# Patient Record
Sex: FEMALE | Race: BLACK OR AFRICAN AMERICAN | NOT HISPANIC OR LATINO | Employment: FULL TIME | URBAN - METROPOLITAN AREA
[De-identification: names, ages, dates, MRNs, and addresses within clinical notes are randomized per-mention and may not be internally consistent; named-entity substitution may affect disease eponyms.]

---

## 2017-02-01 RX ORDER — LEVOTHYROXINE SODIUM 75 UG/1
TABLET ORAL
Qty: 90 TABLET | Refills: 0 | OUTPATIENT
Start: 2017-02-01

## 2017-02-07 ENCOUNTER — OFFICE VISIT (OUTPATIENT)
Dept: FAMILY MEDICINE | Facility: CLINIC | Age: 32
End: 2017-02-07
Payer: COMMERCIAL

## 2017-02-07 VITALS
BODY MASS INDEX: 27.46 KG/M2 | HEART RATE: 80 BPM | HEIGHT: 69 IN | WEIGHT: 185.44 LBS | TEMPERATURE: 99 F | DIASTOLIC BLOOD PRESSURE: 70 MMHG | SYSTOLIC BLOOD PRESSURE: 118 MMHG

## 2017-02-07 DIAGNOSIS — E03.1 CONGENITAL HYPOTHYROIDISM WITHOUT GOITER: Primary | ICD-10-CM

## 2017-02-07 PROCEDURE — 99999 PR PBB SHADOW E&M-EST. PATIENT-LVL III: CPT | Mod: PBBFAC,,, | Performed by: NURSE PRACTITIONER

## 2017-02-07 PROCEDURE — 99213 OFFICE O/P EST LOW 20 MIN: CPT | Mod: S$GLB,,, | Performed by: NURSE PRACTITIONER

## 2017-02-07 RX ORDER — LEVOTHYROXINE SODIUM 75 UG/1
75 TABLET ORAL
Qty: 30 TABLET | Refills: 1 | Status: SHIPPED | OUTPATIENT
Start: 2017-02-07 | End: 2017-03-17 | Stop reason: SDUPTHER

## 2017-02-07 NOTE — PROGRESS NOTES
Subjective:       Patient ID: Emily Pelayo is a 31 y.o. female.    Chief Complaint: thyroid check up    HPI Comments: Here for thyroid check up, states no new sxs. Has been out of thyroid meds since Friday.    Vitals:    02/07/17 0919   BP: 118/70   Pulse: 80   Temp: 98.7 °F (37.1 °C)     Review of Systems   Constitutional: Negative for chills and fever.   Respiratory: Negative for shortness of breath.    Cardiovascular: Negative for chest pain and palpitations.   Gastrointestinal:        Hx of IBS   Genitourinary:        Has been on birth control to regulate her cycle, sees Dr. Sargent for GYN care   Musculoskeletal: Negative.    Skin: Negative.        Past Medical History   Diagnosis Date    Endometriosis     Neuromuscular disorder      nerve damage of right foot after surgery    PCOS (polycystic ovarian syndrome)     Thyroid disease      Objective:      Physical Exam   Constitutional: She is oriented to person, place, and time. She appears well-developed and well-nourished.   HENT:   Head: Normocephalic.   Eyes: Pupils are equal, round, and reactive to light.   Neck: No thyromegaly present.   Cardiovascular: Normal rate, regular rhythm and normal heart sounds.    No murmur heard.  Pulmonary/Chest: Effort normal and breath sounds normal.   Abdominal: Soft. Bowel sounds are normal.   Musculoskeletal: Normal range of motion.   Neurological: She is alert and oriented to person, place, and time.   Skin: Skin is warm and dry.   Nursing note and vitals reviewed.      Assessment:       1. Congenital hypothyroidism without goiter        Plan:       Congenital hypothyroidism without goiter  -     TSH; Future; Expected date: 2/7/17  -     levothyroxine (SYNTHROID) 75 MCG tablet; Take 1 tablet (75 mcg total) by mouth before breakfast.  Dispense: 30 tablet; Refill: 1    labs as ordered in 7-10 days since she has been out of her medication, refill as above, further refills pending lab results        Return in about 6  months (around 8/7/2017). Dictation #1  MRN:[id number]  [facility]:[id number]

## 2017-02-07 NOTE — MR AVS SNAPSHOT
[facility]  [address]  [facility] [address]  Phone: [phone]  Fax: [phone]                  Emily Pelayo   2017 9:20 AM   Office Visit    Description:  Female : 1985   Provider:  EDER Vaughn   Department:  [facility]           Reason for Visit     thyroid check up           Diagnoses this Visit        Comments    Congenital hypothyroidism without goiter    -  Primary            To Do List           Goals (5 Years of Data)     None       These Medications        Disp Refills Start End    levothyroxine (SYNTHROID) 75 MCG tablet 30 tablet 1 2017     Take 1 tablet (75 mcg total) by mouth before breakfast. - Oral    Pharmacy: [facility] [address] [place] [address] #: [phone]         [facility] On Call     [facility] On Call Nurse Care Line -  Assistance  Registered nurses in the [facility] On Call Center provide clinical advisement, health education, appointment booking, and other advisory services.  Call for this free service at [phone].             Medications           Message regarding Medications     Verify the changes and/or additions to your medication regime listed below are the same as discussed with your clinician today.  If any of these changes or additions are incorrect, please notify your healthcare provider.             Verify that the below list of medications is an accurate representation of the medications you are currently taking.  If none reported, the list may be blank. If incorrect, please contact your healthcare provider. Carry this list with you in case of emergency.           Current Medications     CITRANATAL HARMONY, IRON FUM, 27 mg iron-1 mg -50 mg-260 mg Cap TAKE 1 CAPSULE BY MOUTH EVERY DAY    fluocinonide (LIDEX) 0.05 % external solution APPLY THIN FILM TO SCALP EVERY NIGHT AT BEDTIME AS NEEDED FOR ITCHING OR SCALING    ibuprofen  "(ADVIL,MOTRIN) 800 MG tablet Take 800 mg by mouth every 6 (six) hours as needed.     ketoconazole (NIZORAL) 2 % shampoo WASH HAIR WITH MEDICATED SHOMPOO AT LEAST TWICE A WEEK LET SIT ON SCALP AT LEAST 5 MINUTES PRIOR TO RINSING    LEVORA-28 0.15-0.03 mg per tablet TK 1 T PO QD    levothyroxine (SYNTHROID) 75 MCG tablet Take 1 tablet (75 mcg total) by mouth before breakfast.    progesterone (PROMETRIUM) 100 MG capsule TAKE ONE CAPSULE BY MOUTH EVERY NIGHT AT BEDTIME. ONE BY MOUTH DAILY ON DAYS 10-25 EACH CYCLE           Clinical Reference Information           Your Vitals Were     BP Pulse Temp Height Weight BMI    118/70 80 98.7 °F (37.1 °C) (Oral) 5' 9" (1.753 m) 84.1 kg (185 lb 6.5 oz) 27.38 kg/m2      Blood Pressure          Most Recent Value    BP  118/70      Allergies as of 2/7/2017     No Known Allergies      Immunizations Administered on Date of Encounter - 2/7/2017     None      Orders Placed During Today's Visit     Future Labs/Procedures Expected by Expires    TSH  2/7/2017 4/8/2018      Language Assistance Services     ATTENTION: Language assistance services are available, free of charge. Please call [phone].      ATENCIÓN: Si kathyla syeda, tiene a hernandez disposición servicios gratuitos de asistencia lingüística. Llame al [phone].     CHUCK Ý: N?u b?n nói Ti?ng Vi?t, có các d?ch v? h? tr? ngôn ng? mi?n phí dành cho b?n. G?i s? [phone].         [facility] complies with applicable Federal civil rights laws and does not discriminate on the basis of race, color, national origin, age, disability, or sex.        "

## 2017-03-04 ENCOUNTER — LAB VISIT (OUTPATIENT)
Dept: LAB | Facility: HOSPITAL | Age: 32
End: 2017-03-04
Attending: NURSE PRACTITIONER
Payer: COMMERCIAL

## 2017-03-04 DIAGNOSIS — E03.1 CONGENITAL HYPOTHYROIDISM WITHOUT GOITER: ICD-10-CM

## 2017-03-04 LAB
T4 FREE SERPL-MCNC: 1.33 NG/DL
TSH SERPL DL<=0.005 MIU/L-ACNC: 0.39 UIU/ML

## 2017-03-04 PROCEDURE — 84439 ASSAY OF FREE THYROXINE: CPT

## 2017-03-04 PROCEDURE — 36415 COLL VENOUS BLD VENIPUNCTURE: CPT | Mod: PO

## 2017-03-04 PROCEDURE — 84443 ASSAY THYROID STIM HORMONE: CPT

## 2017-03-08 ENCOUNTER — PATIENT MESSAGE (OUTPATIENT)
Dept: FAMILY MEDICINE | Facility: CLINIC | Age: 32
End: 2017-03-08

## 2017-03-13 NOTE — TELEPHONE ENCOUNTER
----- Message from Carolyne Bill sent at 3/13/2017 12:30 PM CDT -----  Contact: Rnju-[phone]  Patient called asking to speak with the nurse regarding lab results. Patient want to know if she should continue to take medication for thyroid.Thanks!

## 2017-03-14 NOTE — TELEPHONE ENCOUNTER
Dr LOVE   This pt is calling to ask since she has low thyroid will she continue on the same med or will she need to increase.

## 2017-03-14 NOTE — TELEPHONE ENCOUNTER
----- Message from Dolores Jones sent at 3/13/2017  1:45 PM CDT -----  Patient is waiting for office to call her back. Patient wants to know how much thyroid medication she should be taking. Her results (she believes because nobody explained what her results meant) said she was low thyroid. Could somebody please call patient and let her know how much medication should she be taking at [phone].

## 2017-03-16 ENCOUNTER — DOCUMENTATION ONLY (OUTPATIENT)
Dept: FAMILY MEDICINE | Facility: CLINIC | Age: 32
End: 2017-03-16

## 2017-03-16 NOTE — TELEPHONE ENCOUNTER
----- Message from Ivy Stevenson sent at 3/16/2017  7:42 AM CDT -----  Patient stated she left several messages requesting her lab test results (thyroid)stated has not heard from anyone/please call patient back at [phone] to advise. (Patient is agitated)

## 2017-03-16 NOTE — PROGRESS NOTES
Pre-Visit Chart Review  For Appointment Scheduled on 3/17/17.    Health Maintenance Due   Topic Date Due    TETANUS VACCINE  06/28/2001    Influenza Vaccine  08/01/2016

## 2017-03-17 ENCOUNTER — LAB VISIT (OUTPATIENT)
Dept: LAB | Facility: HOSPITAL | Age: 32
End: 2017-03-17
Attending: FAMILY MEDICINE
Payer: COMMERCIAL

## 2017-03-17 ENCOUNTER — OFFICE VISIT (OUTPATIENT)
Dept: FAMILY MEDICINE | Facility: CLINIC | Age: 32
End: 2017-03-17
Payer: COMMERCIAL

## 2017-03-17 VITALS
BODY MASS INDEX: 27.46 KG/M2 | DIASTOLIC BLOOD PRESSURE: 80 MMHG | SYSTOLIC BLOOD PRESSURE: 110 MMHG | TEMPERATURE: 99 F | WEIGHT: 185.44 LBS | HEIGHT: 69 IN | HEART RATE: 80 BPM

## 2017-03-17 DIAGNOSIS — E03.9 HYPOTHYROIDISM, UNSPECIFIED TYPE: ICD-10-CM

## 2017-03-17 DIAGNOSIS — R53.83 FATIGUE, UNSPECIFIED TYPE: ICD-10-CM

## 2017-03-17 DIAGNOSIS — E03.9 HYPOTHYROIDISM, UNSPECIFIED TYPE: Primary | ICD-10-CM

## 2017-03-17 LAB
25(OH)D3+25(OH)D2 SERPL-MCNC: 42 NG/ML
BASOPHILS # BLD AUTO: 0.04 K/UL
BASOPHILS NFR BLD: 0.7 %
DIFFERENTIAL METHOD: ABNORMAL
EOSINOPHIL # BLD AUTO: 0 K/UL
EOSINOPHIL NFR BLD: 0.7 %
ERYTHROCYTE [DISTWIDTH] IN BLOOD BY AUTOMATED COUNT: 12.6 %
FERRITIN SERPL-MCNC: 17 NG/ML
FOLATE SERPL-MCNC: 13.4 NG/ML
HCT VFR BLD AUTO: 39.4 %
HGB BLD-MCNC: 12.1 G/DL
IRON SERPL-MCNC: 52 UG/DL
LYMPHOCYTES # BLD AUTO: 1.9 K/UL
LYMPHOCYTES NFR BLD: 31 %
MCH RBC QN AUTO: 29.3 PG
MCHC RBC AUTO-ENTMCNC: 30.7 %
MCV RBC AUTO: 95 FL
MONOCYTES # BLD AUTO: 0.3 K/UL
MONOCYTES NFR BLD: 4.3 %
NEUTROPHILS # BLD AUTO: 3.9 K/UL
NEUTROPHILS NFR BLD: 63.3 %
PLATELET # BLD AUTO: 316 K/UL
PMV BLD AUTO: 11.2 FL
RBC # BLD AUTO: 4.13 M/UL
SATURATED IRON: 11 %
TOTAL IRON BINDING CAPACITY: 460 UG/DL
TRANSFERRIN SERPL-MCNC: 311 MG/DL
TSH SERPL DL<=0.005 MIU/L-ACNC: 1.13 UIU/ML
VIT B12 SERPL-MCNC: 357 PG/ML
WBC # BLD AUTO: 6.09 K/UL

## 2017-03-17 PROCEDURE — 82728 ASSAY OF FERRITIN: CPT

## 2017-03-17 PROCEDURE — 1160F RVW MEDS BY RX/DR IN RCRD: CPT | Mod: S$GLB,,, | Performed by: FAMILY MEDICINE

## 2017-03-17 PROCEDURE — 82306 VITAMIN D 25 HYDROXY: CPT

## 2017-03-17 PROCEDURE — 82746 ASSAY OF FOLIC ACID SERUM: CPT

## 2017-03-17 PROCEDURE — 99999 PR PBB SHADOW E&M-EST. PATIENT-LVL III: CPT | Mod: PBBFAC,,, | Performed by: FAMILY MEDICINE

## 2017-03-17 PROCEDURE — 83540 ASSAY OF IRON: CPT

## 2017-03-17 PROCEDURE — 84443 ASSAY THYROID STIM HORMONE: CPT

## 2017-03-17 PROCEDURE — 36415 COLL VENOUS BLD VENIPUNCTURE: CPT | Mod: PO

## 2017-03-17 PROCEDURE — 82607 VITAMIN B-12: CPT

## 2017-03-17 PROCEDURE — 85025 COMPLETE CBC W/AUTO DIFF WBC: CPT

## 2017-03-17 PROCEDURE — 99214 OFFICE O/P EST MOD 30 MIN: CPT | Mod: S$GLB,,, | Performed by: FAMILY MEDICINE

## 2017-03-17 RX ORDER — LEVOTHYROXINE SODIUM 75 UG/1
75 TABLET ORAL
Qty: 90 TABLET | Refills: 3 | Status: SHIPPED | OUTPATIENT
Start: 2017-03-17 | End: 2017-05-12 | Stop reason: SDUPTHER

## 2017-03-17 RX ORDER — CHOLECALCIFEROL (VITAMIN D3) 50 MCG
2000 TABLET ORAL DAILY
COMMUNITY

## 2017-03-17 NOTE — MR AVS SNAPSHOT
[facility]  [address]  [place] [address]  Phone: [phone]  Fax: [phone]                  Emily Pelayo   3/17/2017 8:00 AM   Office Visit    Description:  Female : 1985   Provider:  Olga Urbina MD   Department:  [facility]           Reason for Visit     Establish Care           Diagnoses this Visit        Comments    Fatigue, unspecified type    -  Primary     Hypothyroidism, unspecified type                To Do List           Future Appointments        Provider Department Dept Phone    3/17/2017 11:00 AM [facility] - [facility] [phone]    2017 8:00 AM Olga Urbina MD [facility] [phone]      Goals (5 Years of Data)     None      Follow-Up and Disposition     Return in about 6 months (around 2017) for hypothyroidism.       These Medications        Disp Refills Start End    levothyroxine (SYNTHROID) 75 MCG tablet 90 tablet 3 3/17/2017     Take 1 tablet (75 mcg total) by mouth before breakfast. - Oral    Pharmacy: [facility] [address] [place] [address] AT [facility] Ph #: [phone]         [facility] On [facility]     [facility] On Call Nurse Care Line -  Assistance  Registered nurses in the [facility] On Call Center provide clinical advisement, health education, appointment booking, and other advisory services.  Call for this free service at [phone].             Medications           Message regarding Medications     Verify the changes and/or additions to your medication regime listed below are the same as discussed with your clinician today.  If any of these changes or additions are incorrect, please notify your healthcare provider.             Verify that the below list of medications is an accurate representation of the medications you are currently taking.  If none reported, the list may be blank. If incorrect, please contact your healthcare provider.  "Carry this list with you in case of emergency.           Current Medications     CITRANATAL HARMONY, IRON FUM, 27 mg iron-1 mg -50 mg-260 mg Cap TAKE 1 CAPSULE BY MOUTH EVERY DAY    fluocinonide (LIDEX) 0.05 % external solution APPLY THIN FILM TO SCALP EVERY NIGHT AT BEDTIME AS NEEDED FOR ITCHING OR SCALING    ibuprofen (ADVIL,MOTRIN) 800 MG tablet Take 800 mg by mouth every 6 (six) hours as needed.     ketoconazole (NIZORAL) 2 % shampoo WASH HAIR WITH MEDICATED SHOMPOO AT LEAST TWICE A WEEK LET SIT ON SCALP AT LEAST 5 MINUTES PRIOR TO RINSING    LEVORA-28 0.15-0.03 mg per tablet TK 1 T PO QD    levothyroxine (SYNTHROID) 75 MCG tablet Take 1 tablet (75 mcg total) by mouth before breakfast.    progesterone (PROMETRIUM) 100 MG capsule TAKE ONE CAPSULE BY MOUTH EVERY NIGHT AT BEDTIME. ONE BY MOUTH DAILY ON DAYS 10-25 EACH CYCLE           Clinical Reference Information           Your Vitals Were     BP Pulse Temp Height Weight Last Period    110/80 (BP Location: Left arm, Patient Position: Sitting, BP Method: Automatic) 80 98.6 °F (37 °C) (Oral) 5' 8.5" (1.74 m) 84.1 kg (185 lb 6.5 oz) 03/13/2017    BMI                27.78 kg/m2          Blood Pressure          Most Recent Value    BP  110/80      Allergies as of 3/17/2017     No Known Allergies      Immunizations Administered on Date of Encounter - 3/17/2017     None      Orders Placed During Today's Visit     Future Labs/Procedures Expected by Expires    CBC auto differential  3/17/2017 5/16/2018    Ferritin  3/17/2017 5/16/2018    Folate  3/17/2017 5/16/2018    Iron and TIBC  3/17/2017 5/16/2018    TSH  3/17/2017 3/17/2018    Vitamin B12  3/17/2017 5/16/2018    Vitamin D  3/17/2017 5/16/2018      Language Assistance Services     ATTENTION: Language assistance services are available, free of charge. Please call [phone].      ATENCIÓN: Si habla español, tiene a hernandez disposición servicios gratuitos de asistencia lingüística. Llame al [phone].     CHÚ Ý: N?u " b?n nói Ti?ng Vi?t, có các d?ch v? h? tr? ngôn ng? mi?n phí dành cho b?n. G?i s? [phone].         [facility] complies with applicable Federal civil rights laws and does not discriminate on the basis of race, color, national origin, age, disability, or sex.

## 2017-03-17 NOTE — PROGRESS NOTES
CHIEF COMPLAINT:  Establish care, hypothyroidism      HISTORY OF PRESENT ILLNESS:  Emily Pelayo is a 31 y.o. female who presents to clinic as a new patient to me for evaluation of hypothyroidism. She is on levothyroxine 75 mcg daily. She had a history of Grave's disease but was then started on thyroid supplementation with pregnancy. Recent TSH was slightly low at 0.394.  She describes excessive fatigue, she has a history of anemia and has heavy periods. She is currently taking a multivitamin and vitamin D 2000 units daily.       REVIEW OF SYSTEMS:  The patient denies any fever, chills, night sweats, headaches, vision changes, difficulty speaking or swallowing, decreased hearing, weight loss, weight gain, chest pain, palpitations, shortness of breath, cough, nausea, vomiting, abdominal pain, dysuria, diarrhea, constipation, hematuria, hematochezia, melena, changes in her hair, skin, nails, numbness or weakness in her extremities, erythema, pain or swelling over any of her joints, myalgia, swollen glands, easy bruising, edema, symptoms of anxiety or depression.       MEDICATIONS:   Reviewed and/or reconciled in EPIC    ALLERGIES:  Reviewed and/or reconciled in EPIC    PAST MEDICAL/SURGICAL HISTORY:   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    PCOS (polycystic ovarian syndrome)     Thyroid disease       Past Surgical History:   Procedure Laterality Date    bladder suspension      dx lap      hammer toe Right 11/19/2015    heavenly toe repeair with callus filing    TONSILLECTOMY      VAGINAL HYSTERECTOMY W/ ANTERIOR AND POSTERIOR VAGINAL REPAIR         FAMILY HISTORY:    Family History   Problem Relation Age of Onset    Ovarian cancer Neg Hx     Breast cancer Neg Hx     Eczema Neg Hx     Lupus Neg Hx     Psoriasis Neg Hx     Melanoma Neg Hx        SOCIAL HISTORY:    Social History     Social History    Marital status:  "Single     Spouse name: N/A    Number of children: N/A    Years of education: N/A     Occupational History    Not on file.     Social History Main Topics    Smoking status: Never Smoker    Smokeless tobacco: Never Used    Alcohol use No    Drug use: No    Sexual activity: Yes     Partners: Male     Birth control/ protection: Condom     Other Topics Concern    Not on file     Social History Narrative       PHYSICAL EXAM:  VITAL SIGNS:   Vitals:    03/17/17 0805   BP: 110/80   BP Location: Left arm   Patient Position: Sitting   BP Method: Automatic   Pulse: 80   Temp: 98.6 °F (37 °C)   TempSrc: Oral   Weight: 84.1 kg (185 lb 6.5 oz)   Height: 5' 8.5" (1.74 m)     GENERAL:  Patient appears well nourished, sitting on exam table, in no acute distress.  HEENT:  Atraumatic, normocephalic, PERRLA, EOMI, no conjunctival injection, sclerae are anicteric, normal external auditory canals,TMs clear b/l, gross hearing intact to whisper, MMM, no oropharygneal erythema or exudate.  NECK:  Supple, normal ROM, trachea is midline , no supraclavicular or cervical LAD or masses palpated.  Thyroid gland not palpable.  CARDIOVASCULAR:  RRR, normal S1 and S2, no m/r/g.  RESPIRATORY:  CTA b/l, no wheezes, rhonchi, rales.  No increased work of breathing, no  use of accessory muscles.  ABDOMEN:  Soft, nontender, nondistended, normoactive bowel sounds in all four quadrants, no rebound or guarding, no HSM or masses palpated.  Normal percussion.  EXTREMITIES:  2+ DP pulses b/l, no edema.  SKIN:  Warm, no lesions on exposed skin.  NEUROMUSCULAR:  Cranial nerves II-XII grossly intact. No clubbing or cyanosis of digits/nails.  Steady gait.  PSYCH:  Patient is alert and oriented to person, time, place. They are appropriately dressed and groomed. There is normal eye contact. Rate and tone of speech is normal. Normal insight, judgement. Normal thought content and process.         ASSESSMENT/PLAN: This is a 31 y.o. female who presents to " clinic for evaluation of the following concerns  1. Hypothyroidism: repeat TSH, if still decreased may need to decrease levothyroxine dose  2. Fatigue: CBC, TSH, iron and ferritin levels, B12, folate        Olga Urbina MD

## 2017-03-28 ENCOUNTER — TELEPHONE (OUTPATIENT)
Dept: FAMILY MEDICINE | Facility: CLINIC | Age: 32
End: 2017-03-28

## 2017-03-28 DIAGNOSIS — E61.1 LOW IRON: Primary | ICD-10-CM

## 2017-03-28 NOTE — TELEPHONE ENCOUNTER
TSH is in goal range, no need to adjust levothyroxine at this time.  there is low ferritin and saturated iron. vitamin D level is at goal. B12 and folate are normal. There is no anemia.      needs to take ferrous sulfate 325 mg 1-2 tablets a day with vitamin C and have repeat labs in 3 months to recheck iron levels. This may help with her fatigue

## 2017-03-28 NOTE — TELEPHONE ENCOUNTER
----- Message from Sharonda Gerardo sent at 3/28/2017  1:23 PM CDT -----  Contact: self  Patient wants to speak with a nurse results. Please call back at [phone]

## 2017-05-12 ENCOUNTER — PATIENT MESSAGE (OUTPATIENT)
Dept: OBSTETRICS AND GYNECOLOGY | Facility: CLINIC | Age: 32
End: 2017-05-12

## 2017-05-12 DIAGNOSIS — E03.9 HYPOTHYROIDISM, UNSPECIFIED TYPE: ICD-10-CM

## 2017-05-12 RX ORDER — LEVOTHYROXINE SODIUM 75 UG/1
75 TABLET ORAL
Qty: 90 TABLET | Refills: 3 | Status: SHIPPED | OUTPATIENT
Start: 2017-05-12 | End: 2018-06-04 | Stop reason: SDUPTHER

## 2017-05-12 NOTE — TELEPHONE ENCOUNTER
----- Message from Keren Vazquez sent at 5/12/2017  8:09 AM CDT -----  Contact: self  Patient needs a refill on tyroid meds, states she has 1 left.  Please call back at [phone] (home)   [facility]-[phone]

## 2017-05-12 NOTE — TELEPHONE ENCOUNTER
I offered the patient an appointment to get her annual exam. She is very upset. She is demanding a refill now. Please advise

## 2017-05-12 NOTE — TELEPHONE ENCOUNTER
Patient called at 2:40 pm reporting she over slept after being added for 1 pm today. Advised she will need to reschedule to next available appointment.

## 2017-05-12 NOTE — TELEPHONE ENCOUNTER
----- Message from Pallavi Owens RT sent at 5/12/2017  2:52 PM CDT -----  Contact: pt    pt , requesting to inform she over slept from work last night and missed her appt today and would like to be rescheduled can your schedule as soon as possible, thanks.

## 2017-05-15 RX ORDER — LEVONORGESTREL AND ETHINYL ESTRADIOL 0.15-0.03
1 KIT ORAL DAILY
Qty: 30 TABLET | Refills: 3 | Status: SHIPPED | OUTPATIENT
Start: 2017-05-15 | End: 2017-08-02 | Stop reason: SDUPTHER

## 2017-05-29 ENCOUNTER — OFFICE VISIT (OUTPATIENT)
Dept: OBSTETRICS AND GYNECOLOGY | Facility: CLINIC | Age: 32
End: 2017-05-29
Payer: COMMERCIAL

## 2017-05-29 VITALS
DIASTOLIC BLOOD PRESSURE: 68 MMHG | BODY MASS INDEX: 27.15 KG/M2 | WEIGHT: 181.19 LBS | SYSTOLIC BLOOD PRESSURE: 114 MMHG

## 2017-05-29 DIAGNOSIS — Z01.419 ENCOUNTER FOR WELL WOMAN EXAM WITH ROUTINE GYNECOLOGICAL EXAM: Primary | ICD-10-CM

## 2017-05-29 DIAGNOSIS — N81.4 UTERINE PROLAPSE: ICD-10-CM

## 2017-05-29 PROCEDURE — 88175 CYTOPATH C/V AUTO FLUID REDO: CPT

## 2017-05-29 PROCEDURE — 99395 PREV VISIT EST AGE 18-39: CPT | Mod: S$GLB,,, | Performed by: SPECIALIST

## 2017-05-29 PROCEDURE — 99999 PR PBB SHADOW E&M-EST. PATIENT-LVL III: CPT | Mod: PBBFAC,,, | Performed by: SPECIALIST

## 2017-05-29 RX ORDER — FLUCONAZOLE 100 MG/1
100 TABLET ORAL WEEKLY
Qty: 30 TABLET | Refills: 0 | Status: SHIPPED | OUTPATIENT
Start: 2017-05-29 | End: 2018-01-25 | Stop reason: SDUPTHER

## 2017-05-29 NOTE — PROGRESS NOTES
30 yo BF presents for annual gyn evaluation. No acute gyn complaints with exception of vaginal pressure. Pt with known hx of uterine prolapse.  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    PCOS (polycystic ovarian syndrome)     Thyroid disease        Past Surgical History:   Procedure Laterality Date    bladder suspension      dx lap      hammer toe Right 11/19/2015    heavenly toe repeair with callus filing    TONSILLECTOMY         Family History   Problem Relation Age of Onset    Thyroid disease Mother      hyperthyroidism    Hypertension Mother     Hypertension Sister     Heart disease Maternal Grandmother     Ovarian cancer Neg Hx     Breast cancer Neg Hx     Eczema Neg Hx     Lupus Neg Hx     Psoriasis Neg Hx     Melanoma Neg Hx     Cancer Neg Hx        Social History     Social History    Marital status: Single     Spouse name: N/A    Number of children: N/A    Years of education: N/A     Social History Main Topics    Smoking status: Never Smoker    Smokeless tobacco: Never Used    Alcohol use No    Drug use: No    Sexual activity: Yes     Partners: Male     Birth control/ protection: Condom     Other Topics Concern    None     Social History Narrative    None       Current Outpatient Prescriptions   Medication Sig Dispense Refill    cholecalciferol, vitamin D3, (VITAMIN D3) 2,000 unit Tab Take 2,000 Units by mouth once daily.      fluocinonide (LIDEX) 0.05 % external solution APPLY THIN FILM TO SCALP EVERY NIGHT AT BEDTIME AS NEEDED FOR ITCHING OR SCALING 60 mL 0    ibuprofen (ADVIL,MOTRIN) 800 MG tablet Take 800 mg by mouth every 6 (six) hours as needed.   0    ketoconazole (NIZORAL) 2 % shampoo WASH HAIR WITH MEDICATED SHOMPOO AT LEAST TWICE A WEEK LET SIT ON SCALP AT LEAST 5 MINUTES PRIOR TO RINSING 120 mL 0    LEVORA-28 0.15-0.03 mg per tablet Take 1 tablet by mouth once daily. 30 tablet 3     levothyroxine (SYNTHROID) 75 MCG tablet Take 1 tablet (75 mcg total) by mouth before breakfast. 90 tablet 3    PNV59-iron,carb,fum-FA-DSS-dha (CITRANATAL HARMONY, IRON FUM,) 27 mg iron-1 mg -50 mg-260 mg Cap Take 1 capsule by mouth once daily. 100 capsule 0    progesterone (PROMETRIUM) 100 MG capsule TAKE ONE CAPSULE BY MOUTH EVERY NIGHT AT BEDTIME. ONE BY MOUTH DAILY ON DAYS 10-25 EACH CYCLE 90 capsule 3     No current facility-administered medications for this visit.        Review of patient's allergies indicates:  No Known Allergies    Review of System:   General: no chills, fever, night sweats, weight gain or weight loss  Psychological: no depression or suicidal ideation  Breasts: no new or changing breast lumps, nipple discharge or masses.  Respiratory: no cough, shortness of breath, or wheezing  Cardiovascular: no chest pain or dyspnea on exertion  Gastrointestinal: no abdominal pain, change in bowel habits, or black or bloody stools  Genito-Urinary: no incontinence, urinary frequency/urgency or , pelvic pain or abnormal vaginal bleeding.RELATIVE VAGINAL PRESSURE  Musculoskeletal: no gait disturbance or muscular weakness    General Appearance:  Alert, cooperative, no distress, appears stated age   Head:  Normocephalic, without obvious abnormality, atraumatic   Eyes:  PERRL, conjunctiva/corneas clear, EOM's intact, fundi benign, both eyes   Ears:  Normal TM's and external ear canals, both ears   Nose: Nares normal, septum midline,mucosa normal, no drainage or sinus tenderness   Throat: Lips, mucosa, and tongue normal; teeth and gums normal   Neck: Supple, symmetrical, trachea midline, no adenopathy;  thyroid: not enlarged, symmetric, no tenderness/mass/nodules; no carotid bruit or JVD   Back:   Symmetric, no curvature, ROM normal, no CVA tenderness   Lungs:   Clear to auscultation bilaterally, respirations unlabored   Breasts:  No masses or tenderness   Heart:  Regular rate and rhythm, S1 and S2 normal,  no murmur, rub, or gallop   Abdomen:   Soft, non-tender, bowel sounds active all four quadrants,  no masses, no organomegaly    Genitourinary:   External rectal exam shows no thrombosed external hemorrhoids.   Pelvic exam was performed with patient supine.  No labial fusion.  There is no rash, lesion or injury on the right labia.  There is no rash, lesion or injury on the left labia.  No bleeding and no signs of injury around the vaginal introitus, urethra is without lesions and well supported. The cervix is visualized with no discharge, lesions or friability.  No vaginal discharge found.  No significant Cystocele, Enterocele or rectocele, and uterus displaying Grade 1 prolapse.  Bimanual exam:  The urethra is normal to palpation and there are no palpable vaginal wall masses.  Uterus is not deviated, not enlarged, not fixed, normal shape and not tender.  Cervix exhibits no motion tenderness.   Right adnexum displays no mass and no tenderness.  Left adnexum displays no mass and no tenderness.   Extremities: Extremities normal, atraumatic, no cyanosis or edema   Pulses: 2+ and symmetric   Skin: Skin color, texture, turgor normal, no rashes or lesions   Lymph nodes: Cervical, supraclavicular, and axillary nodes normal   Neurologic: Normal     PAP submitted    I discussed prolapse and supportive care measures   RTO 1 year/prn

## 2017-06-29 ENCOUNTER — LAB VISIT (OUTPATIENT)
Dept: LAB | Facility: HOSPITAL | Age: 32
End: 2017-06-29
Attending: FAMILY MEDICINE
Payer: COMMERCIAL

## 2017-06-29 ENCOUNTER — TELEPHONE (OUTPATIENT)
Dept: FAMILY MEDICINE | Facility: CLINIC | Age: 32
End: 2017-06-29

## 2017-06-29 DIAGNOSIS — E61.1 LOW IRON: ICD-10-CM

## 2017-06-29 LAB
BASOPHILS # BLD AUTO: 0.06 K/UL
BASOPHILS NFR BLD: 1 %
DIFFERENTIAL METHOD: NORMAL
EOSINOPHIL # BLD AUTO: 0.1 K/UL
EOSINOPHIL NFR BLD: 1.5 %
ERYTHROCYTE [DISTWIDTH] IN BLOOD BY AUTOMATED COUNT: 12.6 %
FERRITIN SERPL-MCNC: 20 NG/ML
HCT VFR BLD AUTO: 37.8 %
HGB BLD-MCNC: 12.1 G/DL
IRON SERPL-MCNC: 62 UG/DL
LYMPHOCYTES # BLD AUTO: 2.2 K/UL
LYMPHOCYTES NFR BLD: 36.7 %
MCH RBC QN AUTO: 30.1 PG
MCHC RBC AUTO-ENTMCNC: 32 %
MCV RBC AUTO: 94 FL
MONOCYTES # BLD AUTO: 0.5 K/UL
MONOCYTES NFR BLD: 8.2 %
NEUTROPHILS # BLD AUTO: 3.1 K/UL
NEUTROPHILS NFR BLD: 52.4 %
PLATELET # BLD AUTO: 305 K/UL
PMV BLD AUTO: 11 FL
RBC # BLD AUTO: 4.02 M/UL
SATURATED IRON: 14 %
TOTAL IRON BINDING CAPACITY: 448 UG/DL
TRANSFERRIN SERPL-MCNC: 303 MG/DL
WBC # BLD AUTO: 5.96 K/UL

## 2017-06-29 PROCEDURE — 36415 COLL VENOUS BLD VENIPUNCTURE: CPT | Mod: PO

## 2017-06-29 PROCEDURE — 85025 COMPLETE CBC W/AUTO DIFF WBC: CPT

## 2017-06-29 PROCEDURE — 82728 ASSAY OF FERRITIN: CPT

## 2017-06-29 PROCEDURE — 83540 ASSAY OF IRON: CPT

## 2017-06-29 NOTE — TELEPHONE ENCOUNTER
Patient came in for labs for repeat iron studies from march last TSH was normal in march does patient need to have thyroid labs.

## 2017-07-25 ENCOUNTER — PATIENT MESSAGE (OUTPATIENT)
Dept: FAMILY MEDICINE | Facility: CLINIC | Age: 32
End: 2017-07-25

## 2017-07-25 NOTE — TELEPHONE ENCOUNTER
Dr. Urbina---please review pts My Chart message and advise. Does pt need an additional Iron supplement with the prenatal vitamins? ThanksRadha

## 2017-07-25 NOTE — TELEPHONE ENCOUNTER
Does her prenatal vitamin have iron in it? It it does, that is enough.  It it doesn't, needs to add ferrous sulfate 325 mg daily with the vitamin C

## 2017-08-04 ENCOUNTER — PATIENT MESSAGE (OUTPATIENT)
Dept: OBSTETRICS AND GYNECOLOGY | Facility: CLINIC | Age: 32
End: 2017-08-04

## 2017-08-07 RX ORDER — LEVONORGESTREL AND ETHINYL ESTRADIOL 0.15-0.03
KIT ORAL
Qty: 28 TABLET | Refills: 0 | Status: SHIPPED | OUTPATIENT
Start: 2017-08-07 | End: 2018-01-25 | Stop reason: SDUPTHER

## 2017-09-11 ENCOUNTER — DOCUMENTATION ONLY (OUTPATIENT)
Dept: FAMILY MEDICINE | Facility: CLINIC | Age: 32
End: 2017-09-11

## 2017-09-11 NOTE — PROGRESS NOTES
Pre-Visit Chart Review  For Appointment Scheduled on 9/20/17.    Health Maintenance Due   Topic Date Due    Influenza Vaccine  08/01/2017

## 2017-09-20 ENCOUNTER — TELEPHONE (OUTPATIENT)
Dept: FAMILY MEDICINE | Facility: CLINIC | Age: 32
End: 2017-09-20

## 2017-09-20 NOTE — TELEPHONE ENCOUNTER
Pt was scheduled to see Dr. Urbina today at 8 am. Pt was not in our lobby by 820 am, so Dr. Urbina said that she was not going to be able to be seen if she showed up. At 830 am we received an instant message from the  that pt was on the other side of the clinic in [address], but that she did not sign the clipboard to get checked in. Asked Dr. Urbina if pt could be seen, and again Dr. Urbina stated that she could not see pt today, her schedule is full. Offered pt appt this Friday afternoon, pt declined, offered pt 3 different appts next week, pt declined those too. Pt states that she did not even know why she had to come to an appt today, and that if she could not see her PCP right now, she was going to find a new doctor. Apologized to pt and advised her that Dr. Urbina is just back from being out of the office for 10 days and her schedule is full until Friday. Pt again stated that she was going to find a new doctor and left the building. Advised Dr. Urbina. Thanks, Radha

## 2017-09-25 ENCOUNTER — OFFICE VISIT (OUTPATIENT)
Dept: FAMILY MEDICINE | Facility: CLINIC | Age: 32
End: 2017-09-25
Payer: COMMERCIAL

## 2017-09-25 ENCOUNTER — LAB VISIT (OUTPATIENT)
Dept: LAB | Facility: HOSPITAL | Age: 32
End: 2017-09-25
Attending: FAMILY MEDICINE
Payer: COMMERCIAL

## 2017-09-25 VITALS
HEIGHT: 69 IN | HEART RATE: 77 BPM | WEIGHT: 183 LBS | BODY MASS INDEX: 27.11 KG/M2 | TEMPERATURE: 99 F | DIASTOLIC BLOOD PRESSURE: 87 MMHG | SYSTOLIC BLOOD PRESSURE: 121 MMHG

## 2017-09-25 DIAGNOSIS — E03.9 HYPOTHYROIDISM, UNSPECIFIED TYPE: ICD-10-CM

## 2017-09-25 DIAGNOSIS — Z76.89 ESTABLISHING CARE WITH NEW DOCTOR, ENCOUNTER FOR: Primary | ICD-10-CM

## 2017-09-25 DIAGNOSIS — E28.2 PCOS (POLYCYSTIC OVARIAN SYNDROME): ICD-10-CM

## 2017-09-25 LAB
T4 SERPL-MCNC: 11.2 UG/DL
TSH SERPL DL<=0.005 MIU/L-ACNC: 0.7 UIU/ML

## 2017-09-25 PROCEDURE — 36415 COLL VENOUS BLD VENIPUNCTURE: CPT | Mod: PO

## 2017-09-25 PROCEDURE — 84436 ASSAY OF TOTAL THYROXINE: CPT

## 2017-09-25 PROCEDURE — 99999 PR PBB SHADOW E&M-EST. PATIENT-LVL III: CPT | Mod: PBBFAC,,, | Performed by: FAMILY MEDICINE

## 2017-09-25 PROCEDURE — 84443 ASSAY THYROID STIM HORMONE: CPT

## 2017-09-25 PROCEDURE — 99214 OFFICE O/P EST MOD 30 MIN: CPT | Mod: S$GLB,,, | Performed by: FAMILY MEDICINE

## 2017-09-25 PROCEDURE — 3008F BODY MASS INDEX DOCD: CPT | Mod: S$GLB,,, | Performed by: FAMILY MEDICINE

## 2017-09-25 NOTE — PROGRESS NOTES
Subjective:       Patient ID: Emily Pelayo is a 32 y.o. female.    Chief Complaint: Follow-up and Establish Care    HPI     Establish Care  Pt reports to the clinic to establish care.   The pt has a medical history which includes PCOS and hypothyroidism.   As far as smoking is concerned, the pt denies.   The pt attempts to maintain a healthy diet and engages in regular exercise.   Consistent seatbelt usage reported.   Pt has no symptoms of depression.   Health maintenance addressed and updated in chart.    Review of Systems   Constitutional: Positive for fatigue. Negative for chills and fever.   HENT: Negative for sneezing and sore throat.    Eyes: Negative for visual disturbance.   Respiratory: Negative for cough and shortness of breath.    Cardiovascular: Negative for chest pain and leg swelling.   Gastrointestinal: Negative for abdominal pain, blood in stool, constipation, diarrhea and vomiting.   Genitourinary: Negative for difficulty urinating, dysuria and hematuria.   Musculoskeletal: Negative for arthralgias.   Neurological: Negative for dizziness and weakness.       Objective:      Physical Exam   Constitutional: She appears well-developed and well-nourished. No distress.   HENT:   Head: Normocephalic and atraumatic.   Mouth/Throat: Oropharynx is clear and moist. No oropharyngeal exudate.   Eyes: EOM are normal. Pupils are equal, round, and reactive to light.   Neck: Normal range of motion. Neck supple. No thyromegaly present.   Cardiovascular: Normal rate, regular rhythm, normal heart sounds and intact distal pulses.    Pulmonary/Chest: Effort normal and breath sounds normal. No respiratory distress. She has no wheezes.   Abdominal: Soft. Bowel sounds are normal. She exhibits no distension and no mass. There is no tenderness.   Musculoskeletal: She exhibits no edema.   Lymphadenopathy:     She has no cervical adenopathy.   Neurological: She is alert.   Skin: Skin is warm. No rash noted. No erythema.    Psychiatric: She has a normal mood and affect. Her behavior is normal.   Vitals reviewed.      Assessment:       1. Establishing care with new doctor, encounter for    2. Hypothyroidism, unspecified type    3. PCOS (polycystic ovarian syndrome)        Plan:       1. Establishing care with new doctor, encounter for  Patient has been advised to continue to maintain a healthy lifestyle, including regular exercise and consuming a well balanced diet.     2. Hypothyroidism, unspecified type  - TSH; Future  - T4; Future    3. PCOS (polycystic ovarian syndrome)  Followed by Gyn for this condition.     Portions of this note were created using Dragon voice recognition software. There may be voice recognition errors found in the text, and attempts were made to correct these errors prior to signature    Anshul Hernandes MD    Family Medicine  9/25/2017

## 2017-10-31 DIAGNOSIS — L21.9 SEBORRHEIC DERMATITIS OF SCALP: ICD-10-CM

## 2017-10-31 RX ORDER — FLUOCINONIDE TOPICAL SOLUTION USP, 0.05% 0.5 MG/ML
SOLUTION TOPICAL
Qty: 60 ML | Refills: 0 | Status: SHIPPED | OUTPATIENT
Start: 2017-10-31 | End: 2018-07-16 | Stop reason: SDUPTHER

## 2017-10-31 RX ORDER — KETOCONAZOLE 20 MG/ML
SHAMPOO, SUSPENSION TOPICAL
Qty: 120 ML | Refills: 0 | Status: SHIPPED | OUTPATIENT
Start: 2017-10-31 | End: 2018-07-16 | Stop reason: SDUPTHER

## 2017-12-18 ENCOUNTER — TELEPHONE (OUTPATIENT)
Dept: OBSTETRICS AND GYNECOLOGY | Facility: CLINIC | Age: 32
End: 2017-12-18

## 2017-12-18 NOTE — TELEPHONE ENCOUNTER
----- Message from Valentin ORTEGA Frisard sent at 12/18/2017  7:52 AM CST -----  Contact: same  FYI--Patient called in and is running about 10 minutes late for her 8am appt today Monday 12/18.

## 2017-12-19 ENCOUNTER — OFFICE VISIT (OUTPATIENT)
Dept: OBSTETRICS AND GYNECOLOGY | Facility: CLINIC | Age: 32
End: 2017-12-19
Payer: COMMERCIAL

## 2017-12-19 ENCOUNTER — LAB VISIT (OUTPATIENT)
Dept: LAB | Facility: HOSPITAL | Age: 32
End: 2017-12-19
Attending: OBSTETRICS & GYNECOLOGY
Payer: COMMERCIAL

## 2017-12-19 VITALS
HEIGHT: 69 IN | SYSTOLIC BLOOD PRESSURE: 131 MMHG | BODY MASS INDEX: 27.39 KG/M2 | HEART RATE: 82 BPM | WEIGHT: 184.94 LBS | DIASTOLIC BLOOD PRESSURE: 81 MMHG

## 2017-12-19 DIAGNOSIS — Z11.3 SCREEN FOR STD (SEXUALLY TRANSMITTED DISEASE): ICD-10-CM

## 2017-12-19 DIAGNOSIS — Z71.1 CONCERN ABOUT STD IN FEMALE WITHOUT DIAGNOSIS: ICD-10-CM

## 2017-12-19 DIAGNOSIS — N93.9 ABNORMAL UTERINE BLEEDING: ICD-10-CM

## 2017-12-19 DIAGNOSIS — N92.6 IRREGULAR PERIODS: Primary | ICD-10-CM

## 2017-12-19 LAB
B-HCG UR QL: NEGATIVE
CTP QC/QA: YES
RPR SER QL: NORMAL

## 2017-12-19 PROCEDURE — 87591 N.GONORRHOEAE DNA AMP PROB: CPT

## 2017-12-19 PROCEDURE — 81025 URINE PREGNANCY TEST: CPT | Mod: S$GLB,,, | Performed by: OBSTETRICS & GYNECOLOGY

## 2017-12-19 PROCEDURE — 86703 HIV-1/HIV-2 1 RESULT ANTBDY: CPT

## 2017-12-19 PROCEDURE — 86592 SYPHILIS TEST NON-TREP QUAL: CPT

## 2017-12-19 PROCEDURE — 99999 PR PBB SHADOW E&M-EST. PATIENT-LVL III: CPT | Mod: PBBFAC,,, | Performed by: OBSTETRICS & GYNECOLOGY

## 2017-12-19 PROCEDURE — 87480 CANDIDA DNA DIR PROBE: CPT

## 2017-12-19 PROCEDURE — 36415 COLL VENOUS BLD VENIPUNCTURE: CPT

## 2017-12-19 PROCEDURE — 99214 OFFICE O/P EST MOD 30 MIN: CPT | Mod: S$GLB,,, | Performed by: OBSTETRICS & GYNECOLOGY

## 2017-12-19 PROCEDURE — 87660 TRICHOMONAS VAGIN DIR PROBE: CPT

## 2017-12-19 NOTE — PROGRESS NOTES
Subjective:       Patient ID: Emily Pelayo is a 32 y.o. female.    Chief Complaint:  Menometrorrhagia and STD CHECK      History of Present Illness  HPI  Dysfunctional Uterine Bleeding  Patient complains of irregular menses. She had been bleeding irregularly. Patient admits to non compliance with taking the pill. Patient currently in 3rd week of OCP cycle pack. History of infertility: no. History of abnormal Pap smear: no.Patient desires STD testing today.       GYN & OB History  Patient's last menstrual period was 2017 (approximate).   Date of Last Pap: 2017    OB History    Para Term  AB Living   2 1 1 0 1 1   SAB TAB Ectopic Multiple Live Births   1 0 0 0 1      # Outcome Date GA Lbr Juan/2nd Weight Sex Delivery Anes PTL Lv   2 SAB            1 Term      Vag-Spont   MONET      Birth Comments: System Generated. Please review and update pregnancy details.          Review of Systems  Review of Systems   Constitutional: Negative for activity change, appetite change, chills, diaphoresis, fatigue, fever and unexpected weight change.   HENT: Negative for mouth sores and tinnitus.    Eyes: Negative for discharge and visual disturbance.   Respiratory: Negative for cough, shortness of breath and wheezing.    Cardiovascular: Negative for chest pain, palpitations and leg swelling.   Gastrointestinal: Negative for abdominal pain, bloating, blood in stool, constipation, diarrhea, nausea and vomiting.   Endocrine: Negative for diabetes, hair loss, hot flashes, hyperthyroidism and hypothyroidism.   Genitourinary: Positive for menstrual problem and postcoital bleeding. Negative for decreased libido, dyspareunia, dysuria, flank pain, frequency, genital sores, hematuria, menorrhagia, pelvic pain, urgency, vaginal bleeding, vaginal discharge, vaginal pain, dysmenorrhea, urinary incontinence, postmenopausal bleeding and vaginal odor.   Musculoskeletal: Negative for back pain, joint swelling and myalgias.    Skin:  Negative for rash, no acne and hair changes.   Neurological: Negative for seizures, syncope, numbness and headaches.   Hematological: Negative for adenopathy. Does not bruise/bleed easily.   Psychiatric/Behavioral: Negative for depression and sleep disturbance. The patient is not nervous/anxious.    Breast: Negative for breast mass, breast pain, nipple discharge and skin changes          Objective:    Physical Exam:   Constitutional: She is oriented to person, place, and time. She appears well-developed and well-nourished. No distress.    HENT:   Head: Normocephalic.    Eyes: Pupils are equal, round, and reactive to light.    Neck: Normal range of motion.    Cardiovascular: Normal rate.     Pulmonary/Chest: Effort normal.        Abdominal: Soft.     Genitourinary: Vagina normal and uterus normal.   Genitourinary Comments: No active bleeding from cervix. Cultures done today. Uterus is retroverted,non tender,normal size and shape. No obvious adnexal masses.           Musculoskeletal: Normal range of motion.       Neurological: She is alert and oriented to person, place, and time.    Skin: Skin is warm and dry.    Psychiatric: She has a normal mood and affect. Her behavior is normal. Judgment and thought content normal.          Assessment:        1. Irregular periods    2. Screen for STD (sexually transmitted disease)    3. Abnormal uterine bleeding    4. Concern about STD in female without diagnosis                Plan:      Patient counseled to be consistent with taking the OCP's   Patient to have STD labs

## 2017-12-20 LAB
C TRACH DNA SPEC QL NAA+PROBE: NOT DETECTED
CANDIDA RRNA VAG QL PROBE: NEGATIVE
G VAGINALIS RRNA GENITAL QL PROBE: NEGATIVE
HIV 1+2 AB+HIV1 P24 AG SERPL QL IA: NEGATIVE
N GONORRHOEA DNA SPEC QL NAA+PROBE: NOT DETECTED
T VAGINALIS RRNA GENITAL QL PROBE: NEGATIVE

## 2018-01-25 RX ORDER — LEVONORGESTREL AND ETHINYL ESTRADIOL 0.15-0.03
KIT ORAL
Qty: 28 TABLET | Refills: 5 | Status: SHIPPED | OUTPATIENT
Start: 2018-01-25 | End: 2018-07-16 | Stop reason: SDUPTHER

## 2018-01-25 RX ORDER — FLUCONAZOLE 100 MG/1
100 TABLET ORAL WEEKLY
Qty: 1 TABLET | Refills: 0 | Status: SHIPPED | OUTPATIENT
Start: 2018-01-25 | End: 2018-01-26

## 2018-03-28 ENCOUNTER — DOCUMENTATION ONLY (OUTPATIENT)
Dept: FAMILY MEDICINE | Facility: CLINIC | Age: 33
End: 2018-03-28

## 2018-03-28 NOTE — PROGRESS NOTES
Pre-Visit Chart Review  For Appointment Scheduled on 04/02/18    Health Maintenance Due   Topic Date Due    Influenza Vaccine  08/01/2017

## 2018-04-02 ENCOUNTER — OFFICE VISIT (OUTPATIENT)
Dept: FAMILY MEDICINE | Facility: CLINIC | Age: 33
End: 2018-04-02
Payer: COMMERCIAL

## 2018-04-02 VITALS
DIASTOLIC BLOOD PRESSURE: 73 MMHG | HEIGHT: 69 IN | BODY MASS INDEX: 26.71 KG/M2 | WEIGHT: 180.31 LBS | HEART RATE: 77 BPM | SYSTOLIC BLOOD PRESSURE: 112 MMHG | TEMPERATURE: 99 F

## 2018-04-02 DIAGNOSIS — E03.9 HYPOTHYROIDISM, UNSPECIFIED TYPE: Primary | ICD-10-CM

## 2018-04-02 DIAGNOSIS — Z13.220 SCREENING CHOLESTEROL LEVEL: ICD-10-CM

## 2018-04-02 DIAGNOSIS — E61.1 LOW IRON: ICD-10-CM

## 2018-04-02 PROCEDURE — 99999 PR PBB SHADOW E&M-EST. PATIENT-LVL III: CPT | Mod: PBBFAC,,, | Performed by: PHYSICIAN ASSISTANT

## 2018-04-02 PROCEDURE — 99213 OFFICE O/P EST LOW 20 MIN: CPT | Mod: S$GLB,,, | Performed by: PHYSICIAN ASSISTANT

## 2018-04-02 NOTE — PROGRESS NOTES
Subjective:       Patient ID: Emily Pelayo is a 32 y.o. female.    Chief Complaint: Follow-up (hypothyroidism)    HPI   Patient is a 32 year old AA female presenting to the clinic for follow-up hypothyroidism, low iron.   1) Hypothyroidism- TSH 9/2017 within normal limits; due for repeat TSH level; taking levothyroxine 75mcg daily; reports being fatigued & always cold; she reports losing weight abnormally  2) Low iron- taking iron supplementation; reports history of heavy periods, but not always  Review of Systems   Constitutional: Positive for unexpected weight change. Negative for activity change.   HENT: Negative for hearing loss, rhinorrhea and trouble swallowing.    Eyes: Negative for discharge and visual disturbance.   Respiratory: Negative for chest tightness and wheezing.    Cardiovascular: Negative for chest pain and palpitations.   Gastrointestinal: Negative for blood in stool, constipation, diarrhea and vomiting.   Endocrine: Negative for polydipsia and polyuria.   Genitourinary: Negative for difficulty urinating, dysuria, hematuria and menstrual problem.   Musculoskeletal: Negative for arthralgias, joint swelling and neck pain.   Neurological: Negative for weakness and headaches.   Psychiatric/Behavioral: Negative for confusion and dysphoric mood.       Objective:      Physical Exam   Constitutional: Vital signs are normal. She appears well-developed and well-nourished. No distress.   Cardiovascular: Normal rate, regular rhythm, S1 normal, S2 normal and normal heart sounds.  Exam reveals no gallop.    No murmur heard.  Pulses:       Radial pulses are 2+ on the right side, and 2+ on the left side.   <2sec cap refill fingers bilat     Pulmonary/Chest: Effort normal and breath sounds normal. No respiratory distress. She has no wheezes. She has no rhonchi.   Skin: Skin is warm and dry. She is not diaphoretic.   Appropriate skin turgor   Psychiatric: She has a normal mood and affect. Her speech is normal  and behavior is normal. Judgment and thought content normal. Cognition and memory are normal.       Assessment:       1. Low iron    2. Hypothyroidism, unspecified type    3. Screening cholesterol level        Plan:       Emily was seen today for follow-up.    Diagnoses and all orders for this visit:    Hypothyroidism, unspecified type  -     Comprehensive metabolic panel; Future  -     TSH; Future    Low iron  -     CBC auto differential; Future  -     Iron and TIBC; Future  -     Ferritin; Future    Screening cholesterol level  -     Lipid panel; Future

## 2018-04-03 ENCOUNTER — TELEPHONE (OUTPATIENT)
Dept: FAMILY MEDICINE | Facility: CLINIC | Age: 33
End: 2018-04-03

## 2018-04-04 ENCOUNTER — LAB VISIT (OUTPATIENT)
Dept: LAB | Facility: HOSPITAL | Age: 33
End: 2018-04-04
Attending: PHYSICIAN ASSISTANT
Payer: COMMERCIAL

## 2018-04-04 DIAGNOSIS — Z13.220 SCREENING CHOLESTEROL LEVEL: ICD-10-CM

## 2018-04-04 DIAGNOSIS — E61.1 LOW IRON: ICD-10-CM

## 2018-04-04 DIAGNOSIS — E03.9 HYPOTHYROIDISM, UNSPECIFIED TYPE: ICD-10-CM

## 2018-04-04 LAB
ALBUMIN SERPL BCP-MCNC: 3.4 G/DL
ALP SERPL-CCNC: 51 U/L
ALT SERPL W/O P-5'-P-CCNC: 14 U/L
ANION GAP SERPL CALC-SCNC: 8 MMOL/L
AST SERPL-CCNC: 14 U/L
BASOPHILS # BLD AUTO: 0.03 K/UL
BASOPHILS NFR BLD: 0.8 %
BILIRUB SERPL-MCNC: 0.8 MG/DL
BUN SERPL-MCNC: 13 MG/DL
CALCIUM SERPL-MCNC: 8.8 MG/DL
CHLORIDE SERPL-SCNC: 108 MMOL/L
CHOLEST SERPL-MCNC: 154 MG/DL
CHOLEST/HDLC SERPL: 2.7 {RATIO}
CO2 SERPL-SCNC: 25 MMOL/L
CREAT SERPL-MCNC: 0.8 MG/DL
DIFFERENTIAL METHOD: ABNORMAL
EOSINOPHIL # BLD AUTO: 0.1 K/UL
EOSINOPHIL NFR BLD: 1.5 %
ERYTHROCYTE [DISTWIDTH] IN BLOOD BY AUTOMATED COUNT: 12.1 %
EST. GFR  (AFRICAN AMERICAN): >60 ML/MIN/1.73 M^2
EST. GFR  (NON AFRICAN AMERICAN): >60 ML/MIN/1.73 M^2
FERRITIN SERPL-MCNC: 42 NG/ML
GLUCOSE SERPL-MCNC: 83 MG/DL
HCT VFR BLD AUTO: 56.6 %
HDLC SERPL-MCNC: 58 MG/DL
HDLC SERPL: 37.7 %
HGB BLD-MCNC: 17.7 G/DL
IMM GRANULOCYTES # BLD AUTO: 0 K/UL
IMM GRANULOCYTES NFR BLD AUTO: 0 %
IRON SERPL-MCNC: 100 UG/DL
LDLC SERPL CALC-MCNC: 85 MG/DL
LYMPHOCYTES # BLD AUTO: 1.8 K/UL
LYMPHOCYTES NFR BLD: 44.6 %
MCH RBC QN AUTO: 29.6 PG
MCHC RBC AUTO-ENTMCNC: 31.3 G/DL
MCV RBC AUTO: 95 FL
MONOCYTES # BLD AUTO: 0.2 K/UL
MONOCYTES NFR BLD: 5.3 %
NEUTROPHILS # BLD AUTO: 1.9 K/UL
NEUTROPHILS NFR BLD: 47.8 %
NONHDLC SERPL-MCNC: 96 MG/DL
NRBC BLD-RTO: 0 /100 WBC
PLATELET # BLD AUTO: 124 K/UL
PMV BLD AUTO: 11.2 FL
POTASSIUM SERPL-SCNC: 4.1 MMOL/L
PROT SERPL-MCNC: 6.8 G/DL
RBC # BLD AUTO: 5.97 M/UL
SATURATED IRON: 26 %
SODIUM SERPL-SCNC: 141 MMOL/L
TOTAL IRON BINDING CAPACITY: 392 UG/DL
TRANSFERRIN SERPL-MCNC: 265 MG/DL
TRIGL SERPL-MCNC: 55 MG/DL
TSH SERPL DL<=0.005 MIU/L-ACNC: 0.97 UIU/ML
WBC # BLD AUTO: 3.95 K/UL

## 2018-04-04 PROCEDURE — 83540 ASSAY OF IRON: CPT

## 2018-04-04 PROCEDURE — 36415 COLL VENOUS BLD VENIPUNCTURE: CPT | Mod: PO

## 2018-04-04 PROCEDURE — 80053 COMPREHEN METABOLIC PANEL: CPT

## 2018-04-04 PROCEDURE — 84443 ASSAY THYROID STIM HORMONE: CPT

## 2018-04-04 PROCEDURE — 82728 ASSAY OF FERRITIN: CPT

## 2018-04-04 PROCEDURE — 85025 COMPLETE CBC W/AUTO DIFF WBC: CPT

## 2018-04-04 PROCEDURE — 80061 LIPID PANEL: CPT

## 2018-04-09 DIAGNOSIS — R71.8 ABNORMAL RED BLOOD CELLS: Primary | ICD-10-CM

## 2018-05-09 ENCOUNTER — LAB VISIT (OUTPATIENT)
Dept: LAB | Facility: HOSPITAL | Age: 33
End: 2018-05-09
Attending: PHYSICIAN ASSISTANT
Payer: COMMERCIAL

## 2018-05-09 DIAGNOSIS — R71.8 ABNORMAL RED BLOOD CELLS: ICD-10-CM

## 2018-05-09 LAB
BASOPHILS # BLD AUTO: 0.08 K/UL
BASOPHILS NFR BLD: 1.5 %
DIFFERENTIAL METHOD: ABNORMAL
EOSINOPHIL # BLD AUTO: 0 K/UL
EOSINOPHIL NFR BLD: 0.6 %
ERYTHROCYTE [DISTWIDTH] IN BLOOD BY AUTOMATED COUNT: 12 %
HCT VFR BLD AUTO: 41.3 %
HGB BLD-MCNC: 12.6 G/DL
IMM GRANULOCYTES # BLD AUTO: 0.02 K/UL
IMM GRANULOCYTES NFR BLD AUTO: 0.4 %
LYMPHOCYTES # BLD AUTO: 1.8 K/UL
LYMPHOCYTES NFR BLD: 33.7 %
MCH RBC QN AUTO: 29.4 PG
MCHC RBC AUTO-ENTMCNC: 30.5 G/DL
MCV RBC AUTO: 96 FL
MONOCYTES # BLD AUTO: 0.3 K/UL
MONOCYTES NFR BLD: 6.1 %
NEUTROPHILS # BLD AUTO: 3 K/UL
NEUTROPHILS NFR BLD: 57.7 %
NRBC BLD-RTO: 0 /100 WBC
PLATELET # BLD AUTO: 294 K/UL
PMV BLD AUTO: 11.4 FL
RBC # BLD AUTO: 4.29 M/UL
WBC # BLD AUTO: 5.22 K/UL

## 2018-05-09 PROCEDURE — 85025 COMPLETE CBC W/AUTO DIFF WBC: CPT

## 2018-05-09 PROCEDURE — 36415 COLL VENOUS BLD VENIPUNCTURE: CPT | Mod: PO

## 2018-06-04 DIAGNOSIS — E03.9 HYPOTHYROIDISM, UNSPECIFIED TYPE: ICD-10-CM

## 2018-06-07 RX ORDER — LEVOTHYROXINE SODIUM 75 UG/1
TABLET ORAL
Qty: 90 TABLET | Refills: 3 | Status: SHIPPED | OUTPATIENT
Start: 2018-06-07 | End: 2019-07-23 | Stop reason: SDUPTHER

## 2018-07-13 RX ORDER — LEVONORGESTREL AND ETHINYL ESTRADIOL 0.15-0.03
KIT ORAL
Qty: 28 TABLET | Refills: 5 | OUTPATIENT
Start: 2018-07-13

## 2018-07-16 ENCOUNTER — PATIENT MESSAGE (OUTPATIENT)
Dept: OBSTETRICS AND GYNECOLOGY | Facility: CLINIC | Age: 33
End: 2018-07-16

## 2018-07-16 DIAGNOSIS — L21.9 SEBORRHEIC DERMATITIS OF SCALP: ICD-10-CM

## 2018-07-16 RX ORDER — KETOCONAZOLE 20 MG/ML
SHAMPOO, SUSPENSION TOPICAL
Qty: 120 ML | Refills: 0 | Status: SHIPPED | OUTPATIENT
Start: 2018-07-16 | End: 2019-03-09 | Stop reason: SDUPTHER

## 2018-07-16 RX ORDER — FLUOCINONIDE TOPICAL SOLUTION USP, 0.05% 0.5 MG/ML
SOLUTION TOPICAL
Qty: 60 ML | Refills: 0 | Status: SHIPPED | OUTPATIENT
Start: 2018-07-16 | End: 2019-10-21 | Stop reason: SDUPTHER

## 2018-07-16 RX ORDER — LEVONORGESTREL AND ETHINYL ESTRADIOL 0.15-0.03
1 KIT ORAL DAILY
Qty: 28 TABLET | Refills: 0 | Status: SHIPPED | OUTPATIENT
Start: 2018-07-16 | End: 2018-07-20 | Stop reason: SDUPTHER

## 2018-07-16 NOTE — TELEPHONE ENCOUNTER
Pt last seen 2/19/2016  Requesting refill for keto shampoo and lidex solution  Please advise...      Seborrheic dermatitis of scalp  Orders:  -     ketoconazole (NIZORAL) 2 % shampoo; Wash hair with medicated shampoo at least 2x/week - let sit on scalp at least 5 minutes prior to rinsing  Dispense: 120 mL; Refill: 5  -     fluocinonide (LIDEX) 0.05 % external solution; Thin film to scalp nightly PRN itch/scaling  Dispense: 60 mL; Refill: 1

## 2018-07-20 ENCOUNTER — OFFICE VISIT (OUTPATIENT)
Dept: OBSTETRICS AND GYNECOLOGY | Facility: CLINIC | Age: 33
End: 2018-07-20
Payer: COMMERCIAL

## 2018-07-20 VITALS
BODY MASS INDEX: 27.04 KG/M2 | HEIGHT: 69 IN | DIASTOLIC BLOOD PRESSURE: 60 MMHG | SYSTOLIC BLOOD PRESSURE: 100 MMHG | WEIGHT: 182.56 LBS

## 2018-07-20 DIAGNOSIS — Z01.411 ENCOUNTER FOR GYNECOLOGICAL EXAMINATION WITH ABNORMAL FINDING: ICD-10-CM

## 2018-07-20 DIAGNOSIS — B37.31 YEAST VAGINITIS: ICD-10-CM

## 2018-07-20 DIAGNOSIS — Z12.4 ENCOUNTER FOR PAP SMEAR OF CERVIX WITH HPV DNA COTESTING: Primary | ICD-10-CM

## 2018-07-20 PROCEDURE — 99999 PR PBB SHADOW E&M-EST. PATIENT-LVL III: CPT | Mod: PBBFAC,,, | Performed by: SPECIALIST

## 2018-07-20 PROCEDURE — 3008F BODY MASS INDEX DOCD: CPT | Mod: CPTII,S$GLB,, | Performed by: SPECIALIST

## 2018-07-20 PROCEDURE — 87624 HPV HI-RISK TYP POOLED RSLT: CPT

## 2018-07-20 PROCEDURE — 88175 CYTOPATH C/V AUTO FLUID REDO: CPT

## 2018-07-20 PROCEDURE — 99395 PREV VISIT EST AGE 18-39: CPT | Mod: S$GLB,,, | Performed by: SPECIALIST

## 2018-07-20 RX ORDER — LEVONORGESTREL AND ETHINYL ESTRADIOL 0.15-0.03
1 KIT ORAL DAILY
Qty: 28 TABLET | Refills: 11 | Status: SHIPPED | OUTPATIENT
Start: 2018-07-20 | End: 2019-06-27 | Stop reason: SDUPTHER

## 2018-07-20 RX ORDER — FLUCONAZOLE 200 MG/1
200 TABLET ORAL ONCE
Qty: 1 TABLET | Refills: 0 | Status: SHIPPED | OUTPATIENT
Start: 2018-07-20 | End: 2018-07-20

## 2018-07-20 NOTE — PROGRESS NOTES
32 yo BF presents for annual gyn evaluation. 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    PCOS (polycystic ovarian syndrome)     Thyroid disease        Past Surgical History:   Procedure Laterality Date    bladder suspension      dx lap      hammer toe Right 11/19/2015    heavenly toe repeair with callus filing    TONSILLECTOMY         Family History   Problem Relation Age of Onset    Thyroid disease Mother         hyperthyroidism    Hypertension Mother     Hypertension Sister     Heart disease Maternal Grandmother     Ovarian cancer Neg Hx     Breast cancer Neg Hx     Eczema Neg Hx     Lupus Neg Hx     Psoriasis Neg Hx     Melanoma Neg Hx     Cancer Neg Hx        Social History     Social History    Marital status: Single     Spouse name: N/A    Number of children: N/A    Years of education: N/A     Social History Main Topics    Smoking status: Never Smoker    Smokeless tobacco: Never Used    Alcohol use No    Drug use: No    Sexual activity: Yes     Partners: Male     Birth control/ protection: OCP      Comment: OCP helps regulate cycle     Other Topics Concern    None     Social History Narrative    None       Current Outpatient Prescriptions   Medication Sig Dispense Refill    ASCORBATE CALCIUM (VITAMIN C ORAL) Take by mouth.      cholecalciferol, vitamin D3, (VITAMIN D3) 2,000 unit Tab Take 2,000 Units by mouth once daily.      CITRANATAL HARMONY, IRON FUM, 27 mg iron-1 mg -50 mg-260 mg Cap TAKE 1 CAPSULE BY MOUTH ONCE DAILY. 100 capsule 0    fluocinonide (LIDEX) 0.05 % external solution APPLY THIN FILM TO SCALP EVERY NIGHT AT BEDTIME AS NEEDED FOR ITCHING OR SCALING 60 mL 0    ketoconazole (NIZORAL) 2 % shampoo Lather scalp, let sit 5 min, rinse well. Use 2x weekly 120 mL 0    levonorgestrel-ethinyl estradiol (LEVORA-28) 0.15-0.03 mg per tablet Take 1 tablet by mouth once  daily. 28 tablet 0    levothyroxine (SYNTHROID) 75 MCG tablet TAKE 1 TABLET (75 MCG TOTAL) BY MOUTH BEFORE BREAKFAST DAILY 90 tablet 3     No current facility-administered medications for this visit.        Review of patient's allergies indicates:  No Known Allergies    Review of System:   General: no chills, fever, night sweats, weight gain or weight loss  Psychological: no depression or suicidal ideation  Breasts: no new or changing breast lumps, nipple discharge or masses.  Respiratory: no cough, shortness of breath, or wheezing  Cardiovascular: no chest pain or dyspnea on exertion  Gastrointestinal: no abdominal pain, change in bowel habits, or black or bloody stools  Genito-Urinary: no incontinence, urinary frequency/urgency or , pelvic pain or abnormal vaginal bleeding.  Musculoskeletal: no gait disturbance or muscular weakness    General Appearance:  Alert, cooperative, no distress, appears stated age   Head:  Normocephalic, without obvious abnormality, atraumatic   Eyes:  PERRL, conjunctiva/corneas clear, EOM's intact, fundi benign, both eyes   Ears:  Normal TM's and external ear canals, both ears   Nose: Nares normal, septum midline,mucosa normal, no drainage or sinus tenderness   Throat: Lips, mucosa, and tongue normal; teeth and gums normal   Neck: Supple, symmetrical, trachea midline, no adenopathy;  thyroid: not enlarged, symmetric, no tenderness/mass/nodules; no carotid bruit or JVD   Back:   Symmetric, no curvature, ROM normal, no CVA tenderness   Lungs:   Clear to auscultation bilaterally, respirations unlabored   Breasts:  No masses or tenderness   Heart:  Regular rate and rhythm, S1 and S2 normal, no murmur, rub, or gallop   Abdomen:   Soft, non-tender, bowel sounds active all four quadrants,  no masses, no organomegaly    Genitourinary:   External rectal exam shows no thrombosed external hemorrhoids.   Pelvic exam was performed with patient supine.  No labial fusion.  There is no rash, lesion or  injury on the right labia.  There is no rash, lesion or injury on the left labia.  No bleeding and no signs of injury around the vaginal introitus, urethra is without lesions and well supported. The cervix is visualized with no discharge, lesions or friability.  Vagina  Positive yeast  No significant Cystocele, Enterocele or rectocele, and uterus well supported.  Bimanual exam:  The urethra is normal to palpation and there are no palpable vaginal wall masses.  Uterus is not deviated, not enlarged, not fixed, normal shape and not tender.  Cervix exhibits no motion tenderness.   Right adnexum displays no mass and no tenderness.  Left adnexum displays no mass and no tenderness.   Extremities: Extremities normal, atraumatic, no cyanosis or edema   Pulses: 2+ and symmetric   Skin: Skin color, texture, turgor normal, no rashes or lesions   Lymph nodes: Cervical, supraclavicular, and axillary nodes normal   Neurologic: Normal       PAP submitted    Will treat yeast and refill OCPs  RTO 1 year/prn

## 2018-07-27 LAB
HPV HR 12 DNA CVX QL NAA+PROBE: NEGATIVE
HPV16 AG SPEC QL: NEGATIVE
HPV18 DNA SPEC QL NAA+PROBE: NEGATIVE

## 2018-09-25 ENCOUNTER — PATIENT MESSAGE (OUTPATIENT)
Dept: OBSTETRICS AND GYNECOLOGY | Facility: CLINIC | Age: 33
End: 2018-09-25

## 2018-09-25 RX ORDER — FLUCONAZOLE 200 MG/1
200 TABLET ORAL ONCE
Qty: 1 TABLET | Refills: 3 | Status: SHIPPED | OUTPATIENT
Start: 2018-09-25 | End: 2018-09-25

## 2019-03-09 DIAGNOSIS — L21.9 SEBORRHEIC DERMATITIS OF SCALP: ICD-10-CM

## 2019-03-11 RX ORDER — KETOCONAZOLE 20 MG/ML
SHAMPOO, SUSPENSION TOPICAL
Qty: 120 ML | Refills: 2 | Status: SHIPPED | OUTPATIENT
Start: 2019-03-11

## 2019-06-17 ENCOUNTER — OFFICE VISIT (OUTPATIENT)
Dept: OBSTETRICS AND GYNECOLOGY | Facility: CLINIC | Age: 34
End: 2019-06-17
Payer: COMMERCIAL

## 2019-06-17 DIAGNOSIS — Z01.411 ENCOUNTER FOR GYNECOLOGICAL EXAMINATION WITH ABNORMAL FINDING: Primary | ICD-10-CM

## 2019-06-17 PROCEDURE — 99499 NO LOS: ICD-10-PCS | Mod: S$GLB,,, | Performed by: SPECIALIST

## 2019-06-17 PROCEDURE — 99499 UNLISTED E&M SERVICE: CPT | Mod: S$GLB,,, | Performed by: SPECIALIST

## 2019-06-19 ENCOUNTER — PATIENT MESSAGE (OUTPATIENT)
Dept: OBSTETRICS AND GYNECOLOGY | Facility: CLINIC | Age: 34
End: 2019-06-19

## 2019-06-19 RX ORDER — FLUCONAZOLE 200 MG/1
200 TABLET ORAL ONCE
Qty: 1 TABLET | Refills: 0 | Status: SHIPPED | OUTPATIENT
Start: 2019-06-19 | End: 2019-06-19

## 2019-06-19 NOTE — TELEPHONE ENCOUNTER
Please send Rx for Diflucan as discussed. Was too soon for well women appt so had requested rx for Diflucan

## 2019-06-27 RX ORDER — LEVONORGESTREL AND ETHINYL ESTRADIOL 0.15-0.03
KIT ORAL
Qty: 84 TABLET | Refills: 3 | Status: SHIPPED | OUTPATIENT
Start: 2019-06-27 | End: 2019-07-08 | Stop reason: SDUPTHER

## 2019-07-05 ENCOUNTER — PATIENT MESSAGE (OUTPATIENT)
Dept: OBSTETRICS AND GYNECOLOGY | Facility: CLINIC | Age: 34
End: 2019-07-05

## 2019-07-08 RX ORDER — LEVONORGESTREL AND ETHINYL ESTRADIOL 0.15-0.03
1 KIT ORAL DAILY
Qty: 84 TABLET | Refills: 0 | Status: SHIPPED | OUTPATIENT
Start: 2019-07-08 | End: 2019-07-23 | Stop reason: SDUPTHER

## 2019-07-23 ENCOUNTER — OFFICE VISIT (OUTPATIENT)
Dept: OBSTETRICS AND GYNECOLOGY | Facility: CLINIC | Age: 34
End: 2019-07-23
Payer: COMMERCIAL

## 2019-07-23 ENCOUNTER — OFFICE VISIT (OUTPATIENT)
Dept: FAMILY MEDICINE | Facility: CLINIC | Age: 34
End: 2019-07-23
Payer: COMMERCIAL

## 2019-07-23 VITALS
HEART RATE: 81 BPM | DIASTOLIC BLOOD PRESSURE: 88 MMHG | OXYGEN SATURATION: 97 % | SYSTOLIC BLOOD PRESSURE: 118 MMHG | WEIGHT: 188.06 LBS | BODY MASS INDEX: 27.77 KG/M2

## 2019-07-23 VITALS
BODY MASS INDEX: 27.82 KG/M2 | HEIGHT: 69 IN | WEIGHT: 187.81 LBS | SYSTOLIC BLOOD PRESSURE: 118 MMHG | DIASTOLIC BLOOD PRESSURE: 76 MMHG

## 2019-07-23 DIAGNOSIS — Z00.00 ANNUAL PHYSICAL EXAM: Primary | ICD-10-CM

## 2019-07-23 DIAGNOSIS — E28.2 PCOS (POLYCYSTIC OVARIAN SYNDROME): ICD-10-CM

## 2019-07-23 DIAGNOSIS — E03.9 HYPOTHYROIDISM, UNSPECIFIED TYPE: ICD-10-CM

## 2019-07-23 DIAGNOSIS — Z01.419 WELL WOMAN EXAM WITH ROUTINE GYNECOLOGICAL EXAM: Primary | ICD-10-CM

## 2019-07-23 PROCEDURE — 99999 PR PBB SHADOW E&M-EST. PATIENT-LVL III: CPT | Mod: PBBFAC,,, | Performed by: INTERNAL MEDICINE

## 2019-07-23 PROCEDURE — 99395 PR PREVENTIVE VISIT,EST,18-39: ICD-10-PCS | Mod: S$GLB,,, | Performed by: SPECIALIST

## 2019-07-23 PROCEDURE — 99999 PR PBB SHADOW E&M-EST. PATIENT-LVL III: ICD-10-PCS | Mod: PBBFAC,,, | Performed by: SPECIALIST

## 2019-07-23 PROCEDURE — 87624 HPV HI-RISK TYP POOLED RSLT: CPT

## 2019-07-23 PROCEDURE — 99395 PR PREVENTIVE VISIT,EST,18-39: ICD-10-PCS | Mod: S$GLB,,, | Performed by: INTERNAL MEDICINE

## 2019-07-23 PROCEDURE — 3008F BODY MASS INDEX DOCD: CPT | Mod: CPTII,S$GLB,, | Performed by: SPECIALIST

## 2019-07-23 PROCEDURE — 99999 PR PBB SHADOW E&M-EST. PATIENT-LVL III: ICD-10-PCS | Mod: PBBFAC,,, | Performed by: INTERNAL MEDICINE

## 2019-07-23 PROCEDURE — 99395 PREV VISIT EST AGE 18-39: CPT | Mod: S$GLB,,, | Performed by: INTERNAL MEDICINE

## 2019-07-23 PROCEDURE — 99395 PREV VISIT EST AGE 18-39: CPT | Mod: S$GLB,,, | Performed by: SPECIALIST

## 2019-07-23 PROCEDURE — 3008F PR BODY MASS INDEX (BMI) DOCUMENTED: ICD-10-PCS | Mod: CPTII,S$GLB,, | Performed by: SPECIALIST

## 2019-07-23 PROCEDURE — 99999 PR PBB SHADOW E&M-EST. PATIENT-LVL III: CPT | Mod: PBBFAC,,, | Performed by: SPECIALIST

## 2019-07-23 PROCEDURE — 88175 CYTOPATH C/V AUTO FLUID REDO: CPT

## 2019-07-23 RX ORDER — LEVOTHYROXINE SODIUM 75 UG/1
TABLET ORAL
Qty: 90 TABLET | Refills: 3 | Status: SHIPPED | OUTPATIENT
Start: 2019-07-23 | End: 2020-07-14

## 2019-07-23 RX ORDER — FLUCONAZOLE 100 MG/1
100 TABLET ORAL DAILY
Qty: 3 TABLET | Refills: 3 | Status: SHIPPED | OUTPATIENT
Start: 2019-07-23 | End: 2019-07-26

## 2019-07-23 RX ORDER — LEVONORGESTREL AND ETHINYL ESTRADIOL 0.15-0.03
1 KIT ORAL DAILY
Qty: 84 TABLET | Refills: 3 | Status: SHIPPED | OUTPATIENT
Start: 2019-07-23 | End: 2020-09-24 | Stop reason: SDUPTHER

## 2019-07-23 NOTE — PROGRESS NOTES
Subjective:       Patient ID: Emily Pelayo is a 34 y.o. female.    Chief Complaint: Establish Care; Medication Refill; thyroid check (pt is out of medication due to provider is no longer seeing patients); and Cough    Pt here to get established. She has hypothyroidism and takes 75 mcg of synthroid. She has been out of her medication for a week. She complains of weight gain. She has no other medical conditions aside from PCOS. She takes ocp for this. She is not a smoker. She walks stairs daily.  She is not a smoker.     Review of Systems   Constitutional: Negative for activity change and unexpected weight change.   HENT: Negative for hearing loss, rhinorrhea and trouble swallowing.    Eyes: Negative for discharge and visual disturbance.   Respiratory: Negative for chest tightness and wheezing.    Cardiovascular: Negative for chest pain and palpitations.   Gastrointestinal: Negative for blood in stool, constipation, diarrhea and vomiting.   Endocrine: Negative for polydipsia and polyuria.   Genitourinary: Negative for difficulty urinating, dysuria, hematuria and menstrual problem.   Musculoskeletal: Negative for arthralgias, joint swelling and neck pain.   Neurological: Negative for weakness and headaches.   Psychiatric/Behavioral: Negative for confusion and dysphoric mood.       Objective:      Physical Exam   Constitutional: She is oriented to person, place, and time. She appears well-developed and well-nourished.   HENT:   Head: Normocephalic and atraumatic.   Mouth/Throat: Oropharynx is clear and moist.   Eyes: Pupils are equal, round, and reactive to light. Conjunctivae and EOM are normal.   Neck: Normal range of motion. Neck supple. No thyromegaly present.   Cardiovascular: Normal rate, regular rhythm, normal heart sounds and intact distal pulses. Exam reveals no gallop and no friction rub.   No murmur heard.  Pulmonary/Chest: Effort normal and breath sounds normal. No respiratory distress. She has no  wheezes. She has no rales.   Abdominal: Soft. Bowel sounds are normal. She exhibits no distension. There is no tenderness.   Musculoskeletal: Normal range of motion. She exhibits no edema.   Lymphadenopathy:     She has no cervical adenopathy.   Neurological: She is alert and oriented to person, place, and time. No cranial nerve deficit.   Skin: Skin is warm and dry.   Psychiatric: She has a normal mood and affect. Her behavior is normal.       Assessment:       1. Annual physical exam    2. PCOS (polycystic ovarian syndrome)    3. Hypothyroidism, unspecified type        Plan:       Hypothyroidism- check labs in 4-6 weeks once back on med. Will refill levothyroxine for now until get results back  Annual- utd gyn exam. Check labs

## 2019-09-09 ENCOUNTER — LAB VISIT (OUTPATIENT)
Dept: LAB | Facility: HOSPITAL | Age: 34
End: 2019-09-09
Attending: INTERNAL MEDICINE
Payer: COMMERCIAL

## 2019-09-09 DIAGNOSIS — Z00.00 ANNUAL PHYSICAL EXAM: ICD-10-CM

## 2019-09-09 LAB
ALBUMIN SERPL BCP-MCNC: 3.6 G/DL (ref 3.5–5.2)
ALP SERPL-CCNC: 42 U/L (ref 55–135)
ALT SERPL W/O P-5'-P-CCNC: 14 U/L (ref 10–44)
ANION GAP SERPL CALC-SCNC: 12 MMOL/L (ref 8–16)
AST SERPL-CCNC: 14 U/L (ref 10–40)
BASOPHILS # BLD AUTO: 0.07 K/UL (ref 0–0.2)
BASOPHILS NFR BLD: 1.1 % (ref 0–1.9)
BILIRUB SERPL-MCNC: 0.4 MG/DL (ref 0.1–1)
BUN SERPL-MCNC: 14 MG/DL (ref 6–20)
CALCIUM SERPL-MCNC: 9.5 MG/DL (ref 8.7–10.5)
CHLORIDE SERPL-SCNC: 105 MMOL/L (ref 95–110)
CHOLEST SERPL-MCNC: 166 MG/DL (ref 120–199)
CHOLEST/HDLC SERPL: 2.7 {RATIO} (ref 2–5)
CO2 SERPL-SCNC: 22 MMOL/L (ref 23–29)
CREAT SERPL-MCNC: 0.9 MG/DL (ref 0.5–1.4)
DIFFERENTIAL METHOD: ABNORMAL
EOSINOPHIL # BLD AUTO: 0 K/UL (ref 0–0.5)
EOSINOPHIL NFR BLD: 0.6 % (ref 0–8)
ERYTHROCYTE [DISTWIDTH] IN BLOOD BY AUTOMATED COUNT: 12.3 % (ref 11.5–14.5)
EST. GFR  (AFRICAN AMERICAN): >60 ML/MIN/1.73 M^2
EST. GFR  (NON AFRICAN AMERICAN): >60 ML/MIN/1.73 M^2
GLUCOSE SERPL-MCNC: 76 MG/DL (ref 70–110)
HCT VFR BLD AUTO: 42.1 % (ref 37–48.5)
HDLC SERPL-MCNC: 61 MG/DL (ref 40–75)
HDLC SERPL: 36.7 % (ref 20–50)
HGB BLD-MCNC: 12.7 G/DL (ref 12–16)
IMM GRANULOCYTES # BLD AUTO: 0.03 K/UL (ref 0–0.04)
IMM GRANULOCYTES NFR BLD AUTO: 0.5 % (ref 0–0.5)
LDLC SERPL CALC-MCNC: 96.8 MG/DL (ref 63–159)
LYMPHOCYTES # BLD AUTO: 2 K/UL (ref 1–4.8)
LYMPHOCYTES NFR BLD: 31.2 % (ref 18–48)
MCH RBC QN AUTO: 29.3 PG (ref 27–31)
MCHC RBC AUTO-ENTMCNC: 30.2 G/DL (ref 32–36)
MCV RBC AUTO: 97 FL (ref 82–98)
MONOCYTES # BLD AUTO: 0.4 K/UL (ref 0.3–1)
MONOCYTES NFR BLD: 6 % (ref 4–15)
NEUTROPHILS # BLD AUTO: 3.9 K/UL (ref 1.8–7.7)
NEUTROPHILS NFR BLD: 60.6 % (ref 38–73)
NONHDLC SERPL-MCNC: 105 MG/DL
NRBC BLD-RTO: 0 /100 WBC
PLATELET # BLD AUTO: 319 K/UL (ref 150–350)
PMV BLD AUTO: 10.8 FL (ref 9.2–12.9)
POTASSIUM SERPL-SCNC: 4.4 MMOL/L (ref 3.5–5.1)
PROT SERPL-MCNC: 7.3 G/DL (ref 6–8.4)
RBC # BLD AUTO: 4.34 M/UL (ref 4–5.4)
SODIUM SERPL-SCNC: 139 MMOL/L (ref 136–145)
TRIGL SERPL-MCNC: 41 MG/DL (ref 30–150)
TSH SERPL DL<=0.005 MIU/L-ACNC: 2.14 UIU/ML (ref 0.4–4)
WBC # BLD AUTO: 6.45 K/UL (ref 3.9–12.7)

## 2019-09-09 PROCEDURE — 80053 COMPREHEN METABOLIC PANEL: CPT

## 2019-09-09 PROCEDURE — 84443 ASSAY THYROID STIM HORMONE: CPT

## 2019-09-09 PROCEDURE — 80061 LIPID PANEL: CPT

## 2019-09-09 PROCEDURE — 36415 COLL VENOUS BLD VENIPUNCTURE: CPT | Mod: PO

## 2019-09-09 PROCEDURE — 85025 COMPLETE CBC W/AUTO DIFF WBC: CPT

## 2019-10-21 DIAGNOSIS — L21.9 SEBORRHEIC DERMATITIS OF SCALP: ICD-10-CM

## 2019-10-21 RX ORDER — FLUOCINONIDE TOPICAL SOLUTION USP, 0.05% 0.5 MG/ML
SOLUTION TOPICAL
Qty: 60 ML | Refills: 0 | Status: SHIPPED | OUTPATIENT
Start: 2019-10-21

## 2020-01-08 ENCOUNTER — OFFICE VISIT (OUTPATIENT)
Dept: GASTROENTEROLOGY | Facility: CLINIC | Age: 35
End: 2020-01-08
Payer: COMMERCIAL

## 2020-01-08 VITALS — BODY MASS INDEX: 28.44 KG/M2 | RESPIRATION RATE: 18 BRPM | HEIGHT: 69 IN | WEIGHT: 192 LBS

## 2020-01-08 DIAGNOSIS — K92.1 BLACK STOOL: ICD-10-CM

## 2020-01-08 DIAGNOSIS — R10.84 GENERALIZED ABDOMINAL PAIN: Primary | ICD-10-CM

## 2020-01-08 DIAGNOSIS — R11.2 NON-INTRACTABLE VOMITING WITH NAUSEA: ICD-10-CM

## 2020-01-08 DIAGNOSIS — R14.0 BLOATING SYMPTOM: ICD-10-CM

## 2020-01-08 DIAGNOSIS — R19.8 IRREGULAR BOWEL HABITS: ICD-10-CM

## 2020-01-08 DIAGNOSIS — Z86.010 HISTORY OF COLON POLYPS: ICD-10-CM

## 2020-01-08 PROCEDURE — 3008F PR BODY MASS INDEX (BMI) DOCUMENTED: ICD-10-PCS | Mod: CPTII,S$GLB,, | Performed by: NURSE PRACTITIONER

## 2020-01-08 PROCEDURE — 99204 OFFICE O/P NEW MOD 45 MIN: CPT | Mod: S$GLB,,, | Performed by: NURSE PRACTITIONER

## 2020-01-08 PROCEDURE — 99999 PR PBB SHADOW E&M-EST. PATIENT-LVL IV: CPT | Mod: PBBFAC,,, | Performed by: NURSE PRACTITIONER

## 2020-01-08 PROCEDURE — 99999 PR PBB SHADOW E&M-EST. PATIENT-LVL IV: ICD-10-PCS | Mod: PBBFAC,,, | Performed by: NURSE PRACTITIONER

## 2020-01-08 PROCEDURE — 99204 PR OFFICE/OUTPT VISIT, NEW, LEVL IV, 45-59 MIN: ICD-10-PCS | Mod: S$GLB,,, | Performed by: NURSE PRACTITIONER

## 2020-01-08 PROCEDURE — 3008F BODY MASS INDEX DOCD: CPT | Mod: CPTII,S$GLB,, | Performed by: NURSE PRACTITIONER

## 2020-01-08 RX ORDER — PREDNISOLONE ACETATE 10 MG/ML
SUSPENSION/ DROPS OPHTHALMIC
COMMUNITY
Start: 2020-01-05 | End: 2022-11-14 | Stop reason: ALTCHOICE

## 2020-01-08 RX ORDER — CHOLECALCIFEROL (VITAMIN D3) 125 MCG
CAPSULE ORAL DAILY PRN
COMMUNITY
End: 2021-05-28

## 2020-01-08 NOTE — PATIENT INSTRUCTIONS
Abdominal Pain    Abdominal pain is pain in the stomach or belly area. Everyone has this pain from time to time. In many cases it goes away on its own. But abdominal pain can sometimes be due to a serious problem, such as appendicitis. So its important to know when to seek help.  Causes of abdominal pain  There are many possible causes of abdominal pain. Common causes in adults include:  · Constipation, diarrhea, or gas  · Stomach acid flowing back up into the esophagus (acid reflux or heartburn)  · Severe acid reflux, called GERD (gastroesophageal reflux disease)  · A sore in the lining of the stomach or small intestine (peptic ulcer)  · Inflammation of the gallbladder, liver, or pancreas  · Gallstones or kidney stones  · Appendicitis   · Intestinal blockage   · An internal organ pushing through a muscle or other tissue (hernia)  · Urinary tract infections  · In women, menstrual cramps, fibroids, or endometriosis  · Inflammation or infection of the intestines  Diagnosing the cause of abdominal pain  Your healthcare provider will do a physical exam help find the cause of your pain. If needed, tests will be ordered. Belly pain has many possible causes. So it can be hard to find the reason for your pain. Giving details about your pain can help. Tell your provider where and when you feel the pain, and what makes it better or worse. Also let your provider know if you have other symptoms such as:  · Fever  · Tiredness  · Upset stomach (nausea)  · Vomiting  · Changes in bathroom habits  Treating abdominal pain  Some causes of pain need emergency medical treatment right away. These include appendicitis or a bowel blockage. Other problems can be treated with rest, fluids, or medicines. Your healthcare provider can give you specific instructions for treatment or self-care based on what is causing your pain.  If you have vomiting or diarrhea, sip water or other clear fluids. When you are ready to eat solid foods again,  start with small amounts of easy-to-digest, low-fat foods. These include apple sauce, toast, or crackers.   When to seek medical care  Call 911 or go to the hospital right away if you:  · Cant pass stool and are vomiting  · Are vomiting blood or have bloody diarrhea or black, tarry diarrhea  · Have chest, neck, or shoulder pain  · Feel like you might pass out  · Have pain in your shoulder blades with nausea  · Have sudden, severe belly pain  · Have new, severe pain unlike any you have felt before  · Have a belly that is rigid, hard, and tender to touch  Call your healthcare provider if you have:  · Pain for more than 5 days  · Bloating for more than 2 days  · Diarrhea for more than 5 days  · A fever of 100.4°F (38.0°C) or higher, or as directed by your provider  · Pain that gets worse  · Weight loss for no reason  · Continued lack of appetite  · Blood in your stool  How to prevent abdominal pain  Here are some tips to help prevent abdominal pain:  · Eat smaller amounts of food at one time.  · Avoid greasy, fried, or other high-fat foods.  · Avoid foods that give you gas.  · Exercise regularly.  · Drink plenty of fluids.  To help prevent GERD symptoms:  · Quit smoking.  · Reduce alcohol and certain foods that increase stomach acid.  · Avoid aspirin and over-the-counter pain and fever medicines (NSAIDS or nonsteroidal anti-inflammatory drugs), if possible  · Lose extra weight.  · Finish eating at least 2 hours before you go to bed or lie down.  · Raise the head of your bed.  Date Last Reviewed: 7/1/2016  © 9576-9752 [employer]. [address] [place] [address], [place] [address]. All rights reserved. This information is not intended as a substitute for professional medical care. Always follow your healthcare professional's instructions.            Excess Gas  Certain foods produce gas when digested. In some people, these foods make an excessive amount of gas. This may cause bloating, burping, or increased gas  "passing through the rectum (flatulence).     Foods that cause gas  The following foods are more likely to cause this problem. Limit them, or remove them from your diet:  · Broccoli  · Cauliflower  · [place] sprouts  · Cabbage  · Cooked dried beans  · Fizzy (carbonated) drinks, such as sparkling water, soda, beer, and champagne  Other causes  Other causes of excess gas include:  · Eating too fast or talking while you chew. This may cause you to swallow air. This increases the amount of gas in your stomach. And it may make your symptoms worse. Chew each mouthful completely before you swallow. Take your time.  · Chewing on gum or sucking on hard candy. These cause you to swallow more often. And some of what you are swallowing is air. This leads to more gas in your stomach. Avoid chewing gum and hard candy.  · Overeating. This may increase the feeling of being bloated and cause more gas. When you are full, stop eating.   · Being constipated. This can increase the amount of normal intestinal gas. Avoid constipation by getting more fiber in your diet. Good sources of fiber include whole-grain cereal, fresh vegetables (except those in the above list), and fresh fruits. High-fiber foods absorb water and carry it out of the body. When adding more fiber to your diet, you also need to drink more water. You should drink at least 8, 8-ounce glasses of water (2 quarts) per day.  Date Last Reviewed: 8/1/2016  © 1306-4642 [employer]. [address] [place] [address], [place] [address]. All rights reserved. This information is not intended as a substitute for professional medical care. Always follow your healthcare professional's instructions.          Vomiting (Adult)  Vomiting is a common symptom that may be due to different causes. These include gastroenteritis ("stomach flu"), food poisoning and gastritis. There are other more serious causes of vomiting which may be hard to diagnose early in the illness. Therefore, it is " important to watch for the warning signs listed below.  The main danger from repeated vomiting is dehydration. This is due to excess loss of water and minerals from the body. When this occurs, body fluids must be replaced.  Home care  · If symptoms are severe, rest at home for the next 24 hours.  · Because your symptoms may be from an infection, wash your hands frequently and well, and use alcohol-based  to avoid spreading the infection to others.  · Wash your hands for at least 20 seconds. Hum the happy birthday song twice for the correct length of time.  · Wash your hands after using the toilet, before and after preparing food, before eating food, after changing a diaper, cleaning a wound, caring for a sick person, and blowing your nose, coughing, or sneezing. You should also wash your hands after caring for someone who is sick, touching pet food, or treats, and touching an animal, or animal waste.  · You may use acetaminophen or NSAID medicines like ibuprofen or naproxen to control fever, unless another medicine was prescribed. If you have chronic liver or kidney disease or ever had a stomach ulcer or GI bleeding, talk with your doctor before using these medicines. Aspirin should never be used in anyone under 18 years of age who is ill with a fever. It may cause severe liver damage. Don't use NSAID medicines if you are already taking one for another condition (like arthritis) or are on aspirin (such as for heart disease, or after a stroke)  · Avoid tobacco and alcohol use, which may worsen your symptoms.  · If medicines for vomiting were prescribed, take as directed.  · Once vomiting stops, then follow these guidelines:  During the first 12 to 24 hours follow the diet below:  · Fruit juices. Apple, grape juice, clear fruit drinks, and electrolyte replacement drinks.  · Beverages. Soft drinks without caffeine; mineral water (plain or flavored), decaffeinated tea and coffee.  · Soups. Clear broth, consommé  and bouillon  · Desserts. Plain gelatin, popsicles and fruit juice bars. As you feel better, you may add 6-8 ounces of yogurt per day.  During the next 24 hours you may add the following to the above:  · Hot cereal, plain toast, bread, rolls, crackers  · Plain noodles, rice, mashed potatoes, chicken noodle or rice soup  · Unsweetened canned fruit (avoid pineapple), bananas  · Limit caffeine and chocolate. No spices or seasonings except salt.  During the next 24 hours:  Gradually resume a normal diet, as you feel better and your symptoms lessen.  Follow-up care  Follow up with your healthcare provider, or as advised.  When to seek medical advice  Call your healthcare provider right away if any of these occur:  · Constant right-sided lower abdominal pain or increasing general abdominal pain  · Continued vomiting (unable to keep liquids down) for 24 hours  · Frequent diarrhea (more than 5 times a day); blood (red or black color) or mucus in diarrhea  · Reduced urine output or extreme thirst  · Weakness, dizziness or fainting  · Unusually drowsy or confused  · Fever of 100.4°F (38°C) oral or higher, or as directed  · Yellow color of the eyes or skin  Date Last Reviewed: 11/16/2015  © 9056-9670 [employer]. [address] [place] [address], [place] [address]. All rights reserved. This information is not intended as a substitute for professional medical care. Always follow your healthcare professional's instructions.    Eating a High-Fiber Diet  Fiber is what gives strength and structure to plants. Most grains, beans, vegetables, and fruits contain fiber. Foods rich in fiber are often low in calories and fat, and they fill you up more. They may also reduce your risks for certain health problems. To find out the amount of fiber in canned, packaged, or frozen foods, read the Nutrition Facts label. It tells you how much fiber is in a serving.    Types of fiber and their benefits  There are two types of fiber: insoluble  and soluble. They both aid digestion and help you maintain a healthy weight.  · Insoluble fiber. This is found in whole grains, cereals, certain fruits and vegetables such as apple skin, corn, and carrots. Insoluble fiber may prevent constipation and reduce the risk for certain types of cancer.  · Soluble fiber. This type of fiber is in oats, beans, and certain fruits and vegetables such as strawberries and peas. Soluble fiber can reduce cholesterol, which may help lower the risk for heart disease. It also helps control blood sugar levels.  Look for high-fiber foods  Try these foods to add fiber to your diet:  · Whole-grain breads and cereals. Try to eat 6 to 8 ounces a day. Include wheat and oat bran cereals, whole-wheat muffins or toast, and corn tortillas in your meals.  · Fruits. Try to eat 2 cups a day. Apples, oranges, strawberries, pears, and bananas are good sources. (Note: Fruit juice is low in fiber.)  · Vegetables. Try to eat at least 2.5 cups a day. Add asparagus, carrots, broccoli, peas, and corn to your meals.  · Beans. One cup of cooked lentils gives you over 15 grams of fiber. Try navy beans, lentils, and chickpeas.  · Seeds. A small handful of seeds gives you about 3 grams of fiber. Try sunflower seeds.  Keep track of your fiber  Keep track of how much fiber you eat. Start by reading food labels. Then eat a variety of foods high in fiber. As you begin to eat more fiber, ask your healthcare provider how much water you should be drinking to keep your digestive system working smoothly.  You should aim for a certain amount of fiber in your diet each day. If you are a woman, that amount is between 25 and 28 grams per day. Men should aim for 30 to 33 grams per day. After age 50, your daily fiber needs drop to 22 grams for women and 28 grams for men.  Before you reach for the fiber supplements, think about this. Fiber is found naturally in healthy whole foods. It gives you that feeling of fullness after  you eat. Taking fiber supplements or eating fiber-enriched foods will not give you this full feeling.  Your fiber intake is a good measure for the quality of your overall diet. If you are missing out on your daily amount of fiber, you may be lacking other important nutrients as well.  Date Last Reviewed: 5/11/2015  © 1888-0226 [employer]. [address] [place] [address], [place] [address]. All rights reserved. This information is not intended as a substitute for professional medical care. Always follow your healthcare professional's instructions.

## 2020-01-08 NOTE — H&P (VIEW-ONLY)
Subjective:       Patient ID: Emily Pelayo is a 34 y.o. female Body mass index is 28.36 kg/m².    Chief Complaint: Abdominal Pain (flatulence, bloating, irregular bowel habits, vomiting)    This patient is new to me.     GI Problem   The primary symptoms include abdominal pain, nausea, vomiting (occurs with episodes of diarrhea), diarrhea and melena (black tarry; aleve prn- menstrual cycle cramps). Primary symptoms do not include fever, weight loss, fatigue, hematemesis, hematochezia or dysuria. The illness began more than 7 days ago (started with GI symptoms ~10 years ago, saw Dr. Neri for it; had colonoscopy for it). The problem has not changed since onset.  The abdominal pain began more than 2 days ago (started at least 10 years ago). The abdominal pain has been unchanged since its onset. Pain location: lower abdominal cramping. The severity of the abdominal pain is 4/10. Relieved by: sometimes improves with bowel movements & passing gas.   The emesis contains stomach contents and bilious material.   The illness is also significant for constipation (intermittent) and tenesmus. The illness does not include chills, dysphagia or odynophagia. Associated symptoms comments: Reports episodes of IBS described as once a month with bloating, cramping with urgency, and bowel movements of sticky, wet, dark stool with foul odor (can have 6-8 times a day), can last hours; occasional she vomits; occasional mucus of purulent drainage at times; borborygmus; otherwise bowel movements once every 3-4 days; lactose intolerant- reports she eats cheese, uses lactaid milk  TREATMENT: probiotic no relief, made gas worse. Significant associated medical issues include GERD (occasional; reflux occurs with vomiting) and irritable bowel syndrome. Associated medical issues do not include inflammatory bowel disease.     Review of Systems   Constitutional: Negative for appetite change, chills, fatigue, fever and weight loss.   HENT:  Negative for sore throat and trouble swallowing.    Respiratory: Negative for cough, choking and shortness of breath.    Cardiovascular: Negative for chest pain.   Gastrointestinal: Positive for abdominal pain, constipation (intermittent), diarrhea, melena (black tarry; aleve prn- menstrual cycle cramps), nausea and vomiting (occurs with episodes of diarrhea). Negative for anal bleeding, blood in stool, dysphagia, hematemesis, hematochezia and rectal pain.   Genitourinary: Negative for difficulty urinating, dysuria and flank pain.   Neurological: Negative for weakness.       Patient's last menstrual period was 12/14/2019 (approximate).  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 Neri, colon polyps per patient report    dx lap      hammer toe Right 11/19/2015    heavenly toe repeair with callus filing    LAPAROSCOPY  ~2012    done for infertility concern    TONSILLECTOMY       Family History   Problem Relation Age of Onset    Thyroid disease Mother         hyperthyroidism    Hypertension Mother     Hypertension Sister     Heart disease Maternal Grandmother     Ovarian cancer Neg Hx     Breast cancer Neg Hx     Eczema Neg Hx     Lupus Neg Hx     Psoriasis Neg Hx     Melanoma Neg Hx     Cancer Neg Hx     Colon cancer Neg Hx     Crohn's disease Neg Hx     Ulcerative colitis Neg Hx     Stomach cancer Neg Hx     Esophageal cancer Neg Hx      Wt Readings from Last 10 Encounters:   01/08/20 87.1 kg (192 lb 0.3 oz)   07/23/19 85.2 kg (187 lb 13.3 oz)   07/23/19 85.3 kg (188 lb 0.8 oz)   07/20/18 82.8 kg (182 lb 8.7 oz)   04/02/18 81.8 kg (180 lb 5.4 oz)   12/19/17 83.9 kg (184 lb 15.5 oz)   09/25/17 83 kg (182 lb 15.7 oz)   05/29/17 82.2 kg  (181 lb 3.5 oz)   03/17/17 84.1 kg (185 lb 6.5 oz)   02/07/17 84.1 kg (185 lb 6.5 oz)     Lab Results   Component Value Date    WBC 6.45 09/09/2019    HGB 12.7 09/09/2019    HCT 42.1 09/09/2019    MCV 97 09/09/2019     09/09/2019     CMP  Sodium   Date Value Ref Range Status   09/09/2019 139 136 - 145 mmol/L Final     Potassium   Date Value Ref Range Status   09/09/2019 4.4 3.5 - 5.1 mmol/L Final     Chloride   Date Value Ref Range Status   09/09/2019 105 95 - 110 mmol/L Final     CO2   Date Value Ref Range Status   09/09/2019 22 (L) 23 - 29 mmol/L Final     Glucose   Date Value Ref Range Status   09/09/2019 76 70 - 110 mg/dL Final     BUN, Bld   Date Value Ref Range Status   09/09/2019 14 6 - 20 mg/dL Final     Creatinine   Date Value Ref Range Status   09/09/2019 0.9 0.5 - 1.4 mg/dL Final     Calcium   Date Value Ref Range Status   09/09/2019 9.5 8.7 - 10.5 mg/dL Final     Total Protein   Date Value Ref Range Status   09/09/2019 7.3 6.0 - 8.4 g/dL Final     Albumin   Date Value Ref Range Status   09/09/2019 3.6 3.5 - 5.2 g/dL Final     Total Bilirubin   Date Value Ref Range Status   09/09/2019 0.4 0.1 - 1.0 mg/dL Final     Comment:     For infants and newborns, interpretation of results should be based  on gestational age, weight and in agreement with clinical  observations.  Premature Infant recommended reference ranges:  Up to 24 hours.............<8.0 mg/dL  Up to 48 hours............<12.0 mg/dL  3-5 days..................<15.0 mg/dL  6-29 days.................<15.0 mg/dL       Alkaline Phosphatase   Date Value Ref Range Status   09/09/2019 42 (L) 55 - 135 U/L Final     AST   Date Value Ref Range Status   09/09/2019 14 10 - 40 U/L Final     ALT   Date Value Ref Range Status   09/09/2019 14 10 - 44 U/L Final     Anion Gap   Date Value Ref Range Status   09/09/2019 12 8 - 16 mmol/L Final     eGFR if    Date Value Ref Range Status   09/09/2019 >60.0 >60 mL/min/1.73 m^2 Final     eGFR if non     Date Value Ref Range Status   09/09/2019 >60.0 >60 mL/min/1.73 m^2 Final     Comment:     Calculation used to obtain the estimated glomerular filtration  rate (eGFR) is the CKD-EPI equation.        Lab Results   Component Value Date    TSH 2.143 09/09/2019     Reviewed prior medical records including radiology report of 3/8/16 pelvic ultrasound.    Objective:      Physical Exam   Constitutional: She is oriented to person, place, and time. She appears well-developed and well-nourished. No distress.   HENT:   Mouth/Throat: Oropharynx is clear and moist and mucous membranes are normal. No oral lesions. No oropharyngeal exudate.   Eyes: Pupils are equal, round, and reactive to light. Conjunctivae are normal. No scleral icterus.   Pulmonary/Chest: Effort normal and breath sounds normal. No respiratory distress. She has no wheezes.   Abdominal: Soft. Normal appearance and bowel sounds are normal. She exhibits no distension, no abdominal bruit and no mass. There is tenderness (mild) in the periumbilical area and suprapubic area. There is no rigidity, no rebound, no guarding, no tenderness at McBurney's point and negative Gomez's sign.   Well-healed surgical scars noted.   Neurological: She is alert and oriented to person, place, and time.   Skin: Skin is warm and dry. No rash noted. She is not diaphoretic. No erythema. No pallor.   Non-jaundiced   Psychiatric: She has a normal mood and affect. Her behavior is normal. Judgment and thought content normal.   Nursing note and vitals reviewed.      Assessment:       1. Generalized abdominal pain    2. Bloating symptom    3. Non-intractable vomiting with nausea    4. Irregular bowel habits    5. Black stool    6. History of colon polyps        Plan:       Generalized abdominal pain  -     CT Abdomen Pelvis With Contrast; Future; Expected date: 01/08/2020  -     Pregnancy, urine rapid; Future; Expected date: 01/08/2020  -     Case request GI: EGD  (ESOPHAGOGASTRODUODENOSCOPY), - schedule EGD, discussed procedure with patient, including risks and benefits, patient verbalized understanding    Bloating symptom  -     CT Abdomen Pelvis With Contrast; Future; Expected date: 01/08/2020  -     Case request GI: EGD (ESOPHAGOGASTRODUODENOSCOPY), - schedule EGD, discussed procedure with patient, including risks and benefits, patient verbalized understanding  - recommended OTC simethicone as directed, such as Phazyme or Gas-x  -discussed with patient about low gas diet: Reduce or eliminate these foods from your diet: Broccoli, Cauliflower, [place] sprouts, Cabbage, Cooked dried beans, Carbonated beverages (sparkling water, soda, beer, champagne)  Other Causes Of Excess Gas Include:   1) EATING TOO FAST or TALKING WHILE YOU CHEW may cause you to swallow air. This increases the amount of gas in the stomach and may worsen your symptoms.  --> Chew each mouthful completely before swallowing. Take your time.  2) OVEREATING may increase the feeling of being bloated and cause more gas.  --> When you are full, stop eating.  3) CONSTIPATION can increase the amount of normal intestinal gas.  --> Avoid constipation by increasing the amount of fiber in your diet by including whole cereal grains, fresh vegetables (except those in the above list) and fresh fruits. High-fiber foods absorb water and carry it out of the body. When increasing the amount of fiber in your diet, you also need to increase the amount of water that you drink. You should drink at least eight 8-ounce glasses of water (two quarts) per day.    Non-intractable vomiting with nausea  -     CT Abdomen Pelvis With Contrast; Future; Expected date: 01/08/2020  -     Case request GI: EGD (ESOPHAGOGASTRODUODENOSCOPY), - schedule EGD, discussed procedure with patient, including risks and benefits, patient verbalized understanding    Irregular bowel habits  -     CT Abdomen Pelvis With Contrast; Future; Expected date:  01/08/2020  -     Stool Exam-Ova,Cysts,Parasites; Future; Expected date: 01/08/2020  -     Fecal fat, qualitative; Future; Expected date: 01/08/2020  -     Giardia / Cryptosporidum, EIA; Future; Expected date: 01/08/2020  -     Occult blood x 1, stool; Future; Expected date: 01/08/2020  -     pH, stool; Future; Expected date: 01/08/2020  -     Rotavirus antigen, stool; Future; Expected date: 01/08/2020  -     WBC, Stool; Future; Expected date: 01/08/2020  -     Stool culture; Future; Expected date: 01/08/2020  -     Clostridium difficile EIA; Future; Expected date: 01/08/2020  -     Adenovirus Molecular Detection, PCR, Non-Blood Stool; Future; Expected date: 01/08/2020  -     Pancreatic elastase, fecal; Future; Expected date: 01/08/2020  - avoid lactose  - schedule Colonoscopy, discussed procedure with the patient, including risks and benefits, patient verbalized understanding    Black stool  -     Case request GI: EGD (ESOPHAGOGASTRODUODENOSCOPY), - schedule EGD, discussed procedure with patient, including risks and benefits, patient verbalized understanding  - schedule Colonoscopy, discussed procedure with the patient, including risks and benefits, patient verbalized understanding  - avoid/minimize use of NSAIDs- since they can cause GI upset, bleeding and/or ulcers. If NSAID must be taken, recommend take with food.    History of colon polyps  - schedule Colonoscopy, discussed procedure with the patient, including risks and benefits, patient verbalized understanding    Follow up in about 1 month (around 2/8/2020), or if symptoms worsen or fail to improve.    If no improvement in symptoms or symptoms worsen, call/follow-up at clinic or go to ER.

## 2020-01-08 NOTE — PROGRESS NOTES
Subjective:       Patient ID: Emily Pelayo is a 34 y.o. female Body mass index is 28.36 kg/m².    Chief Complaint: Abdominal Pain (flatulence, bloating, irregular bowel habits, vomiting)    This patient is new to me.     GI Problem   The primary symptoms include abdominal pain, nausea, vomiting (occurs with episodes of diarrhea), diarrhea and melena (black tarry; aleve prn- menstrual cycle cramps). Primary symptoms do not include fever, weight loss, fatigue, hematemesis, hematochezia or dysuria. The illness began more than 7 days ago (started with GI symptoms ~10 years ago, saw Dr. Neri for it; had colonoscopy for it). The problem has not changed since onset.  The abdominal pain began more than 2 days ago (started at least 10 years ago). The abdominal pain has been unchanged since its onset. Pain location: lower abdominal cramping. The severity of the abdominal pain is 4/10. Relieved by: sometimes improves with bowel movements & passing gas.   The emesis contains stomach contents and bilious material.   The illness is also significant for constipation (intermittent) and tenesmus. The illness does not include chills, dysphagia or odynophagia. Associated symptoms comments: Reports episodes of IBS described as once a month with bloating, cramping with urgency, and bowel movements of sticky, wet, dark stool with foul odor (can have 6-8 times a day), can last hours; occasional she vomits; occasional mucus of purulent drainage at times; borborygmus; otherwise bowel movements once every 3-4 days; lactose intolerant- reports she eats cheese, uses lactaid milk  TREATMENT: probiotic no relief, made gas worse. Significant associated medical issues include GERD (occasional; reflux occurs with vomiting) and irritable bowel syndrome. Associated medical issues do not include inflammatory bowel disease.     Review of Systems   Constitutional: Negative for appetite change, chills, fatigue, fever and weight loss.   HENT:  Negative for sore throat and trouble swallowing.    Respiratory: Negative for cough, choking and shortness of breath.    Cardiovascular: Negative for chest pain.   Gastrointestinal: Positive for abdominal pain, constipation (intermittent), diarrhea, melena (black tarry; aleve prn- menstrual cycle cramps), nausea and vomiting (occurs with episodes of diarrhea). Negative for anal bleeding, blood in stool, dysphagia, hematemesis, hematochezia and rectal pain.   Genitourinary: Negative for difficulty urinating, dysuria and flank pain.   Neurological: Negative for weakness.       Patient's last menstrual period was 12/14/2019 (approximate).  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 Neri, colon polyps per patient report    dx lap      hammer toe Right 11/19/2015    heavenly toe repeair with callus filing    LAPAROSCOPY  ~2012    done for infertility concern    TONSILLECTOMY       Family History   Problem Relation Age of Onset    Thyroid disease Mother         hyperthyroidism    Hypertension Mother     Hypertension Sister     Heart disease Maternal Grandmother     Ovarian cancer Neg Hx     Breast cancer Neg Hx     Eczema Neg Hx     Lupus Neg Hx     Psoriasis Neg Hx     Melanoma Neg Hx     Cancer Neg Hx     Colon cancer Neg Hx     Crohn's disease Neg Hx     Ulcerative colitis Neg Hx     Stomach cancer Neg Hx     Esophageal cancer Neg Hx      Wt Readings from Last 10 Encounters:   01/08/20 87.1 kg (192 lb 0.3 oz)   07/23/19 85.2 kg (187 lb 13.3 oz)   07/23/19 85.3 kg (188 lb 0.8 oz)   07/20/18 82.8 kg (182 lb 8.7 oz)   04/02/18 81.8 kg (180 lb 5.4 oz)   12/19/17 83.9 kg (184 lb 15.5 oz)   09/25/17 83 kg (182 lb 15.7 oz)   05/29/17 82.2 kg  (181 lb 3.5 oz)   03/17/17 84.1 kg (185 lb 6.5 oz)   02/07/17 84.1 kg (185 lb 6.5 oz)     Lab Results   Component Value Date    WBC 6.45 09/09/2019    HGB 12.7 09/09/2019    HCT 42.1 09/09/2019    MCV 97 09/09/2019     09/09/2019     CMP  Sodium   Date Value Ref Range Status   09/09/2019 139 136 - 145 mmol/L Final     Potassium   Date Value Ref Range Status   09/09/2019 4.4 3.5 - 5.1 mmol/L Final     Chloride   Date Value Ref Range Status   09/09/2019 105 95 - 110 mmol/L Final     CO2   Date Value Ref Range Status   09/09/2019 22 (L) 23 - 29 mmol/L Final     Glucose   Date Value Ref Range Status   09/09/2019 76 70 - 110 mg/dL Final     BUN, Bld   Date Value Ref Range Status   09/09/2019 14 6 - 20 mg/dL Final     Creatinine   Date Value Ref Range Status   09/09/2019 0.9 0.5 - 1.4 mg/dL Final     Calcium   Date Value Ref Range Status   09/09/2019 9.5 8.7 - 10.5 mg/dL Final     Total Protein   Date Value Ref Range Status   09/09/2019 7.3 6.0 - 8.4 g/dL Final     Albumin   Date Value Ref Range Status   09/09/2019 3.6 3.5 - 5.2 g/dL Final     Total Bilirubin   Date Value Ref Range Status   09/09/2019 0.4 0.1 - 1.0 mg/dL Final     Comment:     For infants and newborns, interpretation of results should be based  on gestational age, weight and in agreement with clinical  observations.  Premature Infant recommended reference ranges:  Up to 24 hours.............<8.0 mg/dL  Up to 48 hours............<12.0 mg/dL  3-5 days..................<15.0 mg/dL  6-29 days.................<15.0 mg/dL       Alkaline Phosphatase   Date Value Ref Range Status   09/09/2019 42 (L) 55 - 135 U/L Final     AST   Date Value Ref Range Status   09/09/2019 14 10 - 40 U/L Final     ALT   Date Value Ref Range Status   09/09/2019 14 10 - 44 U/L Final     Anion Gap   Date Value Ref Range Status   09/09/2019 12 8 - 16 mmol/L Final     eGFR if    Date Value Ref Range Status   09/09/2019 >60.0 >60 mL/min/1.73 m^2 Final     eGFR if non     Date Value Ref Range Status   09/09/2019 >60.0 >60 mL/min/1.73 m^2 Final     Comment:     Calculation used to obtain the estimated glomerular filtration  rate (eGFR) is the CKD-EPI equation.        Lab Results   Component Value Date    TSH 2.143 09/09/2019     Reviewed prior medical records including radiology report of 3/8/16 pelvic ultrasound.    Objective:      Physical Exam   Constitutional: She is oriented to person, place, and time. She appears well-developed and well-nourished. No distress.   HENT:   Mouth/Throat: Oropharynx is clear and moist and mucous membranes are normal. No oral lesions. No oropharyngeal exudate.   Eyes: Pupils are equal, round, and reactive to light. Conjunctivae are normal. No scleral icterus.   Pulmonary/Chest: Effort normal and breath sounds normal. No respiratory distress. She has no wheezes.   Abdominal: Soft. Normal appearance and bowel sounds are normal. She exhibits no distension, no abdominal bruit and no mass. There is tenderness (mild) in the periumbilical area and suprapubic area. There is no rigidity, no rebound, no guarding, no tenderness at McBurney's point and negative Gomez's sign.   Well-healed surgical scars noted.   Neurological: She is alert and oriented to person, place, and time.   Skin: Skin is warm and dry. No rash noted. She is not diaphoretic. No erythema. No pallor.   Non-jaundiced   Psychiatric: She has a normal mood and affect. Her behavior is normal. Judgment and thought content normal.   Nursing note and vitals reviewed.      Assessment:       1. Generalized abdominal pain    2. Bloating symptom    3. Non-intractable vomiting with nausea    4. Irregular bowel habits    5. Black stool    6. History of colon polyps        Plan:       Generalized abdominal pain  -     CT Abdomen Pelvis With Contrast; Future; Expected date: 01/08/2020  -     Pregnancy, urine rapid; Future; Expected date: 01/08/2020  -     Case request GI: EGD  (ESOPHAGOGASTRODUODENOSCOPY), - schedule EGD, discussed procedure with patient, including risks and benefits, patient verbalized understanding    Bloating symptom  -     CT Abdomen Pelvis With Contrast; Future; Expected date: 01/08/2020  -     Case request GI: EGD (ESOPHAGOGASTRODUODENOSCOPY), - schedule EGD, discussed procedure with patient, including risks and benefits, patient verbalized understanding  - recommended OTC simethicone as directed, such as Phazyme or Gas-x  -discussed with patient about low gas diet: Reduce or eliminate these foods from your diet: Broccoli, Cauliflower, Wykoff sprouts, Cabbage, Cooked dried beans, Carbonated beverages (sparkling water, soda, beer, champagne)  Other Causes Of Excess Gas Include:   1) EATING TOO FAST or TALKING WHILE YOU CHEW may cause you to swallow air. This increases the amount of gas in the stomach and may worsen your symptoms.  --> Chew each mouthful completely before swallowing. Take your time.  2) OVEREATING may increase the feeling of being bloated and cause more gas.  --> When you are full, stop eating.  3) CONSTIPATION can increase the amount of normal intestinal gas.  --> Avoid constipation by increasing the amount of fiber in your diet by including whole cereal grains, fresh vegetables (except those in the above list) and fresh fruits. High-fiber foods absorb water and carry it out of the body. When increasing the amount of fiber in your diet, you also need to increase the amount of water that you drink. You should drink at least eight 8-ounce glasses of water (two quarts) per day.    Non-intractable vomiting with nausea  -     CT Abdomen Pelvis With Contrast; Future; Expected date: 01/08/2020  -     Case request GI: EGD (ESOPHAGOGASTRODUODENOSCOPY), - schedule EGD, discussed procedure with patient, including risks and benefits, patient verbalized understanding    Irregular bowel habits  -     CT Abdomen Pelvis With Contrast; Future; Expected date:  01/08/2020  -     Stool Exam-Ova,Cysts,Parasites; Future; Expected date: 01/08/2020  -     Fecal fat, qualitative; Future; Expected date: 01/08/2020  -     Giardia / Cryptosporidum, EIA; Future; Expected date: 01/08/2020  -     Occult blood x 1, stool; Future; Expected date: 01/08/2020  -     pH, stool; Future; Expected date: 01/08/2020  -     Rotavirus antigen, stool; Future; Expected date: 01/08/2020  -     WBC, Stool; Future; Expected date: 01/08/2020  -     Stool culture; Future; Expected date: 01/08/2020  -     Clostridium difficile EIA; Future; Expected date: 01/08/2020  -     Adenovirus Molecular Detection, PCR, Non-Blood Stool; Future; Expected date: 01/08/2020  -     Pancreatic elastase, fecal; Future; Expected date: 01/08/2020  - avoid lactose  - schedule Colonoscopy, discussed procedure with the patient, including risks and benefits, patient verbalized understanding    Black stool  -     Case request GI: EGD (ESOPHAGOGASTRODUODENOSCOPY), - schedule EGD, discussed procedure with patient, including risks and benefits, patient verbalized understanding  - schedule Colonoscopy, discussed procedure with the patient, including risks and benefits, patient verbalized understanding  - avoid/minimize use of NSAIDs- since they can cause GI upset, bleeding and/or ulcers. If NSAID must be taken, recommend take with food.    History of colon polyps  - schedule Colonoscopy, discussed procedure with the patient, including risks and benefits, patient verbalized understanding    Follow up in about 1 month (around 2/8/2020), or if symptoms worsen or fail to improve.    If no improvement in symptoms or symptoms worsen, call/follow-up at clinic or go to ER.

## 2020-01-14 ENCOUNTER — HOSPITAL ENCOUNTER (OUTPATIENT)
Dept: RADIOLOGY | Facility: HOSPITAL | Age: 35
Discharge: HOME | End: 2020-01-14
Attending: NURSE PRACTITIONER
Payer: COMMERCIAL

## 2020-01-14 DIAGNOSIS — R14.0 BLOATING SYMPTOM: ICD-10-CM

## 2020-01-14 DIAGNOSIS — R11.2 NON-INTRACTABLE VOMITING WITH NAUSEA: ICD-10-CM

## 2020-01-14 DIAGNOSIS — R10.84 GENERALIZED ABDOMINAL PAIN: ICD-10-CM

## 2020-01-14 DIAGNOSIS — R19.8 IRREGULAR BOWEL HABITS: ICD-10-CM

## 2020-01-14 PROCEDURE — 74177 CT ABD & PELVIS W/CONTRAST: CPT | Mod: 26,,, | Performed by: RADIOLOGY

## 2020-01-14 PROCEDURE — 74177 CT ABDOMEN PELVIS WITH CONTRAST: ICD-10-PCS | Mod: 26,,, | Performed by: RADIOLOGY

## 2020-01-14 PROCEDURE — 25500020 PHARM REV CODE 255: Performed by: NURSE PRACTITIONER

## 2020-01-14 PROCEDURE — 74177 CT ABD & PELVIS W/CONTRAST: CPT | Mod: TC

## 2020-01-14 RX ADMIN — IOHEXOL 1000 ML: 9 SOLUTION ORAL at 09:01

## 2020-01-14 RX ADMIN — IOHEXOL 100 ML: 350 INJECTION, SOLUTION INTRAVENOUS at 09:01

## 2020-01-16 ENCOUNTER — TELEPHONE (OUTPATIENT)
Dept: GASTROENTEROLOGY | Facility: CLINIC | Age: 35
End: 2020-01-16

## 2020-01-16 DIAGNOSIS — R93.89 ABNORMAL FINDING ON CT SCAN: Primary | ICD-10-CM

## 2020-01-16 DIAGNOSIS — R93.89 ABNORMAL FINDING PRESENT ON DIAGNOSTIC IMAGING OF UTERUS: ICD-10-CM

## 2020-01-16 DIAGNOSIS — N83.209 CYST OF OVARY, UNSPECIFIED LATERALITY: ICD-10-CM

## 2020-01-16 NOTE — TELEPHONE ENCOUNTER
"Please call to inform & review the results with the patient- radiology report of the ct abdomen pelvis showed "prominent amount of stool throughout colon and rectum" which may be contributing to bloating symptom. Can try one time dose of OTC magnesium citrate (295ml- take as directed) to help clear the stool out of the colon. Recommend high fiber diet (20-30 grams of fiber daily)/OTC fiber supplements daily as directed. Otherwise, continue previous recommendations as discussed.    "Very mildly prominent right lower quadrant mesenteric lymph nodes which in the appropriate clinical setting can question mesenteric adenitis with correlation recommended clinically": mesenteric adenitis is typically a self-limiting condition; continue with colonoscopy as previously recommended (please schedule patient for colonoscopy).     "Heterogeneous enhancement of the uterus which may be related to the stage of the patient's menstrual cycle and the timing of the contrast but suspicious for abnormal enhancement most commonly uterine fibroids and small exophytic fibroid is suggested as well.  Suggest pelvic ultrasound for further evaluation" & ovarian cysts: I placed order for pelvic ultrasound & recommend follow-up with GYN for continued evaluation and management of this finding.    Additional findings showed "Small basilar pulmonary nodules largest measuring 5 mm", "diffuse bladder wall thickening versus incomplete distention" & a renal cyst: Recommend follow-up with Primary Care Provider for continued evaluation and management of these findings.    Otherwise unremarkable findings.  Continue with previous recommendations. If no improvement in symptoms or symptoms worsen, call/follow-up at clinic or go to ER.  Please release results to patient's mychart once you have discussed results and recommendations with patient.  Thanks,        "

## 2020-01-16 NOTE — PATIENT INSTRUCTIONS
Mesenteric Adenitis  The mesentery is a sheet of tissue that attaches the intestines to the abdominal wall. Lymph nodes are small glands throughout the body. They are part of the system that fights infection. Mesenteric adenitis is swelling of the lymph nodes in the mesentery. The problem is caused by an infection, or an inflammatory condition, often of the intestines.  Mesenteric adenitis can cause these symptoms:  · Severe pain in the abdomen, which can be all over  · Pain can be in the lower right side, sometimes mimicking appendicitis  · Nausea and vomiting  · Diarrhea  · Fever  · Loss of appetite  · Malaise  This condition can be difficult to diagnose because the pain is usually not just in one spot. You may need tests for this reason. Occasionally, the pain shifts to the lower right part of your abdomen. When this happens, it may seem like appendicitis. This is another reason for testing.  The problem most often goes away in a few days. If you have a bacterial infection, you may need to take antibiotics. Medicines may also be given to help relieve pain until the problem subsides.    Home care  · Your healthcare provider may prescribe medicines for pain, nausea, or infection. Follow the healthcare provider's instructions when using these medicines. If you are given medicine for infection, take all of it as directed until it is gone, even if you feel better.  · Rest until you feel better.  · To help relieve abdominal pain, soak a towel in warm water and place it on your belly.  · If you have had diarrhea or vomiting, follow the guidelines you are given for what to eat and drink and what to avoid.  · Drink plenty of fluids.  · Dont smoke or drink alcohol.  Follow-up care  Follow up with your healthcare provider, or as advised. It is often very hard to tell mesenteric adenitis apart from appendicitis. So close follow-up is needed.  If X-rays were done, a radiologist will look at them. You will be told if there  are changes.  Call 911  Call 911 if any of these occur:  · Trouble breathing  · Confusion  · Very drowsy or trouble awakening  · Fainting or loss of consciousness  · Rapid heart rate   · Chest pain  When to seek medical advice  Call your healthcare provider right away if any of these occur:  · Fever of 100.4°F (38°C) or higher  · Pain not relieved with medicine, or pain that goes away and returns  · Pain that is getting worse over time or changing in location  · Pain that localizes to the right lower abdomen, and not improving or is worsening  · Severe diarrhea or vomiting  · Severe headache  · Few or no stools or gas  · Little or no urine  · Leg or foot cramps  · Small dark red dots on the skin  · Swelling in the abdomen  · Bloody stools   Date Last Reviewed: 12/30/2015  © 3331-6483 [employer]. [address] [place] [address], [place] [address]. All rights reserved. This information is not intended as a substitute for professional medical care. Always follow your healthcare professional's instructions.

## 2020-01-20 ENCOUNTER — TELEPHONE (OUTPATIENT)
Dept: GASTROENTEROLOGY | Facility: CLINIC | Age: 35
End: 2020-01-20

## 2020-01-20 DIAGNOSIS — R11.0 NAUSEA: ICD-10-CM

## 2020-01-20 DIAGNOSIS — R12 HEARTBURN: Primary | ICD-10-CM

## 2020-01-20 DIAGNOSIS — R10.84 GENERALIZED ABDOMINAL PAIN: ICD-10-CM

## 2020-01-20 RX ORDER — ONDANSETRON 4 MG/1
4 TABLET, ORALLY DISINTEGRATING ORAL 3 TIMES DAILY PRN
Qty: 30 TABLET | Refills: 0 | Status: ON HOLD | OUTPATIENT
Start: 2020-01-20 | End: 2020-01-22 | Stop reason: SDUPTHER

## 2020-01-20 RX ORDER — OMEPRAZOLE 40 MG/1
40 CAPSULE, DELAYED RELEASE ORAL
Qty: 30 CAPSULE | Refills: 0 | Status: SHIPPED | OUTPATIENT
Start: 2020-01-20 | End: 2020-02-14

## 2020-01-20 NOTE — TELEPHONE ENCOUNTER
Spoke with pt and informed of results and recommendations per Nathalie Tsang NP. Pt verbalized understanding. Pt stated she is having abd pain and vomiting and that it was severe yesterday 1/19/20 after eating tacos. I advised pt of educational material attached by Nathalie Tsang and pt verbalized understanding.

## 2020-01-20 NOTE — TELEPHONE ENCOUNTER
Patient had ct scan on 1/14/20 which did not possible mesenteric adenitis. I recommended patient schedule a colonoscopy to further evaluate her symptoms (I do not see that she has scheduled the colonoscopy).  Patient needs to see GYN abnormal findings of uterus seen on ct scan (possible fibroids); pelvic ultrasound was ordered (has not been completed yet).  Patient is scheduled for EGD on 1/22/2020. I recommend patient avoid spicy foods, high-fat foods, chocolate, caffeine, citrus, alcohol, & tomato products.  Advise to avoid use of NSAID's, since they can cause GI upset, bleeding, and/or ulcers.  We can try an acid reducing medication (omeprazole) and an anti-emetic medication (zofran). These were sent to patient's pharmacy. Also, patient should follow-up if her symptoms has persisted/worsened. I have availability in clinic tomorrow and later this week as well.  Thanks,  TERESA

## 2020-01-20 NOTE — TELEPHONE ENCOUNTER
Spoke with pt and advised to go to ER if pain was severe and that a message was sent to Nathalie and would contact pt with message from Nathalie as soon as she responds. Pt verbalized understanding.

## 2020-01-20 NOTE — TELEPHONE ENCOUNTER
----- Message from Dorota Gonzalez sent at 1/20/2020  8:37 AM CST -----  Contact: patient  Type: Needs Medical Advice    Who Called:  patient  Symptoms (please be specific):  Abdominal pains,vomitting  How long has patient had these symptoms: since sunday   Best Call Back Number: [phone]  Additional Information: Patient states she would like the nurse to return her call she has questions about lab results on her portal and to report pains in stomach.  Please call to advise.  Thanks!

## 2020-01-20 NOTE — TELEPHONE ENCOUNTER
----- Message from Ivy Grossman sent at 1/20/2020  2:07 PM CST -----  Type: Needs Medical Advice    Who Called:  Patient   Symptoms (please be specific):  abdominal pain  How long has patient had these symptoms:  Since passing stool today about 2pm  Pharmacy name and phone #:    Best Call Back Number: [phone]  Additional Information:

## 2020-01-21 ENCOUNTER — TELEPHONE (OUTPATIENT)
Dept: OBSTETRICS AND GYNECOLOGY | Facility: CLINIC | Age: 35
End: 2020-01-21

## 2020-01-21 ENCOUNTER — OFFICE VISIT (OUTPATIENT)
Dept: OBSTETRICS AND GYNECOLOGY | Facility: CLINIC | Age: 35
End: 2020-01-21
Payer: COMMERCIAL

## 2020-01-21 VITALS
SYSTOLIC BLOOD PRESSURE: 110 MMHG | WEIGHT: 187.38 LBS | BODY MASS INDEX: 27.75 KG/M2 | HEIGHT: 69 IN | DIASTOLIC BLOOD PRESSURE: 78 MMHG

## 2020-01-21 DIAGNOSIS — R10.10 PAIN OF UPPER ABDOMEN: ICD-10-CM

## 2020-01-21 DIAGNOSIS — R10.2 PELVIC PAIN: Primary | ICD-10-CM

## 2020-01-21 PROCEDURE — 3008F BODY MASS INDEX DOCD: CPT | Mod: CPTII,S$GLB,, | Performed by: SPECIALIST

## 2020-01-21 PROCEDURE — 99213 OFFICE O/P EST LOW 20 MIN: CPT | Mod: S$GLB,,, | Performed by: SPECIALIST

## 2020-01-21 PROCEDURE — 99999 PR PBB SHADOW E&M-EST. PATIENT-LVL III: CPT | Mod: PBBFAC,,, | Performed by: SPECIALIST

## 2020-01-21 PROCEDURE — 99999 PR PBB SHADOW E&M-EST. PATIENT-LVL III: ICD-10-PCS | Mod: PBBFAC,,, | Performed by: SPECIALIST

## 2020-01-21 PROCEDURE — 3008F PR BODY MASS INDEX (BMI) DOCUMENTED: ICD-10-PCS | Mod: CPTII,S$GLB,, | Performed by: SPECIALIST

## 2020-01-21 PROCEDURE — 99213 PR OFFICE/OUTPT VISIT, EST, LEVL III, 20-29 MIN: ICD-10-PCS | Mod: S$GLB,,, | Performed by: SPECIALIST

## 2020-01-21 NOTE — TELEPHONE ENCOUNTER
----- Message from Isabel  sent at 1/21/2020  8:43 AM CST -----  Contact: pt  Type: Needs Medical Advice    Who Called:      Best Call Back Number:     Additional Information: Requesting a call back from Nurse, regarding pt needs a pelvic exam per NP and would like for a nurse to call her ,please call back with advice ASAP

## 2020-01-21 NOTE — TELEPHONE ENCOUNTER
abd pain, has not held food down since Sunday afternoon. Pt saw GI, CT showed possible fibroids, GI recommended pelvic exam. Appt scheduled

## 2020-01-22 ENCOUNTER — ANESTHESIA EVENT (OUTPATIENT)
Dept: ENDOSCOPY | Facility: HOSPITAL | Age: 35
End: 2020-01-22
Payer: COMMERCIAL

## 2020-01-22 ENCOUNTER — ANESTHESIA (OUTPATIENT)
Dept: ENDOSCOPY | Facility: HOSPITAL | Age: 35
End: 2020-01-22
Payer: COMMERCIAL

## 2020-01-22 ENCOUNTER — HOSPITAL ENCOUNTER (OUTPATIENT)
Facility: HOSPITAL | Age: 35
Discharge: HOME | End: 2020-01-22
Attending: INTERNAL MEDICINE | Admitting: INTERNAL MEDICINE
Payer: COMMERCIAL

## 2020-01-22 VITALS
DIASTOLIC BLOOD PRESSURE: 82 MMHG | HEIGHT: 69 IN | HEART RATE: 88 BPM | RESPIRATION RATE: 20 BRPM | OXYGEN SATURATION: 100 % | SYSTOLIC BLOOD PRESSURE: 110 MMHG | BODY MASS INDEX: 27.75 KG/M2 | WEIGHT: 187.38 LBS | TEMPERATURE: 99 F

## 2020-01-22 DIAGNOSIS — R11.0 NAUSEA: ICD-10-CM

## 2020-01-22 DIAGNOSIS — R10.9 ABDOMINAL PAIN: ICD-10-CM

## 2020-01-22 PROCEDURE — 63600175 PHARM REV CODE 636 W HCPCS: Performed by: NURSE ANESTHETIST, CERTIFIED REGISTERED

## 2020-01-22 PROCEDURE — 37000009 HC ANESTHESIA EA ADD 15 MINS: Performed by: INTERNAL MEDICINE

## 2020-01-22 PROCEDURE — 63600175 PHARM REV CODE 636 W HCPCS: Performed by: INTERNAL MEDICINE

## 2020-01-22 PROCEDURE — 43239 EGD BIOPSY SINGLE/MULTIPLE: CPT | Performed by: INTERNAL MEDICINE

## 2020-01-22 PROCEDURE — 43239 EGD BIOPSY SINGLE/MULTIPLE: CPT | Mod: ,,, | Performed by: INTERNAL MEDICINE

## 2020-01-22 PROCEDURE — 37000008 HC ANESTHESIA 1ST 15 MINUTES: Performed by: INTERNAL MEDICINE

## 2020-01-22 PROCEDURE — D9220A PRA ANESTHESIA: ICD-10-PCS | Mod: ANES,,, | Performed by: ANESTHESIOLOGY

## 2020-01-22 PROCEDURE — 43239 PR EGD, FLEX, W/BIOPSY, SGL/MULTI: ICD-10-PCS | Mod: ,,, | Performed by: INTERNAL MEDICINE

## 2020-01-22 PROCEDURE — 88305 TISSUE EXAM BY PATHOLOGIST: ICD-10-PCS | Mod: 26,,, | Performed by: PATHOLOGY

## 2020-01-22 PROCEDURE — D9220A PRA ANESTHESIA: ICD-10-PCS | Mod: CRNA,,, | Performed by: NURSE ANESTHETIST, CERTIFIED REGISTERED

## 2020-01-22 PROCEDURE — D9220A PRA ANESTHESIA: Mod: CRNA,,, | Performed by: NURSE ANESTHETIST, CERTIFIED REGISTERED

## 2020-01-22 PROCEDURE — 27201012 HC FORCEPS, HOT/COLD, DISP: Performed by: INTERNAL MEDICINE

## 2020-01-22 PROCEDURE — 88305 TISSUE EXAM BY PATHOLOGIST: CPT | Mod: 26,,, | Performed by: PATHOLOGY

## 2020-01-22 PROCEDURE — D9220A PRA ANESTHESIA: Mod: ANES,,, | Performed by: ANESTHESIOLOGY

## 2020-01-22 PROCEDURE — 25000003 PHARM REV CODE 250: Performed by: NURSE ANESTHETIST, CERTIFIED REGISTERED

## 2020-01-22 PROCEDURE — 88305 TISSUE EXAM BY PATHOLOGIST: CPT | Performed by: PATHOLOGY

## 2020-01-22 RX ORDER — LIDOCAINE HCL/PF 100 MG/5ML
SYRINGE (ML) INTRAVENOUS
Status: DISCONTINUED | OUTPATIENT
Start: 2020-01-22 | End: 2020-01-22

## 2020-01-22 RX ORDER — GLYCOPYRROLATE 0.2 MG/ML
INJECTION INTRAMUSCULAR; INTRAVENOUS
Status: DISCONTINUED | OUTPATIENT
Start: 2020-01-22 | End: 2020-01-22

## 2020-01-22 RX ORDER — SUCRALFATE 1 G/10ML
1 SUSPENSION ORAL
Qty: 300 ML | Refills: 1 | Status: SHIPPED | OUTPATIENT
Start: 2020-01-22 | End: 2020-02-01

## 2020-01-22 RX ORDER — PROPOFOL 10 MG/ML
VIAL (ML) INTRAVENOUS
Status: DISCONTINUED | OUTPATIENT
Start: 2020-01-22 | End: 2020-01-22

## 2020-01-22 RX ORDER — ONDANSETRON 4 MG/1
4 TABLET, ORALLY DISINTEGRATING ORAL EVERY 8 HOURS PRN
Qty: 30 TABLET | Refills: 1 | Status: SHIPPED | OUTPATIENT
Start: 2020-01-22 | End: 2022-01-03

## 2020-01-22 RX ORDER — SODIUM CHLORIDE 9 MG/ML
INJECTION, SOLUTION INTRAVENOUS CONTINUOUS
Status: DISCONTINUED | OUTPATIENT
Start: 2020-01-22 | End: 2020-01-22 | Stop reason: HOSPADM

## 2020-01-22 RX ADMIN — GLYCOPYRROLATE 0.2 MG: 0.2 INJECTION, SOLUTION INTRAMUSCULAR; INTRAVENOUS at 08:01

## 2020-01-22 RX ADMIN — PROPOFOL 50 MG: 10 INJECTION, EMULSION INTRAVENOUS at 08:01

## 2020-01-22 RX ADMIN — LIDOCAINE HYDROCHLORIDE 100 MG: 20 INJECTION, SOLUTION INTRAVENOUS at 08:01

## 2020-01-22 RX ADMIN — SODIUM CHLORIDE 1000 ML: 0.9 INJECTION, SOLUTION INTRAVENOUS at 07:01

## 2020-01-22 RX ADMIN — PROPOFOL 150 MG: 10 INJECTION, EMULSION INTRAVENOUS at 08:01

## 2020-01-22 NOTE — DISCHARGE INSTRUCTIONS
Duodenitis    The duodenum is the first part of the small intestine, just past the stomach. Duodenitis is inflammation of the lining of the duodenum. This sheet tells you more about the condition.  Causes of duodenitis  The most common cause of duodenitis is infection by Helicobacter pylori (H. pylori) bacteria. Another common cause is long-term use of NSAIDs (such as aspirin and ibuprofen). Celiac disease, an allergy to gluten, causes a particular type of inflammation in the duodenum along with other changes. Less commonly, duodenitis happens along with another health problem, such as Crohn's disease. Drinking alcohol, smoking, or taking certain medicines also may make duodenitis more likely to happen.   Symptoms of duodenitis  The condition may cause no symptoms. If symptoms do happen, they can include:  · Burning, cramping, or hunger-like pain in your stomach  · Gas or a bloated feeling  · Nausea and vomiting  · Feeling full soon after starting a meal  Diagnosing duodenitis  Here is what will be done to diagnose duodenitis:  · If duodenitis is suspected, an upper endoscopy with biopsy will be done to confirm it. During this test, a thin, flexible tube with a light and camera on the end (endoscope) is used. The scope is moved down your throat to the stomach and into the duodenum. The scope sends images of the duodenum to a video screen. Small samples (biopsy) of the lining of the duodenum may be taken. These samples may be sent to a lab for testing for H. pylori.  · To check for H. pylori or other pathogens, a blood, stool, gastric biopsy, or breath test may be done. Samples of blood or stool are taken and tested in a lab. For a breath test, you swallow a harmless compound. If H. pylori bacteria are present, extra carbon dioxide gas can then be detected in your breath.  · To check for celiac disease, blood tests may be done. If positive, an upper endoscopy with biopsy is usually done to confirm the  diagnosis.   · In rare cases, an upper gastrointestinal (GI) series is done. This gives more information about the digestive tract. This procedure takes X-rays of the upper GI tract from the mouth to the small intestine.  Treating duodenitis  Duodenitis is treated using one or more of the following:  · Antibiotic medicines to kill H. pylori  · Medicines to reduce the amount of acid the stomach makes  · Stopping NSAIDs such as aspirin and ibuprofen. However, if you take aspirin for a medical condition, such as heart disease or stroke, do not stop until you check with your prescribing healthcare provider. If you take NSAIDs for arthritis or pain, check with your healthcare provider about alternatives.  · Adopting a gluten-free diet if celiac disease is the cause.  · Avoiding alcohol  · Stopping smoking  Your healthcare provider can tell you more about what treatments are needed.  Recovery and follow-up  With treatment, most cases of duodenitis clear up completely. In rare cases, duodenitis can be an ongoing (chronic) problem or can develop into a duodenal ulcer. If your symptoms do not improve or if they go away and come back, let your healthcare provider know. In such cases, regular healthcare provider visits and treatments are needed to manage your condition.   When to call the healthcare provider  Call your healthcare provider right away if you have any of the following:  · Fever of 100.4°F (38.0°C) or higher, or as advised by your healthcare provider  · Nausea or vomiting (vomit may be bloody or look like coffee grounds)  · Dark, tarry, or bloody stools  · Sudden or severe stomach pain  · Pain that does not improve with treatment  · Rapid weight loss   Date Last Reviewed: 7/1/2016  © 7027-8435 [employer]. [address] [place] [address], [place] [address]. All rights reserved. This information is not intended as a substitute for professional medical care. Always follow your healthcare professional's  instructions.

## 2020-01-22 NOTE — [employer]OVATION PATIENT INSTRUCTIONS
Discharge Summary/Instructions after an Endoscopic Procedure  Patient Name: Emily Pelayo  Patient MRN: [id number]  Patient YOB: 1985 Wednesday, January 22, 2020  Yessi Pal MD  RESTRICTIONS:  During your procedure today, you received medications for sedation.  These   medications may affect your judgment, balance and coordination.  Therefore,   for 24 hours, you have the following restrictions:   - DO NOT drive a car, operate machinery, make legal/financial decisions,   sign important papers or drink alcohol.    ACTIVITY:  Today: no heavy lifting, straining or running due to procedural   sedation/anesthesia.  The following day: return to full activity including work.  DIET:  Eat and drink normally unless instructed otherwise.     TREATMENT FOR COMMON SIDE EFFECTS:  - Mild abdominal pain, nausea, belching, bloating or excessive gas:  rest,   eat lightly and use a heating pad.  - Sore Throat: treat with throat lozenges and/or gargle with warm salt   water.  - Because air was used during the procedure, expelling large amounts of air   from your rectum or belching is normal.  - If a bowel prep was taken, you may not have a bowel movement for 1-3 days.    This is normal.  SYMPTOMS TO WATCH FOR AND REPORT TO YOUR PHYSICIAN:  1. Abdominal pain or bloating, other than gas cramps.  2. Chest pain.  3. Back pain.  4. Signs of infection such as: chills or fever occurring within 24 hours   after the procedure.  5. Rectal bleeding, which would show as bright red, maroon, or black stools.   (A tablespoon of blood from the rectum is not serious, especially if   hemorrhoids are present.)  6. Vomiting.  7. Weakness or dizziness.  GO DIRECTLY TO THE NEAREST EMERGENCY ROOM IF YOU HAVE ANY OF THE FOLLOWING:      Difficulty breathing              Chills and/or fever over 101 F   Persistent vomiting and/or vomiting blood   Severe abdominal pain   Severe chest pain   Black, tarry stools   Bleeding- more than  one tablespoon   Any other symptom or condition that you feel may need urgent attention  Your doctor recommends these additional instructions:  If any biopsies were taken, your doctors clinic will contact you in 1 to 2   weeks with any results.  - Await pathology results.   - Discharge patient to home (with escort).   - Patient has a contact number available for emergencies.  The signs and   symptoms of potential delayed complications were discussed with the   patient.  Return to normal activities tomorrow.  Written discharge   instructions were provided to the patient.   - Resume previous diet.   - Begin daily PPI  -Trial of Carafate TID x 10 days  -Zofran PRN  -If biopsies unremarkable and symptoms continue to be severe may consider   HIDA scan with EF as next step + trial of IBgard vs Bentyl  For questions, problems or results please call your physician - Yessi Pal MD at Work:  [phone].  [facility], EMERGENCY ROOM PHONE NUMBER: [phone]  IF A COMPLICATION OR EMERGENCY SITUATION ARISES AND YOU ARE UNABLE TO REACH   YOUR PHYSICIAN - GO DIRECTLY TO THE EMERGENCY ROOM.  Yessi Pal MD  1/22/2020 9:02:17 AM  This report has been verified and signed electronically.  [employer]

## 2020-01-22 NOTE — ANESTHESIA POSTPROCEDURE EVALUATION
Anesthesia Post Evaluation    Patient: Emily Pelayo    Procedure(s) Performed: Procedure(s) (LRB):  EGD (ESOPHAGOGASTRODUODENOSCOPY) (N/A)    Final Anesthesia Type: general    Patient location during evaluation: PACU  Patient participation: Yes- Able to Participate  Level of consciousness: awake and alert  Post-procedure vital signs: reviewed and stable  Pain management: adequate  Airway patency: patent    PONV status at discharge: No PONV  Anesthetic complications: no      Cardiovascular status: blood pressure returned to baseline  Respiratory status: unassisted  Hydration status: euvolemic  Follow-up not needed.          Vitals Value Taken Time   /82 1/22/2020  9:30 AM   Temp 37 °C (98.6 °F) 1/22/2020  7:45 AM   Pulse 88 1/22/2020  9:30 AM   Resp 20 1/22/2020  9:30 AM   SpO2 100 % 1/22/2020  9:30 AM         Event Time     Out of Recovery 09:37:58          Pain/Godwin Score: Godwin Score: 10 (1/22/2020  9:30 AM)

## 2020-01-22 NOTE — ANESTHESIA PREPROCEDURE EVALUATION
01/22/2020  Emily Pelayo is a 34 y.o., female.    Anesthesia Evaluation    I have reviewed the Patient Summary Reports.    I have reviewed the Nursing Notes.   I have reviewed the Medications.     Review of Systems  Anesthesia Hx:  Denies Family Hx of Anesthesia complications.   Denies Personal Hx of Anesthesia complications.   Endocrine:   Hypothyroidism        Physical Exam  General:  Well nourished    Airway/Jaw/Neck:  Airway Findings: Mouth Opening: Normal Tongue: Normal  General Airway Assessment: Adult  Mallampati: II  Improves to I with phonation.  TM Distance: Normal, at least 6 cm         Dental:  DENTAL FINDINGS: Normal   Chest/Lungs:  Chest/Lungs Clear    Heart/Vascular:  Heart Findings: Normal            Anesthesia Plan  Type of Anesthesia, risks & benefits discussed:  Anesthesia Type:  general  Patient's Preference:   Intra-op Monitoring Plan: standard ASA monitors  Intra-op Monitoring Plan Comments:   Post Op Pain Control Plan:   Post Op Pain Control Plan Comments:   Induction:   IV  Beta Blocker:  Patient is not currently on a Beta-Blocker (No further documentation required).       Informed Consent: Patient understands risks and agrees with Anesthesia plan.  Questions answered. Anesthesia consent signed with patient.  ASA Score: 2     Day of Surgery Review of History & Physical:    H&P update referred to the provider.         Ready For Surgery From Anesthesia Perspective.

## 2020-01-22 NOTE — TRANSFER OF CARE
"Anesthesia Transfer of Care Note    Patient: Emily Pelayo    Procedure(s) Performed: Procedure(s) (LRB):  EGD (ESOPHAGOGASTRODUODENOSCOPY) (N/A)    Patient location: GI    Anesthesia Type: general    Transport from OR: Transported from OR on room air with adequate spontaneous ventilation    Post pain: adequate analgesia    Post assessment: no apparent anesthetic complications    Post vital signs: stable    Level of consciousness: awake    Nausea/Vomiting: no nausea/vomiting    Complications: none    Transfer of care protocol was followed      Last vitals:   Visit Vitals  /73 (BP Location: Left arm, Patient Position: Lying)   Pulse 86   Temp 37 °C (98.6 °F) (Skin)   Resp 18   Ht 5' 9" (1.753 m)   Wt 85 kg (187 lb 6.3 oz)   LMP 01/11/2020   SpO2 99%   Breastfeeding? No   BMI 27.67 kg/m²     "

## 2020-01-22 NOTE — PLAN OF CARE
Vss, denies pain, ambulates easily. IV removed, catheter intact. Discharge instructions provided and states understanding. States ready to go home.  Discharged from facility with family per wheelchair.

## 2020-01-23 ENCOUNTER — HOSPITAL ENCOUNTER (OUTPATIENT)
Dept: RADIOLOGY | Facility: HOSPITAL | Age: 35
Discharge: HOME | End: 2020-01-23
Attending: SPECIALIST
Payer: COMMERCIAL

## 2020-01-23 DIAGNOSIS — R10.2 PELVIC PAIN: ICD-10-CM

## 2020-01-23 PROCEDURE — 76856 US PELVIS COMP WITH TRANSVAG NON-OB (XPD): ICD-10-PCS | Mod: 26,,, | Performed by: RADIOLOGY

## 2020-01-23 PROCEDURE — 76830 TRANSVAGINAL US NON-OB: CPT | Mod: 26,,, | Performed by: RADIOLOGY

## 2020-01-23 PROCEDURE — 76856 US EXAM PELVIC COMPLETE: CPT | Mod: 26,,, | Performed by: RADIOLOGY

## 2020-01-23 PROCEDURE — 76830 US PELVIS COMP WITH TRANSVAG NON-OB (XPD): ICD-10-PCS | Mod: 26,,, | Performed by: RADIOLOGY

## 2020-01-23 PROCEDURE — 76830 TRANSVAGINAL US NON-OB: CPT | Mod: TC,PN

## 2020-01-23 NOTE — PROGRESS NOTES
35 yo BF presents with complaint epigastric pain and discomfort over several weeks. Denies overt pelvic pain, flank pain, dysuria, f/c, discharge, menorrhagia.  Pt had CT abd and pelvis completed revealing the following...  Narrative     EXAMINATION:  CT ABDOMEN PELVIS WITH CONTRAST    CLINICAL HISTORY:  Abdominal pain, unspecified; Generalized abdominal pain    TECHNIQUE:  Low dose axial images, sagittal and coronal reformations were obtained from the lung bases to the pubic symphysis following the IV administration of 100 mL of Omnipaque 350 and the oral administration of 1000 mL Omnipaque 9 p.o.    COMPARISON:  None.    FINDINGS:  Visualized lung bases show small nodules largest measuring 5 mm for example coronal series 601, image 86 corresponding to axial series 2, image 12.    Liver, gallbladder, bile ducts; unremarkable appearance of the liver.  No calcified stones the gallbladder or CT findings of acute cholecystitis.  No biliary duct dilatation    Spleen not enlarged    Adrenal glands unremarkable appearance    Pancreas unremarkable appearance    Abdominal aorta no aneurysm    Stomach, bowel, mesentery; normal appearing appendix is thought to be visualized and no abnormal appendix inflammatory change in the appendiceal region is seen.  There are very mildly prominent right lower quadrant mesenteric lymph nodes which can question mesenteric adenitis with correlation recommended clinically.  No free intraperitoneal air.  Trace free fluid the pelvis nonspecific but not more so than can be seen on a physiologic basis.  No abscess.  No appreciable bowel wall thickening or inflammatory change.  There is prominent amount of stool within rectum and colon.    Small fat containing umbilical hernia.    Kidneys, ureters, bladder; symmetrical renal enhancement.  10 mm circumscribed low-density lower pole left renal mass has appearance and CT numbers consistent with a simple cyst.  No hydronephrosis.  Urinary bladder  mildly distended at time of the exam wall appears mildly diffusely thickened but not more so than can be attributed to the incomplete degree of distention.  Recommend correlation clinically if there is clinical consideration for cystitis.    Reproductive organs; tampon within the vagina.  Heterogeneous enhancement of the uterus and suggest pelvic ultrasound for further evaluation of the uterus particularly to evaluate for possible uterine mass/fibroid.  Exophytic 5 mm hyperdense mass is suggested along with a more diffuse heterogenicity.  Adnexal hypodensity suggesting bilateral ovarian cysts largest being on the right and measuring 1.8 cm.    Osseous structures show degenerative changes      Impression       Heterogeneous enhancement of the uterus which may be related to the stage of the patient's menstrual cycle and the timing of the contrast but suspicious for abnormal enhancement most commonly uterine fibroids and small exophytic fibroid is suggested as well.  Suggest pelvic ultrasound for further evaluation    Very mildly prominent right lower quadrant mesenteric lymph nodes which in the appropriate clinical setting can question mesenteric adenitis with correlation recommended clinically    Small basilar pulmonary nodules largest measuring 5 mm.    Yellow Pulmonary Nodule 2017 Alert:    Yellow Actionable Finding (Radiology: Volume 284: Number 1-July 2017)    Fleischner Guidelines do not apply in patients younger than 35 years, immunocompromised patients or patients with cancer. Risk assignment based on ACCP with low risk being less than 5% and high risk combining intermediate and high risk categories.    UNEXPECTED FINDINGS:  Incidental Pulmonary Nodule Multiple Solid <6mm (1)    RECOMMENDATIONS:  If Low Risk No routine follow up, if High Risk optional CT Chest without contrast 12 months    Additional findings as detailed above including mildly prominent amount of stool throughout colon and rectum, mild diffuse  bladder wall thickening versus incomplete distention, renal and ovarian cysts as detailed above.      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 Neri, colon polyps per patient report    dx lap      hammer toe Right 11/19/2015    heavenly toe repeair with callus filing    LAPAROSCOPY  ~2012    done for infertility concern    TONSILLECTOMY         Family History   Problem Relation Age of Onset    Thyroid disease Mother         hyperthyroidism    Hypertension Mother     Hypertension Sister     Heart disease Maternal Grandmother     Ovarian cancer Neg Hx     Breast cancer Neg Hx     Eczema Neg Hx     Lupus Neg Hx     Psoriasis Neg Hx     Melanoma Neg Hx     Cancer Neg Hx     Colon cancer Neg Hx     Crohn's disease Neg Hx     Ulcerative colitis Neg Hx     Stomach cancer Neg Hx     Esophageal cancer Neg Hx        Social History     Socioeconomic History    Marital status: Single     Spouse name: Not on file    Number of children: Not on file    Years of education: Not on file    Highest education level: Not on file   Occupational History    Not on file   Social Needs    Financial resource strain: Somewhat hard    Food insecurity:     Worry: Never true     Inability: Never true    Transportation needs:     Medical: No     Non-medical: No   Tobacco Use    Smoking status: Never Smoker    Smokeless tobacco: Never Used   Substance and Sexual Activity    Alcohol use: No     Frequency: Monthly or less     Drinks per session: 1 or 2     Binge frequency: Never    Drug use: No    Sexual activity: Yes     Partners: Male     Birth control/protection: OCP      Comment: OCP helps regulate cycle   Lifestyle    Physical activity:     Days per week: 4 days     Minutes per session: 60 min    Stress: Only a little   Relationships    Social connections:     Talks on phone: Once a week     Gets together: Never     Attends Orthodoxy service: Not on file     Active member of club or organization: Yes     Attends meetings of clubs or organizations: More than 4 times per year     Relationship status: Never    Other Topics Concern    Are you pregnant or think you may be? Not Asked    Breast-feeding Not Asked   Social History Narrative    Not on file       Current Outpatient Medications   Medication Sig Dispense Refill    levonorgestrel-ethinyl estradiol (MARLISSA, 28,) 0.15-0.03 mg per tablet Take 1 tablet by mouth once daily. 84 tablet 3    levothyroxine (SYNTHROID) 75 MCG tablet TAKE 1 TABLET (75 MCG TOTAL) BY MOUTH BEFORE BREAKFAST DAILY 90 tablet 3    ASCORBATE CALCIUM (VITAMIN C ORAL) Take by mouth.      cholecalciferol, vitamin D3, (VITAMIN D3) 2,000 unit Tab Take 2,000 Units by mouth once daily.      CITRANATAL HARMONY, IRON FUM, 27 mg iron-1 mg -50 mg-260 mg Cap TAKE 1 CAPSULE BY MOUTH ONCE DAILY. (Patient not taking: Reported on 1/8/2020) 100 capsule 0    fluocinonide (LIDEX) 0.05 % external solution APPLY THIN FILM TO SCALP EVERY NIGHT AT BEDTIME AS NEEDED FOR ITCHING OR SCALING (Patient not taking: Reported on 1/21/2020) 60 mL 0    ketoconazole (NIZORAL) 2 % shampoo LATHER SCALP, LET SIT 5 MIN, RINSE WELL. USE 2X WEEKLY (Patient not taking: Reported on 1/21/2020) 120 mL 2    naproxen sodium (ALEVE) 220 mg Cap Take by mouth daily as needed (menses).      omeprazole (PRILOSEC) 40 MG capsule Take 1 capsule (40 mg total) by mouth before breakfast. (Patient not taking: Reported on 1/21/2020) 30 capsule 0    ondansetron (ZOFRAN-ODT) 4 MG TbDL Take 1 tablet (4 mg total) by mouth every 8 (eight) hours as needed (nausea). 30 tablet 1    prednisoLONE acetate  (PRED FORTE) 1 % DrpS PLACE  1 DROP INTO OD QID FOR 5 DAYS      sucralfate (CARAFATE) 100 mg/mL suspension Take 10 mLs (1 g total) by mouth 3 (three) times daily before meals. for 10 days 300 mL 1     No current facility-administered medications for this visit.        Review of patient's allergies indicates:  No Known Allergies    Review of System:   General: no chills, fever, night sweats, weight gain or weight loss  Psychological: no depression or suicidal ideation  Breasts: no new or changing breast lumps, nipple discharge or masses.  Respiratory: no cough, shortness of breath, or wheezing  Cardiovascular: no chest pain or dyspnea on exertion  Gastrointestinal: POSITIVE  abdominal pain, change in bowel habits, or black or bloody stools  Genito-Urinary: no incontinence, urinary frequency/urgency or vulvar/vaginal symptoms, pelvic pain or abnormal vaginal bleeding.  Musculoskeletal: no gait disturbance or muscular weakness                                              General Appearance    A and O x 4, Cooperative, no distress   Breasts    Abdomen   DEFERRED  Soft, non-tender, bowel sounds active all four quadrants,  no masses, no organomegaly    Genitourinary:   External rectal exam shows no thrombosed external hemorrhoids.   Pelvic exam was performed with patient supine.  No labial fusion.  There is no rash, lesion or injury on the right labia.  There is no rash, lesion or injury on the left labia.  No bleeding and no signs of injury around the vaginal introitus, urethra is without lesions and well supported. The cervix is visualized with no discharge, lesions or friability.  No vaginal discharge found.  No significant Cystocele, Enterocele or rectocele, and uterus well supported.  Bimanual exam:  The urethra is normal to palpation and there are no palpable vaginal wall masses.  Uterus is not deviated, not enlarged, not fixed, normal shape and not tender.  Cervix exhibits no motion tenderness.   Right adnexum  displays no mass and no tenderness.  Left adnexum displays no mass and no tenderness.   Extremities: Extremities normal, atraumatic, no cyanosis or edema                       I discussed that I do not feel that uterine fibroids nor any gyn etiology exists to cause the pts abdominal symptoms. I rec follow up with GI medicine  Will follow for additional gyn complaints.  At the end of patient visit, nurse and MD both asked pt if any improvements needed in visit experience and asked whether any further pt questions or concerns.

## 2020-01-30 ENCOUNTER — TELEPHONE (OUTPATIENT)
Dept: FAMILY MEDICINE | Facility: CLINIC | Age: 35
End: 2020-01-30

## 2020-01-30 DIAGNOSIS — R91.1 LUNG NODULE: ICD-10-CM

## 2020-01-30 LAB
FINAL PATHOLOGIC DIAGNOSIS: NORMAL
GROSS: NORMAL

## 2020-01-30 NOTE — TELEPHONE ENCOUNTER
Pt had ct abd/pelvis done recently. It showed a lung nodule and radiologist recommending we get chest CT to look at this further. I have ordered it. Can we call pt and schedule

## 2020-01-30 NOTE — TELEPHONE ENCOUNTER
----- Message from Waldo Acuna sent at 1/30/2020  2:14 PM CST -----  Contact: pt  Type:  Patient Returning Call    Who Called:  pt  Who Left Message for Patient:  Bam  Does the patient know what this is regarding?:  Not sure  Best Call Back Number:  [phone]  Additional Information:

## 2020-01-30 NOTE — TELEPHONE ENCOUNTER
Advised pt of message below and scheduled pt for a chest CT. Pt verbalized understanding.    Pt had ct abd/pelvis done recently. It showed a lung nodule and radiologist recommending we get chest CT to look at this further. I have ordered it. Can we call pt and schedule

## 2020-02-05 ENCOUNTER — HOSPITAL ENCOUNTER (OUTPATIENT)
Dept: RADIOLOGY | Facility: HOSPITAL | Age: 35
Discharge: HOME | End: 2020-02-05
Attending: INTERNAL MEDICINE
Payer: COMMERCIAL

## 2020-02-05 DIAGNOSIS — R91.1 LUNG NODULE: ICD-10-CM

## 2020-02-05 PROCEDURE — 71250 CT THORAX DX C-: CPT | Mod: TC,PO

## 2020-02-05 PROCEDURE — 71250 CT CHEST WITHOUT CONTRAST: ICD-10-PCS | Mod: 26,,, | Performed by: RADIOLOGY

## 2020-02-05 PROCEDURE — 71250 CT THORAX DX C-: CPT | Mod: 26,,, | Performed by: RADIOLOGY

## 2020-02-07 ENCOUNTER — PATIENT MESSAGE (OUTPATIENT)
Dept: GASTROENTEROLOGY | Facility: CLINIC | Age: 35
End: 2020-02-07

## 2020-02-07 ENCOUNTER — TELEPHONE (OUTPATIENT)
Dept: GASTROENTEROLOGY | Facility: CLINIC | Age: 35
End: 2020-02-07

## 2020-02-07 NOTE — TELEPHONE ENCOUNTER
----- Message from Yessi Pal MD sent at 2/7/2020  1:59 PM CST -----  Please let patient know her biopsies are benign and do not show H.pylori but do show inflammation. Please let us know if she has not improved on daily PPI.

## 2020-02-13 ENCOUNTER — TELEPHONE (OUTPATIENT)
Dept: GASTROENTEROLOGY | Facility: CLINIC | Age: 35
End: 2020-02-13

## 2020-02-13 ENCOUNTER — PATIENT MESSAGE (OUTPATIENT)
Dept: GASTROENTEROLOGY | Facility: CLINIC | Age: 35
End: 2020-02-13

## 2020-02-13 DIAGNOSIS — R12 HEARTBURN: ICD-10-CM

## 2020-02-13 DIAGNOSIS — R10.84 GENERALIZED ABDOMINAL PAIN: ICD-10-CM

## 2020-02-13 NOTE — TELEPHONE ENCOUNTER
----- Message from Jennifer Veloz sent at 2/13/2020  3:37 PM CST -----  Contact: Emily pt  Type: Needs Medical Advice    Who Called:  Emily  Symptoms (please be specific):  She is still nauseated  How long has patient had these symptoms:  ongoing  Best Call Back Number: [phone]  Additional Information: Pls call pt regarding her results. She does not want a msg on my chart she wants to speak to someone live.

## 2020-02-13 NOTE — TELEPHONE ENCOUNTER
Call placed to Ms. Pelayo in regards to message left. She stated that she does NOT like receiving messages through patient portal. She wants to speak with someone in the office things. She stated that she is still having nausea, and stomach pains and she does not know why. Asked if she was taking her Prilosec as prescribed. She stated yes but she has not had any relief. She stated that she is having constipation which is making her feel nauseas. Advised her she can try an OTC stool softer. I advised her that it looks like KNadir Tsang only ordered an EGD. She stated that no she ordered a colonoscopy also but she was unable to do it due to not having a ride. I advised her that I will send a message to Dr. Pal letting her know what is going on, and give her a call back. Advised her that Dr. Pal is in surgery on Friday's, and won't return to clinic until Tuesday, but if her pain got worse she should proceed to the ER> She verbalized understanding.

## 2020-02-14 RX ORDER — OMEPRAZOLE 40 MG/1
40 CAPSULE, DELAYED RELEASE ORAL
Qty: 30 CAPSULE | Refills: 0 | Status: SHIPPED | OUTPATIENT
Start: 2020-02-14 | End: 2020-02-26 | Stop reason: SDUPTHER

## 2020-02-24 ENCOUNTER — PATIENT OUTREACH (OUTPATIENT)
Dept: ADMINISTRATIVE | Facility: OTHER | Age: 35
End: 2020-02-24

## 2020-02-26 ENCOUNTER — OFFICE VISIT (OUTPATIENT)
Dept: GASTROENTEROLOGY | Facility: CLINIC | Age: 35
End: 2020-02-26
Payer: COMMERCIAL

## 2020-02-26 VITALS
HEART RATE: 89 BPM | WEIGHT: 190.25 LBS | HEIGHT: 69 IN | BODY MASS INDEX: 28.18 KG/M2 | SYSTOLIC BLOOD PRESSURE: 124 MMHG | DIASTOLIC BLOOD PRESSURE: 83 MMHG

## 2020-02-26 DIAGNOSIS — R10.84 GENERALIZED ABDOMINAL PAIN: ICD-10-CM

## 2020-02-26 DIAGNOSIS — R12 HEARTBURN: ICD-10-CM

## 2020-02-26 DIAGNOSIS — R19.4 CHANGE IN BOWEL HABIT: Primary | ICD-10-CM

## 2020-02-26 PROCEDURE — 99999 PR PBB SHADOW E&M-EST. PATIENT-LVL III: CPT | Mod: PBBFAC,,, | Performed by: INTERNAL MEDICINE

## 2020-02-26 PROCEDURE — 99999 PR PBB SHADOW E&M-EST. PATIENT-LVL III: ICD-10-PCS | Mod: PBBFAC,,, | Performed by: INTERNAL MEDICINE

## 2020-02-26 PROCEDURE — 3008F BODY MASS INDEX DOCD: CPT | Mod: CPTII,S$GLB,, | Performed by: INTERNAL MEDICINE

## 2020-02-26 PROCEDURE — 99214 OFFICE O/P EST MOD 30 MIN: CPT | Mod: S$GLB,,, | Performed by: INTERNAL MEDICINE

## 2020-02-26 PROCEDURE — 99214 PR OFFICE/OUTPT VISIT, EST, LEVL IV, 30-39 MIN: ICD-10-PCS | Mod: S$GLB,,, | Performed by: INTERNAL MEDICINE

## 2020-02-26 PROCEDURE — 3008F PR BODY MASS INDEX (BMI) DOCUMENTED: ICD-10-PCS | Mod: CPTII,S$GLB,, | Performed by: INTERNAL MEDICINE

## 2020-02-26 RX ORDER — DICYCLOMINE HYDROCHLORIDE 20 MG/1
20 TABLET ORAL 3 TIMES DAILY
Qty: 90 TABLET | Refills: 3 | Status: SHIPPED | OUTPATIENT
Start: 2020-02-26 | End: 2020-03-27

## 2020-02-26 RX ORDER — OMEPRAZOLE 40 MG/1
40 CAPSULE, DELAYED RELEASE ORAL
Qty: 90 CAPSULE | Refills: 3 | Status: SHIPPED | OUTPATIENT
Start: 2020-02-26 | End: 2021-01-05

## 2020-02-26 NOTE — PROGRESS NOTES
"[facility] Note    CC: Abdominal pain, alternating bowel habits    HPI 34 y.o. female presents for follow up of chronic, moderate, diffuse abdominal pain that is associated with nausea, vomiting and occasional diarrhea. She has episodes every few weeks after eating where she often has vomiting and diarrhea at the same time. In between episodes she has mild/moderate constipation. She underwent colonoscopy performed by Dr. Neri > 10 years ago for similar symptoms and was told he removed several polyps. She underwent EGD in January 2020 which was notable for duodenitis, biopsies negative for H.pylori. She began taking Omeprazole every day and feels improved with more regular bowel habits. She has not had weight loss and states she has gained weight in her abdomen .she denies frequent NSAID use and has no family history of IBD.     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    PCOS (polycystic ovarian syndrome)     Thyroid disease          Review of Systems  General ROS: negative for - chills, fever or weight loss  Cardiovascular ROS: no chest pain or dyspnea on exertion  Gastrointestinal ROS: + abdominal pain, change in bowel habits with diarrhea and constipation, no hematemesis    Physical Examination  /83   Pulse 89   Ht 5' 9" (1.753 m)   Wt 86.3 kg (190 lb 4.1 oz)   LMP 02/09/2020   BMI 28.10 kg/m²   General appearance: alert, cooperative, no distress  HENT: Normocephalic, atraumatic, neck symmetrical, no nasal discharge, sclera anicteric   Lungs: clear to auscultation bilaterally, symmetric chest wall expansion bilaterally  Heart: regular rate and rhythm without rub; no displacement of the PMI   Abdomen: soft, mild distention, BS active, no masses appreciated   Extremities: extremities symmetric; no clubbing, cyanosis, or edema    Labs:  Lab Results   Component Value Date    WBC 6.45 09/09/2019    " HGB 12.7 09/09/2019    HCT 42.1 09/09/2019    MCV 97 09/09/2019     09/09/2019       CMP  Sodium   Date Value Ref Range Status   09/09/2019 139 136 - 145 mmol/L Final     Potassium   Date Value Ref Range Status   09/09/2019 4.4 3.5 - 5.1 mmol/L Final     Chloride   Date Value Ref Range Status   09/09/2019 105 95 - 110 mmol/L Final     CO2   Date Value Ref Range Status   09/09/2019 22 (L) 23 - 29 mmol/L Final     Glucose   Date Value Ref Range Status   09/09/2019 76 70 - 110 mg/dL Final     BUN, Bld   Date Value Ref Range Status   09/09/2019 14 6 - 20 mg/dL Final     Creatinine   Date Value Ref Range Status   09/09/2019 0.9 0.5 - 1.4 mg/dL Final     Calcium   Date Value Ref Range Status   09/09/2019 9.5 8.7 - 10.5 mg/dL Final     Total Protein   Date Value Ref Range Status   09/09/2019 7.3 6.0 - 8.4 g/dL Final     Albumin   Date Value Ref Range Status   09/09/2019 3.6 3.5 - 5.2 g/dL Final     Total Bilirubin   Date Value Ref Range Status   09/09/2019 0.4 0.1 - 1.0 mg/dL Final     Comment:     For infants and newborns, interpretation of results should be based  on gestational age, weight and in agreement with clinical  observations.  Premature Infant recommended reference ranges:  Up to 24 hours.............<8.0 mg/dL  Up to 48 hours............<12.0 mg/dL  3-5 days..................<15.0 mg/dL  6-29 days.................<15.0 mg/dL       Alkaline Phosphatase   Date Value Ref Range Status   09/09/2019 42 (L) 55 - 135 U/L Final     AST   Date Value Ref Range Status   09/09/2019 14 10 - 40 U/L Final     ALT   Date Value Ref Range Status   09/09/2019 14 10 - 44 U/L Final     Anion Gap   Date Value Ref Range Status   09/09/2019 12 8 - 16 mmol/L Final     eGFR if    Date Value Ref Range Status   09/09/2019 >60.0 >60 mL/min/1.73 m^2 Final     eGFR if non    Date Value Ref Range Status   09/09/2019 >60.0 >60 mL/min/1.73 m^2 Final     Comment:     Calculation used to obtain the estimated  glomerular filtration  rate (eGFR) is the CKD-EPI equation.            Imaging:  CT abdomen report January 2020 was independently visualized and reviewed by me and showed mildly prominent RLQ mesenteric LN, small pulmonary nodules, abnormal uterus    Assessment:   34 y.o. female presents for follow up of chronic, intermittent nausea, vomiting and diarrhea with frequent constipation, ? IBS. She reports multiple polyps removed > 10 years ago.     Plan:  -Schedule colonoscopy with TI intubation and biopsies  -Continue daily Omeprazole  -FODMAP diet  -Trial of Bentyl     Yessi Pal MD  [facility]  [address], [address]  [place] [address]  Office: [phone]  Fax: [phone]

## 2020-03-24 ENCOUNTER — TELEPHONE (OUTPATIENT)
Dept: GASTROENTEROLOGY | Facility: CLINIC | Age: 35
End: 2020-03-24

## 2020-03-24 ENCOUNTER — PATIENT MESSAGE (OUTPATIENT)
Dept: ENDOSCOPY | Facility: HOSPITAL | Age: 35
End: 2020-03-24

## 2020-03-24 NOTE — TELEPHONE ENCOUNTER
Call placed to Ms. Pelayo. Advised her per Dr. Pal If her symptoms are still frequent I would, if they have improved then I would defer for now. She stated that since starting the Bentyl she has not had any problems. She stated that she can hold off on her procedure for right now. I advised her that I would call her in 30 days to reschedule. She verbalized understanding. No further issues noted.

## 2020-03-24 NOTE — TELEPHONE ENCOUNTER
----- Message from Carolyne Bill sent at 3/24/2020  8:12 AM CDT -----  Type: Needs Medical Advice    Who Called:  self  Symptoms (please be specific):  NA  How long has patient had these symptoms: NA  Pharmacy name and phone #:    Best Call Back Number: [phone]   Additional Information: Patient called asking if the schedule procedure for Thursday is still on.

## 2020-05-28 DIAGNOSIS — Z01.818 PREOP TESTING: ICD-10-CM

## 2020-05-28 DIAGNOSIS — R19.4 CHANGE IN BOWEL HABIT: Primary | ICD-10-CM

## 2020-06-12 ENCOUNTER — TELEPHONE (OUTPATIENT)
Dept: GASTROENTEROLOGY | Facility: CLINIC | Age: 35
End: 2020-06-12

## 2020-06-12 NOTE — TELEPHONE ENCOUNTER
Call placed to Ms. Mcwilliams, advised her I would send over instructions right away. She verbalized understanding. No further issues noted.

## 2020-06-12 NOTE — TELEPHONE ENCOUNTER
----- Message from Danni Condon MA sent at 6/12/2020  9:32 AM CDT -----  Contact: self/ [phone]  Pt is requesting a call back in regards to OTC medication for her PROCS. Please call back to discuss.     Procs: 6/18/2020

## 2020-06-15 ENCOUNTER — TELEPHONE (OUTPATIENT)
Dept: GASTROENTEROLOGY | Facility: CLINIC | Age: 35
End: 2020-06-15

## 2020-06-15 NOTE — TELEPHONE ENCOUNTER
----- Message from Marietta Benitez sent at 6/15/2020 10:52 AM CDT -----  Regarding: Patient @ [phone]  Good Morning  Patient would like to talk to office due to Colonoscopy. Please call     Thank you

## 2020-06-15 NOTE — TELEPHONE ENCOUNTER
Call placed to MsNadir Poppy in regards to message received. She stated due to not having a ride or anyone to keep her child she has to cancel her colonoscopy. I advised her when she is ready to reschedule to give us a call. She verbalized understanding. No further issues noted.

## 2020-07-10 ENCOUNTER — OFFICE VISIT (OUTPATIENT)
Dept: FAMILY MEDICINE | Facility: CLINIC | Age: 35
End: 2020-07-10
Payer: COMMERCIAL

## 2020-07-10 VITALS
TEMPERATURE: 98 F | DIASTOLIC BLOOD PRESSURE: 90 MMHG | HEIGHT: 69 IN | BODY MASS INDEX: 28.77 KG/M2 | SYSTOLIC BLOOD PRESSURE: 118 MMHG | HEART RATE: 84 BPM | WEIGHT: 194.25 LBS

## 2020-07-10 DIAGNOSIS — G47.00 INSOMNIA, UNSPECIFIED TYPE: ICD-10-CM

## 2020-07-10 DIAGNOSIS — F43.23 SITUATIONAL MIXED ANXIETY AND DEPRESSIVE DISORDER: Primary | ICD-10-CM

## 2020-07-10 PROCEDURE — 99999 PR PBB SHADOW E&M-EST. PATIENT-LVL IV: CPT | Mod: PBBFAC,,, | Performed by: NURSE PRACTITIONER

## 2020-07-10 PROCEDURE — 3008F PR BODY MASS INDEX (BMI) DOCUMENTED: ICD-10-PCS | Mod: CPTII,S$GLB,, | Performed by: NURSE PRACTITIONER

## 2020-07-10 PROCEDURE — 99999 PR PBB SHADOW E&M-EST. PATIENT-LVL IV: ICD-10-PCS | Mod: PBBFAC,,, | Performed by: NURSE PRACTITIONER

## 2020-07-10 PROCEDURE — 99214 PR OFFICE/OUTPT VISIT, EST, LEVL IV, 30-39 MIN: ICD-10-PCS | Mod: S$GLB,,, | Performed by: NURSE PRACTITIONER

## 2020-07-10 PROCEDURE — 99214 OFFICE O/P EST MOD 30 MIN: CPT | Mod: S$GLB,,, | Performed by: NURSE PRACTITIONER

## 2020-07-10 PROCEDURE — 3008F BODY MASS INDEX DOCD: CPT | Mod: CPTII,S$GLB,, | Performed by: NURSE PRACTITIONER

## 2020-07-10 RX ORDER — QUETIAPINE FUMARATE 25 MG/1
25 TABLET, FILM COATED ORAL NIGHTLY
Qty: 30 TABLET | Refills: 11 | Status: SHIPPED | OUTPATIENT
Start: 2020-07-10 | End: 2020-07-28
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
## 2020-07-14 ENCOUNTER — OFFICE VISIT (OUTPATIENT)
Dept: FAMILY MEDICINE | Facility: CLINIC | Age: 35
End: 2020-07-14
Payer: COMMERCIAL

## 2020-07-14 VITALS
DIASTOLIC BLOOD PRESSURE: 86 MMHG | HEART RATE: 78 BPM | WEIGHT: 197.06 LBS | HEIGHT: 69 IN | BODY MASS INDEX: 29.19 KG/M2 | TEMPERATURE: 98 F | SYSTOLIC BLOOD PRESSURE: 130 MMHG

## 2020-07-14 DIAGNOSIS — T76.12XA PARENTAL CONCERN ABOUT POSSIBLE NON-ACCIDENTAL TRAUMATIC INJURY IN CHILD: ICD-10-CM

## 2020-07-14 DIAGNOSIS — F43.23 SITUATIONAL MIXED ANXIETY AND DEPRESSIVE DISORDER: Primary | ICD-10-CM

## 2020-07-14 DIAGNOSIS — T14.90XA TRAUMA IN CHILDHOOD: ICD-10-CM

## 2020-07-14 DIAGNOSIS — G47.00 INSOMNIA, UNSPECIFIED TYPE: ICD-10-CM

## 2020-07-14 PROCEDURE — 99999 PR PBB SHADOW E&M-EST. PATIENT-LVL III: ICD-10-PCS | Mod: PBBFAC,,, | Performed by: NURSE PRACTITIONER

## 2020-07-14 PROCEDURE — 3008F BODY MASS INDEX DOCD: CPT | Mod: CPTII,S$GLB,, | Performed by: NURSE PRACTITIONER

## 2020-07-14 PROCEDURE — 99215 OFFICE O/P EST HI 40 MIN: CPT | Mod: S$GLB,,, | Performed by: NURSE PRACTITIONER

## 2020-07-14 PROCEDURE — 99215 PR OFFICE/OUTPT VISIT, EST, LEVL V, 40-54 MIN: ICD-10-PCS | Mod: S$GLB,,, | Performed by: NURSE PRACTITIONER

## 2020-07-14 PROCEDURE — 99999 PR PBB SHADOW E&M-EST. PATIENT-LVL III: CPT | Mod: PBBFAC,,, | Performed by: NURSE PRACTITIONER

## 2020-07-14 PROCEDURE — 3008F PR BODY MASS INDEX (BMI) DOCUMENTED: ICD-10-PCS | Mod: CPTII,S$GLB,, | Performed by: NURSE PRACTITIONER
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
## 2020-07-23 ENCOUNTER — TELEPHONE (OUTPATIENT)
Dept: FAMILY MEDICINE | Facility: CLINIC | Age: 35
End: 2020-07-23

## 2020-07-23 NOTE — TELEPHONE ENCOUNTER
Completed Functional mental status exam form has been faxed to Lauri gómez. The original has been placed at the  for the patient to . Patient is aware.   Copy sent to scanning.

## 2020-07-23 NOTE — TELEPHONE ENCOUNTER
----- Message from Ivy Grossman sent at 7/23/2020 10:28 AM CDT -----  Contact: self  Type: Needs Medical Advice  Who Called:  patient   Best Call Back Number:[phone] call after 12 pm today.   Additional Information: Lauri gómez has not received paper work for her short term disability and FMLA approval,  please contact to advice.

## 2020-07-24 ENCOUNTER — TELEPHONE (OUTPATIENT)
Dept: FAMILY MEDICINE | Facility: CLINIC | Age: 35
End: 2020-07-24

## 2020-07-24 NOTE — TELEPHONE ENCOUNTER
Copy of office note from 7/14/2020 visit has been printed and faxed to Lauri Cosme. Patient has been informed.

## 2020-07-24 NOTE — TELEPHONE ENCOUNTER
----- Message from Karina Campbell sent at 7/24/2020  8:15 AM CDT -----  Contact: pt  Pt states that she came and saw the NP and he was give paperwork to fill out for her short term disability but the the short term people are saying they need her medical records which our her clinic note  and records..[phone]

## 2020-07-28 ENCOUNTER — OFFICE VISIT (OUTPATIENT)
Dept: FAMILY MEDICINE | Facility: CLINIC | Age: 35
End: 2020-07-28
Payer: COMMERCIAL

## 2020-07-28 VITALS
TEMPERATURE: 99 F | HEIGHT: 69 IN | HEART RATE: 97 BPM | WEIGHT: 196.44 LBS | DIASTOLIC BLOOD PRESSURE: 84 MMHG | BODY MASS INDEX: 29.09 KG/M2 | SYSTOLIC BLOOD PRESSURE: 120 MMHG

## 2020-07-28 DIAGNOSIS — F43.23 SITUATIONAL MIXED ANXIETY AND DEPRESSIVE DISORDER: Primary | ICD-10-CM

## 2020-07-28 DIAGNOSIS — T76.12XA PARENTAL CONCERN ABOUT POSSIBLE NON-ACCIDENTAL TRAUMATIC INJURY IN CHILD: ICD-10-CM

## 2020-07-28 DIAGNOSIS — G47.00 INSOMNIA, UNSPECIFIED TYPE: ICD-10-CM

## 2020-07-28 DIAGNOSIS — T14.90XA TRAUMA IN CHILDHOOD: ICD-10-CM

## 2020-07-28 PROCEDURE — 99213 PR OFFICE/OUTPT VISIT, EST, LEVL III, 20-29 MIN: ICD-10-PCS | Mod: S$GLB,,, | Performed by: NURSE PRACTITIONER

## 2020-07-28 PROCEDURE — 99999 PR PBB SHADOW E&M-EST. PATIENT-LVL IV: CPT | Mod: PBBFAC,,, | Performed by: NURSE PRACTITIONER

## 2020-07-28 PROCEDURE — 99213 OFFICE O/P EST LOW 20 MIN: CPT | Mod: S$GLB,,, | Performed by: NURSE PRACTITIONER

## 2020-07-28 PROCEDURE — 99999 PR PBB SHADOW E&M-EST. PATIENT-LVL IV: ICD-10-PCS | Mod: PBBFAC,,, | Performed by: NURSE PRACTITIONER

## 2020-07-28 PROCEDURE — 3008F PR BODY MASS INDEX (BMI) DOCUMENTED: ICD-10-PCS | Mod: CPTII,S$GLB,, | Performed by: NURSE PRACTITIONER

## 2020-07-28 PROCEDURE — 3008F BODY MASS INDEX DOCD: CPT | Mod: CPTII,S$GLB,, | Performed by: NURSE PRACTITIONER

## 2020-07-28 RX ORDER — QUETIAPINE FUMARATE 25 MG/1
50 TABLET, FILM COATED ORAL NIGHTLY
Qty: 60 TABLET | Refills: 11 | Status: SHIPPED | OUTPATIENT
Start: 2020-07-28 | End: 2022-01-03
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
## 2020-08-10 ENCOUNTER — TELEPHONE (OUTPATIENT)
Dept: FAMILY MEDICINE | Facility: CLINIC | Age: 35
End: 2020-08-10

## 2020-08-10 NOTE — TELEPHONE ENCOUNTER
----- Message from Lalito Peralta sent at 8/10/2020 11:35 AM CDT -----  Regarding: pt  Type: Needs Medical Advice    Who Called:  pt    Best Call Back Number: [phone]  Additional Information: pt FMLA expires on 8/24 but appt is not until 8/28. Pt needs to know if FMLA can be extended to end of Sept before appt, if not can appt be moved to before 8/24. Please call to assist.

## 2020-08-17 ENCOUNTER — TELEPHONE (OUTPATIENT)
Dept: GASTROENTEROLOGY | Facility: CLINIC | Age: 35
End: 2020-08-17

## 2020-08-17 DIAGNOSIS — R19.4 CHANGE IN BOWEL HABIT: Primary | ICD-10-CM

## 2020-08-17 DIAGNOSIS — Z01.818 PREOP TESTING: ICD-10-CM

## 2020-08-17 NOTE — TELEPHONE ENCOUNTER
----- Message from Ivy Stevenson sent at 8/17/2020 10:25 AM CDT -----  Type: Needs to schedule colonoscopy    Who Called:  Patient  Best Call Back Number: [phone]  Additional Information:  Patient needs to speak with nurse concerning scheduling colonoscopy one day this week due to has help with bringing her to have done/please call back to schedule or advise.

## 2020-08-18 ENCOUNTER — LAB VISIT (OUTPATIENT)
Dept: PRIMARY CARE CLINIC | Facility: CLINIC | Age: 35
End: 2020-08-18
Payer: COMMERCIAL

## 2020-08-18 DIAGNOSIS — Z01.818 PREOP TESTING: ICD-10-CM

## 2020-08-18 PROCEDURE — U0003 INFECTIOUS AGENT DETECTION BY NUCLEIC ACID (DNA OR RNA); SEVERE ACUTE RESPIRATORY SYNDROME CORONAVIRUS 2 (SARS-COV-2) (CORONAVIRUS DISEASE [COVID-19]), AMPLIFIED PROBE TECHNIQUE, MAKING USE OF HIGH THROUGHPUT TECHNOLOGIES AS DESCRIBED BY CMS-2020-01-R: HCPCS

## 2020-08-19 LAB — SARS-COV-2 RNA RESP QL NAA+PROBE: NOT DETECTED

## 2020-08-21 ENCOUNTER — ANESTHESIA (OUTPATIENT)
Dept: ENDOSCOPY | Facility: HOSPITAL | Age: 35
End: 2020-08-21
Payer: COMMERCIAL

## 2020-08-21 ENCOUNTER — HOSPITAL ENCOUNTER (OUTPATIENT)
Facility: HOSPITAL | Age: 35
Discharge: HOME | End: 2020-08-21
Attending: INTERNAL MEDICINE | Admitting: INTERNAL MEDICINE
Payer: COMMERCIAL

## 2020-08-21 ENCOUNTER — OFFICE VISIT (OUTPATIENT)
Dept: FAMILY MEDICINE | Facility: CLINIC | Age: 35
End: 2020-08-21
Payer: COMMERCIAL

## 2020-08-21 ENCOUNTER — ANESTHESIA EVENT (OUTPATIENT)
Dept: ENDOSCOPY | Facility: HOSPITAL | Age: 35
End: 2020-08-21
Payer: COMMERCIAL

## 2020-08-21 VITALS
HEIGHT: 69 IN | WEIGHT: 196.19 LBS | BODY MASS INDEX: 29.06 KG/M2 | DIASTOLIC BLOOD PRESSURE: 78 MMHG | SYSTOLIC BLOOD PRESSURE: 114 MMHG | TEMPERATURE: 98 F | HEART RATE: 77 BPM

## 2020-08-21 DIAGNOSIS — G47.00 INSOMNIA, UNSPECIFIED TYPE: ICD-10-CM

## 2020-08-21 DIAGNOSIS — F43.23 SITUATIONAL MIXED ANXIETY AND DEPRESSIVE DISORDER: Primary | ICD-10-CM

## 2020-08-21 DIAGNOSIS — R19.4 CHANGE IN BOWEL HABITS: ICD-10-CM

## 2020-08-21 LAB
B-HCG UR QL: NEGATIVE
CTP QC/QA: YES

## 2020-08-21 PROCEDURE — 63600175 PHARM REV CODE 636 W HCPCS: Performed by: NURSE ANESTHETIST, CERTIFIED REGISTERED

## 2020-08-21 PROCEDURE — 27201012 HC FORCEPS, HOT/COLD, DISP: Performed by: INTERNAL MEDICINE

## 2020-08-21 PROCEDURE — 45380 COLONOSCOPY AND BIOPSY: CPT | Performed by: INTERNAL MEDICINE

## 2020-08-21 PROCEDURE — 3008F BODY MASS INDEX DOCD: CPT | Mod: CPTII,S$GLB,, | Performed by: NURSE PRACTITIONER

## 2020-08-21 PROCEDURE — 88305 TISSUE EXAM BY PATHOLOGIST: ICD-10-PCS | Mod: 26,,, | Performed by: PATHOLOGY

## 2020-08-21 PROCEDURE — 37000009 HC ANESTHESIA EA ADD 15 MINS: Performed by: INTERNAL MEDICINE

## 2020-08-21 PROCEDURE — 99999 PR PBB SHADOW E&M-EST. PATIENT-LVL IV: CPT | Mod: PBBFAC,,, | Performed by: NURSE PRACTITIONER

## 2020-08-21 PROCEDURE — 99999 PR PBB SHADOW E&M-EST. PATIENT-LVL IV: ICD-10-PCS | Mod: PBBFAC,,, | Performed by: NURSE PRACTITIONER

## 2020-08-21 PROCEDURE — 37000008 HC ANESTHESIA 1ST 15 MINUTES: Performed by: INTERNAL MEDICINE

## 2020-08-21 PROCEDURE — D9220A PRA ANESTHESIA: Mod: CRNA,,, | Performed by: NURSE ANESTHETIST, CERTIFIED REGISTERED

## 2020-08-21 PROCEDURE — 3008F PR BODY MASS INDEX (BMI) DOCUMENTED: ICD-10-PCS | Mod: CPTII,S$GLB,, | Performed by: NURSE PRACTITIONER

## 2020-08-21 PROCEDURE — D9220A PRA ANESTHESIA: ICD-10-PCS | Mod: ANES,,, | Performed by: ANESTHESIOLOGY

## 2020-08-21 PROCEDURE — 99212 PR OFFICE/OUTPT VISIT, EST, LEVL II, 10-19 MIN: ICD-10-PCS | Mod: S$GLB,,, | Performed by: NURSE PRACTITIONER

## 2020-08-21 PROCEDURE — 99212 OFFICE O/P EST SF 10 MIN: CPT | Mod: S$GLB,,, | Performed by: NURSE PRACTITIONER

## 2020-08-21 PROCEDURE — D9220A PRA ANESTHESIA: Mod: ANES,,, | Performed by: ANESTHESIOLOGY

## 2020-08-21 PROCEDURE — 45380 PR COLONOSCOPY,BIOPSY: ICD-10-PCS | Mod: ,,, | Performed by: INTERNAL MEDICINE

## 2020-08-21 PROCEDURE — 45380 COLONOSCOPY AND BIOPSY: CPT | Mod: ,,, | Performed by: INTERNAL MEDICINE

## 2020-08-21 PROCEDURE — 25000003 PHARM REV CODE 250: Performed by: INTERNAL MEDICINE

## 2020-08-21 PROCEDURE — 88305 TISSUE EXAM BY PATHOLOGIST: CPT | Performed by: PATHOLOGY

## 2020-08-21 PROCEDURE — 88305 TISSUE EXAM BY PATHOLOGIST: CPT | Mod: 26,,, | Performed by: PATHOLOGY

## 2020-08-21 PROCEDURE — D9220A PRA ANESTHESIA: ICD-10-PCS | Mod: CRNA,,, | Performed by: NURSE ANESTHETIST, CERTIFIED REGISTERED

## 2020-08-21 PROCEDURE — 81025 URINE PREGNANCY TEST: CPT | Performed by: INTERNAL MEDICINE

## 2020-08-21 RX ORDER — HYDROXYZINE HYDROCHLORIDE 25 MG/1
25 TABLET, FILM COATED ORAL 3 TIMES DAILY PRN
COMMUNITY
End: 2022-05-24

## 2020-08-21 RX ORDER — PROPOFOL 10 MG/ML
VIAL (ML) INTRAVENOUS
Status: DISCONTINUED | OUTPATIENT
Start: 2020-08-21 | End: 2020-08-21

## 2020-08-21 RX ORDER — LIDOCAINE HYDROCHLORIDE 20 MG/ML
INJECTION INTRAVENOUS
Status: DISCONTINUED | OUTPATIENT
Start: 2020-08-21 | End: 2020-08-21

## 2020-08-21 RX ORDER — DICYCLOMINE HYDROCHLORIDE 10 MG/1
CAPSULE ORAL 3 TIMES DAILY
COMMUNITY
End: 2020-09-16 | Stop reason: SDUPTHER

## 2020-08-21 RX ORDER — SERTRALINE HYDROCHLORIDE 50 MG/1
50 TABLET, FILM COATED ORAL NIGHTLY
COMMUNITY
End: 2022-05-24

## 2020-08-21 RX ORDER — SODIUM CHLORIDE 9 MG/ML
INJECTION, SOLUTION INTRAVENOUS CONTINUOUS
Status: DISCONTINUED | OUTPATIENT
Start: 2020-08-21 | End: 2020-08-21 | Stop reason: HOSPADM

## 2020-08-21 RX ORDER — PRAZOSIN HYDROCHLORIDE 1 MG/1
1 CAPSULE ORAL NIGHTLY
COMMUNITY
End: 2022-05-24

## 2020-08-21 RX ADMIN — PROPOFOL 50 MG: 10 INJECTION, EMULSION INTRAVENOUS at 01:08

## 2020-08-21 RX ADMIN — LIDOCAINE HYDROCHLORIDE 100 MG: 20 INJECTION, SOLUTION INTRAVENOUS at 01:08

## 2020-08-21 RX ADMIN — SODIUM CHLORIDE: 0.9 INJECTION, SOLUTION INTRAVENOUS at 12:08

## 2020-08-21 RX ADMIN — PROPOFOL 100 MG: 10 INJECTION, EMULSION INTRAVENOUS at 01:08

## 2020-08-21 NOTE — H&P
"[facility] Note    CC: Diarrhea, change in bowel habits    HPI 35 y.o. female presents for follow up of chronic, intermittent nausea, vomiting and diarrhea with frequent constipation, ? IBS. She reports multiple polyps removed > 10 years ago.     Past Medical History:   Diagnosis Date    Encounter for blood transfusion     Endometriosis     Neuromuscular disorder     nerve damage of right foot after surgery    PCOS (polycystic ovarian syndrome)     Thyroid disease        Allergies and Medications reviewed     Review of Systems  General ROS: negative for - chills, fever or weight loss  Cardiovascular ROS: no chest pain or dyspnea on exertion  Gastrointestinal ROS: + diarrhea , + constipation    Physical Examination  /78 (BP Location: Left arm, Patient Position: Lying)   Pulse 91   Temp 98.4 °F (36.9 °C) (Skin)   Resp 16   Ht 5' 9" (1.753 m)   Wt 88.9 kg (196 lb)   SpO2 99%   Breastfeeding No   BMI 28.94 kg/m²   General appearance: alert, cooperative, no distress  HENT: Normocephalic, atraumatic, neck symmetrical, no nasal discharge, sclera anicteric   Lungs: clear to auscultation bilaterally, symmetric chest wall expansion bilaterally  Heart: regular rate and rhythm without rub; no displacement of the PMI   Abdomen: soft  Extremities: extremities symmetric; no clubbing, cyanosis, or edema        Labs:  Lab Results   Component Value Date    WBC 6.45 09/09/2019    HGB 12.7 09/09/2019    HCT 42.1 09/09/2019    MCV 97 09/09/2019     09/09/2019           Assessment:   35 y.o. female presents for follow up of chronic, intermittent nausea, vomiting and diarrhea with frequent constipation, ? IBS. She reports multiple polyps removed > 10 years ago.     Plan:  -Proceed to colonoscopy     MD [facility]  [address], [address]  [place] [address]  Office: [phone]  Fax: [phone]  "
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
## 2020-08-21 NOTE — ANESTHESIA POSTPROCEDURE EVALUATION
Anesthesia Post Evaluation    Patient: Emily Pelayo    Procedure(s) Performed: Procedure(s) (LRB):  COLONOSCOPY (N/A)    Final Anesthesia Type: general    Patient location during evaluation: PACU  Patient participation: Yes- Able to Participate  Level of consciousness: sedated and awake  Post-procedure vital signs: reviewed and stable  Pain management: adequate  Airway patency: patent    PONV status at discharge: No PONV  Anesthetic complications: no      Cardiovascular status: blood pressure returned to baseline  Respiratory status: spontaneous ventilation  Hydration status: euvolemic  Follow-up not needed.          Vitals Value Taken Time   /70 08/21/20 1403   Temp 36.7 °C (98 °F) 08/21/20 1403   Pulse 83 08/21/20 1403   Resp 14 08/21/20 1403   SpO2 100 % 08/21/20 1403         No case tracking events are documented in the log.      Pain/Godwin Score: No data recorded

## 2020-08-21 NOTE — TRANSFER OF CARE
"Anesthesia Transfer of Care Note    Patient: Emily Pelayo    Procedure(s) Performed: Procedure(s) (LRB):  COLONOSCOPY (N/A)    Patient location: GI    Anesthesia Type: general    Transport from OR: Transported from OR on room air with adequate spontaneous ventilation    Post pain: adequate analgesia    Post assessment: no apparent anesthetic complications    Post vital signs: stable    Level of consciousness: sedated    Nausea/Vomiting: no nausea/vomiting    Complications: none    Transfer of care protocol was followed      Last vitals:   Visit Vitals  /78 (BP Location: Left arm, Patient Position: Lying)   Pulse 91   Temp 36.9 °C (98.4 °F) (Skin)   Resp 16   Ht 5' 9" (1.753 m)   Wt 88.9 kg (196 lb)   SpO2 99%   Breastfeeding No   BMI 28.94 kg/m²     "

## 2020-08-21 NOTE — PLAN OF CARE
Vss, jung po fluids, denies pain, ambulates easily. IV removed, catheter intact. Discharge instructions provided and states understanding. States ready to go home.  Discharged from facility with family per wheelchair.
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
## 2020-08-21 NOTE — PROVATION PATIENT INSTRUCTIONS
Discharge Summary/Instructions after an Endoscopic Procedure  Patient Name: Emily Pelayo  Patient MRN: [id number]  Patient YOB: 1985 Friday, August 21, 2020  Yessi Pal MD  RESTRICTIONS:  During your procedure today, you received medications for sedation.  These   medications may affect your judgment, balance and coordination.  Therefore,   for 24 hours, you have the following restrictions:   - DO NOT drive a car, operate machinery, make legal/financial decisions,   sign important papers or drink alcohol.    ACTIVITY:  Today: no heavy lifting, straining or running due to procedural   sedation/anesthesia.  The following day: return to full activity including work.  DIET:  Eat and drink normally unless instructed otherwise.     TREATMENT FOR COMMON SIDE EFFECTS:  - Mild abdominal pain, nausea, belching, bloating or excessive gas:  rest,   eat lightly and use a heating pad.  - Sore Throat: treat with throat lozenges and/or gargle with warm salt   water.  - Because air was used during the procedure, expelling large amounts of air   from your rectum or belching is normal.  - If a bowel prep was taken, you may not have a bowel movement for 1-3 days.    This is normal.  SYMPTOMS TO WATCH FOR AND REPORT TO YOUR PHYSICIAN:  1. Abdominal pain or bloating, other than gas cramps.  2. Chest pain.  3. Back pain.  4. Signs of infection such as: chills or fever occurring within 24 hours   after the procedure.  5. Rectal bleeding, which would show as bright red, maroon, or black stools.   (A tablespoon of blood from the rectum is not serious, especially if   hemorrhoids are present.)  6. Vomiting.  7. Weakness or dizziness.  GO DIRECTLY TO THE NEAREST EMERGENCY ROOM IF YOU HAVE ANY OF THE FOLLOWING:      Difficulty breathing              Chills and/or fever over 101 F   Persistent vomiting and/or vomiting blood   Severe abdominal pain   Severe chest pain   Black, tarry stools   Bleeding- more than one  tablespoon   Any other symptom or condition that you feel may need urgent attention  Your doctor recommends these additional instructions:  If any biopsies were taken, your doctors clinic will contact you in 1 to 2   weeks with any results.  - Discharge patient to home (with escort).   - Patient has a contact number available for emergencies.  The signs and   symptoms of potential delayed complications were discussed with the   patient.  Return to normal activities tomorrow.  Written discharge   instructions were provided to the patient.   - Resume previous diet.   - Continue present medications.   - Await pathology results.   - Repeat colonoscopy at age 48 for screening purposes.   -Consider trial of Linzess +/- IBgard, other option includes Librax  For questions, problems or results please call your physician - Yessi Pal MD at Work:  [phone].  [facility], EMERGENCY ROOM PHONE NUMBER: [phone]  IF A COMPLICATION OR EMERGENCY SITUATION ARISES AND YOU ARE UNABLE TO REACH   YOUR PHYSICIAN - GO DIRECTLY TO THE EMERGENCY ROOM.  Yessi Pal MD  8/21/2020 2:13:18 PM  This report has been verified and signed electronically.  [employer]ATION

## 2020-08-24 VITALS
DIASTOLIC BLOOD PRESSURE: 79 MMHG | TEMPERATURE: 98 F | HEIGHT: 69 IN | BODY MASS INDEX: 29.03 KG/M2 | OXYGEN SATURATION: 98 % | HEART RATE: 80 BPM | RESPIRATION RATE: 16 BRPM | WEIGHT: 196 LBS | SYSTOLIC BLOOD PRESSURE: 114 MMHG

## 2020-08-25 LAB
FINAL PATHOLOGIC DIAGNOSIS: NORMAL
GROSS: NORMAL

## 2020-08-31 ENCOUNTER — TELEPHONE (OUTPATIENT)
Dept: GASTROENTEROLOGY | Facility: CLINIC | Age: 35
End: 2020-08-31

## 2020-08-31 NOTE — TELEPHONE ENCOUNTER
----- Message from Payton Myers sent at 8/31/2020  8:19 AM CDT -----  Patient is requesting a return call regarding the new medication.  She said she does not understand why it was prescribed.  She said she is already having the side effects listed before she takes it.  She did take it this morning, but now her stomach hurts.  Please call her at [phone].  Thank you!

## 2020-08-31 NOTE — TELEPHONE ENCOUNTER
Call placed to MsNadir Poppy in regards to message received. She stated that she took linzess at 0630 this morning, and is already have stomach pains, and diarrhea so she is not sure as to why she was given this medication. I advised this medication is used to treat constipation, and it usually takes a couple of weeks to get into her system. I advised her that medication will help with her being able to have bowel movements. She verbalized understanding. No further issues noted.

## 2020-09-14 ENCOUNTER — TELEPHONE (OUTPATIENT)
Dept: GASTROENTEROLOGY | Facility: CLINIC | Age: 35
End: 2020-09-14

## 2020-09-14 ENCOUNTER — TELEPHONE (OUTPATIENT)
Dept: OBSTETRICS AND GYNECOLOGY | Facility: CLINIC | Age: 35
End: 2020-09-14

## 2020-09-14 NOTE — TELEPHONE ENCOUNTER
----- Message from Ivy Grossman sent at 9/14/2020  8:20 AM CDT -----  Contact: patient  Type:  RX Refill Request    Who Called:  patient  Refill or New Rx:  refill  RX Name and Strength:  levonorgestrel-ethinyl estradiol (MARLISSA, 28,) 0.15-0.03 mg per tablet  How is the patient currently taking it? (ex. 1XDay):  1xday  Is this a 30 day or 90 day RX:  90  Preferred Pharmacy with phone number:        [facility]/pharmacy #[id number] [place], [place] - [address] [address]  Phone: [phone] Fax: [phone]      Local or Mail Order:  local  Ordering Provider:  Rosetta Milton Call Back Number:  [phone] (home)     Additional Information:

## 2020-09-14 NOTE — TELEPHONE ENCOUNTER
Call placed to Ms. Pérezs in regards to message received. She stated that since taking the linzess she has felt horrible. She is in the bathroom constantly, and she doesn't eat until around the afternoon time. She stated that she cannot keep taking this medication and go back to work. I advised her that Dr. Pal is out of the office on Monday's, but I will send a message to Dr. Pal to find out if there is anything else she can take. She also stated that she is out of  Bentyl and needs a refill sent to the pharmacy. No further issues noted.

## 2020-09-14 NOTE — TELEPHONE ENCOUNTER
----- Message from Ivy Grossman sent at 9/14/2020  8:25 AM CDT -----  Contact: patient  Type: Needs Medical Advice  Who Called: patient  Symptoms (please be specific): linaCLOtide (LINZESS) 145 mcg Cap capsule    How long has patient had these symptoms:  causing stomach issues, states she can not eat until about 4 in the afternoon  Pharmacy name and phone #:    [facility] Call Back Number: [phone] (home)     Additional Information: Patient returning to work, say she can continue to take the medication once she returns to work, please contact patient regarding if she should continue to take the medication

## 2020-09-16 RX ORDER — LUBIPROSTONE 24 UG/1
24 CAPSULE ORAL 2 TIMES DAILY WITH MEALS
Qty: 60 CAPSULE | Refills: 3 | Status: SHIPPED | OUTPATIENT
Start: 2020-09-16 | End: 2020-10-29

## 2020-09-16 RX ORDER — DICYCLOMINE HYDROCHLORIDE 10 MG/1
10 CAPSULE ORAL EVERY 6 HOURS PRN
Qty: 30 CAPSULE | Refills: 6 | Status: SHIPPED | OUTPATIENT
Start: 2020-09-16 | End: 2021-05-27

## 2020-09-23 ENCOUNTER — TELEPHONE (OUTPATIENT)
Dept: FAMILY MEDICINE | Facility: CLINIC | Age: 35
End: 2020-09-23

## 2020-09-23 ENCOUNTER — PATIENT OUTREACH (OUTPATIENT)
Dept: ADMINISTRATIVE | Facility: OTHER | Age: 35
End: 2020-09-23

## 2020-09-23 NOTE — TELEPHONE ENCOUNTER
----- Message from Luci Lynn sent at 9/23/2020  9:18 AM CDT -----  Regarding: advice  Contact: pt  ADVICE     Patient called to let you know you did not check the box that is the reason you have not   Received the medical records from her Psy    Patient is asking if you will call her back she will explain.    Call back [phone]

## 2020-09-24 ENCOUNTER — OFFICE VISIT (OUTPATIENT)
Dept: OBSTETRICS AND GYNECOLOGY | Facility: CLINIC | Age: 35
End: 2020-09-24
Payer: COMMERCIAL

## 2020-09-24 VITALS
DIASTOLIC BLOOD PRESSURE: 92 MMHG | SYSTOLIC BLOOD PRESSURE: 130 MMHG | WEIGHT: 196.63 LBS | BODY MASS INDEX: 29.04 KG/M2

## 2020-09-24 DIAGNOSIS — Z12.4 PAP SMEAR FOR CERVICAL CANCER SCREENING: Primary | ICD-10-CM

## 2020-09-24 DIAGNOSIS — Z00.00 PREVENTATIVE HEALTH CARE: ICD-10-CM

## 2020-09-24 PROCEDURE — 99395 PR PREVENTIVE VISIT,EST,18-39: ICD-10-PCS | Mod: S$GLB,,, | Performed by: SPECIALIST

## 2020-09-24 PROCEDURE — 99999 PR PBB SHADOW E&M-EST. PATIENT-LVL III: CPT | Mod: PBBFAC,,, | Performed by: SPECIALIST

## 2020-09-24 PROCEDURE — 99999 PR PBB SHADOW E&M-EST. PATIENT-LVL III: ICD-10-PCS | Mod: PBBFAC,,, | Performed by: SPECIALIST

## 2020-09-24 PROCEDURE — 88175 CYTOPATH C/V AUTO FLUID REDO: CPT

## 2020-09-24 PROCEDURE — 99395 PREV VISIT EST AGE 18-39: CPT | Mod: S$GLB,,, | Performed by: SPECIALIST

## 2020-09-24 RX ORDER — LEVONORGESTREL AND ETHINYL ESTRADIOL 0.15-0.03
1 KIT ORAL DAILY
Qty: 84 TABLET | Refills: 3 | Status: SHIPPED | OUTPATIENT
Start: 2020-09-24 | End: 2021-08-18

## 2020-09-24 NOTE — PROGRESS NOTES
Health Maintenance Due   Topic Date Due    Influenza Vaccine (1) 08/01/2020     Updates were requested from care everywhere.  Chart was reviewed for overdue Proactive [facility] Encounters ([facility]) topics (CRS, Breast Cancer Screening, Eye exam)  Health Maintenance has been updated.  LINKS immunization registry triggered.  Immunizations were reconciled.

## 2020-09-24 NOTE — PROGRESS NOTES
34 yo BF returns for preventative care. Discussed recent stress issues.  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 Neri, colon polyps per patient report    COLONOSCOPY N/A 8/21/2020    Procedure: COLONOSCOPY;  Surgeon: Yessi Pal MD;  Location: [facility] ENDO;  Service: Endoscopy;  Laterality: N/A;    dx lap      ESOPHAGOGASTRODUODENOSCOPY N/A 1/22/2020    Procedure: EGD (ESOPHAGOGASTRODUODENOSCOPY);  Surgeon: Yessi Pal MD;  Location: [facility] ENDO;  Service: Endoscopy;  Laterality: N/A;    hammer toe Right 11/19/2015    heavenly toe repeair with callus filing    LAPAROSCOPY  ~2012    done for infertility concern    TONSILLECTOMY         Family History   Problem Relation Age of Onset    Thyroid disease Mother         hyperthyroidism    Hypertension Mother     Hypertension Sister     Heart disease Maternal Grandmother     Ovarian cancer Neg Hx     Breast cancer Neg Hx     Eczema Neg Hx     Lupus Neg Hx     Psoriasis Neg Hx     Melanoma Neg Hx     Cancer Neg Hx     Colon cancer Neg Hx     Crohn's disease Neg Hx     Ulcerative colitis Neg Hx     Stomach cancer Neg Hx     Esophageal cancer Neg Hx        Social History     Socioeconomic History    Marital status: Single     Spouse name: Not on file    Number of children: Not on file    Years of education: Not on file    Highest education level: Not on file   Occupational History    Not on file   Social Needs    Financial resource strain: Somewhat hard    Food insecurity     Worry: Never true     Inability: Never true    Transportation needs     Medical: No     Non-medical: No   Tobacco Use    Smoking status: Never Smoker     Smokeless tobacco: Never Used   Substance and Sexual Activity    Alcohol use: No     Frequency: Monthly or less     Drinks per session: 1 or 2     Binge frequency: Never    Drug use: No    Sexual activity: Yes     Partners: Male     Birth control/protection: OCP     Comment: OCP helps regulate cycle   Lifestyle    Physical activity     Days per week: 4 days     Minutes per session: 60 min    Stress: Only a little   Relationships    Social connections     Talks on phone: Once a week     Gets together: Never     Attends [employer]al service: Not on file     Active member of club or organization: Yes     Attends meetings of clubs or organizations: More than 4 times per year     Relationship status: Never    Other Topics Concern    Are you pregnant or think you may be? Not Asked    Breast-feeding Not Asked   Social History Narrative    Not on file       Current Outpatient Medications   Medication Sig Dispense Refill    ASCORBATE CALCIUM (VITAMIN C ORAL) Take by mouth.      cholecalciferol, vitamin D3, (VITAMIN D3) 2,000 unit Tab Take 2,000 Units by mouth once daily.      CITRANATAL HARMONY, IRON FUM, 27 mg iron-1 mg -50 mg-260 mg Cap TAKE 1 CAPSULE BY MOUTH ONCE DAILY. 100 capsule 0    dicyclomine (BENTYL) 10 MG capsule Take 1 capsule (10 mg total) by mouth every 6 (six) hours as needed. 30 capsule 6    fluocinonide (LIDEX) 0.05 % external solution APPLY THIN FILM TO SCALP EVERY NIGHT AT BEDTIME AS NEEDED FOR ITCHING OR SCALING 60 mL 0    hydrOXYzine HCL (ATARAX) 25 MG tablet Take 25 mg by mouth 3 (three) times daily.      ketoconazole (NIZORAL) 2 % shampoo LATHER SCALP, LET SIT 5 MIN, RINSE WELL. USE 2X WEEKLY 120 mL 2    levonorgestrel-ethinyl estradiol (MARLISSA, 28,) 0.15-0.03 mg per tablet Take 1 tablet by mouth once daily. 84 tablet 3    levothyroxine (SYNTHROID) 75 MCG tablet TAKE 1 TABLET (75 MCG TOTAL) BY MOUTH BEFORE BREAKFAST DAILY 90 tablet 3    lubiprostone (AMITIZA) 24 MCG Cap Take 1  capsule (24 mcg total) by mouth 2 (two) times daily with meals. 60 capsule 3    omeprazole (PRILOSEC) 40 MG capsule Take 1 capsule (40 mg total) by mouth before breakfast. 90 capsule 3    ondansetron (ZOFRAN-ODT) 4 MG TbDL Take 1 tablet (4 mg total) by mouth every 8 (eight) hours as needed (nausea). 30 tablet 1    prazosin (MINIPRESS) 1 MG Cap Take 1 mg by mouth 2 (two) times daily.      prednisoLONE acetate (PRED FORTE) 1 % DrpS PLACE  1 DROP INTO OD QID FOR 5 DAYS      sertraline (ZOLOFT) 50 MG tablet Take 50 mg by mouth every evening.      naproxen sodium (ALEVE) 220 mg Cap Take by mouth daily as needed (menses).      QUEtiapine (SEROQUEL) 25 MG Tab Take 2 tablets (50 mg total) by mouth every evening. (Patient not taking: Reported on 9/24/2020) 60 tablet 11     No current facility-administered medications for this visit.        Review of patient's allergies indicates:  No Known Allergies    Review of System:   General: no chills, fever, night sweats, weight gain or weight loss  Psychological: no depression or suicidal ideation  Breasts: no new or changing breast lumps, nipple discharge or masses.  Respiratory: no cough, shortness of breath, or wheezing  Cardiovascular: no chest pain or dyspnea on exertion  Gastrointestinal: no abdominal pain, change in bowel habits, or black or bloody stools  Genito-Urinary: no incontinence, urinary frequency/urgency or vulvar/vaginal symptoms, pelvic pain or abnormal vaginal bleeding.  Musculoskeletal: no gait disturbance or muscular weakness                                              General Appearance    A and O x 4, Cooperative, no distress   Breasts    Abdomen   Symmetrical, no masses, no discharge, skin changes , erythema or retraction. No adenopathy  Soft, non-tender, bowel sounds active all four quadrants,  no masses, no organomegaly    Genitourinary:   External rectal exam shows no thrombosed external hemorrhoids.   Pelvic exam was performed with patient  supine.  No labial fusion.  There is no rash, lesion or injury on the right labia.  There is no rash, lesion or injury on the left labia.  No bleeding and no signs of injury around the vaginal introitus, urethra is without lesions and well supported. The cervix is visualized with no discharge, lesions or friability.  No vaginal discharge found.  No significant Cystocele, Enterocele or rectocele, and uterus well supported.  Bimanual exam:  The urethra is normal to palpation and there are no palpable vaginal wall masses.  Uterus is not deviated, not enlarged, not fixed, normal shape and not tender.  Cervix exhibits no motion tenderness.   Right adnexum displays no mass and no tenderness.  Left adnexum displays no mass and no tenderness.   Extremities: Extremities normal, atraumatic, no cyanosis or edema                       PAP submitted    Plan BSE monthly        Refill OCPS        RTO 1 year/prn  I reviewed pt's past medical history, past and current meds, family history, allergies and reviewed problem list  I spent 20 min with pt with approx half in consultation

## 2020-09-29 ENCOUNTER — OFFICE VISIT (OUTPATIENT)
Dept: FAMILY MEDICINE | Facility: CLINIC | Age: 35
End: 2020-09-29
Payer: COMMERCIAL

## 2020-09-29 VITALS
SYSTOLIC BLOOD PRESSURE: 128 MMHG | HEART RATE: 94 BPM | TEMPERATURE: 98 F | DIASTOLIC BLOOD PRESSURE: 82 MMHG | WEIGHT: 198 LBS | BODY MASS INDEX: 29.33 KG/M2 | HEIGHT: 69 IN

## 2020-09-29 DIAGNOSIS — M62.838 NECK MUSCLE SPASM: ICD-10-CM

## 2020-09-29 DIAGNOSIS — R59.9 ADENOPATHY: Primary | ICD-10-CM

## 2020-09-29 PROCEDURE — 99999 PR PBB SHADOW E&M-EST. PATIENT-LVL V: ICD-10-PCS | Mod: PBBFAC,,, | Performed by: NURSE PRACTITIONER

## 2020-09-29 PROCEDURE — 99214 OFFICE O/P EST MOD 30 MIN: CPT | Mod: S$GLB,,, | Performed by: NURSE PRACTITIONER

## 2020-09-29 PROCEDURE — 99999 PR PBB SHADOW E&M-EST. PATIENT-LVL V: CPT | Mod: PBBFAC,,, | Performed by: NURSE PRACTITIONER

## 2020-09-29 PROCEDURE — 99214 PR OFFICE/OUTPT VISIT, EST, LEVL IV, 30-39 MIN: ICD-10-PCS | Mod: S$GLB,,, | Performed by: NURSE PRACTITIONER

## 2020-09-29 PROCEDURE — 3008F PR BODY MASS INDEX (BMI) DOCUMENTED: ICD-10-PCS | Mod: CPTII,S$GLB,, | Performed by: NURSE PRACTITIONER

## 2020-09-29 PROCEDURE — 3008F BODY MASS INDEX DOCD: CPT | Mod: CPTII,S$GLB,, | Performed by: NURSE PRACTITIONER

## 2020-09-29 RX ORDER — CYCLOBENZAPRINE HCL 5 MG
10 TABLET ORAL NIGHTLY
Qty: 20 TABLET | Refills: 0 | Status: SHIPPED | OUTPATIENT
Start: 2020-09-29 | End: 2020-10-09

## 2020-09-29 RX ORDER — AMOXICILLIN AND CLAVULANATE POTASSIUM 875; 125 MG/1; MG/1
1 TABLET, FILM COATED ORAL EVERY 12 HOURS
Qty: 20 TABLET | Refills: 0 | Status: SHIPPED | OUTPATIENT
Start: 2020-09-29 | End: 2020-10-09

## 2020-09-29 NOTE — PATIENT INSTRUCTIONS
Alternate ice and heat. Apply each for 10 minutes.   Use a towel to make a neck roll for you neck.

## 2020-09-29 NOTE — PROGRESS NOTES
This dictation has been generated using Modal Fluency Dictation some phonetic errors may occur. Please contact author for clarification if needed.     Problem List Items Addressed This Visit     None      Visit Diagnoses     Adenopathy    -  Primary    Neck muscle spasm              Orders Placed This Encounter    cyclobenzaprine (FLEXERIL) 5 MG tablet    amoxicillin-clavulanate 875-125mg (AUGMENTIN) 875-125 mg per tablet     OME LEFT. ADENOPATHY. ABX  NECK PAIN DUE TO SPASM FLEXERIL USE DISCUSSED SOMNOLENCE.   Patient Instructions   Alternate ice and heat. Apply each for 10 minutes.   Use a towel to make a neck roll for you neck.     Follow up in about 2 weeks (around 10/13/2020), or if symptoms worsen or fail to improve.    ________________________________________________________________  ________________________________________________________________      Chief Complaint   Patient presents with    Neck Pain     History of present illness  This 35 y.o. presents today for complaint of left-sided neck pain.  Patient notes symptoms have been present for weeks.  It has been intermittent.  This weekend she had some swelling of the glands below the ear and some ear fullness.  Also noted some jaw gland swelling which she describes all as bumps.  Neck pain started after buying a new mattress.  She tried using her pillows without changing them after buying a new mattress.  She began experiencing neck pain.  Currently sleeping without any pillows.  Has not tried a neck roll.    Review of systems  No fever chills  Denies other gland swelling  No cervical radiculopathy.  No weakness numbness upper extremities.    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 Neri, colon polyps per patient report    COLONOSCOPY N/A 8/21/2020    Procedure: COLONOSCOPY;  Surgeon: Yessi Pal MD;  Location: [facility] ENDO;  Service: Endoscopy;  Laterality: N/A;    dx lap      ESOPHAGOGASTRODUODENOSCOPY N/A 1/22/2020    Procedure: EGD (ESOPHAGOGASTRODUODENOSCOPY);  Surgeon: Yessi Pal MD;  Location: [facility] ENDO;  Service: Endoscopy;  Laterality: N/A;    hammer toe Right 11/19/2015    heavenly toe repeair with callus filing    LAPAROSCOPY  ~2012    done for infertility concern    TONSILLECTOMY         Family History   Problem Relation Age of Onset    Thyroid disease Mother         hyperthyroidism    Hypertension Mother     Hypertension Sister     Heart disease Maternal Grandmother     Ovarian cancer Neg Hx     Breast cancer Neg Hx     Eczema Neg Hx     Lupus Neg Hx     Psoriasis Neg Hx     Melanoma Neg Hx     Cancer Neg Hx     Colon cancer Neg Hx     Crohn's disease Neg Hx     Ulcerative colitis Neg Hx     Stomach cancer Neg Hx     Esophageal cancer Neg Hx        Social History     Socioeconomic History    Marital status: Single     Spouse name: Not on file    Number of children: Not on file    Years of education: Not on file    Highest education level: Not on file   Occupational History    Not on file   Social Needs    Financial resource strain: Not hard at all    Food insecurity     Worry: Never true     Inability: Never true    Transportation needs     Medical: No     Non-medical: No   Tobacco Use    Smoking status: Never Smoker    Smokeless tobacco: Never Used   Substance and Sexual Activity    Alcohol use: No     Frequency: Never     Drinks per session: 1 or 2     Binge frequency: Never    Drug use: No    Sexual activity: Yes     Partners: Male     Birth control/protection: OCP     Comment: OCP helps regulate cycle   Lifestyle    Physical activity     Days per week: 2 days     Minutes  per session: 60 min    Stress: To some extent   Relationships    Social connections     Talks on phone: Twice a week     Gets together: Never     Attends Orthodoxy service: Not on file     Active member of club or organization: Yes     Attends meetings of clubs or organizations: More than 4 times per year     Relationship status: Never    Other Topics Concern    Are you pregnant or think you may be? Not Asked    Breast-feeding Not Asked   Social History Narrative    Not on file       Current Outpatient Medications   Medication Sig Dispense Refill    ASCORBATE CALCIUM (VITAMIN C ORAL) Take by mouth.      cholecalciferol, vitamin D3, (VITAMIN D3) 2,000 unit Tab Take 2,000 Units by mouth once daily.      CITRANATAL HARMONY, IRON FUM, 27 mg iron-1 mg -50 mg-260 mg Cap TAKE 1 CAPSULE BY MOUTH ONCE DAILY. 100 capsule 0    dicyclomine (BENTYL) 10 MG capsule Take 1 capsule (10 mg total) by mouth every 6 (six) hours as needed. 30 capsule 6    fluocinonide (LIDEX) 0.05 % external solution APPLY THIN FILM TO SCALP EVERY NIGHT AT BEDTIME AS NEEDED FOR ITCHING OR SCALING 60 mL 0    hydrOXYzine HCL (ATARAX) 25 MG tablet Take 25 mg by mouth 3 (three) times daily.      ketoconazole (NIZORAL) 2 % shampoo LATHER SCALP, LET SIT 5 MIN, RINSE WELL. USE 2X WEEKLY 120 mL 2    levonorgestrel-ethinyl estradiol (MARLISSA, 28,) 0.15-0.03 mg per tablet Take 1 tablet by mouth once daily. 84 tablet 3    levothyroxine (SYNTHROID) 75 MCG tablet TAKE 1 TABLET (75 MCG TOTAL) BY MOUTH BEFORE BREAKFAST DAILY 90 tablet 3    lubiprostone (AMITIZA) 24 MCG Cap Take 1 capsule (24 mcg total) by mouth 2 (two) times daily with meals. 60 capsule 3    naproxen sodium (ALEVE) 220 mg Cap Take by mouth daily as needed (menses).      omeprazole (PRILOSEC) 40 MG capsule Take 1 capsule (40 mg total) by mouth before breakfast. 90 capsule 3    ondansetron (ZOFRAN-ODT) 4 MG TbDL Take 1 tablet (4 mg total) by mouth every 8 (eight) hours as needed  (nausea). 30 tablet 1    prazosin (MINIPRESS) 1 MG Cap Take 1 mg by mouth 2 (two) times daily.      sertraline (ZOLOFT) 50 MG tablet Take 50 mg by mouth every evening.      amoxicillin-clavulanate 875-125mg (AUGMENTIN) 875-125 mg per tablet Take 1 tablet by mouth every 12 (twelve) hours. for 10 days 20 tablet 0    cyclobenzaprine (FLEXERIL) 5 MG tablet Take 2 tablets (10 mg total) by mouth nightly. for 10 days 20 tablet 0    prednisoLONE acetate (PRED FORTE) 1 % DrpS PLACE  1 DROP INTO OD QID FOR 5 DAYS      QUEtiapine (SEROQUEL) 25 MG Tab Take 2 tablets (50 mg total) by mouth every evening. 60 tablet 11     No current facility-administered medications for this visit.        Review of patient's allergies indicates:  No Known Allergies    Physical examination  Vitals Reviewed\  Vitals:    09/29/20 0944   BP: 128/82   Pulse: 94   Temp: 98.1 °F (36.7 °C)   Weight: 89.8 kg (197 lb 15.6 oz)    Gen. Well-dressed well-nourished   Skin warm dry and intact.  No rashes noted.  HEENT.  TM intact bilateral with normal light reflex.  No mastoid tenderness during percussion.  Nares patent bilateral.  Pharynx is unremarkable.  No maxillary or frontal sinus tenderness when percussed.    Neck is supple with trace adenopathy sublingual/anterior cervical.   Chest.  Respirations are even unlabored.  Lungs are clear to auscultation.  Cardiac regular rate and rhythm.  No chest wall adenopathy noted.  Neuro. Awake alert oriented x4.  Normal judgment and cognition noted.  Extremities no clubbing cyanosis or edema noted.     Call or return to clinic prn if these symptoms worsen or fail to improve as anticipated.      Answers for HPI/ROS submitted by the patient on 9/29/2020   activity change: No  unexpected weight change: No  neck pain: Yes  hearing loss: No  rhinorrhea: No  trouble swallowing: No  eye discharge: No  visual disturbance: No  chest tightness: No  wheezing: No  chest pain: No  palpitations: No  blood in stool:  No  constipation: Yes  vomiting: No  diarrhea: Yes  polydipsia: No  polyuria: No  difficulty urinating: Yes  hematuria: No  menstrual problem: No  dysuria: No  joint swelling: No  arthralgias: No  headaches: No  weakness: No  confusion: No  dysphoric mood: Yes

## 2020-10-14 ENCOUNTER — PATIENT MESSAGE (OUTPATIENT)
Dept: GASTROENTEROLOGY | Facility: CLINIC | Age: 35
End: 2020-10-14

## 2020-10-14 LAB
FINAL PATHOLOGIC DIAGNOSIS: NORMAL
Lab: NORMAL

## 2020-10-14 NOTE — TELEPHONE ENCOUNTER
Call placed to Ms. Pelayo in regards to her request for a call back in regards to amitiza. She stated that she returned back to work, and the amitiza is making it to ware she is in the bathroom for hours after taking. She stated that she stopped taking the amitiza due to going back to work and not being able to constantly be in the bathroom, but when she did that her bloating came back so she restarted the medication. She stated there has to be a reason this is happening. I advised her that I would send a message to Dr. Whiteside so she is aware as to what is going on, but I do see where she wanted to see her back in 6 months. I advised her I would find out if Dr. whiteside wanted her to come into the office for an appt as well. She verbalized understanding. No further issues noted.

## 2020-10-19 ENCOUNTER — OFFICE VISIT (OUTPATIENT)
Dept: FAMILY MEDICINE | Facility: CLINIC | Age: 35
End: 2020-10-19
Payer: COMMERCIAL

## 2020-10-19 VITALS
BODY MASS INDEX: 29.46 KG/M2 | TEMPERATURE: 98 F | HEIGHT: 69 IN | SYSTOLIC BLOOD PRESSURE: 120 MMHG | WEIGHT: 198.88 LBS | HEART RATE: 100 BPM | DIASTOLIC BLOOD PRESSURE: 82 MMHG

## 2020-10-19 DIAGNOSIS — H92.09 EAR ACHE: Primary | ICD-10-CM

## 2020-10-19 PROCEDURE — 3008F BODY MASS INDEX DOCD: CPT | Mod: CPTII,S$GLB,, | Performed by: NURSE PRACTITIONER

## 2020-10-19 PROCEDURE — 99213 OFFICE O/P EST LOW 20 MIN: CPT | Mod: S$GLB,,, | Performed by: NURSE PRACTITIONER

## 2020-10-19 PROCEDURE — 3008F PR BODY MASS INDEX (BMI) DOCUMENTED: ICD-10-PCS | Mod: CPTII,S$GLB,, | Performed by: NURSE PRACTITIONER

## 2020-10-19 PROCEDURE — 99999 PR PBB SHADOW E&M-EST. PATIENT-LVL III: CPT | Mod: PBBFAC,,, | Performed by: NURSE PRACTITIONER

## 2020-10-19 PROCEDURE — 99999 PR PBB SHADOW E&M-EST. PATIENT-LVL III: ICD-10-PCS | Mod: PBBFAC,,, | Performed by: NURSE PRACTITIONER

## 2020-10-19 PROCEDURE — 99213 PR OFFICE/OUTPT VISIT, EST, LEVL III, 20-29 MIN: ICD-10-PCS | Mod: S$GLB,,, | Performed by: NURSE PRACTITIONER

## 2020-10-19 NOTE — PROGRESS NOTES
This dictation has been generated using Modal Fluency Dictation some phonetic errors may occur. Please contact author for clarification if needed.     Problem List Items Addressed This Visit     None      Visit Diagnoses     Ear ache    -  Primary               Abnormal light reflex suspect some eustachian tube dysfunction treat with Sudafed over-the-counter    Follow up if symptoms worsen or fail to improve.    ________________________________________________________________  ________________________________________________________________      Chief Complaint   Patient presents with    Otalgia     History of present illness  This 35 y.o. presents today for complaint of left ear feels stuffy.  Previously seen for sore throat.  Also notes some gland swelling at that time.  Those symptoms have resolved.  Notes some stuffy feeling in the left ear.  No fever chills.  No overt pain.  No drainage.  Discussed anxiety and stress issues.  She is back to work.  She is doing counseling.  Her daughter is also in counseling.  Limited review of systems negative  Past medical social surgical reviewed  Past Medical History:   Diagnosis Date    Encounter for blood transfusion     Endometriosis     Neuromuscular disorder     nerve damage of right foot after surgery    PCOS (polycystic ovarian syndrome)     Thyroid disease        Past Surgical History:   Procedure Laterality Date    bladder suspension      COLONOSCOPY  ~24 years old    Dr. Neri, colon polyps per patient report    COLONOSCOPY N/A 8/21/2020    Procedure: COLONOSCOPY;  Surgeon: Yessi Pal MD;  Location: [facility];  Service: Endoscopy;  Laterality: N/A;    dx lap      ESOPHAGOGASTRODUODENOSCOPY N/A 1/22/2020    Procedure: EGD (ESOPHAGOGASTRODUODENOSCOPY);  Surgeon: Yessi Pal MD;  Location: [facility];  Service: Endoscopy;  Laterality: N/A;    hammer toe Right 11/19/2015    heavenly toe repeair with callus filing    LAPAROSCOPY  ~2012    done  for infertility concern    TONSILLECTOMY         Family History   Problem Relation Age of Onset    Thyroid disease Mother         hyperthyroidism    Hypertension Mother     Hypertension Sister     Heart disease Maternal Grandmother     Ovarian cancer Neg Hx     Breast cancer Neg Hx     Eczema Neg Hx     Lupus Neg Hx     Psoriasis Neg Hx     Melanoma Neg Hx     Cancer Neg Hx     Colon cancer Neg Hx     Crohn's disease Neg Hx     Ulcerative colitis Neg Hx     Stomach cancer Neg Hx     Esophageal cancer Neg Hx        Social History     Socioeconomic History    Marital status: Single     Spouse name: Not on file    Number of children: Not on file    Years of education: Not on file    Highest education level: Not on file   Occupational History    Not on file   Social Needs    Financial resource strain: Not hard at all    Food insecurity     Worry: Never true     Inability: Never true    Transportation needs     Medical: No     Non-medical: No   Tobacco Use    Smoking status: Never Smoker    Smokeless tobacco: Never Used   Substance and Sexual Activity    Alcohol use: No     Frequency: Never     Drinks per session: 1 or 2     Binge frequency: Never    Drug use: No    Sexual activity: Yes     Partners: Male     Birth control/protection: OCP     Comment: OCP helps regulate cycle   Lifestyle    Physical activity     Days per week: 2 days     Minutes per session: 60 min    Stress: To some extent   Relationships    Social connections     Talks on phone: Twice a week     Gets together: Never     Attends Orthodox service: Not on file     Active member of club or organization: Yes     Attends meetings of clubs or organizations: More than 4 times per year     Relationship status: Never    Other Topics Concern    Are you pregnant or think you may be? Not Asked    Breast-feeding Not Asked   Social History Narrative    Not on file       Current Outpatient Medications   Medication Sig  Dispense Refill    CITRANATAL CINDA, IRON FUM, 27 mg iron-1 mg -50 mg-260 mg Cap TAKE 1 CAPSULE BY MOUTH ONCE DAILY. 100 capsule 0    dicyclomine (BENTYL) 10 MG capsule Take 1 capsule (10 mg total) by mouth every 6 (six) hours as needed. 30 capsule 6    fluocinonide (LIDEX) 0.05 % external solution APPLY THIN FILM TO SCALP EVERY NIGHT AT BEDTIME AS NEEDED FOR ITCHING OR SCALING 60 mL 0    hydrOXYzine HCL (ATARAX) 25 MG tablet Take 25 mg by mouth 3 (three) times daily.      ketoconazole (NIZORAL) 2 % shampoo LATHER SCALP, LET SIT 5 MIN, RINSE WELL. USE 2X WEEKLY 120 mL 2    levonorgestrel-ethinyl estradiol (MARLISSA, 28,) 0.15-0.03 mg per tablet Take 1 tablet by mouth once daily. 84 tablet 3    levothyroxine (SYNTHROID) 75 MCG tablet TAKE 1 TABLET (75 MCG TOTAL) BY MOUTH BEFORE BREAKFAST DAILY 90 tablet 3    lubiprostone (AMITIZA) 24 MCG Cap Take 1 capsule (24 mcg total) by mouth 2 (two) times daily with meals. 60 capsule 3    naproxen sodium (ALEVE) 220 mg Cap Take by mouth daily as needed (menses).      omeprazole (PRILOSEC) 40 MG capsule Take 1 capsule (40 mg total) by mouth before breakfast. 90 capsule 3    ondansetron (ZOFRAN-ODT) 4 MG TbDL Take 1 tablet (4 mg total) by mouth every 8 (eight) hours as needed (nausea). 30 tablet 1    prazosin (MINIPRESS) 1 MG Cap Take 1 mg by mouth 2 (two) times daily.      QUEtiapine (SEROQUEL) 25 MG Tab Take 2 tablets (50 mg total) by mouth every evening. 60 tablet 11    sertraline (ZOLOFT) 50 MG tablet Take 50 mg by mouth every evening.      ASCORBATE CALCIUM (VITAMIN C ORAL) Take by mouth.      cholecalciferol, vitamin D3, (VITAMIN D3) 2,000 unit Tab Take 2,000 Units by mouth once daily.      prednisoLONE acetate (PRED FORTE) 1 % DrpS PLACE  1 DROP INTO OD QID FOR 5 DAYS       No current facility-administered medications for this visit.        Review of patient's allergies indicates:  No Known Allergies    Physical examination  Vitals Reviewed\       Gen.  Well-dressed well-nourished   Skin warm dry and intact.  No rashes noted.  HEENT.  TM intact bilateral with abnormal light reflex on the left. No redness or drainage.  No mastoid tenderness during percussion.  Nares patent bilateral.  Pharynx is unremarkable.  No maxillary or frontal sinus tenderness when percussed.    Neck is supple without adenopathy  Chest.  Respirations are even unlabored.  Lungs are clear to auscultation.  Cardiac regular rate and rhythm.  No chest wall adenopathy noted.  Neuro. Awake alert oriented x4.  Normal judgment and cognition noted.  Extremities no clubbing cyanosis or edema noted.     Call or return to clinic prn if these symptoms worsen or fail to improve as anticipated.

## 2020-10-23 ENCOUNTER — PATIENT MESSAGE (OUTPATIENT)
Dept: GASTROENTEROLOGY | Facility: CLINIC | Age: 35
End: 2020-10-23

## 2020-10-24 ENCOUNTER — PATIENT MESSAGE (OUTPATIENT)
Dept: GASTROENTEROLOGY | Facility: CLINIC | Age: 35
End: 2020-10-24

## 2020-10-29 ENCOUNTER — TELEPHONE (OUTPATIENT)
Dept: OTOLARYNGOLOGY | Facility: CLINIC | Age: 35
End: 2020-10-29

## 2020-10-30 ENCOUNTER — OFFICE VISIT (OUTPATIENT)
Dept: OTOLARYNGOLOGY | Facility: CLINIC | Age: 35
End: 2020-10-30
Payer: COMMERCIAL

## 2020-10-30 VITALS
HEART RATE: 91 BPM | HEIGHT: 69 IN | WEIGHT: 196.44 LBS | DIASTOLIC BLOOD PRESSURE: 83 MMHG | BODY MASS INDEX: 29.09 KG/M2 | SYSTOLIC BLOOD PRESSURE: 117 MMHG

## 2020-10-30 DIAGNOSIS — H69.92 EUSTACHIAN TUBE DYSFUNCTION, LEFT: Primary | ICD-10-CM

## 2020-10-30 DIAGNOSIS — J30.2 SEASONAL ALLERGIC RHINITIS, UNSPECIFIED TRIGGER: ICD-10-CM

## 2020-10-30 PROCEDURE — 3008F PR BODY MASS INDEX (BMI) DOCUMENTED: ICD-10-PCS | Mod: CPTII,S$GLB,, | Performed by: OTOLARYNGOLOGY

## 2020-10-30 PROCEDURE — 99203 OFFICE O/P NEW LOW 30 MIN: CPT | Mod: S$GLB,,, | Performed by: OTOLARYNGOLOGY

## 2020-10-30 PROCEDURE — 99203 PR OFFICE/OUTPT VISIT, NEW, LEVL III, 30-44 MIN: ICD-10-PCS | Mod: S$GLB,,, | Performed by: OTOLARYNGOLOGY

## 2020-10-30 PROCEDURE — 3008F BODY MASS INDEX DOCD: CPT | Mod: CPTII,S$GLB,, | Performed by: OTOLARYNGOLOGY

## 2020-10-30 RX ORDER — FLUTICASONE PROPIONATE 50 MCG
2 SPRAY, SUSPENSION (ML) NASAL DAILY
Qty: 16 G | Refills: 2 | Status: SHIPPED | OUTPATIENT
Start: 2020-10-30 | End: 2021-01-04

## 2020-10-30 NOTE — PROGRESS NOTES
Subjective:       Patient ID: Emily Pelayo is a 35 y.o. female.    Chief Complaint: Establish Care (pressure in lt ear)    HPI     Poppy Pelayo is a 34 yo woman presenting with left ear fullness/pressure. Feels like her ear is under water. However, denies difficulty hearing. Does have tinnitus, worse on right side. Has allergies, worst in summer. Not on any nasal sprays. Saw her PCP two weeks ago for this, recommended sudafed. No history of ear surgery. Some ear infections in childhood. Had tonsillectomy and septoplasty 12 years ago. Also had recent neck pains, they improved with muscle relaxants.    Review of Systems   Constitutional: Positive for chills. Negative for activity change, appetite change, fever and unexpected weight change.   HENT: Positive for facial swelling, mouth sores and postnasal drip. Negative for nasal congestion, ear discharge, ear pain, hearing loss, nosebleeds, rhinorrhea, sore throat, tinnitus, trouble swallowing and voice change.    Eyes: Negative for photophobia, pain and itching.   Respiratory: Positive for shortness of breath. Negative for cough, wheezing and stridor.    Cardiovascular: Negative.  Negative for chest pain and palpitations.   Gastrointestinal: Positive for abdominal pain, constipation and diarrhea. Negative for nausea and vomiting.   Endocrine: Positive for cold intolerance. Negative for heat intolerance.   Genitourinary: Positive for difficulty urinating. Negative for dysuria.   Musculoskeletal: Positive for neck pain. Negative for arthralgias, myalgias and neck stiffness.   Integumentary:  Negative for pallor and rash. Negative.   Allergic/Immunologic: Negative.  Negative for food allergies.   Neurological: Negative.  Negative for dizziness, vertigo, seizures, syncope, facial asymmetry and headaches.   Hematological: Bruises/bleeds easily.   Psychiatric/Behavioral: Positive for sleep disturbance. Negative for agitation, confusion and hallucinations.         Objective:       Physical Exam  Constitutional:       Appearance: Normal appearance. She is well-developed.   HENT:      Head: Normocephalic and atraumatic.      Right Ear: Hearing, tympanic membrane, ear canal and external ear normal.      Left Ear: Hearing, ear canal and external ear normal.      Ears:      Comments: Left TM mildly retracted; unable to autoinsufflate     Nose: Nose normal.      Mouth/Throat:      Pharynx: Uvula midline.   Eyes:      General: Lids are normal. Vision grossly intact.      Conjunctiva/sclera: Conjunctivae normal.      Pupils: Pupils are equal, round, and reactive to light.   Neck:      Musculoskeletal: Normal range of motion and neck supple.      Thyroid: No thyroid mass or thyromegaly.   Pulmonary:      Effort: Pulmonary effort is normal. No tachypnea.      Breath sounds: No stridor.   Skin:     General: Skin is warm and dry.   Neurological:      Mental Status: She is alert and oriented to person, place, and time.         Assessment:       1. Eustachian tube dysfunction, left    2. Seasonal allergic rhinitis, unspecified trigger        Plan:       34 yo woman with eustachian tube dysfunction on left. NO visible serous effusion    -autoinsufflation encouraged  -flonase  -return if symptoms do not improve

## 2020-11-03 ENCOUNTER — TELEPHONE (OUTPATIENT)
Dept: GASTROENTEROLOGY | Facility: CLINIC | Age: 35
End: 2020-11-03

## 2020-11-03 NOTE — TELEPHONE ENCOUNTER
Call placed to Ms. Pelayo, advised her that Dr. Pal sent her Rx to the pharmacy. She verbalized udnerstnading. No furher issues noted.

## 2020-11-03 NOTE — TELEPHONE ENCOUNTER
----- Message from Vy Hendricks sent at 11/3/2020  3:17 PM CST -----  Type:  Needs Medical Advice    Who Called: pt  Advice Regarding: follow up from msg last week, cannot access portal, requesting Linzess  Would the patient rather a call back or a response via [facility]? call  Best Call Back Number:   Additional Information: n/a

## 2020-11-05 ENCOUNTER — TELEPHONE (OUTPATIENT)
Dept: SURGERY | Facility: CLINIC | Age: 35
End: 2020-11-05

## 2020-11-05 ENCOUNTER — OFFICE VISIT (OUTPATIENT)
Dept: GASTROENTEROLOGY | Facility: CLINIC | Age: 35
End: 2020-11-05
Payer: COMMERCIAL

## 2020-11-05 VITALS
WEIGHT: 197.31 LBS | SYSTOLIC BLOOD PRESSURE: 119 MMHG | HEART RATE: 81 BPM | BODY MASS INDEX: 29.22 KG/M2 | DIASTOLIC BLOOD PRESSURE: 88 MMHG | HEIGHT: 69 IN

## 2020-11-05 DIAGNOSIS — K21.9 GASTROESOPHAGEAL REFLUX DISEASE WITHOUT ESOPHAGITIS: Primary | ICD-10-CM

## 2020-11-05 PROCEDURE — 99999 PR PBB SHADOW E&M-EST. PATIENT-LVL IV: ICD-10-PCS | Mod: PBBFAC,,, | Performed by: INTERNAL MEDICINE

## 2020-11-05 PROCEDURE — 99214 OFFICE O/P EST MOD 30 MIN: CPT | Mod: S$GLB,,, | Performed by: INTERNAL MEDICINE

## 2020-11-05 PROCEDURE — 99999 PR PBB SHADOW E&M-EST. PATIENT-LVL IV: CPT | Mod: PBBFAC,,, | Performed by: INTERNAL MEDICINE

## 2020-11-05 PROCEDURE — 3008F BODY MASS INDEX DOCD: CPT | Mod: CPTII,S$GLB,, | Performed by: INTERNAL MEDICINE

## 2020-11-05 PROCEDURE — 3008F PR BODY MASS INDEX (BMI) DOCUMENTED: ICD-10-PCS | Mod: CPTII,S$GLB,, | Performed by: INTERNAL MEDICINE

## 2020-11-05 PROCEDURE — 99214 PR OFFICE/OUTPT VISIT, EST, LEVL IV, 30-39 MIN: ICD-10-PCS | Mod: S$GLB,,, | Performed by: INTERNAL MEDICINE

## 2020-11-05 NOTE — TELEPHONE ENCOUNTER
Reached out to patient to schedule appointment from referral. Patient is busy at the moment and stated she will call back tomorrow or Monday to schedule.

## 2020-11-05 NOTE — PROGRESS NOTES
"[facility] Note    CC: Abdominal pain, constipation    HPI 35 y.o. female presents to follow up chronic, intermittent abdominal pain often associated with constipation. She has tried numerous medications including Amitiza which increased her abdominal cramping thus she stopped taking. She was prescribed Linzess but discontinued due to diarrhea, however, after a several month break she started retaking and now feels well with regular bowel movements and much improvement in abdominal pain. She has frequent GERD that is controlled at this time with daily PPI, however, if she misses a dose she has significant symptoms. Her last EGD was in January 2020 and was notable for mild gastritis (biopsies negative for celiac and H.pylori). Colonoscopy in August 2020 was normal.       Past Medical History:   Diagnosis Date    Encounter for blood transfusion     Endometriosis     Neuromuscular disorder     nerve damage of right foot after surgery    PCOS (polycystic ovarian syndrome)     Thyroid disease        Allergies and Medications reviewed     Review of Systems  General ROS: negative for - chills, fever or weight loss  Cardiovascular ROS: no chest pain or dyspnea on exertion  Gastrointestinal ROS: improved constipation, improved abdominal pain, + GERD    Physical Examination  /88   Pulse 81   Ht 5' 9" (1.753 m)   Wt 89.5 kg (197 lb 5 oz)   LMP 10/19/2020   BMI 29.14 kg/m²   General appearance: alert, cooperative, no distress  HENT: Normocephalic, atraumatic, neck symmetrical, no nasal discharge, sclera anicteric   Lungs: clear to auscultation bilaterally, symmetric chest wall expansion bilaterally  Heart: regular rate and rhythm without rub; no displacement of the PMI   Abdomen: soft, nondistended, nontender, BS active  Extremities: extremities symmetric; no clubbing, cyanosis, or edema    Labs:  Lab Results   Component Value Date    WBC 6.45 09/09/2019    HGB 12.7 09/09/2019    HCT 42.1 " 09/09/2019    MCV 97 09/09/2019     09/09/2019           Assessment:   35 y.o. female with GERD and IBS- C who is much improved on Linzess 145 mcg daily. She has daily GERD that is controlled with daily PPI.   Plan:  1.IBS-C  -Continue Linzess 145 mcg daily    2.GERD  -Continue daily PPI  -Patient is interested in LINX procedure, will refer to Dr. Sutherland    -RTC 6 months    Yessi Pal MD  [facility]  [address], [address]  [place] [address]  Office: [phone]  Fax: [phone]

## 2020-11-12 ENCOUNTER — PATIENT MESSAGE (OUTPATIENT)
Dept: GASTROENTEROLOGY | Facility: CLINIC | Age: 35
End: 2020-11-12

## 2020-12-14 ENCOUNTER — OFFICE VISIT (OUTPATIENT)
Dept: OTOLARYNGOLOGY | Facility: CLINIC | Age: 35
End: 2020-12-14
Payer: COMMERCIAL

## 2020-12-14 VITALS
SYSTOLIC BLOOD PRESSURE: 122 MMHG | BODY MASS INDEX: 29.68 KG/M2 | HEIGHT: 69 IN | WEIGHT: 200.38 LBS | HEART RATE: 81 BPM | DIASTOLIC BLOOD PRESSURE: 82 MMHG | OXYGEN SATURATION: 98 %

## 2020-12-14 DIAGNOSIS — H93.8X2 EAR FULLNESS, LEFT: Primary | ICD-10-CM

## 2020-12-14 DIAGNOSIS — H69.92 EUSTACHIAN TUBE DYSFUNCTION, LEFT: ICD-10-CM

## 2020-12-14 PROCEDURE — 1126F PR PAIN SEVERITY QUANTIFIED, NO PAIN PRESENT: ICD-10-PCS | Mod: S$GLB,,, | Performed by: OTOLARYNGOLOGY

## 2020-12-14 PROCEDURE — 1126F AMNT PAIN NOTED NONE PRSNT: CPT | Mod: S$GLB,,, | Performed by: OTOLARYNGOLOGY

## 2020-12-14 PROCEDURE — 3008F PR BODY MASS INDEX (BMI) DOCUMENTED: ICD-10-PCS | Mod: CPTII,S$GLB,, | Performed by: OTOLARYNGOLOGY

## 2020-12-14 PROCEDURE — 99213 PR OFFICE/OUTPT VISIT, EST, LEVL III, 20-29 MIN: ICD-10-PCS | Mod: S$GLB,,, | Performed by: OTOLARYNGOLOGY

## 2020-12-14 PROCEDURE — 99213 OFFICE O/P EST LOW 20 MIN: CPT | Mod: S$GLB,,, | Performed by: OTOLARYNGOLOGY

## 2020-12-14 PROCEDURE — 3008F BODY MASS INDEX DOCD: CPT | Mod: CPTII,S$GLB,, | Performed by: OTOLARYNGOLOGY

## 2020-12-14 NOTE — PROGRESS NOTES
Subjective:       Patient ID: Emily Pelayo is a 35 y.o. female.    Chief Complaint: Follow-up (ear)    Follow-up  Associated symptoms include abdominal pain, chills and neck pain. Pertinent negatives include no arthralgias, chest pain, congestion, coughing, fever, headaches, myalgias, nausea, rash, sore throat, vertigo or vomiting.        Poppy Pelayo is a 36 yo woman presenting with left ear fullness/pressure. Feels like her ear is under water. However, denies difficulty hearing. Does have tinnitus, worse on right side. Has allergies, worst in summer. Not on any nasal sprays. Saw her PCP two weeks ago for this, recommended sudafed. No history of ear surgery. Some ear infections in childhood. Had tonsillectomy and septoplasty 12 years ago. Also had recent neck pains, they improved with muscle relaxants.    12/14: Patient returns today for follow up. Reports ear fullness has not changed. Has been seeing chiropractor for neck/back which has helped, but not with ear pain.    Review of Systems   Constitutional: Positive for chills. Negative for activity change, appetite change, fever and unexpected weight change.   HENT: Positive for facial swelling, mouth sores and postnasal drip. Negative for nasal congestion, ear discharge, ear pain, hearing loss, nosebleeds, rhinorrhea, sore throat, tinnitus, trouble swallowing and voice change.    Eyes: Negative for photophobia, pain and itching.   Respiratory: Positive for shortness of breath. Negative for cough, wheezing and stridor.    Cardiovascular: Negative.  Negative for chest pain and palpitations.   Gastrointestinal: Positive for abdominal pain, constipation and diarrhea. Negative for nausea and vomiting.   Endocrine: Positive for cold intolerance. Negative for heat intolerance.   Genitourinary: Positive for difficulty urinating. Negative for dysuria.   Musculoskeletal: Positive for neck pain. Negative for arthralgias, myalgias and neck stiffness.   Integumentary:   Negative for pallor and rash. Negative.   Allergic/Immunologic: Negative.  Negative for food allergies.   Neurological: Negative.  Negative for dizziness, vertigo, seizures, syncope, facial asymmetry and headaches.   Hematological: Bruises/bleeds easily.   Psychiatric/Behavioral: Positive for sleep disturbance. Negative for agitation, confusion and hallucinations.         Objective:      Physical Exam  Constitutional:       Appearance: Normal appearance. She is well-developed.   HENT:      Head: Normocephalic and atraumatic.      Right Ear: Hearing, tympanic membrane, ear canal and external ear normal.      Left Ear: Hearing, ear canal and external ear normal.      Ears:      Comments: Left TM mildly retracted; unable to autoinsufflate     Nose: Nose normal.      Mouth/Throat:      Pharynx: Uvula midline.   Eyes:      General: Lids are normal. Vision grossly intact.      Conjunctiva/sclera: Conjunctivae normal.      Pupils: Pupils are equal, round, and reactive to light.   Neck:      Musculoskeletal: Normal range of motion and neck supple.      Thyroid: No thyroid mass or thyromegaly.   Pulmonary:      Effort: Pulmonary effort is normal. No tachypnea.      Breath sounds: No stridor.   Skin:     General: Skin is warm and dry.   Neurological:      Mental Status: She is alert and oriented to person, place, and time.         Assessment:       1. Ear fullness, left    2. Eustachian tube dysfunction, left        Plan:       34 yo woman with eustachian tube dysfunction on left. No visible serous effusion    -audiogram  -will call with results

## 2020-12-21 ENCOUNTER — PATIENT MESSAGE (OUTPATIENT)
Dept: OBSTETRICS AND GYNECOLOGY | Facility: CLINIC | Age: 35
End: 2020-12-21

## 2020-12-21 ENCOUNTER — TELEPHONE (OUTPATIENT)
Dept: OBSTETRICS AND GYNECOLOGY | Facility: CLINIC | Age: 35
End: 2020-12-21

## 2020-12-21 NOTE — TELEPHONE ENCOUNTER
----- Message from Sharonda Casiano sent at 12/21/2020  8:59 AM CST -----  Regarding: Prescription  Reason: Patient would like to speak to the nurse.....Please call        Contact: [phone]

## 2021-01-07 ENCOUNTER — CLINICAL SUPPORT (OUTPATIENT)
Dept: URGENT CARE | Facility: CLINIC | Age: 36
End: 2021-01-07
Payer: COMMERCIAL

## 2021-01-07 VITALS — TEMPERATURE: 98 F | HEART RATE: 94 BPM | OXYGEN SATURATION: 99 %

## 2021-01-07 DIAGNOSIS — R43.2 TASTE ABSENT: Primary | ICD-10-CM

## 2021-01-07 DIAGNOSIS — U07.1 COVID-19 VIRUS DETECTED: ICD-10-CM

## 2021-01-07 LAB
CTP QC/QA: YES
SARS-COV-2 RDRP RESP QL NAA+PROBE: POSITIVE

## 2021-01-07 PROCEDURE — U0002: ICD-10-PCS | Mod: QW,S$GLB,, | Performed by: PHYSICIAN ASSISTANT

## 2021-01-07 PROCEDURE — U0002 COVID-19 LAB TEST NON-CDC: HCPCS | Mod: QW,S$GLB,, | Performed by: PHYSICIAN ASSISTANT

## 2021-01-25 ENCOUNTER — OFFICE VISIT (OUTPATIENT)
Dept: SURGERY | Facility: CLINIC | Age: 36
End: 2021-01-25
Payer: COMMERCIAL

## 2021-01-25 VITALS
SYSTOLIC BLOOD PRESSURE: 130 MMHG | BODY MASS INDEX: 29.11 KG/M2 | HEART RATE: 89 BPM | DIASTOLIC BLOOD PRESSURE: 87 MMHG | WEIGHT: 197.06 LBS

## 2021-01-25 DIAGNOSIS — R11.2 NAUSEA AND VOMITING, INTRACTABILITY OF VOMITING NOT SPECIFIED, UNSPECIFIED VOMITING TYPE: ICD-10-CM

## 2021-01-25 DIAGNOSIS — K21.9 GASTROESOPHAGEAL REFLUX DISEASE WITHOUT ESOPHAGITIS: ICD-10-CM

## 2021-01-25 PROCEDURE — 99999 PR PBB SHADOW E&M-EST. PATIENT-LVL IV: ICD-10-PCS | Mod: PBBFAC,,, | Performed by: PHYSICIAN ASSISTANT

## 2021-01-25 PROCEDURE — 3008F BODY MASS INDEX DOCD: CPT | Mod: CPTII,S$GLB,, | Performed by: PHYSICIAN ASSISTANT

## 2021-01-25 PROCEDURE — 99204 OFFICE O/P NEW MOD 45 MIN: CPT | Mod: S$GLB,,, | Performed by: PHYSICIAN ASSISTANT

## 2021-01-25 PROCEDURE — 1126F PR PAIN SEVERITY QUANTIFIED, NO PAIN PRESENT: ICD-10-PCS | Mod: S$GLB,,, | Performed by: PHYSICIAN ASSISTANT

## 2021-01-25 PROCEDURE — 99204 PR OFFICE/OUTPT VISIT, NEW, LEVL IV, 45-59 MIN: ICD-10-PCS | Mod: S$GLB,,, | Performed by: PHYSICIAN ASSISTANT

## 2021-01-25 PROCEDURE — 99999 PR PBB SHADOW E&M-EST. PATIENT-LVL IV: CPT | Mod: PBBFAC,,, | Performed by: PHYSICIAN ASSISTANT

## 2021-01-25 PROCEDURE — 1126F AMNT PAIN NOTED NONE PRSNT: CPT | Mod: S$GLB,,, | Performed by: PHYSICIAN ASSISTANT

## 2021-01-25 PROCEDURE — 3008F PR BODY MASS INDEX (BMI) DOCUMENTED: ICD-10-PCS | Mod: CPTII,S$GLB,, | Performed by: PHYSICIAN ASSISTANT

## 2021-03-08 ENCOUNTER — HOSPITAL ENCOUNTER (OUTPATIENT)
Dept: RADIOLOGY | Facility: HOSPITAL | Age: 36
Discharge: HOME | End: 2021-03-08
Attending: PHYSICIAN ASSISTANT
Payer: COMMERCIAL

## 2021-03-08 DIAGNOSIS — R11.2 NAUSEA AND VOMITING, INTRACTABILITY OF VOMITING NOT SPECIFIED, UNSPECIFIED VOMITING TYPE: ICD-10-CM

## 2021-03-08 PROCEDURE — 78227 HEPATOBIL SYST IMAGE W/DRUG: CPT | Mod: 26,,, | Performed by: RADIOLOGY

## 2021-03-08 PROCEDURE — 78227 NM HEPATOBILIARY(HIDA) WITH PHARM AND EF: ICD-10-PCS | Mod: 26,,, | Performed by: RADIOLOGY

## 2021-03-08 PROCEDURE — 78227 HEPATOBIL SYST IMAGE W/DRUG: CPT | Mod: TC

## 2021-05-06 ENCOUNTER — PATIENT MESSAGE (OUTPATIENT)
Dept: RESEARCH | Facility: HOSPITAL | Age: 36
End: 2021-05-06

## 2021-05-27 ENCOUNTER — OFFICE VISIT (OUTPATIENT)
Dept: URGENT CARE | Facility: CLINIC | Age: 36
End: 2021-05-27
Payer: COMMERCIAL

## 2021-05-27 VITALS
OXYGEN SATURATION: 97 % | WEIGHT: 195 LBS | BODY MASS INDEX: 28.88 KG/M2 | HEART RATE: 91 BPM | TEMPERATURE: 98 F | RESPIRATION RATE: 16 BRPM | DIASTOLIC BLOOD PRESSURE: 85 MMHG | SYSTOLIC BLOOD PRESSURE: 117 MMHG | HEIGHT: 69 IN

## 2021-05-27 DIAGNOSIS — R93.89 ABNORMAL FINDING ON CHEST XRAY: ICD-10-CM

## 2021-05-27 DIAGNOSIS — R07.9 LEFT-SIDED CHEST PAIN: Primary | ICD-10-CM

## 2021-05-27 PROCEDURE — 3008F BODY MASS INDEX DOCD: CPT | Mod: CPTII,S$GLB,, | Performed by: PHYSICIAN ASSISTANT

## 2021-05-27 PROCEDURE — 96372 PR INJECTION,THERAP/PROPH/DIAG2ST, IM OR SUBCUT: ICD-10-PCS | Mod: S$GLB,,, | Performed by: FAMILY MEDICINE

## 2021-05-27 PROCEDURE — 93005 EKG 12-LEAD: ICD-10-PCS | Mod: S$GLB,,, | Performed by: PHYSICIAN ASSISTANT

## 2021-05-27 PROCEDURE — 71046 XR CHEST PA AND LATERAL: ICD-10-PCS | Mod: S$GLB,,, | Performed by: RADIOLOGY

## 2021-05-27 PROCEDURE — 99214 OFFICE O/P EST MOD 30 MIN: CPT | Mod: 25,S$GLB,, | Performed by: PHYSICIAN ASSISTANT

## 2021-05-27 PROCEDURE — 93010 EKG 12-LEAD: ICD-10-PCS | Mod: S$GLB,,, | Performed by: INTERNAL MEDICINE

## 2021-05-27 PROCEDURE — 93005 ELECTROCARDIOGRAM TRACING: CPT | Mod: S$GLB,,, | Performed by: PHYSICIAN ASSISTANT

## 2021-05-27 PROCEDURE — 3008F PR BODY MASS INDEX (BMI) DOCUMENTED: ICD-10-PCS | Mod: CPTII,S$GLB,, | Performed by: PHYSICIAN ASSISTANT

## 2021-05-27 PROCEDURE — 71046 X-RAY EXAM CHEST 2 VIEWS: CPT | Mod: S$GLB,,, | Performed by: RADIOLOGY

## 2021-05-27 PROCEDURE — 99214 PR OFFICE/OUTPT VISIT, EST, LEVL IV, 30-39 MIN: ICD-10-PCS | Mod: 25,S$GLB,, | Performed by: PHYSICIAN ASSISTANT

## 2021-05-27 PROCEDURE — 96372 THER/PROPH/DIAG INJ SC/IM: CPT | Mod: S$GLB,,, | Performed by: FAMILY MEDICINE

## 2021-05-27 PROCEDURE — 93010 ELECTROCARDIOGRAM REPORT: CPT | Mod: S$GLB,,, | Performed by: INTERNAL MEDICINE

## 2021-05-27 RX ORDER — KETOROLAC TROMETHAMINE 30 MG/ML
30 INJECTION, SOLUTION INTRAMUSCULAR; INTRAVENOUS
Status: COMPLETED | OUTPATIENT
Start: 2021-05-27 | End: 2021-05-27

## 2021-05-27 RX ADMIN — KETOROLAC TROMETHAMINE 30 MG: 30 INJECTION, SOLUTION INTRAMUSCULAR; INTRAVENOUS at 01:05

## 2021-05-28 ENCOUNTER — NURSE TRIAGE (OUTPATIENT)
Dept: ADMINISTRATIVE | Facility: CLINIC | Age: 36
End: 2021-05-28

## 2021-05-31 ENCOUNTER — OFFICE VISIT (OUTPATIENT)
Dept: FAMILY MEDICINE | Facility: CLINIC | Age: 36
End: 2021-05-31
Payer: COMMERCIAL

## 2021-05-31 VITALS
SYSTOLIC BLOOD PRESSURE: 114 MMHG | WEIGHT: 202.81 LBS | HEART RATE: 84 BPM | OXYGEN SATURATION: 96 % | HEIGHT: 69 IN | DIASTOLIC BLOOD PRESSURE: 78 MMHG | BODY MASS INDEX: 30.04 KG/M2 | TEMPERATURE: 98 F

## 2021-05-31 DIAGNOSIS — R07.81 PLEURITIC CHEST PAIN: Primary | ICD-10-CM

## 2021-05-31 PROCEDURE — 99999 PR PBB SHADOW E&M-EST. PATIENT-LVL IV: CPT | Mod: PBBFAC,,, | Performed by: NURSE PRACTITIONER

## 2021-05-31 PROCEDURE — 99999 PR PBB SHADOW E&M-EST. PATIENT-LVL IV: ICD-10-PCS | Mod: PBBFAC,,, | Performed by: NURSE PRACTITIONER

## 2021-05-31 PROCEDURE — 3008F PR BODY MASS INDEX (BMI) DOCUMENTED: ICD-10-PCS | Mod: CPTII,S$GLB,, | Performed by: NURSE PRACTITIONER

## 2021-05-31 PROCEDURE — 99213 PR OFFICE/OUTPT VISIT, EST, LEVL III, 20-29 MIN: ICD-10-PCS | Mod: S$GLB,,, | Performed by: NURSE PRACTITIONER

## 2021-05-31 PROCEDURE — 1126F PR PAIN SEVERITY QUANTIFIED, NO PAIN PRESENT: ICD-10-PCS | Mod: S$GLB,,, | Performed by: NURSE PRACTITIONER

## 2021-05-31 PROCEDURE — 1126F AMNT PAIN NOTED NONE PRSNT: CPT | Mod: S$GLB,,, | Performed by: NURSE PRACTITIONER

## 2021-05-31 PROCEDURE — 99213 OFFICE O/P EST LOW 20 MIN: CPT | Mod: S$GLB,,, | Performed by: NURSE PRACTITIONER

## 2021-05-31 PROCEDURE — 3008F BODY MASS INDEX DOCD: CPT | Mod: CPTII,S$GLB,, | Performed by: NURSE PRACTITIONER

## 2021-06-29 ENCOUNTER — TELEPHONE (OUTPATIENT)
Dept: GASTROENTEROLOGY | Facility: CLINIC | Age: 36
End: 2021-06-29

## 2021-06-29 ENCOUNTER — TELEPHONE (OUTPATIENT)
Dept: SURGERY | Facility: CLINIC | Age: 36
End: 2021-06-29

## 2021-06-29 ENCOUNTER — PATIENT MESSAGE (OUTPATIENT)
Dept: GASTROENTEROLOGY | Facility: CLINIC | Age: 36
End: 2021-06-29

## 2021-06-29 DIAGNOSIS — K58.1 IRRITABLE BOWEL SYNDROME WITH CONSTIPATION: Primary | ICD-10-CM

## 2021-08-10 ENCOUNTER — OFFICE VISIT (OUTPATIENT)
Dept: GASTROENTEROLOGY | Facility: CLINIC | Age: 36
End: 2021-08-10
Payer: COMMERCIAL

## 2021-08-10 VITALS
BODY MASS INDEX: 30.73 KG/M2 | WEIGHT: 208.13 LBS | SYSTOLIC BLOOD PRESSURE: 133 MMHG | DIASTOLIC BLOOD PRESSURE: 81 MMHG | HEART RATE: 85 BPM

## 2021-08-10 DIAGNOSIS — R53.83 OTHER FATIGUE: Primary | ICD-10-CM

## 2021-08-10 DIAGNOSIS — K21.9 GASTROESOPHAGEAL REFLUX DISEASE WITHOUT ESOPHAGITIS: ICD-10-CM

## 2021-08-10 DIAGNOSIS — K58.1 IRRITABLE BOWEL SYNDROME WITH CONSTIPATION: ICD-10-CM

## 2021-08-10 PROCEDURE — 3075F SYST BP GE 130 - 139MM HG: CPT | Mod: CPTII,S$GLB,, | Performed by: INTERNAL MEDICINE

## 2021-08-10 PROCEDURE — 99213 PR OFFICE/OUTPT VISIT, EST, LEVL III, 20-29 MIN: ICD-10-PCS | Mod: S$GLB,,, | Performed by: INTERNAL MEDICINE

## 2021-08-10 PROCEDURE — 3008F PR BODY MASS INDEX (BMI) DOCUMENTED: ICD-10-PCS | Mod: CPTII,S$GLB,, | Performed by: INTERNAL MEDICINE

## 2021-08-10 PROCEDURE — 3079F DIAST BP 80-89 MM HG: CPT | Mod: CPTII,S$GLB,, | Performed by: INTERNAL MEDICINE

## 2021-08-10 PROCEDURE — 3008F BODY MASS INDEX DOCD: CPT | Mod: CPTII,S$GLB,, | Performed by: INTERNAL MEDICINE

## 2021-08-10 PROCEDURE — 1159F MED LIST DOCD IN RCRD: CPT | Mod: CPTII,S$GLB,, | Performed by: INTERNAL MEDICINE

## 2021-08-10 PROCEDURE — 99213 OFFICE O/P EST LOW 20 MIN: CPT | Mod: S$GLB,,, | Performed by: INTERNAL MEDICINE

## 2021-08-10 PROCEDURE — 3079F PR MOST RECENT DIASTOLIC BLOOD PRESSURE 80-89 MM HG: ICD-10-PCS | Mod: CPTII,S$GLB,, | Performed by: INTERNAL MEDICINE

## 2021-08-10 PROCEDURE — 1160F PR REVIEW ALL MEDS BY PRESCRIBER/CLIN PHARMACIST DOCUMENTED: ICD-10-PCS | Mod: CPTII,S$GLB,, | Performed by: INTERNAL MEDICINE

## 2021-08-10 PROCEDURE — 3075F PR MOST RECENT SYSTOLIC BLOOD PRESS GE 130-139MM HG: ICD-10-PCS | Mod: CPTII,S$GLB,, | Performed by: INTERNAL MEDICINE

## 2021-08-10 PROCEDURE — 1126F AMNT PAIN NOTED NONE PRSNT: CPT | Mod: CPTII,S$GLB,, | Performed by: INTERNAL MEDICINE

## 2021-08-10 PROCEDURE — 1159F PR MEDICATION LIST DOCUMENTED IN MEDICAL RECORD: ICD-10-PCS | Mod: CPTII,S$GLB,, | Performed by: INTERNAL MEDICINE

## 2021-08-10 PROCEDURE — 99999 PR PBB SHADOW E&M-EST. PATIENT-LVL IV: ICD-10-PCS | Mod: PBBFAC,,, | Performed by: INTERNAL MEDICINE

## 2021-08-10 PROCEDURE — 1126F PR PAIN SEVERITY QUANTIFIED, NO PAIN PRESENT: ICD-10-PCS | Mod: CPTII,S$GLB,, | Performed by: INTERNAL MEDICINE

## 2021-08-10 PROCEDURE — 1160F RVW MEDS BY RX/DR IN RCRD: CPT | Mod: CPTII,S$GLB,, | Performed by: INTERNAL MEDICINE

## 2021-08-10 PROCEDURE — 99999 PR PBB SHADOW E&M-EST. PATIENT-LVL IV: CPT | Mod: PBBFAC,,, | Performed by: INTERNAL MEDICINE

## 2021-08-12 ENCOUNTER — LAB VISIT (OUTPATIENT)
Dept: LAB | Facility: HOSPITAL | Age: 36
End: 2021-08-12
Attending: INTERNAL MEDICINE
Payer: COMMERCIAL

## 2021-08-12 DIAGNOSIS — R53.83 OTHER FATIGUE: ICD-10-CM

## 2021-08-12 LAB
25(OH)D3+25(OH)D2 SERPL-MCNC: 25 NG/ML (ref 30–96)
TSH SERPL DL<=0.005 MIU/L-ACNC: 2.74 UIU/ML (ref 0.4–4)

## 2021-08-12 PROCEDURE — 36415 COLL VENOUS BLD VENIPUNCTURE: CPT | Mod: PN | Performed by: INTERNAL MEDICINE

## 2021-08-12 PROCEDURE — 82306 VITAMIN D 25 HYDROXY: CPT | Performed by: INTERNAL MEDICINE

## 2021-08-12 PROCEDURE — 84443 ASSAY THYROID STIM HORMONE: CPT | Performed by: INTERNAL MEDICINE

## 2021-11-01 ENCOUNTER — OFFICE VISIT (OUTPATIENT)
Dept: OTOLARYNGOLOGY | Facility: CLINIC | Age: 36
End: 2021-11-01
Payer: COMMERCIAL

## 2021-11-01 VITALS — BODY MASS INDEX: 31.48 KG/M2 | WEIGHT: 213.19 LBS

## 2021-11-01 DIAGNOSIS — K11.7 XEROSTOMIA: ICD-10-CM

## 2021-11-01 DIAGNOSIS — R09.A2 GLOBUS SENSATION: ICD-10-CM

## 2021-11-01 DIAGNOSIS — J30.0 VASOMOTOR RHINITIS: Primary | ICD-10-CM

## 2021-11-01 PROCEDURE — 1159F MED LIST DOCD IN RCRD: CPT | Mod: CPTII,S$GLB,, | Performed by: OTOLARYNGOLOGY

## 2021-11-01 PROCEDURE — 99999 PR PBB SHADOW E&M-EST. PATIENT-LVL IV: CPT | Mod: PBBFAC,,, | Performed by: OTOLARYNGOLOGY

## 2021-11-01 PROCEDURE — 99999 PR PBB SHADOW E&M-EST. PATIENT-LVL IV: ICD-10-PCS | Mod: PBBFAC,,, | Performed by: OTOLARYNGOLOGY

## 2021-11-01 PROCEDURE — 1160F PR REVIEW ALL MEDS BY PRESCRIBER/CLIN PHARMACIST DOCUMENTED: ICD-10-PCS | Mod: CPTII,S$GLB,, | Performed by: OTOLARYNGOLOGY

## 2021-11-01 PROCEDURE — 1159F PR MEDICATION LIST DOCUMENTED IN MEDICAL RECORD: ICD-10-PCS | Mod: CPTII,S$GLB,, | Performed by: OTOLARYNGOLOGY

## 2021-11-01 PROCEDURE — 99214 PR OFFICE/OUTPT VISIT, EST, LEVL IV, 30-39 MIN: ICD-10-PCS | Mod: S$GLB,,, | Performed by: OTOLARYNGOLOGY

## 2021-11-01 PROCEDURE — 3008F BODY MASS INDEX DOCD: CPT | Mod: CPTII,S$GLB,, | Performed by: OTOLARYNGOLOGY

## 2021-11-01 PROCEDURE — 1160F RVW MEDS BY RX/DR IN RCRD: CPT | Mod: CPTII,S$GLB,, | Performed by: OTOLARYNGOLOGY

## 2021-11-01 PROCEDURE — 3008F PR BODY MASS INDEX (BMI) DOCUMENTED: ICD-10-PCS | Mod: CPTII,S$GLB,, | Performed by: OTOLARYNGOLOGY

## 2021-11-01 PROCEDURE — 99214 OFFICE O/P EST MOD 30 MIN: CPT | Mod: S$GLB,,, | Performed by: OTOLARYNGOLOGY

## 2021-11-01 RX ORDER — HYDROCODONE BITARTRATE AND ACETAMINOPHEN 5; 325 MG/1; MG/1
1 TABLET ORAL 4 TIMES DAILY PRN
COMMUNITY
Start: 2021-10-29 | End: 2022-09-23

## 2021-11-01 RX ORDER — IPRATROPIUM BROMIDE 42 UG/1
2 SPRAY, METERED NASAL 3 TIMES DAILY
Qty: 15 ML | Refills: 6 | Status: SHIPPED | OUTPATIENT
Start: 2021-11-01

## 2021-11-01 RX ORDER — METHYLPREDNISOLONE 4 MG/1
TABLET ORAL
COMMUNITY
Start: 2021-10-29 | End: 2022-01-03

## 2021-11-10 ENCOUNTER — TELEPHONE (OUTPATIENT)
Dept: OBSTETRICS AND GYNECOLOGY | Facility: CLINIC | Age: 36
End: 2021-11-10
Payer: COMMERCIAL

## 2021-11-18 ENCOUNTER — PATIENT OUTREACH (OUTPATIENT)
Dept: ADMINISTRATIVE | Facility: OTHER | Age: 36
End: 2021-11-18
Payer: COMMERCIAL

## 2021-11-18 ENCOUNTER — OFFICE VISIT (OUTPATIENT)
Dept: OBSTETRICS AND GYNECOLOGY | Facility: CLINIC | Age: 36
End: 2021-11-18
Payer: COMMERCIAL

## 2021-11-18 VITALS
DIASTOLIC BLOOD PRESSURE: 60 MMHG | WEIGHT: 214.5 LBS | HEIGHT: 69 IN | SYSTOLIC BLOOD PRESSURE: 128 MMHG | BODY MASS INDEX: 31.77 KG/M2

## 2021-11-18 DIAGNOSIS — R10.2 PELVIC PAIN: Primary | ICD-10-CM

## 2021-11-18 DIAGNOSIS — Z01.419 ENCOUNTER FOR ROUTINE GYNECOLOGICAL EXAMINATION WITH PAPANICOLAOU SMEAR OF CERVIX: ICD-10-CM

## 2021-11-18 PROCEDURE — 3008F BODY MASS INDEX DOCD: CPT | Mod: CPTII,S$GLB,, | Performed by: SPECIALIST

## 2021-11-18 PROCEDURE — 3074F SYST BP LT 130 MM HG: CPT | Mod: CPTII,S$GLB,, | Performed by: SPECIALIST

## 2021-11-18 PROCEDURE — 99395 PR PREVENTIVE VISIT,EST,18-39: ICD-10-PCS | Mod: S$GLB,,, | Performed by: SPECIALIST

## 2021-11-18 PROCEDURE — 88175 CYTOPATH C/V AUTO FLUID REDO: CPT | Performed by: SPECIALIST

## 2021-11-18 PROCEDURE — 99395 PREV VISIT EST AGE 18-39: CPT | Mod: S$GLB,,, | Performed by: SPECIALIST

## 2021-11-18 PROCEDURE — 1159F MED LIST DOCD IN RCRD: CPT | Mod: CPTII,S$GLB,, | Performed by: SPECIALIST

## 2021-11-18 PROCEDURE — 3074F PR MOST RECENT SYSTOLIC BLOOD PRESSURE < 130 MM HG: ICD-10-PCS | Mod: CPTII,S$GLB,, | Performed by: SPECIALIST

## 2021-11-18 PROCEDURE — 3078F PR MOST RECENT DIASTOLIC BLOOD PRESSURE < 80 MM HG: ICD-10-PCS | Mod: CPTII,S$GLB,, | Performed by: SPECIALIST

## 2021-11-18 PROCEDURE — 99999 PR PBB SHADOW E&M-EST. PATIENT-LVL IV: ICD-10-PCS | Mod: PBBFAC,,, | Performed by: SPECIALIST

## 2021-11-18 PROCEDURE — 3078F DIAST BP <80 MM HG: CPT | Mod: CPTII,S$GLB,, | Performed by: SPECIALIST

## 2021-11-18 PROCEDURE — 1159F PR MEDICATION LIST DOCUMENTED IN MEDICAL RECORD: ICD-10-PCS | Mod: CPTII,S$GLB,, | Performed by: SPECIALIST

## 2021-11-18 PROCEDURE — 3008F PR BODY MASS INDEX (BMI) DOCUMENTED: ICD-10-PCS | Mod: CPTII,S$GLB,, | Performed by: SPECIALIST

## 2021-11-18 PROCEDURE — 99999 PR PBB SHADOW E&M-EST. PATIENT-LVL IV: CPT | Mod: PBBFAC,,, | Performed by: SPECIALIST

## 2021-12-03 ENCOUNTER — PATIENT MESSAGE (OUTPATIENT)
Dept: OBSTETRICS AND GYNECOLOGY | Facility: CLINIC | Age: 36
End: 2021-12-03
Payer: COMMERCIAL

## 2021-12-03 ENCOUNTER — HOSPITAL ENCOUNTER (OUTPATIENT)
Dept: RADIOLOGY | Facility: HOSPITAL | Age: 36
Discharge: HOME | End: 2021-12-03
Attending: SPECIALIST
Payer: COMMERCIAL

## 2021-12-03 DIAGNOSIS — R10.2 PELVIC PAIN: ICD-10-CM

## 2021-12-03 PROCEDURE — 76856 US EXAM PELVIC COMPLETE: CPT | Mod: TC,PN

## 2021-12-03 PROCEDURE — 76830 TRANSVAGINAL US NON-OB: CPT | Mod: 26,,, | Performed by: RADIOLOGY

## 2021-12-03 PROCEDURE — 76830 US PELVIS COMP WITH TRANSVAG NON-OB (XPD): ICD-10-PCS | Mod: 26,,, | Performed by: RADIOLOGY

## 2021-12-03 PROCEDURE — 76856 US EXAM PELVIC COMPLETE: CPT | Mod: 26,,, | Performed by: RADIOLOGY

## 2021-12-03 PROCEDURE — 76856 US PELVIS COMP WITH TRANSVAG NON-OB (XPD): ICD-10-PCS | Mod: 26,,, | Performed by: RADIOLOGY

## 2021-12-08 ENCOUNTER — OFFICE VISIT (OUTPATIENT)
Dept: OBSTETRICS AND GYNECOLOGY | Facility: CLINIC | Age: 36
End: 2021-12-08
Payer: COMMERCIAL

## 2021-12-08 VITALS
DIASTOLIC BLOOD PRESSURE: 78 MMHG | SYSTOLIC BLOOD PRESSURE: 110 MMHG | WEIGHT: 211.63 LBS | HEIGHT: 69 IN | BODY MASS INDEX: 31.34 KG/M2

## 2021-12-08 DIAGNOSIS — N81.4 UTERINE PROLAPSE: ICD-10-CM

## 2021-12-08 DIAGNOSIS — R10.2 PELVIC PAIN: Primary | ICD-10-CM

## 2021-12-08 PROCEDURE — 99999 PR PBB SHADOW E&M-EST. PATIENT-LVL IV: ICD-10-PCS | Mod: PBBFAC,,, | Performed by: SPECIALIST

## 2021-12-08 PROCEDURE — 99999 PR PBB SHADOW E&M-EST. PATIENT-LVL IV: CPT | Mod: PBBFAC,,, | Performed by: SPECIALIST

## 2021-12-08 PROCEDURE — 99213 OFFICE O/P EST LOW 20 MIN: CPT | Mod: S$GLB,,, | Performed by: SPECIALIST

## 2021-12-08 PROCEDURE — 99213 PR OFFICE/OUTPT VISIT, EST, LEVL III, 20-29 MIN: ICD-10-PCS | Mod: S$GLB,,, | Performed by: SPECIALIST

## 2021-12-09 DIAGNOSIS — R10.2 PELVIC PAIN IN FEMALE: Primary | ICD-10-CM

## 2021-12-09 DIAGNOSIS — Z01.818 PRE-OP TESTING: ICD-10-CM

## 2021-12-09 DIAGNOSIS — N81.4 UTERINE PROLAPSE: ICD-10-CM

## 2021-12-10 ENCOUNTER — PATIENT MESSAGE (OUTPATIENT)
Dept: OBSTETRICS AND GYNECOLOGY | Facility: CLINIC | Age: 36
End: 2021-12-10
Payer: COMMERCIAL

## 2021-12-13 ENCOUNTER — PATIENT MESSAGE (OUTPATIENT)
Dept: OBSTETRICS AND GYNECOLOGY | Facility: CLINIC | Age: 36
End: 2021-12-13
Payer: COMMERCIAL

## 2022-01-03 ENCOUNTER — OFFICE VISIT (OUTPATIENT)
Dept: OBSTETRICS AND GYNECOLOGY | Facility: CLINIC | Age: 37
End: 2022-01-03
Payer: COMMERCIAL

## 2022-01-03 VITALS
WEIGHT: 211.19 LBS | SYSTOLIC BLOOD PRESSURE: 118 MMHG | DIASTOLIC BLOOD PRESSURE: 68 MMHG | HEIGHT: 69 IN | BODY MASS INDEX: 31.28 KG/M2

## 2022-01-03 DIAGNOSIS — R10.2 PELVIC PAIN IN FEMALE: Primary | ICD-10-CM

## 2022-01-03 PROCEDURE — 3078F DIAST BP <80 MM HG: CPT | Mod: CPTII,S$GLB,, | Performed by: SPECIALIST

## 2022-01-03 PROCEDURE — 99999 PR PBB SHADOW E&M-EST. PATIENT-LVL IV: CPT | Mod: PBBFAC,,, | Performed by: SPECIALIST

## 2022-01-03 PROCEDURE — 3074F PR MOST RECENT SYSTOLIC BLOOD PRESSURE < 130 MM HG: ICD-10-PCS | Mod: CPTII,S$GLB,, | Performed by: SPECIALIST

## 2022-01-03 PROCEDURE — 3078F PR MOST RECENT DIASTOLIC BLOOD PRESSURE < 80 MM HG: ICD-10-PCS | Mod: CPTII,S$GLB,, | Performed by: SPECIALIST

## 2022-01-03 PROCEDURE — 99213 OFFICE O/P EST LOW 20 MIN: CPT | Mod: S$GLB,,, | Performed by: SPECIALIST

## 2022-01-03 PROCEDURE — 99999 PR PBB SHADOW E&M-EST. PATIENT-LVL IV: ICD-10-PCS | Mod: PBBFAC,,, | Performed by: SPECIALIST

## 2022-01-03 PROCEDURE — 1159F MED LIST DOCD IN RCRD: CPT | Mod: CPTII,S$GLB,, | Performed by: SPECIALIST

## 2022-01-03 PROCEDURE — 3008F PR BODY MASS INDEX (BMI) DOCUMENTED: ICD-10-PCS | Mod: CPTII,S$GLB,, | Performed by: SPECIALIST

## 2022-01-03 PROCEDURE — 99213 PR OFFICE/OUTPT VISIT, EST, LEVL III, 20-29 MIN: ICD-10-PCS | Mod: S$GLB,,, | Performed by: SPECIALIST

## 2022-01-03 PROCEDURE — 1159F PR MEDICATION LIST DOCUMENTED IN MEDICAL RECORD: ICD-10-PCS | Mod: CPTII,S$GLB,, | Performed by: SPECIALIST

## 2022-01-03 PROCEDURE — 3074F SYST BP LT 130 MM HG: CPT | Mod: CPTII,S$GLB,, | Performed by: SPECIALIST

## 2022-01-03 PROCEDURE — 3008F BODY MASS INDEX DOCD: CPT | Mod: CPTII,S$GLB,, | Performed by: SPECIALIST

## 2022-01-03 RX ORDER — SODIUM CHLORIDE 9 MG/ML
INJECTION, SOLUTION INTRAVENOUS CONTINUOUS
Status: CANCELLED | OUTPATIENT
Start: 2022-01-03

## 2022-01-03 RX ORDER — MUPIROCIN 20 MG/G
OINTMENT TOPICAL
Status: CANCELLED | OUTPATIENT
Start: 2022-01-03

## 2022-01-03 NOTE — PROGRESS NOTES
35 yo followed for secondary pelvic pain as well as symptomatic uterine prolapse.Incidental uterine fibroids. Pt is scheduled to undergo diagnostic laparoscope with uterine suspension.  I discussed the risks and benefits of procedure, risks of bowel/bladder/utereal injury, infection need for additional procedure.  Consents and orders obtained.  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 Neri, colon polyps per patient report    COLONOSCOPY N/A 8/21/2020    Procedure: COLONOSCOPY;  Surgeon: Yessi Pal MD;  Location: [facility];  Service: Endoscopy;  Laterality: N/A;    dx lap      ESOPHAGOGASTRODUODENOSCOPY N/A 1/22/2020    Procedure: EGD (ESOPHAGOGASTRODUODENOSCOPY);  Surgeon: Yessi Pal MD;  Location: [facility];  Service: Endoscopy;  Laterality: N/A;    hammer toe Right 11/19/2015    heavenly toe repeair with callus filing    LAPAROSCOPY  ~2012    done for infertility concern    TONSILLECTOMY         Family History   Problem Relation Age of Onset    Thyroid disease Mother         hyperthyroidism    Hypertension Mother     Hypertension Sister     Heart disease Maternal Grandmother     Ovarian cancer Neg Hx     Breast cancer Neg Hx     Eczema Neg Hx     Lupus Neg Hx     Psoriasis Neg Hx     Melanoma Neg Hx     Cancer Neg Hx     Colon cancer Neg Hx     Crohn's disease Neg Hx     Ulcerative colitis Neg Hx     Stomach cancer Neg Hx     Esophageal cancer Neg Hx        Social History     Socioeconomic History    Marital status: Single   Tobacco Use    Smoking status: Never Smoker    Smokeless tobacco: Never Used   Substance and Sexual Activity    Alcohol use: No    Drug use: No     Sexual activity: Yes     Partners: Male     Birth control/protection: OCP     Comment: OCP helps regulate cycle       Current Outpatient Medications   Medication Sig Dispense Refill    albuterol (PROVENTIL/VENTOLIN HFA) 90 mcg/actuation inhaler Inhale 1-2 puffs into the lungs every 6 (six) hours as needed. Rescue 18 g 1    amoxicillin-clavulanate 875-125mg (AUGMENTIN) 875-125 mg per tablet Take 1 tablet by mouth 2 (two) times daily. 14 tablet 0    ASCORBATE CALCIUM (VITAMIN C ORAL) Take by mouth.      azithromycin (Z-MAINOR) 250 MG tablet Take 1 tablet (250 mg total) by mouth once daily. Take first 2 tablets together, then 1 every day until finished. 6 tablet 0    cholecalciferol, vitamin D3, (VITAMIN D3) 50 mcg (2,000 unit) Tab Take 2,000 Units by mouth once daily.      CITRANATAL HARMONY, IRON FUM, 27 mg iron-1 mg -50 mg-260 mg Cap TAKE 1 CAPSULE BY MOUTH ONCE DAILY. 100 capsule 0    fluocinonide (LIDEX) 0.05 % external solution APPLY THIN FILM TO SCALP EVERY NIGHT AT BEDTIME AS NEEDED FOR ITCHING OR SCALING 60 mL 0    fluticasone propionate (FLONASE) 50 mcg/actuation nasal spray USE 2 SPRAYS (100 MCG TOTAL) BY EACH NOSTRIL ROUTE ONCE DAILY. 48 mL 0    HYDROcodone-acetaminophen (NORCO) 5-325 mg per tablet Take 1 tablet by mouth 4 (four) times daily as needed.      hydrOXYzine HCL (ATARAX) 25 MG tablet Take 25 mg by mouth 3 (three) times daily.      ipratropium (ATROVENT) 42 mcg (0.06 %) nasal spray 2 sprays by Nasal route 3 (three) times daily. 15 mL 6    ketoconazole (NIZORAL) 2 % shampoo LATHER SCALP, LET SIT 5 MIN, RINSE WELL. USE 2X WEEKLY 120 mL 2    levothyroxine (SYNTHROID) 75 MCG tablet TAKE 1 TABLET (75 MCG TOTAL) BY MOUTH BEFORE BREAKFAST DAILY 90 tablet 2    LINZESS 145 mcg Cap capsule TAKE 1 CAPSULE (145 MCG TOTAL) BY MOUTH BEFORE BREAKFAST. 30 capsule 1    MARLISSA, 28, 0.15-0.03 mg per tablet TAKE 1 TABLET BY MOUTH EVERY DAY 84 tablet 0    methylPREDNISolone (MEDROL DOSEPACK) 4 mg  tablet Take by mouth.      naproxen sodium (ANAPROX) 550 MG tablet Take 1 tablet (550 mg total) by mouth 2 (two) times daily with meals. 20 tablet 0    omeprazole (PRILOSEC) 40 MG capsule TAKE 1 CAPSULE (40 MG TOTAL) BY MOUTH BEFORE BREAKFAST. 90 capsule 1    ondansetron (ZOFRAN-ODT) 4 MG TbDL Take 1 tablet (4 mg total) by mouth every 8 (eight) hours as needed (nausea). 30 tablet 1    prazosin (MINIPRESS) 1 MG Cap Take 1 mg by mouth 2 (two) times daily.      prednisoLONE acetate (PRED FORTE) 1 % DrpS PLACE  1 DROP INTO OD QID FOR 5 DAYS      sertraline (ZOLOFT) 50 MG tablet Take 50 mg by mouth every evening.      QUEtiapine (SEROQUEL) 25 MG Tab Take 2 tablets (50 mg total) by mouth every evening. 60 tablet 11     No current facility-administered medications for this visit.       Review of patient's allergies indicates:  No Known Allergies    Review of System:   General: no chills, fever, night sweats, weight gain or weight loss  Psychological: no depression or suicidal ideation  Breasts: no new or changing breast lumps, nipple discharge or masses.  Respiratory: no cough, shortness of breath, or wheezing  Cardiovascular: no chest pain or dyspnea on exertion  Gastrointestinal: no abdominal pain, change in bowel habits, or black or bloody stools  Genito-Urinary: no incontinence, urinary frequency/urgency or vulvar/vaginal symptoms,  or abnormal vaginal bleeding.  Musculoskeletal: no gait disturbance or muscular weakness    PE  See Epic    Plan  Will review preop lab results and proceed at [facility] 1/6 8am  Answered all questions

## 2022-01-06 PROBLEM — N81.4 UTERINE PROLAPSE: Status: ACTIVE | Noted: 2022-01-06

## 2022-01-09 ENCOUNTER — PATIENT MESSAGE (OUTPATIENT)
Dept: OBSTETRICS AND GYNECOLOGY | Facility: CLINIC | Age: 37
End: 2022-01-09
Payer: COMMERCIAL

## 2022-01-12 ENCOUNTER — PATIENT MESSAGE (OUTPATIENT)
Dept: GASTROENTEROLOGY | Facility: CLINIC | Age: 37
End: 2022-01-12
Payer: COMMERCIAL

## 2022-01-13 ENCOUNTER — PATIENT MESSAGE (OUTPATIENT)
Dept: OBSTETRICS AND GYNECOLOGY | Facility: CLINIC | Age: 37
End: 2022-01-13
Payer: COMMERCIAL

## 2022-01-26 ENCOUNTER — OFFICE VISIT (OUTPATIENT)
Dept: OBSTETRICS AND GYNECOLOGY | Facility: CLINIC | Age: 37
End: 2022-01-26
Payer: COMMERCIAL

## 2022-01-26 VITALS
HEIGHT: 69 IN | BODY MASS INDEX: 31.28 KG/M2 | SYSTOLIC BLOOD PRESSURE: 118 MMHG | DIASTOLIC BLOOD PRESSURE: 82 MMHG | WEIGHT: 211.19 LBS

## 2022-01-26 DIAGNOSIS — N81.4 UTERINE PROLAPSE: Primary | ICD-10-CM

## 2022-01-26 DIAGNOSIS — Z09 POSTOPERATIVE EXAMINATION: ICD-10-CM

## 2022-01-26 PROCEDURE — 99999 PR PBB SHADOW E&M-EST. PATIENT-LVL IV: CPT | Mod: PBBFAC,,, | Performed by: SPECIALIST

## 2022-01-26 PROCEDURE — 1159F PR MEDICATION LIST DOCUMENTED IN MEDICAL RECORD: ICD-10-PCS | Mod: CPTII,S$GLB,, | Performed by: SPECIALIST

## 2022-01-26 PROCEDURE — 3008F PR BODY MASS INDEX (BMI) DOCUMENTED: ICD-10-PCS | Mod: CPTII,S$GLB,, | Performed by: SPECIALIST

## 2022-01-26 PROCEDURE — 1159F MED LIST DOCD IN RCRD: CPT | Mod: CPTII,S$GLB,, | Performed by: SPECIALIST

## 2022-01-26 PROCEDURE — 99024 POSTOP FOLLOW-UP VISIT: CPT | Mod: S$GLB,,, | Performed by: SPECIALIST

## 2022-01-26 PROCEDURE — 3079F DIAST BP 80-89 MM HG: CPT | Mod: CPTII,S$GLB,, | Performed by: SPECIALIST

## 2022-01-26 PROCEDURE — 3008F BODY MASS INDEX DOCD: CPT | Mod: CPTII,S$GLB,, | Performed by: SPECIALIST

## 2022-01-26 PROCEDURE — 99999 PR PBB SHADOW E&M-EST. PATIENT-LVL IV: ICD-10-PCS | Mod: PBBFAC,,, | Performed by: SPECIALIST

## 2022-01-26 PROCEDURE — 3079F PR MOST RECENT DIASTOLIC BLOOD PRESSURE 80-89 MM HG: ICD-10-PCS | Mod: CPTII,S$GLB,, | Performed by: SPECIALIST

## 2022-01-26 PROCEDURE — 3074F SYST BP LT 130 MM HG: CPT | Mod: CPTII,S$GLB,, | Performed by: SPECIALIST

## 2022-01-26 PROCEDURE — 3074F PR MOST RECENT SYSTOLIC BLOOD PRESSURE < 130 MM HG: ICD-10-PCS | Mod: CPTII,S$GLB,, | Performed by: SPECIALIST

## 2022-01-26 PROCEDURE — 99024 PR POST-OP FOLLOW-UP VISIT: ICD-10-PCS | Mod: S$GLB,,, | Performed by: SPECIALIST

## 2022-01-26 NOTE — PROGRESS NOTES
37 yo BF 2 weeks s/p laparoscopic uterine suspension presents for PO follo wup  Denies flank pain, n/v, f/c, dysuria, hematuria, intolerable pain  .Discussed operative findings  Past Medical History:   Diagnosis Date    Anxiety and depression     Constipation     Encounter for blood transfusion     Endometriosis     GERD (gastroesophageal reflux disease)     IBS (irritable bowel syndrome)     Neuromuscular disorder     nerve damage of right foot after hammertoe surgery    PCOS (polycystic ovarian syndrome)     Sleep disturbance     Thyroid disease     Vaginal prolapse        Past Surgical History:   Procedure Laterality Date    bladder suspension      COLONOSCOPY  ~24 years old    Dr. Neri, colon polyps per patient report    COLONOSCOPY N/A 8/21/2020    Procedure: COLONOSCOPY;  Surgeon: Yessi Pal MD;  Location: [facility];  Service: Endoscopy;  Laterality: N/A;    DIAGNOSTIC LAPAROSCOPY N/A 1/6/2022    Procedure: LAPAROSCOPY, DIAGNOSTIC;  Surgeon: Jack Sargent MD;  Location: [facility] OR;  Service: OB/GYN;  Laterality: N/A;    dx lap      ESOPHAGOGASTRODUODENOSCOPY N/A 1/22/2020    Procedure: EGD (ESOPHAGOGASTRODUODENOSCOPY);  Surgeon: Yessi Pal MD;  Location: [facility];  Service: Endoscopy;  Laterality: N/A;    hammer toe Right 11/19/2015    heavenly toe repeair with callus filing    LAPAROSCOPIC SUSPENSION OF UTEROSACRAL LIGAMENT Bilateral 1/6/2022    Procedure: SUSPENSION, LIGAMENT, UTEROSACRAL, LAPAROSCOPIC;  Surgeon: Jack Sargent MD;  Location: [facility] OR;  Service: OB/GYN;  Laterality: Bilateral;    LAPAROSCOPY  ~2012    done for infertility concern    TONSILLECTOMY         Family History   Problem Relation Age of Onset    Thyroid disease Mother         hyperthyroidism    Hypertension Mother     Hypertension Sister     Heart disease Maternal Grandmother     Ovarian cancer Neg Hx     Breast cancer Neg Hx     Eczema Neg Hx     Lupus Neg Hx     Psoriasis Neg Hx      Melanoma Neg Hx     Cancer Neg Hx     Colon cancer Neg Hx     Crohn's disease Neg Hx     Ulcerative colitis Neg Hx     Stomach cancer Neg Hx     Esophageal cancer Neg Hx        Social History     Socioeconomic History    Marital status: Single   Tobacco Use    Smoking status: Never Smoker    Smokeless tobacco: Never Used   Substance and Sexual Activity    Alcohol use: No    Drug use: No    Sexual activity: Yes     Partners: Male     Birth control/protection: OCP     Comment: OCP helps regulate cycle       Current Outpatient Medications   Medication Sig Dispense Refill    cholecalciferol, vitamin D3, (VITAMIN D3) 50 mcg (2,000 unit) Tab Take 2,000 Units by mouth once daily.      CITRANATAL HARMONY, IRON FUM, 27 mg iron-1 mg -50 mg-260 mg Cap TAKE 1 CAPSULE BY MOUTH ONCE DAILY. 100 capsule 0    fluocinonide (LIDEX) 0.05 % external solution APPLY THIN FILM TO SCALP EVERY NIGHT AT BEDTIME AS NEEDED FOR ITCHING OR SCALING 60 mL 0    HYDROcodone-acetaminophen (NORCO) 5-325 mg per tablet Take 1 tablet by mouth 4 (four) times daily as needed.      hydrOXYzine HCL (ATARAX) 25 MG tablet Take 25 mg by mouth 3 (three) times daily as needed.      ipratropium (ATROVENT) 42 mcg (0.06 %) nasal spray 2 sprays by Nasal route 3 (three) times daily. 15 mL 6    ketoconazole (NIZORAL) 2 % shampoo LATHER SCALP, LET SIT 5 MIN, RINSE WELL. USE 2X WEEKLY 120 mL 2    levothyroxine (SYNTHROID) 75 MCG tablet TAKE 1 TABLET (75 MCG TOTAL) BY MOUTH BEFORE BREAKFAST DAILY 90 tablet 2    LINZESS 145 mcg Cap capsule TAKE 1 CAPSULE (145 MCG TOTAL) BY MOUTH BEFORE BREAKFAST. 30 capsule 1    MARLISSA, 28, 0.15-0.03 mg per tablet TAKE 1 TABLET BY MOUTH EVERY DAY 84 tablet 0    naproxen sodium (ANAPROX) 550 MG tablet Take 1 tablet (550 mg total) by mouth 2 (two) times daily with meals. 20 tablet 0    omeprazole (PRILOSEC) 40 MG capsule TAKE 1 CAPSULE (40 MG TOTAL) BY MOUTH BEFORE BREAKFAST. 90 capsule 1     oxyCODONE-acetaminophen (PERCOCET) 5-325 mg per tablet Take 1 tablet by mouth every 4 (four) hours as needed for Pain. 20 tablet 0    prazosin (MINIPRESS) 1 MG Cap Take 1 mg by mouth every evening.      sertraline (ZOLOFT) 50 MG tablet Take 50 mg by mouth every evening.      albuterol (PROVENTIL/VENTOLIN HFA) 90 mcg/actuation inhaler Inhale 1-2 puffs into the lungs every 6 (six) hours as needed. Rescue (Patient not taking: No sig reported) 18 g 1    prednisoLONE acetate (PRED FORTE) 1 % DrpS PLACE  1 DROP INTO OD QID FOR 5 DAYS       No current facility-administered medications for this visit.       Review of patient's allergies indicates:  No Known Allergies    Review of System:   General: no chills, fever, night sweats, weight gain or weight loss  Psychological: no depression or suicidal ideation  Breasts: no new or changing breast lumps, nipple discharge or masses.  Respiratory: no cough, shortness of breath, or wheezing  Cardiovascular: no chest pain or dyspnea on exertion  Gastrointestinal: no abdominal pain, change in bowel habits, or black or bloody stools  Genito-Urinary: no incontinence, urinary frequency/urgency or vulvar/vaginal symptoms, pelvic pain or abnormal vaginal bleeding.  Musculoskeletal: no gait disturbance or muscular weakness    Abdomen soft, incisions well approx No flank pain or rebound  No d/c or erythema or crepitance at incision sites  Sutures removed    Plan pelvic rest c 6 weeks No heavy lifting  RTO 6 weeks Po check  Answered all questions

## 2022-02-16 ENCOUNTER — PATIENT OUTREACH (OUTPATIENT)
Dept: ADMINISTRATIVE | Facility: OTHER | Age: 37
End: 2022-02-16
Payer: COMMERCIAL

## 2022-02-16 NOTE — PROGRESS NOTES
Chart was reviewed for overdue Proactive [facility] Encounters ([facility])  topics  Updates were requested from care everywhere  Health Maintenance has been updated  LIN[place] immunization registry triggered

## 2022-02-17 ENCOUNTER — LAB VISIT (OUTPATIENT)
Dept: LAB | Facility: HOSPITAL | Age: 37
End: 2022-02-17
Attending: ALLERGY & IMMUNOLOGY
Payer: COMMERCIAL

## 2022-02-17 ENCOUNTER — OFFICE VISIT (OUTPATIENT)
Dept: ALLERGY | Facility: CLINIC | Age: 37
End: 2022-02-17
Payer: COMMERCIAL

## 2022-02-17 VITALS
WEIGHT: 211 LBS | HEIGHT: 69 IN | BODY MASS INDEX: 31.25 KG/M2 | OXYGEN SATURATION: 97 % | DIASTOLIC BLOOD PRESSURE: 80 MMHG | TEMPERATURE: 99 F | SYSTOLIC BLOOD PRESSURE: 110 MMHG | HEART RATE: 96 BPM

## 2022-02-17 DIAGNOSIS — J31.0 CHRONIC RHINITIS: Primary | ICD-10-CM

## 2022-02-17 DIAGNOSIS — J31.0 CHRONIC RHINITIS: ICD-10-CM

## 2022-02-17 PROCEDURE — 99999 PR PBB SHADOW E&M-EST. PATIENT-LVL IV: CPT | Mod: PBBFAC,,, | Performed by: ALLERGY & IMMUNOLOGY

## 2022-02-17 PROCEDURE — 3079F PR MOST RECENT DIASTOLIC BLOOD PRESSURE 80-89 MM HG: ICD-10-PCS | Mod: CPTII,S$GLB,, | Performed by: ALLERGY & IMMUNOLOGY

## 2022-02-17 PROCEDURE — 99203 OFFICE O/P NEW LOW 30 MIN: CPT | Mod: S$GLB,,, | Performed by: ALLERGY & IMMUNOLOGY

## 2022-02-17 PROCEDURE — 3008F PR BODY MASS INDEX (BMI) DOCUMENTED: ICD-10-PCS | Mod: CPTII,S$GLB,, | Performed by: ALLERGY & IMMUNOLOGY

## 2022-02-17 PROCEDURE — 99203 PR OFFICE/OUTPT VISIT, NEW, LEVL III, 30-44 MIN: ICD-10-PCS | Mod: S$GLB,,, | Performed by: ALLERGY & IMMUNOLOGY

## 2022-02-17 PROCEDURE — 3074F SYST BP LT 130 MM HG: CPT | Mod: CPTII,S$GLB,, | Performed by: ALLERGY & IMMUNOLOGY

## 2022-02-17 PROCEDURE — 82785 ASSAY OF IGE: CPT | Performed by: ALLERGY & IMMUNOLOGY

## 2022-02-17 PROCEDURE — 3074F PR MOST RECENT SYSTOLIC BLOOD PRESSURE < 130 MM HG: ICD-10-PCS | Mod: CPTII,S$GLB,, | Performed by: ALLERGY & IMMUNOLOGY

## 2022-02-17 PROCEDURE — 1160F PR REVIEW ALL MEDS BY PRESCRIBER/CLIN PHARMACIST DOCUMENTED: ICD-10-PCS | Mod: CPTII,S$GLB,, | Performed by: ALLERGY & IMMUNOLOGY

## 2022-02-17 PROCEDURE — 1159F MED LIST DOCD IN RCRD: CPT | Mod: CPTII,S$GLB,, | Performed by: ALLERGY & IMMUNOLOGY

## 2022-02-17 PROCEDURE — 3008F BODY MASS INDEX DOCD: CPT | Mod: CPTII,S$GLB,, | Performed by: ALLERGY & IMMUNOLOGY

## 2022-02-17 PROCEDURE — 3079F DIAST BP 80-89 MM HG: CPT | Mod: CPTII,S$GLB,, | Performed by: ALLERGY & IMMUNOLOGY

## 2022-02-17 PROCEDURE — 1159F PR MEDICATION LIST DOCUMENTED IN MEDICAL RECORD: ICD-10-PCS | Mod: CPTII,S$GLB,, | Performed by: ALLERGY & IMMUNOLOGY

## 2022-02-17 PROCEDURE — 1160F RVW MEDS BY RX/DR IN RCRD: CPT | Mod: CPTII,S$GLB,, | Performed by: ALLERGY & IMMUNOLOGY

## 2022-02-17 PROCEDURE — 86003 ALLG SPEC IGE CRUDE XTRC EA: CPT | Mod: 59 | Performed by: ALLERGY & IMMUNOLOGY

## 2022-02-17 PROCEDURE — 86003 ALLG SPEC IGE CRUDE XTRC EA: CPT | Performed by: ALLERGY & IMMUNOLOGY

## 2022-02-17 PROCEDURE — 99999 PR PBB SHADOW E&M-EST. PATIENT-LVL IV: ICD-10-PCS | Mod: PBBFAC,,, | Performed by: ALLERGY & IMMUNOLOGY

## 2022-02-17 NOTE — PROGRESS NOTES
ALLERGY & IMMUNOLOGY CLINIC -  INITIAL CONSULTATION       HISTORY OF PRESENT ILLNESS     Patient ID: Emily Pelayo is a 36 y.o. female    CC: query allergy    HPI: 35 yo woman presents for initial evaluation. She has a longstanding history of rhinitis.     Chronic sensation of ear fullness that started one year ago. In the springtime, she gets rhinorrhea and post-nasal drip. She often coughs/spits up mucous. This gets worse at night - it can wake her up at night. Seems to be worse in the summer, but this year she is experiencing it now. Intermittently, she gets scratchy throat. Recently, she drove her neighbor to an appointment, and suddenly developed scratchy throat, eyes watering, nose running. Symptoms improved when her neighbor got out of the car.     Three weeks ago, she had a sinus infection - symptoms included sore throat, runny nose, congestion, headache, some sneezing, and fever.     She is not currently on any medications for her symptoms. She does take hydroxyzine at night.     She takes omeprazole daily but does not report the sensation of acid reflux or heartburn.     Milk: At age 22, she was diagnosed with lactose intolerance. She can eat some cheese but cannot drink regular milk. She drinks lactaid milk and does ok.     No history of hives.     Never had allergy testing done before.      REVIEW OF SYSTEMS     CONST: no F/C/NS, no unintentional weight changes  NEURO: no H/A, no weakness, no paresthesias  EYES: no discharge, no pruritus, no erythema  EARS: no hearing loss, + sensation of fullness  NOSE: + congestion, + rhinorrhea, no itching, + sneezing  PULM: no SOB, no wheezing, no cough  CV: no CP, no palpitations, no leg swelling  GI: no dysphagia, no heartburn, no pain, no N/V/D  MSK: no joint pain, no muscle pain  DERM: no rashes, no skin breaks     MEDICAL HISTORY     MedHx: active problems reviewed  SurgHx: tonsillectomy  SocHx: no tob use, not working, no pets but is around a dog  "intermittently for holidays, moved from [place] to [facility] in 2003  FamHx: no history of allergies in the family  Allergies: NKDA  Medications: MAR reviewed  Vaccines: has not had her covid vaccines, does not wish to discuss this further    H/o Asthma: denies, had bronchitis as a kid, was prescribed an inhaler for pneumonia last year but never used it  H/o Eczema: denies  H/o Rhinitis: yes  Oral Allergy:  denies  Food Allergy: denies  Venom Allergy: denies  Latex Allergy: denies     PHYSICAL EXAM     VS: /80 (BP Location: Right arm, Patient Position: Sitting, BP Method: Large (Manual))   Pulse 96   Temp 98.5 °F (36.9 °C) (Oral)   Ht 5' 9" (1.753 m)   Wt 95.7 kg (210 lb 15.7 oz)   SpO2 97%   BMI 31.16 kg/m²   GENERAL: alert, NAD, well-appearing, cooperative  EYES: PERRL, EOMI, no conjunctival injection, no discharge, no infraorbital shiners  EARS: external auditory canals normal B/L, TM normal B/L  NOSE: NT 2-3+ and pink B/L, no stringing mucous, no polyps  ORAL: MMM, no ulcers, no thrush, no cobblestoning  LUNGS: CTAB, no w/r/c, no increased WOB  HEART: RRR, normal S1/S2, no m/g/r  DERM: no rashes, no skin breaks, no dystrophic fingernails     LABORATORY STUDIES     No eosinophilia     ALLERGEN TESTING     Never done     IMAGING & OTHER DIAGNOSTICS     Chest imaging 5/2021 with pneumonia     ASSESSMENT & PLAN     Emily Pelayo is a 36 y.o. female with     Chronic rhinitis  History of pneumonia and sinus infection, but otherwise doesn't feel she gets sick often  History of lactose intolerance    Plan:   Cannot skin test due to ongoing hydroxyzine use, will proceed with immunocaps to aeroallergens  Will titrate medications based on results.     Follow up: based on results    Aide Álvarez MD  Allergy/Immunology      "

## 2022-02-18 LAB — IGE SERPL-ACNC: <35 IU/ML (ref 0–100)

## 2022-02-22 LAB
A ALTERNATA IGE QN: <0.1 KU/L
A FUMIGATUS IGE QN: <0.1 KU/L
BERMUDA GRASS IGE QN: <0.1 KU/L
CAT DANDER IGE QN: <0.1 KU/L
CEDAR IGE QN: <0.1 KU/L
D FARINAE IGE QN: <0.1 KU/L
D PTERONYSS IGE QN: <0.1 KU/L
DEPRECATED A ALTERNATA IGE RAST QL: NORMAL
DEPRECATED A FUMIGATUS IGE RAST QL: NORMAL
DEPRECATED BERMUDA GRASS IGE RAST QL: NORMAL
DEPRECATED CAT DANDER IGE RAST QL: NORMAL
DEPRECATED CEDAR IGE RAST QL: NORMAL
DEPRECATED D FARINAE IGE RAST QL: NORMAL
DEPRECATED D PTERONYSS IGE RAST QL: NORMAL
DEPRECATED DOG DANDER IGE RAST QL: NORMAL
DEPRECATED ELDER IGE RAST QL: NORMAL
DEPRECATED ENGL PLANTAIN IGE RAST QL: NORMAL
DEPRECATED PECAN/HICK TREE IGE RAST QL: NORMAL
DEPRECATED ROACH IGE RAST QL: NORMAL
DEPRECATED TIMOTHY IGE RAST QL: NORMAL
DEPRECATED WEST RAGWEED IGE RAST QL: NORMAL
DEPRECATED WHITE OAK IGE RAST QL: NORMAL
DOG DANDER IGE QN: <0.1 KU/L
ELDER IGE QN: <0.1 KU/L
ENGL PLANTAIN IGE QN: <0.1 KU/L
PECAN/HICK TREE IGE QN: <0.1 KU/L
ROACH IGE QN: <0.1 KU/L
TIMOTHY IGE QN: <0.1 KU/L
WEST RAGWEED IGE QN: <0.1 KU/L
WHITE OAK IGE QN: <0.1 KU/L

## 2022-02-24 ENCOUNTER — PATIENT MESSAGE (OUTPATIENT)
Dept: ALLERGY | Facility: CLINIC | Age: 37
End: 2022-02-24
Payer: COMMERCIAL

## 2022-03-03 ENCOUNTER — PATIENT MESSAGE (OUTPATIENT)
Dept: GASTROENTEROLOGY | Facility: CLINIC | Age: 37
End: 2022-03-03
Payer: COMMERCIAL

## 2022-03-03 DIAGNOSIS — R12 HEARTBURN: ICD-10-CM

## 2022-03-03 DIAGNOSIS — R10.84 GENERALIZED ABDOMINAL PAIN: ICD-10-CM

## 2022-03-03 RX ORDER — OMEPRAZOLE 40 MG/1
40 CAPSULE, DELAYED RELEASE ORAL
Qty: 90 CAPSULE | Refills: 3 | OUTPATIENT
Start: 2022-03-03 | End: 2023-03-03

## 2022-03-04 ENCOUNTER — TELEPHONE (OUTPATIENT)
Dept: FAMILY MEDICINE | Facility: CLINIC | Age: 37
End: 2022-03-04
Payer: COMMERCIAL

## 2022-03-04 DIAGNOSIS — T14.90XA TRAUMA IN CHILDHOOD: ICD-10-CM

## 2022-03-04 DIAGNOSIS — F43.23 SITUATIONAL MIXED ANXIETY AND DEPRESSIVE DISORDER: Primary | ICD-10-CM

## 2022-03-04 DIAGNOSIS — T76.12XA PARENTAL CONCERN ABOUT POSSIBLE NON-ACCIDENTAL TRAUMATIC INJURY IN CHILD: ICD-10-CM

## 2022-03-04 NOTE — TELEPHONE ENCOUNTER
----- Message from Thalia Estevez sent at 3/4/2022 10:45 AM CST -----  Contact: pt  Type: Needs Medical Advice    Who Called: pt  Best Call Back Number: [phone]    Inquiry/Question: pt needs a referral to a Psychiatrist. She has been seeing the one that Dr Meehan sent her to but they are no longer in the practice.        Thank you~

## 2022-04-12 ENCOUNTER — OFFICE VISIT (OUTPATIENT)
Dept: PSYCHIATRY | Facility: CLINIC | Age: 37
End: 2022-04-12
Payer: COMMERCIAL

## 2022-04-12 VITALS
DIASTOLIC BLOOD PRESSURE: 86 MMHG | SYSTOLIC BLOOD PRESSURE: 124 MMHG | HEART RATE: 94 BPM | BODY MASS INDEX: 31.96 KG/M2 | HEIGHT: 69 IN | WEIGHT: 215.81 LBS

## 2022-04-12 DIAGNOSIS — F41.1 GAD (GENERALIZED ANXIETY DISORDER): ICD-10-CM

## 2022-04-12 DIAGNOSIS — F43.10 PTSD (POST-TRAUMATIC STRESS DISORDER): ICD-10-CM

## 2022-04-12 DIAGNOSIS — T76.12XA PARENTAL CONCERN ABOUT POSSIBLE NON-ACCIDENTAL TRAUMATIC INJURY IN CHILD: ICD-10-CM

## 2022-04-12 DIAGNOSIS — T14.90XA TRAUMA IN CHILDHOOD: ICD-10-CM

## 2022-04-12 DIAGNOSIS — F43.23 SITUATIONAL MIXED ANXIETY AND DEPRESSIVE DISORDER: ICD-10-CM

## 2022-04-12 DIAGNOSIS — F33.2 SEVERE EPISODE OF RECURRENT MAJOR DEPRESSIVE DISORDER, WITHOUT PSYCHOTIC FEATURES: Primary | ICD-10-CM

## 2022-04-12 PROBLEM — Z62.819 TRAUMA IN CHILDHOOD: Status: ACTIVE | Noted: 2022-04-12

## 2022-04-12 PROCEDURE — 1159F MED LIST DOCD IN RCRD: CPT | Mod: CPTII,S$GLB,,

## 2022-04-12 PROCEDURE — 99999 PR PBB SHADOW E&M-EST. PATIENT-LVL IV: CPT | Mod: PBBFAC,,,

## 2022-04-12 PROCEDURE — 3079F PR MOST RECENT DIASTOLIC BLOOD PRESSURE 80-89 MM HG: ICD-10-PCS | Mod: CPTII,S$GLB,,

## 2022-04-12 PROCEDURE — 3008F BODY MASS INDEX DOCD: CPT | Mod: CPTII,S$GLB,,

## 2022-04-12 PROCEDURE — 1160F PR REVIEW ALL MEDS BY PRESCRIBER/CLIN PHARMACIST DOCUMENTED: ICD-10-PCS | Mod: CPTII,S$GLB,,

## 2022-04-12 PROCEDURE — 99999 PR PBB SHADOW E&M-EST. PATIENT-LVL IV: ICD-10-PCS | Mod: PBBFAC,,,

## 2022-04-12 PROCEDURE — 3074F SYST BP LT 130 MM HG: CPT | Mod: CPTII,S$GLB,,

## 2022-04-12 PROCEDURE — 1159F PR MEDICATION LIST DOCUMENTED IN MEDICAL RECORD: ICD-10-PCS | Mod: CPTII,S$GLB,,

## 2022-04-12 PROCEDURE — 90792 PR PSYCHIATRIC DIAGNOSTIC EVALUATION W/MEDICAL SERVICES: ICD-10-PCS | Mod: S$GLB,,,

## 2022-04-12 PROCEDURE — 3074F PR MOST RECENT SYSTOLIC BLOOD PRESSURE < 130 MM HG: ICD-10-PCS | Mod: CPTII,S$GLB,,

## 2022-04-12 PROCEDURE — 3079F DIAST BP 80-89 MM HG: CPT | Mod: CPTII,S$GLB,,

## 2022-04-12 PROCEDURE — 3008F PR BODY MASS INDEX (BMI) DOCUMENTED: ICD-10-PCS | Mod: CPTII,S$GLB,,

## 2022-04-12 PROCEDURE — 90792 PSYCH DIAG EVAL W/MED SRVCS: CPT | Mod: S$GLB,,,

## 2022-04-12 PROCEDURE — 1160F RVW MEDS BY RX/DR IN RCRD: CPT | Mod: CPTII,S$GLB,,

## 2022-04-12 RX ORDER — SERTRALINE HYDROCHLORIDE 50 MG/1
50 TABLET, FILM COATED ORAL DAILY
Qty: 30 TABLET | Refills: 2 | Status: SHIPPED | OUTPATIENT
Start: 2022-04-12 | End: 2022-05-24

## 2022-04-12 RX ORDER — HYDROXYZINE HYDROCHLORIDE 25 MG/1
25 TABLET, FILM COATED ORAL 3 TIMES DAILY PRN
Qty: 90 TABLET | Refills: 2 | Status: SHIPPED | OUTPATIENT
Start: 2022-04-12 | End: 2022-05-12

## 2022-04-12 RX ORDER — PRAZOSIN HYDROCHLORIDE 1 MG/1
1 CAPSULE ORAL NIGHTLY
Qty: 30 CAPSULE | Refills: 2 | Status: SHIPPED | OUTPATIENT
Start: 2022-04-12 | End: 2022-05-24
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
## 2022-04-18 ENCOUNTER — TELEPHONE (OUTPATIENT)
Dept: PSYCHIATRY | Facility: CLINIC | Age: 37
End: 2022-04-18
Payer: COMMERCIAL

## 2022-04-18 NOTE — TELEPHONE ENCOUNTER
----- Message from Veronica Beal PsyD sent at 4/12/2022  4:27 PM CDT -----  Regarding: Scheduling an initial evaluation for trauma  Absolutely. I'm cc-ing Tonie Del Rosario who can reach out to this patient.   Thank you for the referral!  Dr. Beal  ----- Message -----  From: Jessica Zamora NP  Sent: 4/12/2022   3:31 PM CDT  To: Veronica Beal PsyD    Hi Dr. Beal,    Would you be able to see this patient for EMDR?  She has a history of trauma since childhood and believe she would benefit greatly.    Thank you,  Jessica

## 2022-04-19 ENCOUNTER — OFFICE VISIT (OUTPATIENT)
Dept: PSYCHIATRY | Facility: CLINIC | Age: 37
End: 2022-04-19
Payer: COMMERCIAL

## 2022-04-19 DIAGNOSIS — F43.10 PTSD (POST-TRAUMATIC STRESS DISORDER): Primary | ICD-10-CM

## 2022-04-19 PROCEDURE — 99999 PR PBB SHADOW E&M-EST. PATIENT-LVL I: ICD-10-PCS | Mod: PBBFAC,,, | Performed by: PSYCHOLOGIST

## 2022-04-19 PROCEDURE — 1160F PR REVIEW ALL MEDS BY PRESCRIBER/CLIN PHARMACIST DOCUMENTED: ICD-10-PCS | Mod: CPTII,S$GLB,, | Performed by: PSYCHOLOGIST

## 2022-04-19 PROCEDURE — 1159F MED LIST DOCD IN RCRD: CPT | Mod: CPTII,S$GLB,, | Performed by: PSYCHOLOGIST

## 2022-04-19 PROCEDURE — 1160F RVW MEDS BY RX/DR IN RCRD: CPT | Mod: CPTII,S$GLB,, | Performed by: PSYCHOLOGIST

## 2022-04-19 PROCEDURE — 90791 PSYCH DIAGNOSTIC EVALUATION: CPT | Mod: S$GLB,,, | Performed by: PSYCHOLOGIST

## 2022-04-19 PROCEDURE — 99999 PR PBB SHADOW E&M-EST. PATIENT-LVL I: CPT | Mod: PBBFAC,,, | Performed by: PSYCHOLOGIST

## 2022-04-19 PROCEDURE — 90791 PR PSYCHIATRIC DIAGNOSTIC EVALUATION: ICD-10-PCS | Mod: S$GLB,,, | Performed by: PSYCHOLOGIST

## 2022-04-19 PROCEDURE — 1159F PR MEDICATION LIST DOCUMENTED IN MEDICAL RECORD: ICD-10-PCS | Mod: CPTII,S$GLB,, | Performed by: PSYCHOLOGIST
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
## 2022-04-27 DIAGNOSIS — E03.9 HYPOTHYROIDISM, UNSPECIFIED TYPE: ICD-10-CM

## 2022-04-27 RX ORDER — LEVOTHYROXINE SODIUM 75 UG/1
TABLET ORAL
Qty: 90 TABLET | Refills: 2 | Status: SHIPPED | OUTPATIENT
Start: 2022-04-27 | End: 2022-06-16

## 2022-04-27 NOTE — TELEPHONE ENCOUNTER
Refill Routing Note   Medication(s) are not appropriate for processing by [facility] for the following reason(s):      - Patient has not been seen in over 15 months by PCP    [facility] action(s):  Defer Medication-related problems identified: Requires appointment     Medication Therapy Plan: last seen by Sergio Meehan  Medication reconciliation completed: No     Appointments  past 12m or future 3m with PCP    Date Provider   Last Visit   7/23/2019 Adilene Oconnor MD   Next Visit   Visit date not found Adilene Oconnor MD   ED visits in past 90 days: 0        Note composed:11:56 AM 04/27/2022

## 2022-04-27 NOTE — TELEPHONE ENCOUNTER
No new care gaps identified.  Powered by [employer]. Reference number: [id number].   4/27/2022 12:29:12 AM CDT

## 2022-05-02 ENCOUNTER — OFFICE VISIT (OUTPATIENT)
Dept: PSYCHIATRY | Facility: CLINIC | Age: 37
End: 2022-05-02
Payer: COMMERCIAL

## 2022-05-02 DIAGNOSIS — T14.90XA TRAUMA IN CHILDHOOD: ICD-10-CM

## 2022-05-02 DIAGNOSIS — F43.10 PTSD (POST-TRAUMATIC STRESS DISORDER): ICD-10-CM

## 2022-05-02 PROCEDURE — 99999 PR PBB SHADOW E&M-EST. PATIENT-LVL I: ICD-10-PCS | Mod: PBBFAC,,, | Performed by: PSYCHOLOGIST

## 2022-05-02 PROCEDURE — 90834 PR PSYCHOTHERAPY W/PATIENT, 45 MIN: ICD-10-PCS | Mod: S$GLB,,, | Performed by: PSYCHOLOGIST

## 2022-05-02 PROCEDURE — 99999 PR PBB SHADOW E&M-EST. PATIENT-LVL I: CPT | Mod: PBBFAC,,, | Performed by: PSYCHOLOGIST

## 2022-05-02 PROCEDURE — 90834 PSYTX W PT 45 MINUTES: CPT | Mod: S$GLB,,, | Performed by: PSYCHOLOGIST
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
## 2022-05-16 ENCOUNTER — OFFICE VISIT (OUTPATIENT)
Dept: PSYCHIATRY | Facility: CLINIC | Age: 37
End: 2022-05-16
Payer: COMMERCIAL

## 2022-05-16 DIAGNOSIS — F43.10 PTSD (POST-TRAUMATIC STRESS DISORDER): Primary | ICD-10-CM

## 2022-05-16 PROCEDURE — 90837 PR PSYCHOTHERAPY W/PATIENT, 60 MIN: ICD-10-PCS | Mod: S$GLB,,, | Performed by: PSYCHOLOGIST

## 2022-05-16 PROCEDURE — 90837 PSYTX W PT 60 MINUTES: CPT | Mod: S$GLB,,, | Performed by: PSYCHOLOGIST
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
## 2022-05-23 ENCOUNTER — PATIENT MESSAGE (OUTPATIENT)
Dept: PSYCHIATRY | Facility: CLINIC | Age: 37
End: 2022-05-23
Payer: COMMERCIAL

## 2022-05-24 ENCOUNTER — OFFICE VISIT (OUTPATIENT)
Dept: PSYCHIATRY | Facility: CLINIC | Age: 37
End: 2022-05-24
Payer: COMMERCIAL

## 2022-05-24 VITALS
DIASTOLIC BLOOD PRESSURE: 79 MMHG | BODY MASS INDEX: 32.15 KG/M2 | WEIGHT: 217.06 LBS | HEIGHT: 69 IN | SYSTOLIC BLOOD PRESSURE: 129 MMHG | HEART RATE: 91 BPM

## 2022-05-24 DIAGNOSIS — F33.2 SEVERE EPISODE OF RECURRENT MAJOR DEPRESSIVE DISORDER, WITHOUT PSYCHOTIC FEATURES: ICD-10-CM

## 2022-05-24 DIAGNOSIS — F33.1 MODERATE EPISODE OF RECURRENT MAJOR DEPRESSIVE DISORDER: ICD-10-CM

## 2022-05-24 DIAGNOSIS — F41.1 GAD (GENERALIZED ANXIETY DISORDER): Primary | ICD-10-CM

## 2022-05-24 DIAGNOSIS — F43.10 PTSD (POST-TRAUMATIC STRESS DISORDER): ICD-10-CM

## 2022-05-24 PROCEDURE — 1159F PR MEDICATION LIST DOCUMENTED IN MEDICAL RECORD: ICD-10-PCS | Mod: CPTII,S$GLB,,

## 2022-05-24 PROCEDURE — 90833 PR PSYCHOTHERAPY W/PATIENT W/E&M, 30 MIN (ADD ON): ICD-10-PCS | Mod: ,,,

## 2022-05-24 PROCEDURE — 99214 OFFICE O/P EST MOD 30 MIN: CPT | Mod: S$GLB,,,

## 2022-05-24 PROCEDURE — 99999 PR PBB SHADOW E&M-EST. PATIENT-LVL IV: ICD-10-PCS | Mod: PBBFAC,,,

## 2022-05-24 PROCEDURE — 3074F SYST BP LT 130 MM HG: CPT | Mod: CPTII,S$GLB,,

## 2022-05-24 PROCEDURE — 99999 PR PBB SHADOW E&M-EST. PATIENT-LVL IV: CPT | Mod: PBBFAC,,,

## 2022-05-24 PROCEDURE — 3078F DIAST BP <80 MM HG: CPT | Mod: CPTII,S$GLB,,

## 2022-05-24 PROCEDURE — 3078F PR MOST RECENT DIASTOLIC BLOOD PRESSURE < 80 MM HG: ICD-10-PCS | Mod: CPTII,S$GLB,,

## 2022-05-24 PROCEDURE — 90833 PSYTX W PT W E/M 30 MIN: CPT | Mod: ,,,

## 2022-05-24 PROCEDURE — 99214 PR OFFICE/OUTPT VISIT, EST, LEVL IV, 30-39 MIN: ICD-10-PCS | Mod: S$GLB,,,

## 2022-05-24 PROCEDURE — 3008F BODY MASS INDEX DOCD: CPT | Mod: CPTII,S$GLB,,

## 2022-05-24 PROCEDURE — 1159F MED LIST DOCD IN RCRD: CPT | Mod: CPTII,S$GLB,,

## 2022-05-24 PROCEDURE — 1160F RVW MEDS BY RX/DR IN RCRD: CPT | Mod: CPTII,S$GLB,,

## 2022-05-24 PROCEDURE — 3008F PR BODY MASS INDEX (BMI) DOCUMENTED: ICD-10-PCS | Mod: CPTII,S$GLB,,

## 2022-05-24 PROCEDURE — 1160F PR REVIEW ALL MEDS BY PRESCRIBER/CLIN PHARMACIST DOCUMENTED: ICD-10-PCS | Mod: CPTII,S$GLB,,

## 2022-05-24 PROCEDURE — 3074F PR MOST RECENT SYSTOLIC BLOOD PRESSURE < 130 MM HG: ICD-10-PCS | Mod: CPTII,S$GLB,,

## 2022-05-24 RX ORDER — HYDROXYZINE HYDROCHLORIDE 25 MG/1
25 TABLET, FILM COATED ORAL 3 TIMES DAILY PRN
Qty: 90 TABLET | Refills: 3 | Status: SHIPPED | OUTPATIENT
Start: 2022-05-24 | End: 2022-09-22

## 2022-05-24 RX ORDER — PRAZOSIN HYDROCHLORIDE 1 MG/1
1 CAPSULE ORAL NIGHTLY
Qty: 90 CAPSULE | Refills: 1 | Status: SHIPPED | OUTPATIENT
Start: 2022-05-24 | End: 2022-11-21

## 2022-05-24 RX ORDER — SERTRALINE HYDROCHLORIDE 50 MG/1
50 TABLET, FILM COATED ORAL DAILY
Qty: 90 TABLET | Refills: 1 | Status: SHIPPED | OUTPATIENT
Start: 2022-05-24 | End: 2022-11-21
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
## 2022-05-25 DIAGNOSIS — M25.561 ACUTE PAIN OF RIGHT KNEE: Primary | ICD-10-CM

## 2022-05-25 DIAGNOSIS — M25.512 ACUTE PAIN OF LEFT SHOULDER: ICD-10-CM

## 2022-05-26 ENCOUNTER — HOSPITAL ENCOUNTER (OUTPATIENT)
Dept: RADIOLOGY | Facility: HOSPITAL | Age: 37
Discharge: HOME | End: 2022-05-26
Attending: NURSE PRACTITIONER
Payer: COMMERCIAL

## 2022-05-26 ENCOUNTER — OFFICE VISIT (OUTPATIENT)
Dept: ORTHOPEDICS | Facility: CLINIC | Age: 37
End: 2022-05-26
Payer: COMMERCIAL

## 2022-05-26 VITALS — BODY MASS INDEX: 32.16 KG/M2 | WEIGHT: 217.13 LBS | HEIGHT: 69 IN

## 2022-05-26 DIAGNOSIS — M25.561 ACUTE PAIN OF RIGHT KNEE: ICD-10-CM

## 2022-05-26 DIAGNOSIS — M25.512 ACUTE PAIN OF LEFT SHOULDER: ICD-10-CM

## 2022-05-26 DIAGNOSIS — M17.0 BILATERAL PRIMARY OSTEOARTHRITIS OF KNEE: Primary | ICD-10-CM

## 2022-05-26 DIAGNOSIS — M75.42 IMPINGEMENT SYNDROME OF LEFT SHOULDER: ICD-10-CM

## 2022-05-26 PROCEDURE — 3008F PR BODY MASS INDEX (BMI) DOCUMENTED: ICD-10-PCS | Mod: CPTII,S$GLB,, | Performed by: NURSE PRACTITIONER

## 2022-05-26 PROCEDURE — 73560 X-RAY EXAM OF KNEE 1 OR 2: CPT | Mod: 26,LT,, | Performed by: RADIOLOGY

## 2022-05-26 PROCEDURE — 73560 XR KNEE ORTHO RIGHT: ICD-10-PCS | Mod: 26,LT,, | Performed by: RADIOLOGY

## 2022-05-26 PROCEDURE — 73030 XR SHOULDER COMPLETE 2 OR MORE VIEWS LEFT: ICD-10-PCS | Mod: 26,LT,, | Performed by: RADIOLOGY

## 2022-05-26 PROCEDURE — 1160F RVW MEDS BY RX/DR IN RCRD: CPT | Mod: CPTII,S$GLB,, | Performed by: NURSE PRACTITIONER

## 2022-05-26 PROCEDURE — 73030 X-RAY EXAM OF SHOULDER: CPT | Mod: TC,PO,LT

## 2022-05-26 PROCEDURE — 1160F PR REVIEW ALL MEDS BY PRESCRIBER/CLIN PHARMACIST DOCUMENTED: ICD-10-PCS | Mod: CPTII,S$GLB,, | Performed by: NURSE PRACTITIONER

## 2022-05-26 PROCEDURE — 3008F BODY MASS INDEX DOCD: CPT | Mod: CPTII,S$GLB,, | Performed by: NURSE PRACTITIONER

## 2022-05-26 PROCEDURE — 1159F MED LIST DOCD IN RCRD: CPT | Mod: CPTII,S$GLB,, | Performed by: NURSE PRACTITIONER

## 2022-05-26 PROCEDURE — 20610 LARGE JOINT ASPIRATION/INJECTION: L SUBACROMIAL BURSA: ICD-10-PCS | Mod: LT,S$GLB,, | Performed by: NURSE PRACTITIONER

## 2022-05-26 PROCEDURE — 99999 PR PBB SHADOW E&M-EST. PATIENT-LVL IV: ICD-10-PCS | Mod: PBBFAC,,, | Performed by: NURSE PRACTITIONER

## 2022-05-26 PROCEDURE — 73562 X-RAY EXAM OF KNEE 3: CPT | Mod: 26,RT,, | Performed by: RADIOLOGY

## 2022-05-26 PROCEDURE — 99214 PR OFFICE/OUTPT VISIT, EST, LEVL IV, 30-39 MIN: ICD-10-PCS | Mod: 25,S$GLB,, | Performed by: NURSE PRACTITIONER

## 2022-05-26 PROCEDURE — 73562 XR KNEE ORTHO RIGHT: ICD-10-PCS | Mod: 26,RT,, | Performed by: RADIOLOGY

## 2022-05-26 PROCEDURE — 99214 OFFICE O/P EST MOD 30 MIN: CPT | Mod: 25,S$GLB,, | Performed by: NURSE PRACTITIONER

## 2022-05-26 PROCEDURE — 73030 X-RAY EXAM OF SHOULDER: CPT | Mod: 26,LT,, | Performed by: RADIOLOGY

## 2022-05-26 PROCEDURE — 1159F PR MEDICATION LIST DOCUMENTED IN MEDICAL RECORD: ICD-10-PCS | Mod: CPTII,S$GLB,, | Performed by: NURSE PRACTITIONER

## 2022-05-26 PROCEDURE — 73560 X-RAY EXAM OF KNEE 1 OR 2: CPT | Mod: TC,PO,LT

## 2022-05-26 PROCEDURE — 99999 PR PBB SHADOW E&M-EST. PATIENT-LVL IV: CPT | Mod: PBBFAC,,, | Performed by: NURSE PRACTITIONER

## 2022-05-26 PROCEDURE — 20610 DRAIN/INJ JOINT/BURSA W/O US: CPT | Mod: LT,S$GLB,, | Performed by: NURSE PRACTITIONER

## 2022-05-26 RX ORDER — METHOCARBAMOL 750 MG/1
750 TABLET, FILM COATED ORAL 4 TIMES DAILY PRN
Qty: 40 TABLET | Refills: 2 | Status: SHIPPED | OUTPATIENT
Start: 2022-05-26 | End: 2022-06-25

## 2022-05-26 RX ADMIN — TRIAMCINOLONE ACETONIDE 40 MG: 40 INJECTION, SUSPENSION INTRA-ARTICULAR; INTRAMUSCULAR at 03:05

## 2022-05-26 NOTE — PROGRESS NOTES
Chief Complaint   Patient presents with    Left Shoulder - Swelling, Pain    Right Knee - Pain, Swelling       HPI:    This is a 36 y.o. who presents today complaining of left shoulder pain for the past few weeks after no injury or trauma. Pain is aching, burning, sharp and dull. No numbness or tingling. No associated signs or symptoms.      Past Medical History:   Diagnosis Date    Anxiety and depression     Constipation     Encounter for blood transfusion     Endometriosis     GERD (gastroesophageal reflux disease)     IBS (irritable bowel syndrome)     Neuromuscular disorder     nerve damage of right foot after hammertoe surgery    PCOS (polycystic ovarian syndrome)     Sleep disturbance     Thyroid disease     Vaginal prolapse       Past Surgical History:   Procedure Laterality Date    bladder suspension      COLONOSCOPY  ~24 years old    Dr. Neri, colon polyps per patient report    COLONOSCOPY N/A 8/21/2020    Procedure: COLONOSCOPY;  Surgeon: Yessi Pal MD;  Location: [facility] ENDO;  Service: Endoscopy;  Laterality: N/A;    DIAGNOSTIC LAPAROSCOPY N/A 1/6/2022    Procedure: LAPAROSCOPY, DIAGNOSTIC;  Surgeon: Jack Sargent MD;  Location: [facility] OR;  Service: OB/GYN;  Laterality: N/A;    dx lap      ESOPHAGOGASTRODUODENOSCOPY N/A 1/22/2020    Procedure: EGD (ESOPHAGOGASTRODUODENOSCOPY);  Surgeon: Yessi Pal MD;  Location: [facility] ENDO;  Service: Endoscopy;  Laterality: N/A;    hammer toe Right 11/19/2015    heavenly toe repeair with callus filing    LAPAROSCOPIC SUSPENSION OF UTEROSACRAL LIGAMENT Bilateral 1/6/2022    Procedure: SUSPENSION, LIGAMENT, UTEROSACRAL, LAPAROSCOPIC;  Surgeon: Jack Sargent MD;  Location: [facility] OR;  Service: OB/GYN;  Laterality: Bilateral;    LAPAROSCOPY  ~2012    done for infertility concern    TONSILLECTOMY        Current Outpatient Medications on File Prior to Visit   Medication Sig Dispense Refill    albuterol (PROVENTIL/VENTOLIN HFA) 90  mcg/actuation inhaler Inhale 1-2 puffs into the lungs every 6 (six) hours as needed. Rescue 18 g 1    cholecalciferol, vitamin D3, (VITAMIN D3) 50 mcg (2,000 unit) Tab Take 2,000 Units by mouth once daily.      CITRANATAL HARMONY, IRON FUM, 27 mg iron-1 mg -50 mg-260 mg Cap TAKE 1 CAPSULE BY MOUTH ONCE DAILY. 100 capsule 0    fluocinonide (LIDEX) 0.05 % external solution APPLY THIN FILM TO SCALP EVERY NIGHT AT BEDTIME AS NEEDED FOR ITCHING OR SCALING 60 mL 0    HYDROcodone-acetaminophen (NORCO) 5-325 mg per tablet Take 1 tablet by mouth 4 (four) times daily as needed.      hydrOXYzine HCL (ATARAX) 25 MG tablet Take 1 tablet (25 mg total) by mouth 3 (three) times daily as needed for Anxiety. 90 tablet 3    ipratropium (ATROVENT) 42 mcg (0.06 %) nasal spray 2 sprays by Nasal route 3 (three) times daily. 15 mL 6    ketoconazole (NIZORAL) 2 % shampoo LATHER SCALP, LET SIT 5 MIN, RINSE WELL. USE 2X WEEKLY 120 mL 2    levothyroxine (SYNTHROID) 75 MCG tablet TAKE 1 TABLET (75 MCG TOTAL) BY MOUTH BEFORE BREAKFAST DAILY 90 tablet 2    LINZESS 145 mcg Cap capsule TAKE 1 CAPSULE (145 MCG TOTAL) BY MOUTH BEFORE BREAKFAST. 30 capsule 1    MARLISSA, 28, 0.15-0.03 mg per tablet TAKE 1 TABLET BY MOUTH EVERY DAY 84 tablet 0    naproxen sodium (ANAPROX) 550 MG tablet Take 1 tablet (550 mg total) by mouth 2 (two) times daily with meals. 20 tablet 0    omeprazole (PRILOSEC) 40 MG capsule TAKE 1 CAPSULE (40 MG TOTAL) BY MOUTH BEFORE BREAKFAST. 90 capsule 1    prazosin (MINIPRESS) 1 MG Cap Take 1 capsule (1 mg total) by mouth every evening. 90 capsule 1    prednisoLONE acetate (PRED FORTE) 1 % DrpS PLACE  1 DROP INTO OD QID FOR 5 DAYS      sertraline (ZOLOFT) 50 MG tablet Take 1 tablet (50 mg total) by mouth once daily. 90 tablet 1    oxyCODONE-acetaminophen (PERCOCET) 5-325 mg per tablet Take 1 tablet by mouth every 4 (four) hours as needed for Pain. (Patient not taking: Reported on 5/26/2022) 20 tablet 0     No current  facility-administered medications on file prior to visit.      Review of patient's allergies indicates:  No Known Allergies   Family History not pertinent   Social History     Socioeconomic History    Marital status: Single   Tobacco Use    Smoking status: Never Smoker    Smokeless tobacco: Never Used   Substance and Sexual Activity    Alcohol use: No    Drug use: No    Sexual activity: Yes     Partners: Male     Birth control/protection: OCP     Comment: OCP helps regulate cycle         Review of Systems:   Constitutional:  Denies fever or chills    Eyes:  Denies change in visual acuity    HENT:  Denies nasal congestion or sore throat    Respiratory:  Denies cough or shortness of breath    Cardiovascular:  Denies chest pain or edema    GI:  Denies abdominal pain, nausea, vomiting, bloody stools or diarrhea    :  Denies dysuria    Integument:  Denies rash    Neurologic:  Denies headache, focal weakness or sensory changes    Endocrine:  Denies polyuria or polydipsia    Lymphatic:  Denies swollen glands    Psychiatric:  Denies depression or anxiety       Physical Exam:    Constitutional:  Well developed, well nourished, no acute distress, non-toxic appearance    Integument:  Well hydrated, no rash    Lymphatic:  No lymphadenopathy noted    Neurologic:  Alert & oriented x 3,     Psychiatric:  Speech and behavior appropriate    Gi: abdomen soft  Eyes: EOMI     Bilateral Shoulder Exam    right Shoulder Exam   Shoulder exam performed same as contralateral side and is normal.    left Shoulder Exam   Tenderness   Shoulder tenderness location: diffusely about shoulder.    Range of Motion   Forward Flexion: abnormal   External Rotation: abnormal     Muscle Strength   Supraspinatus: 4/5     Tests   Hawkin's test: positive  Impingement: positive    Other   Erythema: absent  Sensation: normal  Pulse: present      X-rays were performed today, personally reviewed by me and findings discussed with the patient.   2 views of  the left shoulder show ac joint arthrosis.           Bilateral primary osteoarthritis of knee  -     Ambulatory referral/consult to Physical/Occupational Therapy; Future; Expected date: 06/02/2022  -     methocarbamoL (ROBAXIN) 750 MG Tab; Take 1 tablet (750 mg total) by mouth 4 (four) times daily as needed.  Dispense: 40 tablet; Refill: 2    Impingement syndrome of left shoulder  -     Ambulatory referral/consult to Physical/Occupational Therapy; Future; Expected date: 06/02/2022  -     methocarbamoL (ROBAXIN) 750 MG Tab; Take 1 tablet (750 mg total) by mouth 4 (four) times daily as needed.  Dispense: 40 tablet; Refill: 2      Using an aseptic technique, I injected 5 cc of lidocaine 1% without and 1 cc of kenalog 40mg into the left Shoulder. The patient tolerated this well. I will have them return to clinic in 6 weeks or as needed. .

## 2022-05-27 RX ORDER — TRIAMCINOLONE ACETONIDE 40 MG/ML
40 INJECTION, SUSPENSION INTRA-ARTICULAR; INTRAMUSCULAR
Status: DISCONTINUED | OUTPATIENT
Start: 2022-05-26 | End: 2022-05-27 | Stop reason: HOSPADM

## 2022-05-27 NOTE — PROCEDURES
Large Joint Aspiration/Injection: L subacromial bursa    Date/Time: 5/26/2022 3:00 PM  Performed by: SILVERIO Ansari  Authorized by: SILVERIO Ansari     Consent Done?:  Yes (Verbal)  Indications:  Pain  Timeout: prior to procedure the correct patient, procedure, and site was verified    Prep: patient was prepped and draped in usual sterile fashion      Local anesthesia used?: Yes    Local anesthetic:  Lidocaine 1% without epinephrine  Anesthetic total (ml):  5      Details:  Needle Size:  21 G  Ultrasonic Guidance for needle placement?: No    Approach:  Posterior  Location:  Shoulder  Site:  L subacromial bursa  Medications:  40 mg triamcinolone acetonide 40 mg/mL  Patient tolerance:  Patient tolerated the procedure well with no immediate complications

## 2022-05-31 ENCOUNTER — PATIENT MESSAGE (OUTPATIENT)
Dept: OBSTETRICS AND GYNECOLOGY | Facility: CLINIC | Age: 37
End: 2022-05-31
Payer: COMMERCIAL

## 2022-06-10 ENCOUNTER — OFFICE VISIT (OUTPATIENT)
Dept: FAMILY MEDICINE | Facility: CLINIC | Age: 37
End: 2022-06-10
Payer: COMMERCIAL

## 2022-06-10 VITALS
HEART RATE: 82 BPM | HEIGHT: 69 IN | OXYGEN SATURATION: 98 % | SYSTOLIC BLOOD PRESSURE: 118 MMHG | BODY MASS INDEX: 32.16 KG/M2 | TEMPERATURE: 99 F | DIASTOLIC BLOOD PRESSURE: 78 MMHG | WEIGHT: 217.13 LBS

## 2022-06-10 DIAGNOSIS — E28.2 PCOS (POLYCYSTIC OVARIAN SYNDROME): ICD-10-CM

## 2022-06-10 DIAGNOSIS — E66.9 OBESITY (BMI 30-39.9): Primary | ICD-10-CM

## 2022-06-10 DIAGNOSIS — N92.6 IRREGULAR MENSES: ICD-10-CM

## 2022-06-10 DIAGNOSIS — E03.9 HYPOTHYROIDISM, UNSPECIFIED TYPE: ICD-10-CM

## 2022-06-10 DIAGNOSIS — R63.5 WEIGHT GAIN: ICD-10-CM

## 2022-06-10 PROCEDURE — 3008F PR BODY MASS INDEX (BMI) DOCUMENTED: ICD-10-PCS | Mod: CPTII,S$GLB,, | Performed by: NURSE PRACTITIONER

## 2022-06-10 PROCEDURE — 3078F PR MOST RECENT DIASTOLIC BLOOD PRESSURE < 80 MM HG: ICD-10-PCS | Mod: CPTII,S$GLB,, | Performed by: NURSE PRACTITIONER

## 2022-06-10 PROCEDURE — 99999 PR PBB SHADOW E&M-EST. PATIENT-LVL IV: CPT | Mod: PBBFAC,,, | Performed by: NURSE PRACTITIONER

## 2022-06-10 PROCEDURE — 3078F DIAST BP <80 MM HG: CPT | Mod: CPTII,S$GLB,, | Performed by: NURSE PRACTITIONER

## 2022-06-10 PROCEDURE — 3008F BODY MASS INDEX DOCD: CPT | Mod: CPTII,S$GLB,, | Performed by: NURSE PRACTITIONER

## 2022-06-10 PROCEDURE — 3074F PR MOST RECENT SYSTOLIC BLOOD PRESSURE < 130 MM HG: ICD-10-PCS | Mod: CPTII,S$GLB,, | Performed by: NURSE PRACTITIONER

## 2022-06-10 PROCEDURE — 99214 PR OFFICE/OUTPT VISIT, EST, LEVL IV, 30-39 MIN: ICD-10-PCS | Mod: S$GLB,,, | Performed by: NURSE PRACTITIONER

## 2022-06-10 PROCEDURE — 99214 OFFICE O/P EST MOD 30 MIN: CPT | Mod: S$GLB,,, | Performed by: NURSE PRACTITIONER

## 2022-06-10 PROCEDURE — 3074F SYST BP LT 130 MM HG: CPT | Mod: CPTII,S$GLB,, | Performed by: NURSE PRACTITIONER

## 2022-06-10 PROCEDURE — 99999 PR PBB SHADOW E&M-EST. PATIENT-LVL IV: ICD-10-PCS | Mod: PBBFAC,,, | Performed by: NURSE PRACTITIONER

## 2022-06-10 RX ORDER — METFORMIN HYDROCHLORIDE 500 MG/1
500 TABLET, EXTENDED RELEASE ORAL
Qty: 90 TABLET | Refills: 3 | Status: SHIPPED | OUTPATIENT
Start: 2022-06-10 | End: 2022-08-22

## 2022-06-10 RX ORDER — TIZANIDINE 2 MG/1
2 TABLET ORAL NIGHTLY
Qty: 30 TABLET | Refills: 0 | Status: SHIPPED | OUTPATIENT
Start: 2022-06-10 | End: 2022-07-11

## 2022-06-10 NOTE — PATIENT INSTRUCTIONS
Alphonse Diaz,     If you are due for any health screening(s) below please notify me so we can arrange them to be ordered and scheduled to maintain your health. Most healthy patients complete it. Don't lose out on improving your health.     Health Maintenance   Topic Date Due    TETANUS VACCINE  01/22/2023    Hepatitis C Screening  Completed    Lipid Panel  Completed

## 2022-06-10 NOTE — PROGRESS NOTES
This dictation has been generated using Modal Fluency Dictation some phonetic errors may occur. Please contact author for clarification if needed.     Problem List Items Addressed This Visit     PCOS (polycystic ovarian syndrome)    Relevant Orders    LUTEINIZING HORMONE    FOLLICLE STIMULATING HORMONE    INSULIN, RANDOM    Hypothyroidism    Overview     History of Grave's disease, then started on thyroid replacement with pregnancy.            Relevant Orders    T4, Free    TSH    THYROID PEROXIDASE ANTIBODY      Other Visit Diagnoses     Obesity (BMI 30-39.9)    -  Primary    Relevant Orders    CBC Auto Differential    Comprehensive Metabolic Panel    Hemoglobin A1C    T4, Free    TSH    CORTISOL, 8AM    LUTEINIZING HORMONE    FOLLICLE STIMULATING HORMONE    ESTROGENS, TOTAL    US Abdomen Complete    THYROID PEROXIDASE ANTIBODY    Weight gain        Relevant Orders    Hemoglobin A1C    T4, Free    TSH    CORTISOL, 8AM    INSULIN, RANDOM    Irregular menses        Relevant Orders    CORTISOL, 8AM    LUTEINIZING HORMONE    FOLLICLE STIMULATING HORMONE    ESTROGENS, TOTAL          Orders Placed This Encounter    US Abdomen Complete    CBC Auto Differential    Comprehensive Metabolic Panel    Hemoglobin A1C    T4, Free    TSH    CORTISOL, 8AM    LUTEINIZING HORMONE    FOLLICLE STIMULATING HORMONE    ESTROGENS, TOTAL    THYROID PEROXIDASE ANTIBODY    INSULIN, RANDOM    metFORMIN (GLUCOPHAGE-XR) 500 MG ER 24hr tablet    tiZANidine (ZANAFLEX) 2 MG tablet     PCOS update labs as above  Hypothyroidism update labs as above  Weight gain check blood sugar average update TSH check cortisol all and insulin levels.  Consider PCOS hypothyroid contributing.  Add metformin for weight loss.  Irregular menses consider PCOS contributing.  Also check other labs as above.  I will review and address accordingly.  Back spasm left shoulder tizanidine as above.  Discussed risk of somnolence.  Continue follow-up with  Orthopedics.    Follow up in about 2 weeks (around 6/24/2022).    ________________________________________________________________  ________________________________________________________________      Chief Complaint   Patient presents with    Bloated    Leg Swelling     History of present illness  This 36 y.o. presents today for complaint of weight gain and back pain.  Patient notes weight gain.  She has been trying to lose weight.  Notes working on her diet without improvement.  Notes increase in abdominal girth.  She is concerned about her hormones.  She did have a uterine prolapse.  She does see Dr. Sargent next week.  Notes irregular menses.  Review of systems  No fever chills  No lumbar radiculopathy  No bowel or bladder changes    Past Medical History:   Diagnosis Date    Anxiety and depression     Constipation     Encounter for blood transfusion     Endometriosis     GERD (gastroesophageal reflux disease)     IBS (irritable bowel syndrome)     Neuromuscular disorder     nerve damage of right foot after hammertoe surgery    PCOS (polycystic ovarian syndrome)     Sleep disturbance     Thyroid disease     Vaginal prolapse        Past Surgical History:   Procedure Laterality Date    bladder suspension      COLONOSCOPY  ~24 years old    Dr. Neri, colon polyps per patient report    COLONOSCOPY N/A 8/21/2020    Procedure: COLONOSCOPY;  Surgeon: Yessi Pal MD;  Location: [facility];  Service: Endoscopy;  Laterality: N/A;    DIAGNOSTIC LAPAROSCOPY N/A 1/6/2022    Procedure: LAPAROSCOPY, DIAGNOSTIC;  Surgeon: Jack Sargent MD;  Location: [facility] OR;  Service: OB/GYN;  Laterality: N/A;    dx lap      ESOPHAGOGASTRODUODENOSCOPY N/A 1/22/2020    Procedure: EGD (ESOPHAGOGASTRODUODENOSCOPY);  Surgeon: Yessi Pal MD;  Location: [facility];  Service: Endoscopy;  Laterality: N/A;    hammer toe Right 11/19/2015    heavenly toe repeair with callus filing    LAPAROSCOPIC SUSPENSION OF  UTEROSACRAL LIGAMENT Bilateral 1/6/2022    Procedure: SUSPENSION, LIGAMENT, UTEROSACRAL, LAPAROSCOPIC;  Surgeon: Jack Sargent MD;  Location: [facility] OR;  Service: OB/GYN;  Laterality: Bilateral;    LAPAROSCOPY  ~2012    done for infertility concern    TONSILLECTOMY         Family History   Problem Relation Age of Onset    Thyroid disease Mother         hyperthyroidism    Hypertension Mother     Hypertension Sister     Heart disease Maternal Grandmother     Ovarian cancer Neg Hx     Breast cancer Neg Hx     Eczema Neg Hx     Lupus Neg Hx     Psoriasis Neg Hx     Melanoma Neg Hx     Cancer Neg Hx     Colon cancer Neg Hx     Crohn's disease Neg Hx     Ulcerative colitis Neg Hx     Stomach cancer Neg Hx     Esophageal cancer Neg Hx        Social History     Socioeconomic History    Marital status: Single   Tobacco Use    Smoking status: Never Smoker    Smokeless tobacco: Never Used   Substance and Sexual Activity    Alcohol use: No    Drug use: No    Sexual activity: Yes     Partners: Male     Birth control/protection: OCP     Comment: OCP helps regulate cycle       Current Outpatient Medications   Medication Sig Dispense Refill    albuterol (PROVENTIL/VENTOLIN HFA) 90 mcg/actuation inhaler Inhale 1-2 puffs into the lungs every 6 (six) hours as needed. Rescue 18 g 1    cholecalciferol, vitamin D3, (VITAMIN D3) 50 mcg (2,000 unit) Tab Take 2,000 Units by mouth once daily.      CITRANATAL HARMONY, IRON FUM, 27 mg iron-1 mg -50 mg-260 mg Cap TAKE 1 CAPSULE BY MOUTH ONCE DAILY. 100 capsule 0    fluocinonide (LIDEX) 0.05 % external solution APPLY THIN FILM TO SCALP EVERY NIGHT AT BEDTIME AS NEEDED FOR ITCHING OR SCALING 60 mL 0    HYDROcodone-acetaminophen (NORCO) 5-325 mg per tablet Take 1 tablet by mouth 4 (four) times daily as needed.      hydrOXYzine HCL (ATARAX) 25 MG tablet Take 1 tablet (25 mg total) by mouth 3 (three) times daily as needed for Anxiety. 90 tablet 3    ipratropium  (ATROVENT) 42 mcg (0.06 %) nasal spray 2 sprays by Nasal route 3 (three) times daily. 15 mL 6    ketoconazole (NIZORAL) 2 % shampoo LATHER SCALP, LET SIT 5 MIN, RINSE WELL. USE 2X WEEKLY 120 mL 2    levothyroxine (SYNTHROID) 75 MCG tablet TAKE 1 TABLET (75 MCG TOTAL) BY MOUTH BEFORE BREAKFAST DAILY 90 tablet 2    LINZESS 145 mcg Cap capsule TAKE 1 CAPSULE (145 MCG TOTAL) BY MOUTH BEFORE BREAKFAST. 30 capsule 1    MARLISSA, 28, 0.15-0.03 mg per tablet TAKE 1 TABLET BY MOUTH EVERY DAY 84 tablet 0    metFORMIN (GLUCOPHAGE-XR) 500 MG ER 24hr tablet Take 1 tablet (500 mg total) by mouth daily with breakfast. 90 tablet 3    methocarbamoL (ROBAXIN) 750 MG Tab Take 1 tablet (750 mg total) by mouth 4 (four) times daily as needed. 40 tablet 2    naproxen sodium (ANAPROX) 550 MG tablet Take 1 tablet (550 mg total) by mouth 2 (two) times daily with meals. 20 tablet 0    omeprazole (PRILOSEC) 40 MG capsule TAKE 1 CAPSULE (40 MG TOTAL) BY MOUTH BEFORE BREAKFAST. 90 capsule 1    oxyCODONE-acetaminophen (PERCOCET) 5-325 mg per tablet Take 1 tablet by mouth every 4 (four) hours as needed for Pain. (Patient not taking: Reported on 5/26/2022) 20 tablet 0    prazosin (MINIPRESS) 1 MG Cap Take 1 capsule (1 mg total) by mouth every evening. 90 capsule 1    prednisoLONE acetate (PRED FORTE) 1 % DrpS PLACE  1 DROP INTO OD QID FOR 5 DAYS      sertraline (ZOLOFT) 50 MG tablet Take 1 tablet (50 mg total) by mouth once daily. 90 tablet 1    tiZANidine (ZANAFLEX) 2 MG tablet Take 1 tablet (2 mg total) by mouth every evening. 30 tablet 0     No current facility-administered medications for this visit.       Review of patient's allergies indicates:  No Known Allergies    Physical examination  Vitals Reviewed\  Vitals:    06/10/22 1538   BP: 118/78   Pulse: 82   Temp: 98.8 °F (37.1 °C)     Body mass index is 32.07 kg/m². .FLOWAMB[14    Weight: 98.5 kg (217 lb 2.5 oz)    Gen. Well-dressed well-nourished would  Skin warm dry and intact.   No rashes noted.  HEENT.  Pharynx is unremarkable.  No maxillary or frontal sinus tenderness when percussed.    Neck is supple without adenopathy  Chest.  Respirations are even unlabored.  Lungs are clear to auscultation.  Cardiac regular rate and rhythm.  No chest wall adenopathy noted.  Abdomen is soft and not distended.  Bowel sounds are present.  No tenderness during palpation of the abdomen.  No Hepatosplenomegaly noted.  No hernia noted.  No CVA tenderness to percussion.    Neuro. Awake alert oriented x4.  Normal judgment and cognition noted.  Extremities no clubbing cyanosis or edema noted.     Call or return to clinic prn if these symptoms worsen or fail to improve as anticipated.

## 2022-06-11 ENCOUNTER — LAB VISIT (OUTPATIENT)
Dept: LAB | Facility: HOSPITAL | Age: 37
End: 2022-06-11
Attending: NURSE PRACTITIONER
Payer: COMMERCIAL

## 2022-06-11 DIAGNOSIS — E03.9 HYPOTHYROIDISM, UNSPECIFIED TYPE: ICD-10-CM

## 2022-06-11 DIAGNOSIS — E66.9 OBESITY (BMI 30-39.9): ICD-10-CM

## 2022-06-11 DIAGNOSIS — N92.6 IRREGULAR MENSES: ICD-10-CM

## 2022-06-11 DIAGNOSIS — E28.2 PCOS (POLYCYSTIC OVARIAN SYNDROME): ICD-10-CM

## 2022-06-11 DIAGNOSIS — R63.5 WEIGHT GAIN: ICD-10-CM

## 2022-06-11 LAB
ALBUMIN SERPL BCP-MCNC: 3.3 G/DL (ref 3.5–5.2)
ALP SERPL-CCNC: 54 U/L (ref 55–135)
ALT SERPL W/O P-5'-P-CCNC: 14 U/L (ref 10–44)
ANION GAP SERPL CALC-SCNC: 11 MMOL/L (ref 8–16)
AST SERPL-CCNC: 14 U/L (ref 10–40)
BASOPHILS # BLD AUTO: 0.06 K/UL (ref 0–0.2)
BASOPHILS NFR BLD: 1 % (ref 0–1.9)
BILIRUB SERPL-MCNC: 0.6 MG/DL (ref 0.1–1)
BUN SERPL-MCNC: 12 MG/DL (ref 6–20)
CALCIUM SERPL-MCNC: 9.1 MG/DL (ref 8.7–10.5)
CHLORIDE SERPL-SCNC: 107 MMOL/L (ref 95–110)
CO2 SERPL-SCNC: 21 MMOL/L (ref 23–29)
CORTIS SERPL-MCNC: 13.8 UG/DL (ref 4.3–22.4)
CREAT SERPL-MCNC: 1 MG/DL (ref 0.5–1.4)
DIFFERENTIAL METHOD: ABNORMAL
EOSINOPHIL # BLD AUTO: 0 K/UL (ref 0–0.5)
EOSINOPHIL NFR BLD: 0.7 % (ref 0–8)
ERYTHROCYTE [DISTWIDTH] IN BLOOD BY AUTOMATED COUNT: 12.9 % (ref 11.5–14.5)
EST. GFR  (AFRICAN AMERICAN): >60 ML/MIN/1.73 M^2
EST. GFR  (NON AFRICAN AMERICAN): >60 ML/MIN/1.73 M^2
ESTIMATED AVG GLUCOSE: 85 MG/DL (ref 68–131)
FSH SERPL-ACNC: 2.34 MIU/ML
GLUCOSE SERPL-MCNC: 85 MG/DL (ref 70–110)
HBA1C MFR BLD: 4.6 % (ref 4–5.6)
HCT VFR BLD AUTO: 35.9 % (ref 37–48.5)
HGB BLD-MCNC: 11.2 G/DL (ref 12–16)
IMM GRANULOCYTES # BLD AUTO: 0.01 K/UL (ref 0–0.04)
IMM GRANULOCYTES NFR BLD AUTO: 0.2 % (ref 0–0.5)
INSULIN COLLECTION INTERVAL: NORMAL
INSULIN SERPL-ACNC: 12.2 UU/ML
LH SERPL-ACNC: 1.4 MIU/ML
LYMPHOCYTES # BLD AUTO: 1.7 K/UL (ref 1–4.8)
LYMPHOCYTES NFR BLD: 27.9 % (ref 18–48)
MCH RBC QN AUTO: 28.1 PG (ref 27–31)
MCHC RBC AUTO-ENTMCNC: 31.2 G/DL (ref 32–36)
MCV RBC AUTO: 90 FL (ref 82–98)
MONOCYTES # BLD AUTO: 0.4 K/UL (ref 0.3–1)
MONOCYTES NFR BLD: 6.5 % (ref 4–15)
NEUTROPHILS # BLD AUTO: 3.9 K/UL (ref 1.8–7.7)
NEUTROPHILS NFR BLD: 63.7 % (ref 38–73)
NRBC BLD-RTO: 0 /100 WBC
PLATELET # BLD AUTO: 280 K/UL (ref 150–450)
PMV BLD AUTO: 10.1 FL (ref 9.2–12.9)
POTASSIUM SERPL-SCNC: 3.9 MMOL/L (ref 3.5–5.1)
PROT SERPL-MCNC: 7 G/DL (ref 6–8.4)
RBC # BLD AUTO: 3.98 M/UL (ref 4–5.4)
SODIUM SERPL-SCNC: 139 MMOL/L (ref 136–145)
T4 FREE SERPL-MCNC: 1.02 NG/DL (ref 0.71–1.51)
THYROPEROXIDASE IGG SERPL-ACNC: 61.4 IU/ML
TSH SERPL DL<=0.005 MIU/L-ACNC: 5.56 UIU/ML (ref 0.4–4)
WBC # BLD AUTO: 6.13 K/UL (ref 3.9–12.7)

## 2022-06-11 PROCEDURE — 83525 ASSAY OF INSULIN: CPT | Performed by: NURSE PRACTITIONER

## 2022-06-11 PROCEDURE — 84439 ASSAY OF FREE THYROXINE: CPT | Performed by: NURSE PRACTITIONER

## 2022-06-11 PROCEDURE — 82672 ASSAY OF ESTROGEN: CPT | Performed by: NURSE PRACTITIONER

## 2022-06-11 PROCEDURE — 83036 HEMOGLOBIN GLYCOSYLATED A1C: CPT | Performed by: NURSE PRACTITIONER

## 2022-06-11 PROCEDURE — 83002 ASSAY OF GONADOTROPIN (LH): CPT | Performed by: NURSE PRACTITIONER

## 2022-06-11 PROCEDURE — 83001 ASSAY OF GONADOTROPIN (FSH): CPT | Performed by: NURSE PRACTITIONER

## 2022-06-11 PROCEDURE — 85025 COMPLETE CBC W/AUTO DIFF WBC: CPT | Performed by: NURSE PRACTITIONER

## 2022-06-11 PROCEDURE — 80053 COMPREHEN METABOLIC PANEL: CPT | Performed by: NURSE PRACTITIONER

## 2022-06-11 PROCEDURE — 86376 MICROSOMAL ANTIBODY EACH: CPT | Performed by: NURSE PRACTITIONER

## 2022-06-11 PROCEDURE — 82533 TOTAL CORTISOL: CPT | Performed by: NURSE PRACTITIONER

## 2022-06-11 PROCEDURE — 84443 ASSAY THYROID STIM HORMONE: CPT | Performed by: NURSE PRACTITIONER

## 2022-06-15 ENCOUNTER — OFFICE VISIT (OUTPATIENT)
Dept: OBSTETRICS AND GYNECOLOGY | Facility: CLINIC | Age: 37
End: 2022-06-15
Payer: COMMERCIAL

## 2022-06-15 ENCOUNTER — PATIENT MESSAGE (OUTPATIENT)
Dept: FAMILY MEDICINE | Facility: CLINIC | Age: 37
End: 2022-06-15
Payer: COMMERCIAL

## 2022-06-15 ENCOUNTER — HOSPITAL ENCOUNTER (OUTPATIENT)
Dept: RADIOLOGY | Facility: HOSPITAL | Age: 37
Discharge: HOME | End: 2022-06-15
Attending: NURSE PRACTITIONER
Payer: COMMERCIAL

## 2022-06-15 VITALS
DIASTOLIC BLOOD PRESSURE: 82 MMHG | BODY MASS INDEX: 31.91 KG/M2 | SYSTOLIC BLOOD PRESSURE: 112 MMHG | WEIGHT: 216.06 LBS

## 2022-06-15 DIAGNOSIS — R10.2 PELVIC PAIN IN FEMALE: Primary | ICD-10-CM

## 2022-06-15 DIAGNOSIS — E66.9 OBESITY (BMI 30-39.9): ICD-10-CM

## 2022-06-15 LAB — ESTROGEN SERPL-MCNC: 79 PG/ML

## 2022-06-15 PROCEDURE — 3044F HG A1C LEVEL LT 7.0%: CPT | Mod: CPTII,S$GLB,, | Performed by: SPECIALIST

## 2022-06-15 PROCEDURE — 3074F SYST BP LT 130 MM HG: CPT | Mod: CPTII,S$GLB,, | Performed by: SPECIALIST

## 2022-06-15 PROCEDURE — 3079F PR MOST RECENT DIASTOLIC BLOOD PRESSURE 80-89 MM HG: ICD-10-PCS | Mod: CPTII,S$GLB,, | Performed by: SPECIALIST

## 2022-06-15 PROCEDURE — 99999 PR PBB SHADOW E&M-EST. PATIENT-LVL IV: CPT | Mod: PBBFAC,,, | Performed by: SPECIALIST

## 2022-06-15 PROCEDURE — 3074F PR MOST RECENT SYSTOLIC BLOOD PRESSURE < 130 MM HG: ICD-10-PCS | Mod: CPTII,S$GLB,, | Performed by: SPECIALIST

## 2022-06-15 PROCEDURE — 99499 NO LOS: ICD-10-PCS | Mod: S$GLB,,, | Performed by: SPECIALIST

## 2022-06-15 PROCEDURE — 3044F PR MOST RECENT HEMOGLOBIN A1C LEVEL <7.0%: ICD-10-PCS | Mod: CPTII,S$GLB,, | Performed by: SPECIALIST

## 2022-06-15 PROCEDURE — 76700 US ABDOMEN COMPLETE: ICD-10-PCS | Mod: 26,,, | Performed by: RADIOLOGY

## 2022-06-15 PROCEDURE — 76700 US EXAM ABDOM COMPLETE: CPT | Mod: TC

## 2022-06-15 PROCEDURE — 1159F MED LIST DOCD IN RCRD: CPT | Mod: CPTII,S$GLB,, | Performed by: SPECIALIST

## 2022-06-15 PROCEDURE — 1159F PR MEDICATION LIST DOCUMENTED IN MEDICAL RECORD: ICD-10-PCS | Mod: CPTII,S$GLB,, | Performed by: SPECIALIST

## 2022-06-15 PROCEDURE — 3079F DIAST BP 80-89 MM HG: CPT | Mod: CPTII,S$GLB,, | Performed by: SPECIALIST

## 2022-06-15 PROCEDURE — 99999 PR PBB SHADOW E&M-EST. PATIENT-LVL IV: ICD-10-PCS | Mod: PBBFAC,,, | Performed by: SPECIALIST

## 2022-06-15 PROCEDURE — 99499 UNLISTED E&M SERVICE: CPT | Mod: S$GLB,,, | Performed by: SPECIALIST

## 2022-06-15 PROCEDURE — 3008F BODY MASS INDEX DOCD: CPT | Mod: CPTII,S$GLB,, | Performed by: SPECIALIST

## 2022-06-15 PROCEDURE — 3008F PR BODY MASS INDEX (BMI) DOCUMENTED: ICD-10-PCS | Mod: CPTII,S$GLB,, | Performed by: SPECIALIST

## 2022-06-15 PROCEDURE — 76700 US EXAM ABDOM COMPLETE: CPT | Mod: 26,,, | Performed by: RADIOLOGY

## 2022-06-16 ENCOUNTER — TELEPHONE (OUTPATIENT)
Dept: FAMILY MEDICINE | Facility: CLINIC | Age: 37
End: 2022-06-16
Payer: COMMERCIAL

## 2022-06-16 ENCOUNTER — OFFICE VISIT (OUTPATIENT)
Dept: OBSTETRICS AND GYNECOLOGY | Facility: CLINIC | Age: 37
End: 2022-06-16
Payer: COMMERCIAL

## 2022-06-16 VITALS
DIASTOLIC BLOOD PRESSURE: 74 MMHG | HEIGHT: 69 IN | WEIGHT: 215.38 LBS | BODY MASS INDEX: 31.9 KG/M2 | SYSTOLIC BLOOD PRESSURE: 126 MMHG

## 2022-06-16 DIAGNOSIS — R63.5 WEIGHT GAIN: Primary | ICD-10-CM

## 2022-06-16 PROCEDURE — 99999 PR PBB SHADOW E&M-EST. PATIENT-LVL IV: ICD-10-PCS | Mod: PBBFAC,,, | Performed by: SPECIALIST

## 2022-06-16 PROCEDURE — 1159F PR MEDICATION LIST DOCUMENTED IN MEDICAL RECORD: ICD-10-PCS | Mod: CPTII,S$GLB,, | Performed by: SPECIALIST

## 2022-06-16 PROCEDURE — 3044F HG A1C LEVEL LT 7.0%: CPT | Mod: CPTII,S$GLB,, | Performed by: SPECIALIST

## 2022-06-16 PROCEDURE — 3078F DIAST BP <80 MM HG: CPT | Mod: CPTII,S$GLB,, | Performed by: SPECIALIST

## 2022-06-16 PROCEDURE — 3008F PR BODY MASS INDEX (BMI) DOCUMENTED: ICD-10-PCS | Mod: CPTII,S$GLB,, | Performed by: SPECIALIST

## 2022-06-16 PROCEDURE — 99999 PR PBB SHADOW E&M-EST. PATIENT-LVL IV: CPT | Mod: PBBFAC,,, | Performed by: SPECIALIST

## 2022-06-16 PROCEDURE — 3074F PR MOST RECENT SYSTOLIC BLOOD PRESSURE < 130 MM HG: ICD-10-PCS | Mod: CPTII,S$GLB,, | Performed by: SPECIALIST

## 2022-06-16 PROCEDURE — 1159F MED LIST DOCD IN RCRD: CPT | Mod: CPTII,S$GLB,, | Performed by: SPECIALIST

## 2022-06-16 PROCEDURE — 99213 OFFICE O/P EST LOW 20 MIN: CPT | Mod: S$GLB,,, | Performed by: SPECIALIST

## 2022-06-16 PROCEDURE — 3008F BODY MASS INDEX DOCD: CPT | Mod: CPTII,S$GLB,, | Performed by: SPECIALIST

## 2022-06-16 PROCEDURE — 3078F PR MOST RECENT DIASTOLIC BLOOD PRESSURE < 80 MM HG: ICD-10-PCS | Mod: CPTII,S$GLB,, | Performed by: SPECIALIST

## 2022-06-16 PROCEDURE — 3044F PR MOST RECENT HEMOGLOBIN A1C LEVEL <7.0%: ICD-10-PCS | Mod: CPTII,S$GLB,, | Performed by: SPECIALIST

## 2022-06-16 PROCEDURE — 3074F SYST BP LT 130 MM HG: CPT | Mod: CPTII,S$GLB,, | Performed by: SPECIALIST

## 2022-06-16 PROCEDURE — 99213 PR OFFICE/OUTPT VISIT, EST, LEVL III, 20-29 MIN: ICD-10-PCS | Mod: S$GLB,,, | Performed by: SPECIALIST

## 2022-06-16 RX ORDER — LEVOTHYROXINE SODIUM 88 UG/1
88 TABLET ORAL
Qty: 30 TABLET | Refills: 11 | Status: SHIPPED | OUTPATIENT
Start: 2022-06-16 | End: 2022-09-23

## 2022-06-16 NOTE — PROGRESS NOTES
37 yo BF presents with complaint weight gain, sluggishness, fatigue Menses q month Denies overt constipation/dierrhea, n/v, SOB, flank pain dysuria.  Recent lab reveals elevated TSH as well as Thyroid AB    Past Medical History:   Diagnosis Date    Anxiety and depression     Constipation     Encounter for blood transfusion     Endometriosis     GERD (gastroesophageal reflux disease)     IBS (irritable bowel syndrome)     Neuromuscular disorder     nerve damage of right foot after hammertoe surgery    PCOS (polycystic ovarian syndrome)     Sleep disturbance     Thyroid disease     Vaginal prolapse        Past Surgical History:   Procedure Laterality Date    bladder suspension      COLONOSCOPY  ~24 years old    Dr. Neri, colon polyps per patient report    COLONOSCOPY N/A 8/21/2020    Procedure: COLONOSCOPY;  Surgeon: Yessi Pal MD;  Location: [facility] ENDO;  Service: Endoscopy;  Laterality: N/A;    DIAGNOSTIC LAPAROSCOPY N/A 1/6/2022    Procedure: LAPAROSCOPY, DIAGNOSTIC;  Surgeon: Jack Sargent MD;  Location: [facility] OR;  Service: OB/GYN;  Laterality: N/A;    dx lap      ESOPHAGOGASTRODUODENOSCOPY N/A 1/22/2020    Procedure: EGD (ESOPHAGOGASTRODUODENOSCOPY);  Surgeon: Yessi Pal MD;  Location: [facility] ENDO;  Service: Endoscopy;  Laterality: N/A;    hammer toe Right 11/19/2015    heavenly toe repeair with callus filing    LAPAROSCOPIC SUSPENSION OF UTEROSACRAL LIGAMENT Bilateral 1/6/2022    Procedure: SUSPENSION, LIGAMENT, UTEROSACRAL, LAPAROSCOPIC;  Surgeon: Jack Sargent MD;  Location: [facility] OR;  Service: OB/GYN;  Laterality: Bilateral;    LAPAROSCOPY  ~2012    done for infertility concern    TONSILLECTOMY         Family History   Problem Relation Age of Onset    Thyroid disease Mother         hyperthyroidism    Hypertension Mother     Hypertension Sister     Heart disease Maternal Grandmother     Ovarian cancer Neg Hx     Breast cancer Neg Hx     Eczema Neg Hx     Lupus  Neg Hx     Psoriasis Neg Hx     Melanoma Neg Hx     Cancer Neg Hx     Colon cancer Neg Hx     Crohn's disease Neg Hx     Ulcerative colitis Neg Hx     Stomach cancer Neg Hx     Esophageal cancer Neg Hx        Social History     Socioeconomic History    Marital status: Single   Tobacco Use    Smoking status: Never Smoker    Smokeless tobacco: Never Used   Substance and Sexual Activity    Alcohol use: No    Drug use: No    Sexual activity: Yes     Partners: Male     Birth control/protection: OCP     Comment: OCP helps regulate cycle       Current Outpatient Medications   Medication Sig Dispense Refill    cholecalciferol, vitamin D3, (VITAMIN D3) 50 mcg (2,000 unit) Tab Take 2,000 Units by mouth once daily.      fluocinonide (LIDEX) 0.05 % external solution APPLY THIN FILM TO SCALP EVERY NIGHT AT BEDTIME AS NEEDED FOR ITCHING OR SCALING 60 mL 0    HYDROcodone-acetaminophen (NORCO) 5-325 mg per tablet Take 1 tablet by mouth 4 (four) times daily as needed.      hydrOXYzine HCL (ATARAX) 25 MG tablet Take 1 tablet (25 mg total) by mouth 3 (three) times daily as needed for Anxiety. 90 tablet 3    ipratropium (ATROVENT) 42 mcg (0.06 %) nasal spray 2 sprays by Nasal route 3 (three) times daily. 15 mL 6    ketoconazole (NIZORAL) 2 % shampoo LATHER SCALP, LET SIT 5 MIN, RINSE WELL. USE 2X WEEKLY 120 mL 2    levothyroxine (SYNTHROID) 75 MCG tablet TAKE 1 TABLET (75 MCG TOTAL) BY MOUTH BEFORE BREAKFAST DAILY 90 tablet 2    LINZESS 145 mcg Cap capsule TAKE 1 CAPSULE (145 MCG TOTAL) BY MOUTH BEFORE BREAKFAST. 30 capsule 1    MARLISSA, 28, 0.15-0.03 mg per tablet TAKE 1 TABLET BY MOUTH EVERY DAY 84 tablet 0    metFORMIN (GLUCOPHAGE-XR) 500 MG ER 24hr tablet Take 1 tablet (500 mg total) by mouth daily with breakfast. 90 tablet 3    methocarbamoL (ROBAXIN) 750 MG Tab Take 1 tablet (750 mg total) by mouth 4 (four) times daily as needed. 40 tablet 2    naproxen sodium (ANAPROX) 550 MG tablet Take 1 tablet (550  mg total) by mouth 2 (two) times daily with meals. 20 tablet 0    omeprazole (PRILOSEC) 40 MG capsule TAKE 1 CAPSULE (40 MG TOTAL) BY MOUTH BEFORE BREAKFAST. 90 capsule 1    oxyCODONE-acetaminophen (PERCOCET) 5-325 mg per tablet Take 1 tablet by mouth every 4 (four) hours as needed for Pain. 20 tablet 0    prazosin (MINIPRESS) 1 MG Cap Take 1 capsule (1 mg total) by mouth every evening. 90 capsule 1    sertraline (ZOLOFT) 50 MG tablet Take 1 tablet (50 mg total) by mouth once daily. 90 tablet 1    tiZANidine (ZANAFLEX) 2 MG tablet Take 1 tablet (2 mg total) by mouth every evening. 30 tablet 0    albuterol (PROVENTIL/VENTOLIN HFA) 90 mcg/actuation inhaler Inhale 1-2 puffs into the lungs every 6 (six) hours as needed. Rescue 18 g 1    CITRANATAL HARMONY, IRON FUM, 27 mg iron-1 mg -50 mg-260 mg Cap TAKE 1 CAPSULE BY MOUTH ONCE DAILY. (Patient not taking: No sig reported) 100 capsule 0    prednisoLONE acetate (PRED FORTE) 1 % DrpS PLACE  1 DROP INTO OD QID FOR 5 DAYS       No current facility-administered medications for this visit.       Review of patient's allergies indicates:  No Known Allergies    Review of System:   General:as above  Psychological: no depression or suicidal ideation  Breasts: no new or changing breast lumps, nipple discharge or masses.  Respiratory: no cough, shortness of breath, or wheezing  Cardiovascular: no chest pain or dyspnea on exertion  Gastrointestinal: no abdominal pain, change in bowel habits, or black or bloody stools  Genito-Urinary: no incontinence, urinary frequency/urgency or vulvar/vaginal symptoms, pelvic pain or abnormal vaginal bleeding.  Musculoskeletal: no gait disturbance or muscular weakness                                              General Appearance    A and O x 4, Cooperative, no distress   Breasts    Abdomen   Deferred  Soft, non-tender, bowel sounds active all four quadrants,  no masses, no organomegaly    Genitourinary:   External rectal exam shows no  thrombosed external hemorrhoids.   Pelvic exam was performed with patient supine.  No labial fusion.  There is no rash, lesion or injury on the right labia.  There is no rash, lesion or injury on the left labia.  No bleeding and no signs of injury around the vaginal introitus, urethra is without lesions and well supported. The cervix is visualized with no discharge, lesions or friability.  No vaginal discharge found.  No significant Cystocele, Enterocele or rectocele, and uterus well supported.  Bimanual exam:  The urethra is normal to palpation and there are no palpable vaginal wall masses.  Uterus is not deviated, not enlarged, not fixed, normal shape and not tender.  Cervix exhibits no motion tenderness.   Right adnexum displays no mass and no tenderness.  Left adnexum displays no mass and no tenderness.   Extremities: Extremities normal, atraumatic, no cyanosis or edema                     NOTE  NURSING PERSONAL PRESENT FOR ENTIRE PHYSICAL EXAM    Suspect Hashimotos and rec PCP follow up  No overt GYN etiology for above s/s  Answered all questions

## 2022-06-21 ENCOUNTER — OFFICE VISIT (OUTPATIENT)
Dept: FAMILY MEDICINE | Facility: CLINIC | Age: 37
End: 2022-06-21
Payer: COMMERCIAL

## 2022-06-21 ENCOUNTER — LAB VISIT (OUTPATIENT)
Dept: LAB | Facility: HOSPITAL | Age: 37
End: 2022-06-21
Attending: NURSE PRACTITIONER
Payer: COMMERCIAL

## 2022-06-21 VITALS
WEIGHT: 214.31 LBS | SYSTOLIC BLOOD PRESSURE: 118 MMHG | TEMPERATURE: 98 F | BODY MASS INDEX: 31.74 KG/M2 | HEART RATE: 95 BPM | DIASTOLIC BLOOD PRESSURE: 78 MMHG | HEIGHT: 69 IN | OXYGEN SATURATION: 97 %

## 2022-06-21 DIAGNOSIS — D64.9 ANEMIA, UNSPECIFIED TYPE: ICD-10-CM

## 2022-06-21 DIAGNOSIS — E06.3 HASHIMOTO'S THYROIDITIS: Primary | ICD-10-CM

## 2022-06-21 LAB
FOLATE SERPL-MCNC: 7 NG/ML (ref 4–24)
IRON SERPL-MCNC: 41 UG/DL (ref 30–160)
SATURATED IRON: 8 % (ref 20–50)
TOTAL IRON BINDING CAPACITY: 505 UG/DL (ref 250–450)
TRANSFERRIN SERPL-MCNC: 341 MG/DL (ref 200–375)
VIT B12 SERPL-MCNC: 286 PG/ML (ref 210–950)

## 2022-06-21 PROCEDURE — 3008F BODY MASS INDEX DOCD: CPT | Mod: CPTII,S$GLB,, | Performed by: NURSE PRACTITIONER

## 2022-06-21 PROCEDURE — 82607 VITAMIN B-12: CPT | Performed by: NURSE PRACTITIONER

## 2022-06-21 PROCEDURE — 3044F PR MOST RECENT HEMOGLOBIN A1C LEVEL <7.0%: ICD-10-PCS | Mod: CPTII,S$GLB,, | Performed by: NURSE PRACTITIONER

## 2022-06-21 PROCEDURE — 3074F SYST BP LT 130 MM HG: CPT | Mod: CPTII,S$GLB,, | Performed by: NURSE PRACTITIONER

## 2022-06-21 PROCEDURE — 99999 PR PBB SHADOW E&M-EST. PATIENT-LVL IV: ICD-10-PCS | Mod: PBBFAC,,, | Performed by: NURSE PRACTITIONER

## 2022-06-21 PROCEDURE — 99999 PR PBB SHADOW E&M-EST. PATIENT-LVL IV: CPT | Mod: PBBFAC,,, | Performed by: NURSE PRACTITIONER

## 2022-06-21 PROCEDURE — 1159F MED LIST DOCD IN RCRD: CPT | Mod: CPTII,S$GLB,, | Performed by: NURSE PRACTITIONER

## 2022-06-21 PROCEDURE — 99214 OFFICE O/P EST MOD 30 MIN: CPT | Mod: S$GLB,,, | Performed by: NURSE PRACTITIONER

## 2022-06-21 PROCEDURE — 1159F PR MEDICATION LIST DOCUMENTED IN MEDICAL RECORD: ICD-10-PCS | Mod: CPTII,S$GLB,, | Performed by: NURSE PRACTITIONER

## 2022-06-21 PROCEDURE — 3008F PR BODY MASS INDEX (BMI) DOCUMENTED: ICD-10-PCS | Mod: CPTII,S$GLB,, | Performed by: NURSE PRACTITIONER

## 2022-06-21 PROCEDURE — 3078F DIAST BP <80 MM HG: CPT | Mod: CPTII,S$GLB,, | Performed by: NURSE PRACTITIONER

## 2022-06-21 PROCEDURE — 99214 PR OFFICE/OUTPT VISIT, EST, LEVL IV, 30-39 MIN: ICD-10-PCS | Mod: S$GLB,,, | Performed by: NURSE PRACTITIONER

## 2022-06-21 PROCEDURE — 3078F PR MOST RECENT DIASTOLIC BLOOD PRESSURE < 80 MM HG: ICD-10-PCS | Mod: CPTII,S$GLB,, | Performed by: NURSE PRACTITIONER

## 2022-06-21 PROCEDURE — 36415 COLL VENOUS BLD VENIPUNCTURE: CPT | Mod: PO | Performed by: NURSE PRACTITIONER

## 2022-06-21 PROCEDURE — 82746 ASSAY OF FOLIC ACID SERUM: CPT | Performed by: NURSE PRACTITIONER

## 2022-06-21 PROCEDURE — 3074F PR MOST RECENT SYSTOLIC BLOOD PRESSURE < 130 MM HG: ICD-10-PCS | Mod: CPTII,S$GLB,, | Performed by: NURSE PRACTITIONER

## 2022-06-21 PROCEDURE — 3044F HG A1C LEVEL LT 7.0%: CPT | Mod: CPTII,S$GLB,, | Performed by: NURSE PRACTITIONER

## 2022-06-21 PROCEDURE — 84466 ASSAY OF TRANSFERRIN: CPT | Performed by: NURSE PRACTITIONER

## 2022-06-21 NOTE — PROGRESS NOTES
This dictation has been generated using Modal Fluency Dictation some phonetic errors may occur. Please contact author for clarification if needed.     Problem List Items Addressed This Visit    None     Visit Diagnoses     Hashimoto's thyroiditis    -  Primary    Relevant Orders    TSH    US Soft Tissue Head Neck Thyroid    Ambulatory referral/consult to Endocrinology    Anemia, unspecified type        Relevant Orders    Iron and TIBC (Completed)    Vitamin B12 (Completed)    Folate (Completed)          Orders Placed This Encounter    US Soft Tissue Head Neck Thyroid    TSH    Iron and TIBC    Vitamin B12    Folate    Ambulatory referral/consult to Endocrinology     PCOS update labs as above  Hypothyroidism TSH was elevated.  TPO elevated.  Consider Hashimoto's thyroiditis refer to endocrine.  Check ultrasound of thyroid as above.  Weight gain checked blood sugar average update TSH check cortisol all and insulin levels.  Consider PCOS hypothyroid contributing.  Add metformin for weight loss.  No PCOS.  Irregular menses consider PCOS contributing.  Also check other labs as above.  I will review and address accordingly.  Back spasm left shoulder tizanidine as above.  Discussed risk of somnolence.  Continue follow-up with Orthopedics.  Recommended prenatal vitamins for iron.  Recommended magnesium.  Follow up in about 2 months (around 8/21/2022).    ________________________________________________________________  ________________________________________________________________      Chief Complaint   Patient presents with    Follow-up     History of present illness  This 36 y.o. presents today for complaint of weight gain and back pain.  Patient notes weight gain.  She has been trying to lose weight.  Notes working on her diet without improvement.  Notes increase in abdominal girth.  She is concerned about her hormones.  She did have a uterine prolapse.  She does see Dr. Sargent next week.  Notes irregular  menses.  Review of systems  No fever chills  No lumbar radiculopathy  No bowel or bladder changes    Past Medical History:   Diagnosis Date    Anxiety and depression     Constipation     Encounter for blood transfusion     Endometriosis     GERD (gastroesophageal reflux disease)     IBS (irritable bowel syndrome)     Neuromuscular disorder     nerve damage of right foot after hammertoe surgery    PCOS (polycystic ovarian syndrome)     Sleep disturbance     Thyroid disease     Vaginal prolapse        Past Surgical History:   Procedure Laterality Date    bladder suspension      COLONOSCOPY  ~24 years old    Dr. Neri, colon polyps per patient report    COLONOSCOPY N/A 8/21/2020    Procedure: COLONOSCOPY;  Surgeon: Yessi Pal MD;  Location: [facility] ENDO;  Service: Endoscopy;  Laterality: N/A;    DIAGNOSTIC LAPAROSCOPY N/A 1/6/2022    Procedure: LAPAROSCOPY, DIAGNOSTIC;  Surgeon: Jack Sargent MD;  [facility] OR;  Service: OB/GYN;  Laterality: N/A;    dx lap      ESOPHAGOGASTRODUODENOSCOPY N/A 1/22/2020    Procedure: EGD (ESOPHAGOGASTRODUODENOSCOPY);  Surgeon: Yessi Pal MD;  Location: [facility] ENDO;  Service: Endoscopy;  Laterality: N/A;    hammer toe Right 11/19/2015    heavenly toe repeair with callus filing    LAPAROSCOPIC SUSPENSION OF UTEROSACRAL LIGAMENT Bilateral 1/6/2022    Procedure: SUSPENSION, LIGAMENT, UTEROSACRAL, LAPAROSCOPIC;  Surgeon: Jack Sargent MD;  [facility] OR;  Service: OB/GYN;  Laterality: Bilateral;    LAPAROSCOPY  ~2012    done for infertility concern    TONSILLECTOMY         Family History   Problem Relation Age of Onset    Thyroid disease Mother         hyperthyroidism    Hypertension Mother     Hypertension Sister     Heart disease Maternal Grandmother     Ovarian cancer Neg Hx     Breast cancer Neg Hx     Eczema Neg Hx     Lupus Neg Hx     Psoriasis Neg Hx     Melanoma Neg Hx     Cancer Neg Hx     Colon cancer Neg Hx     Crohn's  disease Neg Hx     Ulcerative colitis Neg Hx     Stomach cancer Neg Hx     Esophageal cancer Neg Hx        Social History     Socioeconomic History    Marital status: Single   Tobacco Use    Smoking status: Never Smoker    Smokeless tobacco: Never Used   Substance and Sexual Activity    Alcohol use: No    Drug use: No    Sexual activity: Yes     Partners: Male     Birth control/protection: OCP     Comment: OCP helps regulate cycle       Current Outpatient Medications   Medication Sig Dispense Refill    cholecalciferol, vitamin D3, (VITAMIN D3) 50 mcg (2,000 unit) Tab Take 2,000 Units by mouth once daily.      CITRANATAL HARMONY, IRON FUM, 27 mg iron-1 mg -50 mg-260 mg Cap TAKE 1 CAPSULE BY MOUTH ONCE DAILY. 100 capsule 0    fluocinonide (LIDEX) 0.05 % external solution APPLY THIN FILM TO SCALP EVERY NIGHT AT BEDTIME AS NEEDED FOR ITCHING OR SCALING 60 mL 0    HYDROcodone-acetaminophen (NORCO) 5-325 mg per tablet Take 1 tablet by mouth 4 (four) times daily as needed.      hydrOXYzine HCL (ATARAX) 25 MG tablet Take 1 tablet (25 mg total) by mouth 3 (three) times daily as needed for Anxiety. 90 tablet 3    ipratropium (ATROVENT) 42 mcg (0.06 %) nasal spray 2 sprays by Nasal route 3 (three) times daily. 15 mL 6    ketoconazole (NIZORAL) 2 % shampoo LATHER SCALP, LET SIT 5 MIN, RINSE WELL. USE 2X WEEKLY 120 mL 2    levothyroxine (SYNTHROID) 88 MCG tablet Take 1 tablet (88 mcg total) by mouth before breakfast. 30 tablet 11    LINZESS 145 mcg Cap capsule TAKE 1 CAPSULE (145 MCG TOTAL) BY MOUTH BEFORE BREAKFAST. 30 capsule 1    MARLISSA, 28, 0.15-0.03 mg per tablet TAKE 1 TABLET BY MOUTH EVERY DAY 84 tablet 0    metFORMIN (GLUCOPHAGE-XR) 500 MG ER 24hr tablet Take 1 tablet (500 mg total) by mouth daily with breakfast. 90 tablet 3    methocarbamoL (ROBAXIN) 750 MG Tab Take 1 tablet (750 mg total) by mouth 4 (four) times daily as needed. 40 tablet 2    naproxen sodium (ANAPROX) 550 MG tablet Take 1  tablet (550 mg total) by mouth 2 (two) times daily with meals. 20 tablet 0    omeprazole (PRILOSEC) 40 MG capsule TAKE 1 CAPSULE (40 MG TOTAL) BY MOUTH BEFORE BREAKFAST. 90 capsule 1    oxyCODONE-acetaminophen (PERCOCET) 5-325 mg per tablet Take 1 tablet by mouth every 4 (four) hours as needed for Pain. 20 tablet 0    prazosin (MINIPRESS) 1 MG Cap Take 1 capsule (1 mg total) by mouth every evening. 90 capsule 1    prednisoLONE acetate (PRED FORTE) 1 % DrpS PLACE  1 DROP INTO OD QID FOR 5 DAYS      sertraline (ZOLOFT) 50 MG tablet Take 1 tablet (50 mg total) by mouth once daily. 90 tablet 1    tiZANidine (ZANAFLEX) 2 MG tablet Take 1 tablet (2 mg total) by mouth every evening. 30 tablet 0    albuterol (PROVENTIL/VENTOLIN HFA) 90 mcg/actuation inhaler Inhale 1-2 puffs into the lungs every 6 (six) hours as needed. Rescue 18 g 1     No current facility-administered medications for this visit.       Review of patient's allergies indicates:  No Known Allergies    Physical examination  Vitals Reviewed\  Vitals:    06/21/22 0707   BP: 118/78   Pulse: 95   Temp: 98.2 °F (36.8 °C)     Body mass index is 31.64 kg/m². .FLOWAMB[14    Weight: 97.2 kg (214 lb 4.6 oz)    Gen. Well-dressed well-nourished would  Skin warm dry and intact.  No rashes noted.  HEENT.  Pharynx is unremarkable.  No maxillary or frontal sinus tenderness when percussed.    Neck is supple without adenopathy  Chest.  Respirations are even unlabored.  Lungs are clear to auscultation.  Cardiac regular rate and rhythm.  No chest wall adenopathy noted.  Abdomen is soft and not distended.  Bowel sounds are present.  No tenderness during palpation of the abdomen.  No Hepatosplenomegaly noted.  No hernia noted.  No CVA tenderness to percussion.    Neuro. Awake alert oriented x4.  Normal judgment and cognition noted.  Extremities no clubbing cyanosis or edema noted.     Call or return to clinic prn if these symptoms worsen or fail to improve as  anticipated.

## 2022-06-21 NOTE — PATIENT INSTRUCTIONS
Prenatal vitamins with iron and b12/folate  Magnesium 300-500mg otc    Alphonse Diaz,     If you are due for any health screening(s) below please notify me so we can arrange them to be ordered and scheduled to maintain your health. Most healthy patients complete it. Don't lose out on improving your health.     Health Maintenance   Topic Date Due    TETANUS VACCINE  01/22/2023    Hepatitis C Screening  Completed    Lipid Panel  Completed

## 2022-06-23 ENCOUNTER — HOSPITAL ENCOUNTER (OUTPATIENT)
Dept: RADIOLOGY | Facility: HOSPITAL | Age: 37
Discharge: HOME | End: 2022-06-23
Attending: NURSE PRACTITIONER
Payer: COMMERCIAL

## 2022-06-23 DIAGNOSIS — E06.3 HASHIMOTO'S THYROIDITIS: ICD-10-CM

## 2022-06-23 PROCEDURE — 76536 US EXAM OF HEAD AND NECK: CPT | Mod: 26,,, | Performed by: RADIOLOGY

## 2022-06-23 PROCEDURE — 76536 US EXAM OF HEAD AND NECK: CPT | Mod: TC

## 2022-06-23 PROCEDURE — 76536 US SOFT TISSUE HEAD NECK THYROID: ICD-10-PCS | Mod: 26,,, | Performed by: RADIOLOGY
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
## 2022-06-27 ENCOUNTER — OFFICE VISIT (OUTPATIENT)
Dept: PSYCHIATRY | Facility: CLINIC | Age: 37
End: 2022-06-27
Payer: COMMERCIAL

## 2022-06-27 DIAGNOSIS — F43.10 PTSD (POST-TRAUMATIC STRESS DISORDER): Primary | ICD-10-CM

## 2022-06-27 PROCEDURE — 1160F RVW MEDS BY RX/DR IN RCRD: CPT | Mod: CPTII,S$GLB,, | Performed by: PSYCHOLOGIST

## 2022-06-27 PROCEDURE — 1159F MED LIST DOCD IN RCRD: CPT | Mod: CPTII,S$GLB,, | Performed by: PSYCHOLOGIST

## 2022-06-27 PROCEDURE — 1159F PR MEDICATION LIST DOCUMENTED IN MEDICAL RECORD: ICD-10-PCS | Mod: CPTII,S$GLB,, | Performed by: PSYCHOLOGIST

## 2022-06-27 PROCEDURE — 99999 PR PBB SHADOW E&M-EST. PATIENT-LVL II: CPT | Mod: PBBFAC,,, | Performed by: PSYCHOLOGIST

## 2022-06-27 PROCEDURE — 1160F PR REVIEW ALL MEDS BY PRESCRIBER/CLIN PHARMACIST DOCUMENTED: ICD-10-PCS | Mod: CPTII,S$GLB,, | Performed by: PSYCHOLOGIST

## 2022-06-27 PROCEDURE — 99999 PR PBB SHADOW E&M-EST. PATIENT-LVL II: ICD-10-PCS | Mod: PBBFAC,,, | Performed by: PSYCHOLOGIST

## 2022-06-27 PROCEDURE — 90832 PR PSYCHOTHERAPY W/PATIENT, 30 MIN: ICD-10-PCS | Mod: S$GLB,,, | Performed by: PSYCHOLOGIST

## 2022-06-27 PROCEDURE — 3044F PR MOST RECENT HEMOGLOBIN A1C LEVEL <7.0%: ICD-10-PCS | Mod: CPTII,S$GLB,, | Performed by: PSYCHOLOGIST

## 2022-06-27 PROCEDURE — 3044F HG A1C LEVEL LT 7.0%: CPT | Mod: CPTII,S$GLB,, | Performed by: PSYCHOLOGIST

## 2022-06-27 PROCEDURE — 90832 PSYTX W PT 30 MINUTES: CPT | Mod: S$GLB,,, | Performed by: PSYCHOLOGIST
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
## 2022-07-19 ENCOUNTER — OFFICE VISIT (OUTPATIENT)
Dept: PSYCHIATRY | Facility: CLINIC | Age: 37
End: 2022-07-19
Payer: COMMERCIAL

## 2022-07-19 DIAGNOSIS — F43.10 PTSD (POST-TRAUMATIC STRESS DISORDER): Primary | ICD-10-CM

## 2022-07-19 PROCEDURE — 3044F PR MOST RECENT HEMOGLOBIN A1C LEVEL <7.0%: ICD-10-PCS | Mod: CPTII,S$GLB,, | Performed by: PSYCHOLOGIST

## 2022-07-19 PROCEDURE — 90834 PR PSYCHOTHERAPY W/PATIENT, 45 MIN: ICD-10-PCS | Mod: S$GLB,,, | Performed by: PSYCHOLOGIST

## 2022-07-19 PROCEDURE — 3044F HG A1C LEVEL LT 7.0%: CPT | Mod: CPTII,S$GLB,, | Performed by: PSYCHOLOGIST

## 2022-07-19 PROCEDURE — 90834 PSYTX W PT 45 MINUTES: CPT | Mod: S$GLB,,, | Performed by: PSYCHOLOGIST

## 2022-07-25 ENCOUNTER — PATIENT MESSAGE (OUTPATIENT)
Dept: FAMILY MEDICINE | Facility: CLINIC | Age: 37
End: 2022-07-25
Payer: COMMERCIAL

## 2022-08-18 ENCOUNTER — LAB VISIT (OUTPATIENT)
Dept: LAB | Facility: HOSPITAL | Age: 37
End: 2022-08-18
Attending: NURSE PRACTITIONER
Payer: COMMERCIAL

## 2022-08-18 DIAGNOSIS — E06.3 HASHIMOTO'S THYROIDITIS: ICD-10-CM

## 2022-08-18 LAB — TSH SERPL DL<=0.005 MIU/L-ACNC: 2.77 UIU/ML (ref 0.4–4)

## 2022-08-18 PROCEDURE — 36415 COLL VENOUS BLD VENIPUNCTURE: CPT | Mod: PO | Performed by: NURSE PRACTITIONER

## 2022-08-18 PROCEDURE — 84443 ASSAY THYROID STIM HORMONE: CPT | Performed by: NURSE PRACTITIONER

## 2022-08-22 ENCOUNTER — OFFICE VISIT (OUTPATIENT)
Dept: FAMILY MEDICINE | Facility: CLINIC | Age: 37
End: 2022-08-22
Payer: COMMERCIAL

## 2022-08-22 VITALS
DIASTOLIC BLOOD PRESSURE: 80 MMHG | SYSTOLIC BLOOD PRESSURE: 118 MMHG | HEIGHT: 69 IN | WEIGHT: 213.88 LBS | OXYGEN SATURATION: 97 % | BODY MASS INDEX: 31.68 KG/M2 | HEART RATE: 85 BPM

## 2022-08-22 DIAGNOSIS — F41.1 GAD (GENERALIZED ANXIETY DISORDER): Primary | ICD-10-CM

## 2022-08-22 DIAGNOSIS — E66.9 OBESITY (BMI 30.0-34.9): ICD-10-CM

## 2022-08-22 DIAGNOSIS — E03.9 HYPOTHYROIDISM, UNSPECIFIED TYPE: ICD-10-CM

## 2022-08-22 PROCEDURE — 99999 PR PBB SHADOW E&M-EST. PATIENT-LVL III: ICD-10-PCS | Mod: PBBFAC,,, | Performed by: NURSE PRACTITIONER

## 2022-08-22 PROCEDURE — 99999 PR PBB SHADOW E&M-EST. PATIENT-LVL III: CPT | Mod: PBBFAC,,, | Performed by: NURSE PRACTITIONER

## 2022-08-22 PROCEDURE — 99214 PR OFFICE/OUTPT VISIT, EST, LEVL IV, 30-39 MIN: ICD-10-PCS | Mod: S$GLB,,, | Performed by: NURSE PRACTITIONER

## 2022-08-22 PROCEDURE — 3074F SYST BP LT 130 MM HG: CPT | Mod: CPTII,S$GLB,, | Performed by: NURSE PRACTITIONER

## 2022-08-22 PROCEDURE — 99214 OFFICE O/P EST MOD 30 MIN: CPT | Mod: S$GLB,,, | Performed by: NURSE PRACTITIONER

## 2022-08-22 PROCEDURE — 3008F PR BODY MASS INDEX (BMI) DOCUMENTED: ICD-10-PCS | Mod: CPTII,S$GLB,, | Performed by: NURSE PRACTITIONER

## 2022-08-22 PROCEDURE — 3079F PR MOST RECENT DIASTOLIC BLOOD PRESSURE 80-89 MM HG: ICD-10-PCS | Mod: CPTII,S$GLB,, | Performed by: NURSE PRACTITIONER

## 2022-08-22 PROCEDURE — 3044F HG A1C LEVEL LT 7.0%: CPT | Mod: CPTII,S$GLB,, | Performed by: NURSE PRACTITIONER

## 2022-08-22 PROCEDURE — 3074F PR MOST RECENT SYSTOLIC BLOOD PRESSURE < 130 MM HG: ICD-10-PCS | Mod: CPTII,S$GLB,, | Performed by: NURSE PRACTITIONER

## 2022-08-22 PROCEDURE — 1159F PR MEDICATION LIST DOCUMENTED IN MEDICAL RECORD: ICD-10-PCS | Mod: CPTII,S$GLB,, | Performed by: NURSE PRACTITIONER

## 2022-08-22 PROCEDURE — 3008F BODY MASS INDEX DOCD: CPT | Mod: CPTII,S$GLB,, | Performed by: NURSE PRACTITIONER

## 2022-08-22 PROCEDURE — 3044F PR MOST RECENT HEMOGLOBIN A1C LEVEL <7.0%: ICD-10-PCS | Mod: CPTII,S$GLB,, | Performed by: NURSE PRACTITIONER

## 2022-08-22 PROCEDURE — 1159F MED LIST DOCD IN RCRD: CPT | Mod: CPTII,S$GLB,, | Performed by: NURSE PRACTITIONER

## 2022-08-22 PROCEDURE — 3079F DIAST BP 80-89 MM HG: CPT | Mod: CPTII,S$GLB,, | Performed by: NURSE PRACTITIONER

## 2022-08-22 RX ORDER — METFORMIN HYDROCHLORIDE 500 MG/1
500 TABLET, EXTENDED RELEASE ORAL 2 TIMES DAILY WITH MEALS
Qty: 180 TABLET | Refills: 3 | Status: SHIPPED | OUTPATIENT
Start: 2022-08-22 | End: 2023-06-23

## 2022-08-22 RX ORDER — SEMAGLUTIDE 0.25 MG/.5ML
0.25 INJECTION, SOLUTION SUBCUTANEOUS
Qty: 2 ML | Refills: 0 | Status: SHIPPED | OUTPATIENT
Start: 2022-08-22 | End: 2022-09-23

## 2022-08-22 NOTE — PROGRESS NOTES
This dictation has been generated using Modal Fluency Dictation some phonetic errors may occur. Please contact author for clarification if needed.     Problem List Items Addressed This Visit     Hypothyroidism    Overview     History of Grave's disease, then started on thyroid replacement with pregnancy.            Relevant Medications    semaglutide, weight loss, (WEGOVY) 0.25 mg/0.5 mL PnIj    JIMBO (generalized anxiety disorder) - Primary      Other Visit Diagnoses     Obesity (BMI 30.0-34.9)        Relevant Medications    semaglutide, weight loss, (WEGOVY) 0.25 mg/0.5 mL PnIj          Orders Placed This Encounter    metFORMIN (GLUCOPHAGE-XR) 500 MG ER 24hr tablet    semaglutide, weight loss, (WEGOVY) 0.25 mg/0.5 mL PnIj    prenatal,calc.40-iron-folate 1 27-1 mg Tab     PCOS   Hypothyroidism TSH normal range.  Historically TPO elevated.  Consider Hashimoto's thyroiditis previously refered to endocrine.  Thyroid us Hashimotos with heterogeneous echotexture.      Weight lost only one pound add injection. Increase metformin. No PCOS. Add semaglutide or like med.   Irregular menses considered PCOS negative.  Also check other labs as above.  I will review and address accordingly.    Recommended prenatal vitamins for iron.  Recommended magnesium.    Follow up 1-3 months.   No follow-ups on file.    ________________________________________________________________  ________________________________________________________________      Chief Complaint   Patient presents with    Thyroid Problem     History of present illness  This 37 y.o. presents today for complaint of following up.  Has hypothyroidism, Hashimoto's, general anxiety disorder, obesity.  Today she is concerned about her weight.  She only managed to lose 1 lb.  Taking metformin as directed without side effects.  Has never had injectable.  Currently not taking the iron supplementation.  Dissatisfied with abdominal fat.  LOV 6/2022weight gain and back  pain.  Patient notes weight gain.  She has been trying to lose weight.  Notes working on her diet without improvement.  Notes increase in abdominal girth.  She is concerned about her hormones.  She did have a uterine prolapse.  She does see Dr. Sargent next week.  Notes irregular menses.  Review of systems  No fever chills  No lumbar radiculopathy  No bowel or bladder changes    Past Medical History:   Diagnosis Date    Anxiety and depression     Constipation     Encounter for blood transfusion     Endometriosis     GERD (gastroesophageal reflux disease)     IBS (irritable bowel syndrome)     Neuromuscular disorder     nerve damage of right foot after hammertoe surgery    PCOS (polycystic ovarian syndrome)     Sleep disturbance     Thyroid disease     Vaginal prolapse        Past Surgical History:   Procedure Laterality Date    bladder suspension      COLONOSCOPY  ~24 years old    Dr. Neri, colon polyps per patient report    COLONOSCOPY N/A 8/21/2020    Procedure: COLONOSCOPY;  Surgeon: Yessi Pal MD;  Location: [facility];  Service: Endoscopy;  Laterality: N/A;    DIAGNOSTIC LAPAROSCOPY N/A 1/6/2022    Procedure: LAPAROSCOPY, DIAGNOSTIC;  Surgeon: Jack Sargent MD;  Location: [facility] OR;  Service: OB/GYN;  Laterality: N/A;    dx lap      ESOPHAGOGASTRODUODENOSCOPY N/A 1/22/2020    Procedure: EGD (ESOPHAGOGASTRODUODENOSCOPY);  Surgeon: Yessi Pal MD;  Location: [facility];  Service: Endoscopy;  Laterality: N/A;    hammer toe Right 11/19/2015    heavenly toe repeair with callus filing    LAPAROSCOPIC SUSPENSION OF UTEROSACRAL LIGAMENT Bilateral 1/6/2022    Procedure: SUSPENSION, LIGAMENT, UTEROSACRAL, LAPAROSCOPIC;  Surgeon: Jack Sargent MD;  Location: [facility] OR;  Service: OB/GYN;  Laterality: Bilateral;    LAPAROSCOPY  ~2012    done for infertility concern    TONSILLECTOMY         Family History   Problem Relation Age of Onset    Thyroid disease Mother         hyperthyroidism     Hypertension Mother     Hypertension Sister     Heart disease Maternal Grandmother     Ovarian cancer Neg Hx     Breast cancer Neg Hx     Eczema Neg Hx     Lupus Neg Hx     Psoriasis Neg Hx     Melanoma Neg Hx     Cancer Neg Hx     Colon cancer Neg Hx     Crohn's disease Neg Hx     Ulcerative colitis Neg Hx     Stomach cancer Neg Hx     Esophageal cancer Neg Hx        Social History     Socioeconomic History    Marital status: Single   Tobacco Use    Smoking status: Never Smoker    Smokeless tobacco: Never Used   Substance and Sexual Activity    Alcohol use: No    Drug use: No    Sexual activity: Yes     Partners: Male     Birth control/protection: OCP     Comment: OCP helps regulate cycle       Current Outpatient Medications   Medication Sig Dispense Refill    cholecalciferol, vitamin D3, (VITAMIN D3) 50 mcg (2,000 unit) Tab Take 2,000 Units by mouth once daily.      CITRANATAL HARMONY, IRON FUM, 27 mg iron-1 mg -50 mg-260 mg Cap TAKE 1 CAPSULE BY MOUTH ONCE DAILY. 100 capsule 0    fluocinonide (LIDEX) 0.05 % external solution APPLY THIN FILM TO SCALP EVERY NIGHT AT BEDTIME AS NEEDED FOR ITCHING OR SCALING 60 mL 0    HYDROcodone-acetaminophen (NORCO) 5-325 mg per tablet Take 1 tablet by mouth 4 (four) times daily as needed.      hydrOXYzine HCL (ATARAX) 25 MG tablet Take 1 tablet (25 mg total) by mouth 3 (three) times daily as needed for Anxiety. 90 tablet 3    ipratropium (ATROVENT) 42 mcg (0.06 %) nasal spray 2 sprays by Nasal route 3 (three) times daily. 15 mL 6    ketoconazole (NIZORAL) 2 % shampoo LATHER SCALP, LET SIT 5 MIN, RINSE WELL. USE 2X WEEKLY 120 mL 2    levothyroxine (SYNTHROID) 88 MCG tablet Take 1 tablet (88 mcg total) by mouth before breakfast. 30 tablet 11    LINZESS 145 mcg Cap capsule TAKE 1 CAPSULE (145 MCG TOTAL) BY MOUTH BEFORE BREAKFAST. 90 capsule 2    MARLISSA, 28, 0.15-0.03 mg per tablet TAKE 1 TABLET BY MOUTH EVERY DAY 84 tablet 0    naproxen sodium  (ANAPROX) 550 MG tablet Take 1 tablet (550 mg total) by mouth 2 (two) times daily with meals. 20 tablet 0    omeprazole (PRILOSEC) 40 MG capsule TAKE 1 CAPSULE (40 MG TOTAL) BY MOUTH BEFORE BREAKFAST. 90 capsule 1    oxyCODONE-acetaminophen (PERCOCET) 5-325 mg per tablet Take 1 tablet by mouth every 4 (four) hours as needed for Pain. 20 tablet 0    prazosin (MINIPRESS) 1 MG Cap Take 1 capsule (1 mg total) by mouth every evening. 90 capsule 1    prednisoLONE acetate (PRED FORTE) 1 % DrpS PLACE  1 DROP INTO OD QID FOR 5 DAYS      sertraline (ZOLOFT) 50 MG tablet Take 1 tablet (50 mg total) by mouth once daily. 90 tablet 1    tiZANidine (ZANAFLEX) 2 MG tablet TAKE 1 TABLET BY MOUTH EVERY DAY IN THE EVENING 30 tablet 0    albuterol (PROVENTIL/VENTOLIN HFA) 90 mcg/actuation inhaler Inhale 1-2 puffs into the lungs every 6 (six) hours as needed. Rescue 18 g 1    metFORMIN (GLUCOPHAGE-XR) 500 MG ER 24hr tablet Take 1 tablet (500 mg total) by mouth 2 (two) times daily with meals. 180 tablet 3    prenatal,calc.40-iron-folate 1 27-1 mg Tab Take 1 tablet by mouth once daily. 90 each 2    semaglutide, weight loss, (WEGOVY) 0.25 mg/0.5 mL PnIj Inject 0.25 mg into the skin every 7 days. 2 mL 0     No current facility-administered medications for this visit.       Review of patient's allergies indicates:  No Known Allergies    Physical examination  Vitals Reviewed\  Vitals:    08/22/22 0713   BP: 118/80   Pulse: 85     Body mass index is 31.58 kg/m². .FLOWAMB[14    Weight: 97 kg (213 lb 13.5 oz)    Gen. Well-dressed well-nourished would  Skin warm dry and intact.  No rashes noted.  HEENT.  Pharynx is unremarkable.  No maxillary or frontal sinus tenderness when percussed.    Neck is supple without adenopathy  Chest.  Respirations are even unlabored.  Lungs are clear to auscultation.  Cardiac regular rate and rhythm.  No chest wall adenopathy noted.  Abdomen is soft and not distended.  Bowel sounds are present.  No  tenderness during palpation of the abdomen.  No Hepatosplenomegaly noted.  No hernia noted.  No CVA tenderness to percussion.    Neuro. Awake alert oriented x4.  Normal judgment and cognition noted.  Extremities no clubbing cyanosis or edema noted.     Call or return to clinic prn if these symptoms worsen or fail to improve as anticipated.

## 2022-08-31 ENCOUNTER — PATIENT MESSAGE (OUTPATIENT)
Dept: FAMILY MEDICINE | Facility: CLINIC | Age: 37
End: 2022-08-31
Payer: COMMERCIAL
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
## 2022-09-07 ENCOUNTER — OFFICE VISIT (OUTPATIENT)
Dept: PSYCHIATRY | Facility: CLINIC | Age: 37
End: 2022-09-07
Payer: COMMERCIAL

## 2022-09-07 ENCOUNTER — PATIENT MESSAGE (OUTPATIENT)
Dept: PSYCHIATRY | Facility: CLINIC | Age: 37
End: 2022-09-07
Payer: COMMERCIAL

## 2022-09-07 ENCOUNTER — TELEPHONE (OUTPATIENT)
Dept: PSYCHIATRY | Facility: CLINIC | Age: 37
End: 2022-09-07
Payer: COMMERCIAL

## 2022-09-07 DIAGNOSIS — F43.10 PTSD (POST-TRAUMATIC STRESS DISORDER): Primary | ICD-10-CM

## 2022-09-07 PROCEDURE — 99999 PR PBB SHADOW E&M-EST. PATIENT-LVL II: ICD-10-PCS | Mod: PBBFAC,,, | Performed by: PSYCHOLOGIST

## 2022-09-07 PROCEDURE — 99999 PR PBB SHADOW E&M-EST. PATIENT-LVL II: CPT | Mod: PBBFAC,,, | Performed by: PSYCHOLOGIST

## 2022-09-07 PROCEDURE — 3044F PR MOST RECENT HEMOGLOBIN A1C LEVEL <7.0%: ICD-10-PCS | Mod: CPTII,S$GLB,, | Performed by: PSYCHOLOGIST

## 2022-09-07 PROCEDURE — 1160F RVW MEDS BY RX/DR IN RCRD: CPT | Mod: CPTII,S$GLB,, | Performed by: PSYCHOLOGIST

## 2022-09-07 PROCEDURE — 1159F MED LIST DOCD IN RCRD: CPT | Mod: CPTII,S$GLB,, | Performed by: PSYCHOLOGIST

## 2022-09-07 PROCEDURE — 90834 PSYTX W PT 45 MINUTES: CPT | Mod: S$GLB,,, | Performed by: PSYCHOLOGIST

## 2022-09-07 PROCEDURE — 1160F PR REVIEW ALL MEDS BY PRESCRIBER/CLIN PHARMACIST DOCUMENTED: ICD-10-PCS | Mod: CPTII,S$GLB,, | Performed by: PSYCHOLOGIST

## 2022-09-07 PROCEDURE — 90834 PR PSYCHOTHERAPY W/PATIENT, 45 MIN: ICD-10-PCS | Mod: S$GLB,,, | Performed by: PSYCHOLOGIST

## 2022-09-07 PROCEDURE — 3044F HG A1C LEVEL LT 7.0%: CPT | Mod: CPTII,S$GLB,, | Performed by: PSYCHOLOGIST

## 2022-09-07 PROCEDURE — 1159F PR MEDICATION LIST DOCUMENTED IN MEDICAL RECORD: ICD-10-PCS | Mod: CPTII,S$GLB,, | Performed by: PSYCHOLOGIST

## 2022-09-07 NOTE — TELEPHONE ENCOUNTER
Patient was in clinic seeing Dr Beal today and stated today was her last session for a little while. The trauma therapy she has been receiving here has been wonderful and she has told her family therapist  and her family members that Dr Beal trauma therapy did very very well. She is so grateful and so glad she found out about this trauma therapy because she feels now she is ok with the trauma that happened in the past. She is very very grateful and appreciative and wanted Dr Beal and Dr Almodovar (Dr Beal supervisor) to know the wonderful treatment of this trauma has done for her.

## 2022-09-08 ENCOUNTER — OFFICE VISIT (OUTPATIENT)
Dept: PSYCHIATRY | Facility: CLINIC | Age: 37
End: 2022-09-08
Payer: COMMERCIAL

## 2022-09-08 DIAGNOSIS — F33.41 RECURRENT MAJOR DEPRESSIVE DISORDER, IN PARTIAL REMISSION: Primary | ICD-10-CM

## 2022-09-08 DIAGNOSIS — F41.1 GAD (GENERALIZED ANXIETY DISORDER): ICD-10-CM

## 2022-09-08 DIAGNOSIS — F43.10 PTSD (POST-TRAUMATIC STRESS DISORDER): ICD-10-CM

## 2022-09-08 PROCEDURE — 99999 PR PBB SHADOW E&M-EST. PATIENT-LVL III: CPT | Mod: PBBFAC,,,

## 2022-09-08 PROCEDURE — 3044F PR MOST RECENT HEMOGLOBIN A1C LEVEL <7.0%: ICD-10-PCS | Mod: CPTII,S$GLB,,

## 2022-09-08 PROCEDURE — 1159F MED LIST DOCD IN RCRD: CPT | Mod: CPTII,S$GLB,,

## 2022-09-08 PROCEDURE — 1159F PR MEDICATION LIST DOCUMENTED IN MEDICAL RECORD: ICD-10-PCS | Mod: CPTII,S$GLB,,

## 2022-09-08 PROCEDURE — 3044F HG A1C LEVEL LT 7.0%: CPT | Mod: CPTII,S$GLB,,

## 2022-09-08 PROCEDURE — 99214 OFFICE O/P EST MOD 30 MIN: CPT | Mod: S$GLB,,,

## 2022-09-08 PROCEDURE — 99999 PR PBB SHADOW E&M-EST. PATIENT-LVL III: ICD-10-PCS | Mod: PBBFAC,,,

## 2022-09-08 PROCEDURE — 1160F PR REVIEW ALL MEDS BY PRESCRIBER/CLIN PHARMACIST DOCUMENTED: ICD-10-PCS | Mod: CPTII,S$GLB,,

## 2022-09-08 PROCEDURE — 1160F RVW MEDS BY RX/DR IN RCRD: CPT | Mod: CPTII,S$GLB,,

## 2022-09-08 PROCEDURE — 99214 PR OFFICE/OUTPT VISIT, EST, LEVL IV, 30-39 MIN: ICD-10-PCS | Mod: S$GLB,,,
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
## 2022-09-23 ENCOUNTER — OFFICE VISIT (OUTPATIENT)
Dept: ENDOCRINOLOGY | Facility: CLINIC | Age: 37
End: 2022-09-23
Payer: COMMERCIAL

## 2022-09-23 VITALS
HEIGHT: 69 IN | OXYGEN SATURATION: 96 % | BODY MASS INDEX: 32.44 KG/M2 | TEMPERATURE: 98 F | WEIGHT: 219 LBS | SYSTOLIC BLOOD PRESSURE: 110 MMHG | HEART RATE: 93 BPM | DIASTOLIC BLOOD PRESSURE: 80 MMHG

## 2022-09-23 DIAGNOSIS — R63.5 WEIGHT GAIN: ICD-10-CM

## 2022-09-23 DIAGNOSIS — E03.8 HYPOTHYROIDISM DUE TO HASHIMOTO'S THYROIDITIS: Primary | ICD-10-CM

## 2022-09-23 DIAGNOSIS — E66.9 OBESITY (BMI 30.0-34.9): ICD-10-CM

## 2022-09-23 DIAGNOSIS — E06.3 HASHIMOTO'S THYROIDITIS: ICD-10-CM

## 2022-09-23 DIAGNOSIS — E61.1 IRON DEFICIENCY: ICD-10-CM

## 2022-09-23 DIAGNOSIS — E06.3 HYPOTHYROIDISM DUE TO HASHIMOTO'S THYROIDITIS: Primary | ICD-10-CM

## 2022-09-23 PROCEDURE — 99204 OFFICE O/P NEW MOD 45 MIN: CPT | Mod: S$GLB,,, | Performed by: PHYSICIAN ASSISTANT

## 2022-09-23 PROCEDURE — 3074F SYST BP LT 130 MM HG: CPT | Mod: CPTII,S$GLB,, | Performed by: PHYSICIAN ASSISTANT

## 2022-09-23 PROCEDURE — 99204 PR OFFICE/OUTPT VISIT, NEW, LEVL IV, 45-59 MIN: ICD-10-PCS | Mod: S$GLB,,, | Performed by: PHYSICIAN ASSISTANT

## 2022-09-23 PROCEDURE — 3044F HG A1C LEVEL LT 7.0%: CPT | Mod: CPTII,S$GLB,, | Performed by: PHYSICIAN ASSISTANT

## 2022-09-23 PROCEDURE — 3079F PR MOST RECENT DIASTOLIC BLOOD PRESSURE 80-89 MM HG: ICD-10-PCS | Mod: CPTII,S$GLB,, | Performed by: PHYSICIAN ASSISTANT

## 2022-09-23 PROCEDURE — 3008F PR BODY MASS INDEX (BMI) DOCUMENTED: ICD-10-PCS | Mod: CPTII,S$GLB,, | Performed by: PHYSICIAN ASSISTANT

## 2022-09-23 PROCEDURE — 1160F PR REVIEW ALL MEDS BY PRESCRIBER/CLIN PHARMACIST DOCUMENTED: ICD-10-PCS | Mod: CPTII,S$GLB,, | Performed by: PHYSICIAN ASSISTANT

## 2022-09-23 PROCEDURE — 3079F DIAST BP 80-89 MM HG: CPT | Mod: CPTII,S$GLB,, | Performed by: PHYSICIAN ASSISTANT

## 2022-09-23 PROCEDURE — 3044F PR MOST RECENT HEMOGLOBIN A1C LEVEL <7.0%: ICD-10-PCS | Mod: CPTII,S$GLB,, | Performed by: PHYSICIAN ASSISTANT

## 2022-09-23 PROCEDURE — 99999 PR PBB SHADOW E&M-EST. PATIENT-LVL V: CPT | Mod: PBBFAC,,, | Performed by: PHYSICIAN ASSISTANT

## 2022-09-23 PROCEDURE — 99999 PR PBB SHADOW E&M-EST. PATIENT-LVL V: ICD-10-PCS | Mod: PBBFAC,,, | Performed by: PHYSICIAN ASSISTANT

## 2022-09-23 PROCEDURE — 1160F RVW MEDS BY RX/DR IN RCRD: CPT | Mod: CPTII,S$GLB,, | Performed by: PHYSICIAN ASSISTANT

## 2022-09-23 PROCEDURE — 3074F PR MOST RECENT SYSTOLIC BLOOD PRESSURE < 130 MM HG: ICD-10-PCS | Mod: CPTII,S$GLB,, | Performed by: PHYSICIAN ASSISTANT

## 2022-09-23 PROCEDURE — 1159F MED LIST DOCD IN RCRD: CPT | Mod: CPTII,S$GLB,, | Performed by: PHYSICIAN ASSISTANT

## 2022-09-23 PROCEDURE — 1159F PR MEDICATION LIST DOCUMENTED IN MEDICAL RECORD: ICD-10-PCS | Mod: CPTII,S$GLB,, | Performed by: PHYSICIAN ASSISTANT

## 2022-09-23 PROCEDURE — 3008F BODY MASS INDEX DOCD: CPT | Mod: CPTII,S$GLB,, | Performed by: PHYSICIAN ASSISTANT

## 2022-09-23 RX ORDER — LEVOTHYROXINE SODIUM 100 UG/1
100 TABLET ORAL DAILY
Qty: 30 TABLET | Refills: 11 | Status: SHIPPED | OUTPATIENT
Start: 2022-09-23 | End: 2023-02-23

## 2022-09-23 RX ORDER — DEXAMETHASONE 1 MG/1
TABLET ORAL
Qty: 1 TABLET | Refills: 0 | Status: SHIPPED | OUTPATIENT
Start: 2022-09-23 | End: 2022-11-14 | Stop reason: ALTCHOICE

## 2022-09-23 NOTE — PROGRESS NOTES
CC: Hypothyroidism    HPI: Emily Pelayo is a 37 y.o. female here for hypothyroidism along with pending conditions listed in the Visit Diagnosis. Diagnosed in 2011. +FHx of thyroid disease in her mother. Diet is low in seafood, soy and cabbage. No hx of neck radiation. [place] the [place]. NM uptake and scan in 2010 was consistent w/ Graves.' Pt has never had to take Methimazole. +TPO. -TSI    Taking 88 mcg qd.  + fatigue, constipation, cold intolerance, hair loss (alopecia)  No diarrhea, sweating, palpitations.   Cycles are regular.  On ocps    No easy bruising, muscle weakness. No facial hair or deeping of voice.    Thyroid u/s 6/22 wnl.    Taking metformin 500 mg bid.  She is going to PT 2x weekly. Works at [employer]    Diet:  BF: corn puffs, pb crunch, fruit loops  LH: doritos, cheese  DN: blackened chicken almas (frozen dinner)  Snacks on grapes, banana, popcorn, candy. Drinks water.    PMHx, PSHx: reviewed in epic.  Social Hx: no ETOH/tobacco use. Works nights/days.    Wt Readings from Last 15 Encounters:   09/23/22 99.3 kg (219 lb 0.4 oz)   08/22/22 97 kg (213 lb 13.5 oz)   06/21/22 97.2 kg (214 lb 4.6 oz)   06/16/22 97.7 kg (215 lb 6.2 oz)   06/15/22 98 kg (216 lb 0.8 oz)   06/10/22 98.5 kg (217 lb 2.5 oz)   05/26/22 98.5 kg (217 lb 2.5 oz)   02/17/22 95.7 kg (210 lb 15.7 oz)   01/26/22 95.8 kg (211 lb 3.2 oz)   01/06/22 95.7 kg (211 lb)   01/03/22 95.8 kg (211 lb 3.2 oz)   12/08/21 96 kg (211 lb 10.3 oz)   11/18/21 97.3 kg (214 lb 8.1 oz)   11/01/21 96.7 kg (213 lb 3 oz)   08/10/21 94.4 kg (208 lb 1.8 oz)      ROS:   Constitutional: No recent significant weight change  Eyes: No recent visual changes  Cardiovascular: Denies current anginal symptoms  Respiratory: Denies current respiratory difficulty  Gastrointestinal: Denies recent bowel disturbances  GenitoUrinary - No dysuria  Skin: No new skin rash  Neurologic: No focal neurologic complaints  Musculoskeletal: no joint pain  Endocrine: no polyphagia,  "polydipsia or polyuria  Remainder ROS negative       /80 (BP Location: Left arm, Patient Position: Sitting, BP Method: Small (Manual))   Pulse 93   Temp 98.2 °F (36.8 °C) (Oral)   Ht 5' 9" (1.753 m)   Wt 99.3 kg (219 lb 0.4 oz)   SpO2 96%   BMI 32.34 kg/m²      Personally reviewed labs below:    Lab Results   Component Value Date    TSH 2.771 08/18/2022    C1GJKQG 92 03/23/2016    N8UNJQK 11.2 09/25/2017    FREET4 1.02 06/11/2022          Chemistry        Component Value Date/Time     06/11/2022 0752    K 3.9 06/11/2022 0752     06/11/2022 0752    CO2 21 (L) 06/11/2022 0752    BUN 12 06/11/2022 0752    CREATININE 1.0 06/11/2022 0752    GLU 85 06/11/2022 0752        Component Value Date/Time    CALCIUM 9.1 06/11/2022 0752    ALKPHOS 54 (L) 06/11/2022 0752    AST 14 06/11/2022 0752    ALT 14 06/11/2022 0752    BILITOT 0.6 06/11/2022 0752    ESTGFRAFRICA >60 06/11/2022 0752    EGFRNONAA >60 06/11/2022 0752           Lab Results   Component Value Date    HGBA1C 4.6 06/11/2022        PE:  GENERAL: middle aged female, well developed, well nourished  NECK: Supple neck, normal thyroid. No bruit  LYMPHATIC: No cervical or supraclavicular lymphadenopathy  CARDIOVASCULAR: Normal heart sounds, no pedal edema  RESPIRATORY: Normal effort, clear to auscultation  MUSC: 2+ DTR UE/LE  NEURO: steady gait, CN ll-Xll grossly intact  PSYCH: normal mood and affect    Assessment/Plan:   1. Hypothyroidism due to Hashimoto's thyroiditis  Ambulatory referral/consult to Endocrinology    TSH    T4, Free    T4, Free    TSH    T4, Free    TSH      2. Hashimoto's thyroiditis        3. Weight gain  ACTH    Cortisol      4. Iron deficiency  Iron and TIBC      5. Obesity (BMI 30.0-34.9)           Hypothyroidism-TSH is >2.5. Increase Levothyroxine to 100 mcg daily. Recheck in six weeks.  Weight gain-dex supression test.  Iron deficiency-recheck iron.  Obesity-Body mass index is 32.34 kg/m². Start Mounjaro 2.5 mg weekly. Advised " to use two methods of birth control. See dietician. Decrease intake of carbs and fatty foods. Increase exercise to 30 min daily.    TFTs in six weeks  ACTH, cortisol, iron soon  F/u in 4 mths -tsh, t4

## 2022-09-29 ENCOUNTER — TELEPHONE (OUTPATIENT)
Dept: ENDOCRINOLOGY | Facility: CLINIC | Age: 37
End: 2022-09-29
Payer: COMMERCIAL

## 2022-09-29 NOTE — TELEPHONE ENCOUNTER
Spoke with pt who verbalized understanding of timing of pill for 11pm tonight, and will do fasting labs tomorrow for 8am

## 2022-09-29 NOTE — TELEPHONE ENCOUNTER
----- Message from Anna Adan sent at 9/29/2022  9:48 AM CDT -----  Contact: Self  Type:  Patient Returning Call    Who Called:  Patient  Who Left Message for Patient:  N/A- needs to speak to the nurse  Does the patient know what this is regarding?:  needs clarification on when to take the dexamethasone, is it today at lunch or tonight around midnight?  Best Call Back Number:  [phone]  Additional Information:  Please call back to advise, if she does not answer please leave a detailed message, due to her cell signal at work. Thank you.

## 2022-09-30 ENCOUNTER — LAB VISIT (OUTPATIENT)
Dept: LAB | Facility: HOSPITAL | Age: 37
End: 2022-09-30
Attending: PHYSICIAN ASSISTANT
Payer: COMMERCIAL

## 2022-09-30 DIAGNOSIS — E61.1 IRON DEFICIENCY: ICD-10-CM

## 2022-09-30 DIAGNOSIS — R63.5 WEIGHT GAIN: ICD-10-CM

## 2022-09-30 DIAGNOSIS — E06.3 HYPOTHYROIDISM DUE TO HASHIMOTO'S THYROIDITIS: ICD-10-CM

## 2022-09-30 DIAGNOSIS — E03.8 HYPOTHYROIDISM DUE TO HASHIMOTO'S THYROIDITIS: ICD-10-CM

## 2022-09-30 LAB
CORTIS SERPL-MCNC: 2.1 UG/DL
IRON SERPL-MCNC: 50 UG/DL (ref 30–160)
SATURATED IRON: 10 % (ref 20–50)
T4 FREE SERPL-MCNC: 1.24 NG/DL (ref 0.71–1.51)
TOTAL IRON BINDING CAPACITY: 488 UG/DL (ref 250–450)
TRANSFERRIN SERPL-MCNC: 330 MG/DL (ref 200–375)
TSH SERPL DL<=0.005 MIU/L-ACNC: 0.75 UIU/ML (ref 0.4–4)

## 2022-09-30 PROCEDURE — 84439 ASSAY OF FREE THYROXINE: CPT | Performed by: PHYSICIAN ASSISTANT

## 2022-09-30 PROCEDURE — 82533 TOTAL CORTISOL: CPT | Performed by: PHYSICIAN ASSISTANT

## 2022-09-30 PROCEDURE — 84443 ASSAY THYROID STIM HORMONE: CPT | Performed by: PHYSICIAN ASSISTANT

## 2022-09-30 PROCEDURE — 36415 COLL VENOUS BLD VENIPUNCTURE: CPT | Mod: PN | Performed by: PHYSICIAN ASSISTANT

## 2022-09-30 PROCEDURE — 82024 ASSAY OF ACTH: CPT | Performed by: PHYSICIAN ASSISTANT

## 2022-09-30 PROCEDURE — 84466 ASSAY OF TRANSFERRIN: CPT | Performed by: PHYSICIAN ASSISTANT

## 2022-10-05 LAB — ACTH PLAS-MCNC: <5 PG/ML (ref 0–46)

## 2022-10-07 ENCOUNTER — OFFICE VISIT (OUTPATIENT)
Dept: ORTHOPEDICS | Facility: CLINIC | Age: 37
End: 2022-10-07
Payer: COMMERCIAL

## 2022-10-07 VITALS — BODY MASS INDEX: 32.44 KG/M2 | HEIGHT: 69 IN | RESPIRATION RATE: 16 BRPM | WEIGHT: 219 LBS

## 2022-10-07 DIAGNOSIS — M25.812 OS ACROMIALE OF LEFT SHOULDER: ICD-10-CM

## 2022-10-07 DIAGNOSIS — M25.512 ACUTE PAIN OF LEFT SHOULDER: ICD-10-CM

## 2022-10-07 DIAGNOSIS — M75.42 IMPINGEMENT SYNDROME OF LEFT SHOULDER: Primary | ICD-10-CM

## 2022-10-07 PROCEDURE — 1159F PR MEDICATION LIST DOCUMENTED IN MEDICAL RECORD: ICD-10-PCS | Mod: CPTII,S$GLB,, | Performed by: ORTHOPAEDIC SURGERY

## 2022-10-07 PROCEDURE — 3008F PR BODY MASS INDEX (BMI) DOCUMENTED: ICD-10-PCS | Mod: CPTII,S$GLB,, | Performed by: ORTHOPAEDIC SURGERY

## 2022-10-07 PROCEDURE — 99203 PR OFFICE/OUTPT VISIT, NEW, LEVL III, 30-44 MIN: ICD-10-PCS | Mod: S$GLB,,, | Performed by: ORTHOPAEDIC SURGERY

## 2022-10-07 PROCEDURE — 99999 PR PBB SHADOW E&M-EST. PATIENT-LVL IV: CPT | Mod: PBBFAC,,, | Performed by: ORTHOPAEDIC SURGERY

## 2022-10-07 PROCEDURE — 3008F BODY MASS INDEX DOCD: CPT | Mod: CPTII,S$GLB,, | Performed by: ORTHOPAEDIC SURGERY

## 2022-10-07 PROCEDURE — 3044F PR MOST RECENT HEMOGLOBIN A1C LEVEL <7.0%: ICD-10-PCS | Mod: CPTII,S$GLB,, | Performed by: ORTHOPAEDIC SURGERY

## 2022-10-07 PROCEDURE — 3044F HG A1C LEVEL LT 7.0%: CPT | Mod: CPTII,S$GLB,, | Performed by: ORTHOPAEDIC SURGERY

## 2022-10-07 PROCEDURE — 99203 OFFICE O/P NEW LOW 30 MIN: CPT | Mod: S$GLB,,, | Performed by: ORTHOPAEDIC SURGERY

## 2022-10-07 PROCEDURE — 99999 PR PBB SHADOW E&M-EST. PATIENT-LVL IV: ICD-10-PCS | Mod: PBBFAC,,, | Performed by: ORTHOPAEDIC SURGERY

## 2022-10-07 PROCEDURE — 1159F MED LIST DOCD IN RCRD: CPT | Mod: CPTII,S$GLB,, | Performed by: ORTHOPAEDIC SURGERY

## 2022-10-07 NOTE — PROGRESS NOTES
Patient ID: Emily Pelayo is a 37 y.o. female    Pain of the Left Shoulder      History of Present Illness:    Emily Pelayo presents to clinic for left shoulder pain Patient denies known ADIEL. The pain started 10 months ago and is becoming progressively worse.  Pain is located over (points to) lateral left shoulder. She reports that the pain is a 8 /10 sharp and constant pain today. The pain is affecting ADLs and limiting desired level of activity. Denies numbness, tingling, radiation and inability to bear weight. Patient is experiencing nighttime pain.   Pain is 8 /10 at its worst.     [place] of Zanaflex, CSI, PT, anti-inflammatories, and rest  with no improvement. She feels like PT helped with ROM but not pain. She states she has gone 2x weekly for the past few months.     Occupation: technician at [employer]    Ambulating: unassisted  Diabetic: no  Smoking: no  Hx of DVT/PE: no     PAST MEDICAL HISTORY:   Past Medical History:   Diagnosis Date    Anxiety and depression     Constipation     Encounter for blood transfusion     Endometriosis     GERD (gastroesophageal reflux disease)     IBS (irritable bowel syndrome)     Neuromuscular disorder     nerve damage of right foot after hammertoe surgery    PCOS (polycystic ovarian syndrome)     Sleep disturbance     Thyroid disease     Vaginal prolapse      PAST SURGICAL HISTORY:   Past Surgical History:   Procedure Laterality Date    bladder suspension      COLONOSCOPY  ~24 years old    Dr. Neri, colon polyps per patient report    COLONOSCOPY N/A 8/21/2020    Procedure: COLONOSCOPY;  Surgeon: Yessi Pal MD;  Location: [facility];  Service: Endoscopy;  Laterality: N/A;    DIAGNOSTIC LAPAROSCOPY N/A 1/6/2022    Procedure: LAPAROSCOPY, DIAGNOSTIC;  Surgeon: Jack Sargent MD;  Location: [facility] OR;  Service: OB/GYN;  Laterality: N/A;    dx lap      ESOPHAGOGASTRODUODENOSCOPY N/A 1/22/2020    Procedure: EGD (ESOPHAGOGASTRODUODENOSCOPY);  Surgeon: Yessi  JORDAN Pal MD;  Location: [facility] ENDO;  Service: Endoscopy;  Laterality: N/A;    hammer toe Right 11/19/2015    heavenly toe repeair with callus filing    LAPAROSCOPIC SUSPENSION OF UTEROSACRAL LIGAMENT Bilateral 1/6/2022    Procedure: SUSPENSION, LIGAMENT, UTEROSACRAL, LAPAROSCOPIC;  Surgeon: Jack Sargent MD;  [email or web address] OR;  Service: OB/GYN;  Laterality: Bilateral;    LAPAROSCOPY  ~2012    done for infertility concern    TONSILLECTOMY       FAMILY HISTORY:   Family History   Problem Relation Age of Onset    Thyroid disease Mother         hyperthyroidism    Hypertension Mother     Hypertension Sister     Heart disease Maternal Grandmother     Ovarian cancer Neg Hx     Breast cancer Neg Hx     Eczema Neg Hx     Lupus Neg Hx     Psoriasis Neg Hx     Melanoma Neg Hx     Cancer Neg Hx     Colon cancer Neg Hx     Crohn's disease Neg Hx     Ulcerative colitis Neg Hx     Stomach cancer Neg Hx     Esophageal cancer Neg Hx      SOCIAL HISTORY:   Social History     Occupational History    Not on file   Tobacco Use    Smoking status: Never    Smokeless tobacco: Never   Substance and Sexual Activity    Alcohol use: No    Drug use: No    Sexual activity: Yes     Partners: Male     Birth control/protection: OCP     Comment: OCP helps regulate cycle        MEDICATIONS:   Current Outpatient Medications:     cholecalciferol, vitamin D3, (VITAMIN D3) 50 mcg (2,000 unit) Tab, Take 2,000 Units by mouth once daily., Disp: , Rfl:     CITRANATAL HARMONY, IRON FUM, 27 mg iron-1 mg -50 mg-260 mg Cap, TAKE 1 CAPSULE BY MOUTH ONCE DAILY., Disp: 100 capsule, Rfl: 0    dexAMETHasone (DECADRON) 1 MG Tab, Take one tablet between 11-12 pm. Have labs drawn the following morning., Disp: 1 tablet, Rfl: 0    fluocinonide (LIDEX) 0.05 % external solution, APPLY THIN FILM TO SCALP EVERY NIGHT AT BEDTIME AS NEEDED FOR ITCHING OR SCALING, Disp: 60 mL, Rfl: 0    hydrOXYzine HCL (ATARAX) 25 MG tablet, TAKE 1 TABLET BY MOUTH 3 TIMES DAILY AS NEEDED FOR  ANXIETY., Disp: 270 tablet, Rfl: 1    ipratropium (ATROVENT) 42 mcg (0.06 %) nasal spray, 2 sprays by Nasal route 3 (three) times daily., Disp: 15 mL, Rfl: 6    ketoconazole (NIZORAL) 2 % shampoo, LATHER SCALP, LET SIT 5 MIN, RINSE WELL. USE 2X WEEKLY, Disp: 120 mL, Rfl: 2    levothyroxine (SYNTHROID) 100 MCG tablet, Take 1 tablet (100 mcg total) by mouth once daily., Disp: 30 tablet, Rfl: 11    LINZESS 145 mcg Cap capsule, TAKE 1 CAPSULE (145 MCG TOTAL) BY MOUTH BEFORE BREAKFAST., Disp: 90 capsule, Rfl: 2    MARLISSA, 28, 0.15-0.03 mg per tablet, TAKE 1 TABLET BY MOUTH EVERY DAY, Disp: 84 tablet, Rfl: 0    metFORMIN (GLUCOPHAGE-XR) 500 MG ER 24hr tablet, Take 1 tablet (500 mg total) by mouth 2 (two) times daily with meals., Disp: 180 tablet, Rfl: 3    naproxen sodium (ANAPROX) 550 MG tablet, Take 1 tablet (550 mg total) by mouth 2 (two) times daily with meals., Disp: 20 tablet, Rfl: 0    omeprazole (PRILOSEC) 40 MG capsule, TAKE 1 CAPSULE BY MOUTH BEFORE BREAKFAST., Disp: 90 capsule, Rfl: 1    prazosin (MINIPRESS) 1 MG Cap, Take 1 capsule (1 mg total) by mouth every evening., Disp: 90 capsule, Rfl: 1    prednisoLONE acetate (PRED FORTE) 1 % DrpS, PLACE  1 DROP INTO OD QID FOR 5 DAYS, Disp: , Rfl:     prenatal,calc.40-iron-folate 1 27-1 mg Tab, Take 1 tablet by mouth once daily., Disp: 90 each, Rfl: 2    sertraline (ZOLOFT) 50 MG tablet, Take 1 tablet (50 mg total) by mouth once daily., Disp: 90 tablet, Rfl: 1    tirzepatide 2.5 mg/0.5 mL PnIj, Inject 2.5 mg (one pen) into the skin every 7 days., Disp: 4 pen, Rfl: 5    tiZANidine (ZANAFLEX) 2 MG tablet, TAKE 1 TABLET BY MOUTH EVERY DAY IN THE EVENING, Disp: 30 tablet, Rfl: 0    albuterol (PROVENTIL/VENTOLIN HFA) 90 mcg/actuation inhaler, Inhale 1-2 puffs into the lungs every 6 (six) hours as needed. Rescue, Disp: 18 g, Rfl: 1  ALLERGIES: Review of patient's allergies indicates:  No Known Allergies      Physical Exam     Vitals:    10/07/22 1243   Resp: 16     Alert  and oriented to person, place and time. No acute distress. Well-groomed, not ill appearing. Pupils round and reactive, normal respiratory effort, no audible wheezing.     Shoulder / Upper Extremity Exam    OBSERVATION:     Swelling  none  Deformity  none   Discoloration  none   Scapular winging none   Scars   none  Atrophy  none    TENDERNESS                Clavicle   Negative         AC Jt.    Negative        SC Jt.    Negative          Acromion:  Negative        Scapular Spine Negative   Supraspinatus  positive       Infraspinatus  Negative   LH Biceps   positive   Greater Tub.  Negative   Trapezius  Negative   Cervical spine  Negative        ROM: (* = with pain)              FE    150°  with pain     ER at 0°    60°        ER at 90° ABD  70°  with pain     IR at 90°  ABD   NA         IR (spine level)   T10         STRENGTH: (* = with pain)    SCAPTION   4/5        IR    5/5       ER    5/5       BICEPS   5/5       Deltoid    5/5         SIGNS:  Painful side       NEER   +    OIVYS  neg    PATHAK   +    SPEEDS  +     DROP ARM   neg   BELLY PRESS neg   Superior escape none    LIFT-OFF  neg   X-Body ADD    +    MOVING VALGUS neg        Imaging:     Left shoulder X-rays ordered/reviewed by me showing no evidence of fracture or dislocation. There is no obvious malalignment. No evidence of masses, lesions or foreign bodies.  Possible os acromiale with type 2 acromion.    Assessment & Plan    Impingement syndrome of left shoulder  -     MRI Shoulder Without Contrast Right; Future; Expected date: 10/07/2022    Acute pain of left shoulder  -     MRI Shoulder Without Contrast Right; Future; Expected date: 10/07/2022    Os acromiale of left shoulder       I made the decision to obtain old records of the patient including previous notes and imaging. New imaging was ordered today of the extremity or extremities evaluated. I independently reviewed and interpreted the radiographs and/or MRIs/CT scan today as well as  prior imaging.    We discussed at length different treatment options including conservative vs surgical management. These include anti-inflammatories, acetaminophen, rest, ice, heat, formal physical therapy including strengthening and stretching exercises, home exercise programs, injections, dry needling, and finally surgical intervention.      Patient would like to proceed with MRI of left shoulder. Patient has tried conservative measurements including anti-inflammatories, CSI, and physical therapy without improvement in pain.     MRI without contrast left shoulder   Cont PT as tolerated  Cont Ibuprofen/Tylenol as needed for pain    Follow up: discuss MRI results  X-rays next visit: none    All questions were answered and patient is agreeable to the above plan.

## 2022-10-11 ENCOUNTER — OFFICE VISIT (OUTPATIENT)
Dept: URGENT CARE | Facility: CLINIC | Age: 37
End: 2022-10-11
Payer: COMMERCIAL

## 2022-10-11 VITALS
SYSTOLIC BLOOD PRESSURE: 118 MMHG | HEIGHT: 69 IN | RESPIRATION RATE: 18 BRPM | BODY MASS INDEX: 31.84 KG/M2 | DIASTOLIC BLOOD PRESSURE: 82 MMHG | WEIGHT: 215 LBS | TEMPERATURE: 98 F | HEART RATE: 87 BPM | OXYGEN SATURATION: 97 %

## 2022-10-11 DIAGNOSIS — R52 GENERALIZED BODY ACHES: ICD-10-CM

## 2022-10-11 DIAGNOSIS — R09.81 SINUS CONGESTION: ICD-10-CM

## 2022-10-11 DIAGNOSIS — R09.82 POSTNASAL DRIP: ICD-10-CM

## 2022-10-11 DIAGNOSIS — H92.02 LEFT EAR PAIN: ICD-10-CM

## 2022-10-11 DIAGNOSIS — H65.92 LEFT OTITIS MEDIA WITH EFFUSION: Primary | ICD-10-CM

## 2022-10-11 LAB
CTP QC/QA: YES
CTP QC/QA: YES
POC MOLECULAR INFLUENZA A AGN: NEGATIVE
POC MOLECULAR INFLUENZA B AGN: NEGATIVE
SARS-COV-2 RDRP RESP QL NAA+PROBE: NEGATIVE

## 2022-10-11 PROCEDURE — 87502 INFLUENZA DNA AMP PROBE: CPT | Mod: QW,S$GLB,, | Performed by: PHYSICIAN ASSISTANT

## 2022-10-11 PROCEDURE — 87502 POCT INFLUENZA A/B MOLECULAR: ICD-10-PCS | Mod: QW,S$GLB,, | Performed by: PHYSICIAN ASSISTANT

## 2022-10-11 PROCEDURE — 99213 PR OFFICE/OUTPT VISIT, EST, LEVL III, 20-29 MIN: ICD-10-PCS | Mod: S$GLB,,, | Performed by: PHYSICIAN ASSISTANT

## 2022-10-11 PROCEDURE — 1159F MED LIST DOCD IN RCRD: CPT | Mod: CPTII,S$GLB,, | Performed by: PHYSICIAN ASSISTANT

## 2022-10-11 PROCEDURE — 87635: ICD-10-PCS | Mod: QW,S$GLB,, | Performed by: PHYSICIAN ASSISTANT

## 2022-10-11 PROCEDURE — 87635 SARS-COV-2 COVID-19 AMP PRB: CPT | Mod: QW,S$GLB,, | Performed by: PHYSICIAN ASSISTANT

## 2022-10-11 PROCEDURE — 1159F PR MEDICATION LIST DOCUMENTED IN MEDICAL RECORD: ICD-10-PCS | Mod: CPTII,S$GLB,, | Performed by: PHYSICIAN ASSISTANT

## 2022-10-11 PROCEDURE — 3074F PR MOST RECENT SYSTOLIC BLOOD PRESSURE < 130 MM HG: ICD-10-PCS | Mod: CPTII,S$GLB,, | Performed by: PHYSICIAN ASSISTANT

## 2022-10-11 PROCEDURE — 3079F DIAST BP 80-89 MM HG: CPT | Mod: CPTII,S$GLB,, | Performed by: PHYSICIAN ASSISTANT

## 2022-10-11 PROCEDURE — 3008F PR BODY MASS INDEX (BMI) DOCUMENTED: ICD-10-PCS | Mod: CPTII,S$GLB,, | Performed by: PHYSICIAN ASSISTANT

## 2022-10-11 PROCEDURE — 3074F SYST BP LT 130 MM HG: CPT | Mod: CPTII,S$GLB,, | Performed by: PHYSICIAN ASSISTANT

## 2022-10-11 PROCEDURE — 99213 OFFICE O/P EST LOW 20 MIN: CPT | Mod: S$GLB,,, | Performed by: PHYSICIAN ASSISTANT

## 2022-10-11 PROCEDURE — 3079F PR MOST RECENT DIASTOLIC BLOOD PRESSURE 80-89 MM HG: ICD-10-PCS | Mod: CPTII,S$GLB,, | Performed by: PHYSICIAN ASSISTANT

## 2022-10-11 PROCEDURE — 3044F PR MOST RECENT HEMOGLOBIN A1C LEVEL <7.0%: ICD-10-PCS | Mod: CPTII,S$GLB,, | Performed by: PHYSICIAN ASSISTANT

## 2022-10-11 PROCEDURE — 3008F BODY MASS INDEX DOCD: CPT | Mod: CPTII,S$GLB,, | Performed by: PHYSICIAN ASSISTANT

## 2022-10-11 PROCEDURE — 3044F HG A1C LEVEL LT 7.0%: CPT | Mod: CPTII,S$GLB,, | Performed by: PHYSICIAN ASSISTANT

## 2022-10-11 RX ORDER — PREDNISONE 20 MG/1
TABLET ORAL
Qty: 10 TABLET | Refills: 0 | Status: SHIPPED | OUTPATIENT
Start: 2022-10-11 | End: 2022-11-14 | Stop reason: ALTCHOICE

## 2022-10-11 RX ORDER — AMOXICILLIN AND CLAVULANATE POTASSIUM 875; 125 MG/1; MG/1
1 TABLET, FILM COATED ORAL EVERY 12 HOURS
Qty: 20 TABLET | Refills: 0 | Status: SHIPPED | OUTPATIENT
Start: 2022-10-11 | End: 2022-10-21

## 2022-10-11 NOTE — PROGRESS NOTES
"Subjective:       Patient ID: Emily Pelayo is a 37 y.o. female.    Vitals:  height is 5' 9" (1.753 m) and weight is 97.5 kg (215 lb). Her oral temperature is 98.1 °F (36.7 °C). Her blood pressure is 118/82 and her pulse is 87. Her respiration is 18 and oxygen saturation is 97%.     Chief Complaint: Otalgia    Otalgia   There is pain in the left ear. This is a new problem. The current episode started in the past 7 days. The problem occurs constantly. The problem has been gradually worsening. There has been no fever. The pain is at a severity of 7/10. The pain is moderate. Associated symptoms include rhinorrhea and a sore throat. Pertinent negatives include no abdominal pain, coughing, diarrhea, ear discharge, headaches, hearing loss, neck pain, rash or vomiting. She has tried nothing for the symptoms.     Constitution: Negative for chills, sweating, fatigue and fever.   HENT:  Positive for ear pain, congestion, postnasal drip, sinus pressure and sore throat. Negative for ear discharge, foreign body in ear, tinnitus, hearing loss, drooling, nosebleeds, foreign body in nose, sinus pain, trouble swallowing and voice change.    Neck: Negative for neck pain, neck stiffness, painful lymph nodes and neck swelling.   Cardiovascular:  Negative for chest pain, leg swelling, palpitations, sob on exertion and passing out.   Eyes:  Negative for eye pain, eye redness, photophobia, double vision, blurred vision and eyelid swelling.   Respiratory:  Negative for chest tightness, cough, sputum production, bloody sputum, shortness of breath, stridor and wheezing.    Gastrointestinal:  Negative for abdominal pain, abdominal bloating, nausea, vomiting, constipation, diarrhea and heartburn.   Musculoskeletal:  Positive for muscle ache. Negative for joint pain, joint swelling, abnormal ROM of joint, back pain and muscle cramps.   Skin:  Negative for rash and hives.   Allergic/Immunologic: Negative for seasonal allergies, food allergies, " hives, itching and sneezing.   Neurological:  Negative for dizziness, light-headedness, passing out, loss of balance, headaches, altered mental status, loss of consciousness and seizures.   Hematologic/Lymphatic: Negative for swollen lymph nodes.   Psychiatric/Behavioral:  Negative for altered mental status and nervous/anxious. The patient is not nervous/anxious.      Objective:      Physical Exam   Constitutional: She is oriented to person, place, and time. She appears well-developed. She is cooperative.  Non-toxic appearance. She does not appear ill. No distress.   HENT:   Head: Normocephalic and atraumatic.   Ears:   Right Ear: Hearing, tympanic membrane, external ear and ear canal normal. No drainage or cerumen not present. Tympanic membrane is not injected, not erythematous and not bulging. No middle ear effusion.   Left Ear: Hearing, external ear and ear canal normal. No drainage or cerumen not present. Tympanic membrane is injected, erythematous and bulging. A middle ear effusion is present.   Nose: Mucosal edema and rhinorrhea present. No nasal deformity. No epistaxis. Right sinus exhibits no maxillary sinus tenderness and no frontal sinus tenderness. Left sinus exhibits no maxillary sinus tenderness and no frontal sinus tenderness.   Mouth/Throat: Uvula is midline and mucous membranes are normal. No trismus in the jaw. Normal dentition. No uvula swelling. Posterior oropharyngeal erythema and cobblestoning present. No oropharyngeal exudate or posterior oropharyngeal edema.   Eyes: Conjunctivae and lids are normal. No scleral icterus.   Neck: Trachea normal and phonation normal. Neck supple. No edema present. No erythema present. No neck rigidity present.   Cardiovascular: Normal rate, regular rhythm, normal heart sounds and normal pulses.   Pulmonary/Chest: Effort normal and breath sounds normal. No accessory muscle usage or stridor. No respiratory distress. She has no decreased breath sounds. She has no  wheezes. She has no rhonchi. She has no rales.   Abdominal: Normal appearance.   Musculoskeletal: Normal range of motion.         General: No deformity. Normal range of motion.   Lymphadenopathy:     She has no cervical adenopathy.   Neurological: She is alert and oriented to person, place, and time. She has normal sensation. She exhibits normal muscle tone. Gait normal. Coordination normal.   Skin: Skin is warm, dry, intact, not diaphoretic, not pale and no rash. Capillary refill takes less than 2 seconds.   Psychiatric: Her speech is normal and behavior is normal. Judgment and thought content normal.   Nursing note and vitals reviewed.      Results for orders placed or performed in visit on 10/11/22   POCT COVID-19 Rapid Screening   Result Value Ref Range    POC Rapid COVID Negative Negative     Acceptable Yes    POCT Influenza A/B MOLECULAR   Result Value Ref Range    POC Molecular Influenza A Ag Negative Negative, Not Reported    POC Molecular Influenza B Ag Negative Negative, Not Reported     Acceptable Yes        Assessment:       1. Left otitis media with effusion    2. Left ear pain    3. Sinus congestion    4. Postnasal drip    5. Generalized body aches          Plan:     Discussed COVID-19 and flu results with patient. Advised close follow-up with PCP and/or Specialist for further evaluation as needed. ER precautions given to patient as well. Patient aware, verbalized understanding and agreed with plan of care.    Left otitis media with effusion    Left ear pain    Sinus congestion  -     POCT COVID-19 Rapid Screening    Postnasal drip  -     POCT COVID-19 Rapid Screening    Generalized body aches  -     POCT COVID-19 Rapid Screening  -     POCT Influenza A/B MOLECULAR    Other orders  -     amoxicillin-clavulanate 875-125mg (AUGMENTIN) 875-125 mg per tablet; Take 1 tablet by mouth every 12 (twelve) hours. for 10 days  Dispense: 20 tablet; Refill: 0  -     predniSONE  (DELTASONE) 20 MG tablet; Take 40mg (2 tablets) x 2 days, 30mg (1.5 tablets) x 2 days, 20mg (1 tablet) x 2 days, 10mg (0.5 tablet) x 2 days.  Dispense: 10 tablet; Refill: 0       Patient Instructions   You must understand that you've received an Urgent Care treatment only and that you may be released before all your medical problems are known or treated. You, the patient, will arrange for follow up care as instructed.  Follow up with your PCP or specialty clinic as directed if not improved or as needed. You can call [phone] to schedule an appointment with the appropriate provider.  If your condition worsens we recommend that you receive another evaluation at the Emergency Department for any concerns or worsening of condition.  Patient aware and verbalized understanding.    Reviewed COVID-19 and flu results with patient.  Counseled patient and answered questions in regards to COVID-19 testing.  Advised patient to go home, treat symptoms with over-the-counter (OTC) medications and avoid contact with others at this time.  Increase fluids and rest is important.  Humidifier use at home.  OTC Claritin or Zyrtec or Allegra daily as needed for nasal congestion/postnasal drip/allergies.  OTC Flonase Nasal Spray daily as needed for nasal congestion/postnasal drip/allergies.  Advised patient to take OTC VITAMIN C and VITAMIN D and ZINC unless contraindicated as discussed.  Alternate OTC Tylenol and Ibuprofen unless contraindicated every 4-6 hours as needed for pain, headache, fever, etc.  Info given for virtual visit, covid 19 information line, state info line.   Advised patient to follow-up with PCP and/or Specialist for further evaluation as needed.   Strict ER precautions given to patient.  Follow local/state guidelines per covid emergency.   Patient aware, verbalized understanding and agreed with plan of care.    IF NOT IMPROVING, FOLLOW UP WITH VIRTUAL ONLINE VISIT WITH A PROVIDER 24/7 - FOR MORE INFORMATION OR TO  DOWNLOAD THE IFTIKHAR, VISIT [facility] ANYWHERE CARE AT [email or web address]  FOR 24/7 NURSE ADVICE, CALL [phone]  FOR COVID 19 RELATED QUESTIONS, CALL the [facility] covid hotline: [phone]  [place] FOR UP TO DATE INFORMATION: Text or dial 211, test keyword [id number] OR DIAL 211    HELPFUL EXTERNAL RESOURCES:  OFFICE OF PUBLIC HEALTH: [place] - http://ldh.la.gov/ and 1-705.554.7034  [facility] FOR DISEASE CONTROL - https://www.cdc.gov/   WORLD HEALTH ORGANIZATION (WHO) - https://www.who.int/   CDC WHEN TO QUARANTINE - https://www.cdc.gov/coronavirus/2019-ncov/if-you-are-sick/quarantine.html     INFO ABOUT [facility] COVID-19 RAPID TESTING:  This test utilizes isothermal nucleic acid amplification technology to detect the SARS-CoV-2 RdRp nucleic acid segment.   The analytical sensitivity (limit of detection) is 125 genome equivalents/mL.   A POSITIVE result implies infection with the SARS-CoV-2 virus; the patient is presumed to be contagious.     A NEGATIVE result means that SARS-CoV-2 nucleic acids are not present above the limit of detection.   A NEGATIVE result should be treated as presumptive. It does not rule out the possibility of COVID-19 and should not be the sole basis for treatment decisions.   This test is only for use under the Food and Drug Administration s Emergency Use Authorization (EUA).   Commercial kits are provided by [facility]. Performance characteristics of the EUA have been independently verified by [facility] Department of Pathology and Laboratory Medicine.   _________________________________________________________________   The authorized Fact Sheet for Healthcare Providers and the authorized Fact Sheet for Patients of the ID NOW COVID-19 are available on the FDA website:   https://www.fda.gov/media/509721/download  https://www.fda.gov/media/910351/download

## 2022-10-11 NOTE — PATIENT INSTRUCTIONS
You must understand that you've received an Urgent Care treatment only and that you may be released before all your medical problems are known or treated. You, the patient, will arrange for follow up care as instructed.  Follow up with your PCP or specialty clinic as directed if not improved or as needed. You can call [phone] to schedule an appointment with the appropriate provider.  If your condition worsens we recommend that you receive another evaluation at the Emergency Department for any concerns or worsening of condition.  Patient aware and verbalized understanding.    Reviewed COVID-19 and flu results with patient.  Counseled patient and answered questions in regards to COVID-19 testing.  Advised patient to go home, treat symptoms with over-the-counter (OTC) medications and avoid contact with others at this time.  Increase fluids and rest is important.  Humidifier use at home.  OTC Claritin or Zyrtec or Allegra daily as needed for nasal congestion/postnasal drip/allergies.  OTC Flonase Nasal Spray daily as needed for nasal congestion/postnasal drip/allergies.  Advised patient to take OTC VITAMIN C and VITAMIN D and ZINC unless contraindicated as discussed.  Alternate OTC Tylenol and Ibuprofen unless contraindicated every 4-6 hours as needed for pain, headache, fever, etc.  Info given for virtual visit, covid 19 information line, state info line.   Advised patient to follow-up with PCP and/or Specialist for further evaluation as needed.   Strict ER precautions given to patient.  Follow local/state guidelines per covid emergency.   Patient aware, verbalized understanding and agreed with plan of care.    IF NOT IMPROVING, FOLLOW UP WITH VIRTUAL ONLINE VISIT WITH A PROVIDER 24/7 - FOR MORE INFORMATION OR TO DOWNLOAD THE IFTIKHAR, VISIT [facility] ANYWHERE CARE AT [email or web address]  FOR 24/7 NURSE ADVICE, CALL [phone]  FOR COVID 19 RELATED QUESTIONS, CALL the [facility] covid hotline: [phone]  [place]  FOR UP TO DATE INFORMATION: Text or dial 211, test keyword LACOVID TO [id number] OR DIAL 211    HELPFUL EXTERNAL RESOURCES:  OFFICE OF PUBLIC HEALTH: [place] - http://ldh.[place].gov/ and 1-540.572.1776  [facility] - https://www.cdc.gov/   WORLD HEALTH ORGANIZATION (WHO) - https://www.who.int/   CDC WHEN TO QUARANTINE - https://www.cdc.gov/coronavirus/2019-ncov/if-you-are-sick/quarantine.html     INFO ABOUT [facility] COVID-19 RAPID TESTING:  This test utilizes isothermal nucleic acid amplification technology to detect the SARS-CoV-2 RdRp nucleic acid segment.   The analytical sensitivity (limit of detection) is 125 genome equivalents/mL.   A POSITIVE result implies infection with the SARS-CoV-2 virus; the patient is presumed to be contagious.     A NEGATIVE result means that SARS-CoV-2 nucleic acids are not present above the limit of detection.   A NEGATIVE result should be treated as presumptive. It does not rule out the possibility of COVID-19 and should not be the sole basis for treatment decisions.   This test is only for use under the Food and Drug Administration s Emergency Use Authorization (EUA).   Commercial kits are provided by [employer]. Performance characteristics of the EUA have been independently verified by [facility] Department of Pathology and Laboratory Medicine.   _________________________________________________________________   The authorized Fact Sheet for Healthcare Providers and the authorized Fact Sheet for Patients of the ID NOW COVID-19 are available on the FDA website:   https://www.fda.gov/media/369237/download  https://www.fda.gov/media/145029/download

## 2022-10-21 ENCOUNTER — OFFICE VISIT (OUTPATIENT)
Dept: ORTHOPEDICS | Facility: CLINIC | Age: 37
End: 2022-10-21
Payer: COMMERCIAL

## 2022-10-21 DIAGNOSIS — S43.432S LABRAL TEAR OF SHOULDER, LEFT, SEQUELA: ICD-10-CM

## 2022-10-21 DIAGNOSIS — Z01.818 PRE-OP TESTING: ICD-10-CM

## 2022-10-21 DIAGNOSIS — M75.22 BICEPS TENDONITIS, LEFT: ICD-10-CM

## 2022-10-21 DIAGNOSIS — M75.122 NONTRAUMATIC COMPLETE TEAR OF ROTATOR CUFF, LEFT: Primary | ICD-10-CM

## 2022-10-21 DIAGNOSIS — M25.812 OS ACROMIALE OF LEFT SHOULDER: ICD-10-CM

## 2022-10-21 PROCEDURE — 3044F HG A1C LEVEL LT 7.0%: CPT | Mod: CPTII,S$GLB,, | Performed by: ORTHOPAEDIC SURGERY

## 2022-10-21 PROCEDURE — 99214 PR OFFICE/OUTPT VISIT, EST, LEVL IV, 30-39 MIN: ICD-10-PCS | Mod: S$GLB,,, | Performed by: ORTHOPAEDIC SURGERY

## 2022-10-21 PROCEDURE — 3044F PR MOST RECENT HEMOGLOBIN A1C LEVEL <7.0%: ICD-10-PCS | Mod: CPTII,S$GLB,, | Performed by: ORTHOPAEDIC SURGERY

## 2022-10-21 PROCEDURE — 99214 OFFICE O/P EST MOD 30 MIN: CPT | Mod: S$GLB,,, | Performed by: ORTHOPAEDIC SURGERY

## 2022-10-21 RX ORDER — CEFAZOLIN SODIUM 1 G/3ML
2 INJECTION, POWDER, FOR SOLUTION INTRAMUSCULAR; INTRAVENOUS
Status: CANCELLED | OUTPATIENT
Start: 2022-10-21

## 2022-10-21 NOTE — PROGRESS NOTES
Patient ID: Emily Pelayo is a 37 y.o. female    No chief complaint on file.      History of Present Illness:    Emily Pelayo presents to clinic for left shoulder pain Patient denies known ADIEL. The pain started 10 months ago and is becoming progressively worse.  Pain is located over (points to) lateral left shoulder. She reports that the pain is a 8 /10 sharp and constant pain today. The pain is affecting ADLs and limiting desired level of activity. Denies numbness, tingling, radiation and inability to bear weight. Patient is experiencing nighttime pain.   Pain is 8 /10 at its worst.     [place] of Zanaflex, CSI, PT, anti-inflammatories, and rest  with no improvement. She feels like PT helped with ROM but not pain. She states she has gone 2x weekly for the past few months.     Occupation: technician at [employer]    Ambulating: unassisted  Diabetic: no  Smoking: no  Hx of DVT/PE: no     ____________________________________________________________________    Interval history 10/21/2022 : Patient returns today for MRI follow-up of their left shoulder.  They report no changes since I saw them last.  Some improvement with muscle relaxer but overall still having left shoulder pain despite therapy.      PAST MEDICAL HISTORY:   Past Medical History:   Diagnosis Date    Anxiety and depression     Constipation     Encounter for blood transfusion     Endometriosis     GERD (gastroesophageal reflux disease)     IBS (irritable bowel syndrome)     Neuromuscular disorder     nerve damage of right foot after hammertoe surgery    PCOS (polycystic ovarian syndrome)     Sleep disturbance     Thyroid disease     Vaginal prolapse      PAST SURGICAL HISTORY:   Past Surgical History:   Procedure Laterality Date    bladder suspension      COLONOSCOPY  ~24 years old    Dr. Neri, colon polyps per patient report    COLONOSCOPY N/A 8/21/2020    Procedure: COLONOSCOPY;  Surgeon: Yessi Pal MD;  Location: [facility];   Service: Endoscopy;  Laterality: N/A;    DIAGNOSTIC LAPAROSCOPY N/A 1/6/2022    Procedure: LAPAROSCOPY, DIAGNOSTIC;  Surgeon: Jack Sargent MD;  Location: [facility] OR;  Service: OB/GYN;  Laterality: N/A;    dx lap      ESOPHAGOGASTRODUODENOSCOPY N/A 1/22/2020    Procedure: EGD (ESOPHAGOGASTRODUODENOSCOPY);  Surgeon: Yessi Pal MD;  Location: [facility];  Service: Endoscopy;  Laterality: N/A;    hammer toe Right 11/19/2015    heavenly toe repeair with callus filing    LAPAROSCOPIC SUSPENSION OF UTEROSACRAL LIGAMENT Bilateral 1/6/2022    Procedure: SUSPENSION, LIGAMENT, UTEROSACRAL, LAPAROSCOPIC;  Surgeon: Jack Sargent MD;  Location: [facility] OR;  Service: OB/GYN;  Laterality: Bilateral;    LAPAROSCOPY  ~2012    done for infertility concern    TONSILLECTOMY       FAMILY HISTORY:   Family History   Problem Relation Age of Onset    Thyroid disease Mother         hyperthyroidism    Hypertension Mother     Hypertension Sister     Heart disease Maternal Grandmother     Ovarian cancer Neg Hx     Breast cancer Neg Hx     Eczema Neg Hx     Lupus Neg Hx     Psoriasis Neg Hx     Melanoma Neg Hx     Cancer Neg Hx     Colon cancer Neg Hx     Crohn's disease Neg Hx     Ulcerative colitis Neg Hx     Stomach cancer Neg Hx     Esophageal cancer Neg Hx      SOCIAL HISTORY:   Social History     Occupational History    Not on file   Tobacco Use    Smoking status: Never    Smokeless tobacco: Never   Substance and Sexual Activity    Alcohol use: No    Drug use: No    Sexual activity: Yes     Partners: Male     Birth control/protection: OCP     Comment: OCP helps regulate cycle        MEDICATIONS:   Current Outpatient Medications:     albuterol (PROVENTIL/VENTOLIN HFA) 90 mcg/actuation inhaler, Inhale 1-2 puffs into the lungs every 6 (six) hours as needed. Rescue, Disp: 18 g, Rfl: 1    amoxicillin-clavulanate 875-125mg (AUGMENTIN) 875-125 mg per tablet, Take 1 tablet by mouth every 12 (twelve) hours. for 10 days, Disp: 20 tablet, Rfl:  0    cholecalciferol, vitamin D3, (VITAMIN D3) 50 mcg (2,000 unit) Tab, Take 2,000 Units by mouth once daily., Disp: , Rfl:     CITRANATAL HARMONY, IRON FUM, 27 mg iron-1 mg -50 mg-260 mg Cap, TAKE 1 CAPSULE BY MOUTH ONCE DAILY., Disp: 100 capsule, Rfl: 0    dexAMETHasone (DECADRON) 1 MG Tab, Take one tablet between 11-12 pm. Have labs drawn the following morning., Disp: 1 tablet, Rfl: 0    fluocinonide (LIDEX) 0.05 % external solution, APPLY THIN FILM TO SCALP EVERY NIGHT AT BEDTIME AS NEEDED FOR ITCHING OR SCALING, Disp: 60 mL, Rfl: 0    hydrOXYzine HCL (ATARAX) 25 MG tablet, TAKE 1 TABLET BY MOUTH 3 TIMES DAILY AS NEEDED FOR ANXIETY., Disp: 270 tablet, Rfl: 1    ipratropium (ATROVENT) 42 mcg (0.06 %) nasal spray, 2 sprays by Nasal route 3 (three) times daily., Disp: 15 mL, Rfl: 6    ketoconazole (NIZORAL) 2 % shampoo, LATHER SCALP, LET SIT 5 MIN, RINSE WELL. USE 2X WEEKLY, Disp: 120 mL, Rfl: 2    levothyroxine (SYNTHROID) 100 MCG tablet, Take 1 tablet (100 mcg total) by mouth once daily., Disp: 30 tablet, Rfl: 11    LINZESS 145 mcg Cap capsule, TAKE 1 CAPSULE (145 MCG TOTAL) BY MOUTH BEFORE BREAKFAST., Disp: 90 capsule, Rfl: 2    MARLISSA, 28, 0.15-0.03 mg per tablet, TAKE 1 TABLET BY MOUTH EVERY DAY, Disp: 84 tablet, Rfl: 0    metFORMIN (GLUCOPHAGE-XR) 500 MG ER 24hr tablet, Take 1 tablet (500 mg total) by mouth 2 (two) times daily with meals., Disp: 180 tablet, Rfl: 3    naproxen sodium (ANAPROX) 550 MG tablet, Take 1 tablet (550 mg total) by mouth 2 (two) times daily with meals., Disp: 20 tablet, Rfl: 0    omeprazole (PRILOSEC) 40 MG capsule, TAKE 1 CAPSULE BY MOUTH BEFORE BREAKFAST., Disp: 90 capsule, Rfl: 1    prazosin (MINIPRESS) 1 MG Cap, Take 1 capsule (1 mg total) by mouth every evening., Disp: 90 capsule, Rfl: 1    prednisoLONE acetate (PRED FORTE) 1 % DrpS, PLACE  1 DROP INTO OD QID FOR 5 DAYS, Disp: , Rfl:     predniSONE (DELTASONE) 20 MG tablet, Take 40mg (2 tablets) x 2 days, 30mg (1.5 tablets) x 2  days, 20mg (1 tablet) x 2 days, 10mg (0.5 tablet) x 2 days., Disp: 10 tablet, Rfl: 0    prenatal,calc.40-iron-folate 1 27-1 mg Tab, Take 1 tablet by mouth once daily., Disp: 90 each, Rfl: 2    sertraline (ZOLOFT) 50 MG tablet, Take 1 tablet (50 mg total) by mouth once daily., Disp: 90 tablet, Rfl: 1    tirzepatide 2.5 mg/0.5 mL PnIj, Inject 2.5 mg (one pen) into the skin every 7 days., Disp: 4 pen, Rfl: 5    tiZANidine (ZANAFLEX) 2 MG tablet, TAKE 1 TABLET BY MOUTH EVERY DAY IN THE EVENING, Disp: 30 tablet, Rfl: 0  ALLERGIES: Review of patient's allergies indicates:  No Known Allergies      Physical Exam     There were no vitals filed for this visit.    Alert and oriented to person, place and time. No acute distress. Well-groomed, not ill appearing. Pupils round and reactive, normal respiratory effort, no audible wheezing.     Shoulder / Upper Extremity Exam    OBSERVATION:     Swelling  none  Deformity  none   Discoloration  none   Scapular winging none   Scars   none  Atrophy  none    TENDERNESS                Clavicle   Negative         AC Jt.    Negative        SC Jt.    Negative          Acromion:  Negative        Scapular Spine Negative   Supraspinatus  positive       Infraspinatus  Negative   LH Biceps   positive   Greater Tub.  Negative   Trapezius  Negative   Cervical spine  Negative        ROM: (* = with pain)              FE    150°  with pain     ER at 0°    60°        ER at 90° ABD  70°  with pain     IR at 90°  ABD   NA         IR (spine level)   T10         STRENGTH: (* = with pain)    SCAPTION   4/5        IR    5/5       ER    5/5       BICEPS   5/5       Deltoid    5/5         SIGNS:  Painful side       NEER   +    OIVYS  neg    PATHAK   +    SPEEDS  +     DROP ARM   neg   BELLY PRESS neg   Superior escape none    LIFT-OFF  neg   X-Body ADD    +    MOVING VALGUS neg        Imaging:     Left shoulder X-rays ordered/reviewed by me showing no evidence of fracture or dislocation. There is no  obvious malalignment. No evidence of masses, lesions or foreign bodies.  Possible os acromiale with type 2 acromion.    MRI of the left shoulder reviewed showing near full-thickness rotator cuff tear of the supraspinatus with degenerative SLAP tear lesion.    Assessment & Plan    Nontraumatic complete tear of rotator cuff, left    Os acromiale of left shoulder    Biceps tendonitis, left       This is a pleasant 37-year-old female who has persistent left shoulder pain despite conservative treatment with therapy.  Her MRI is consistent with high-grade near full-thickness tear of the supraspinatus with degenerative SLAP lesion.  We discussed options for this including repair of the biceps and rotator cuff and indicated procedures include subacromial decompression with acromioplasty.  She would like to proceed with this due to persistent pain despite conservative treatment measures.  We discussed the options and she would like to have this done as soon as possible.    _________________________________________________________________      Diagnosis and findings were discussed with her in lay terms. I discussed the findings and treatment options with them at length. Questions were actively sought and all questions were answered to their apparent satisfaction. We discussed the risks and benefits of surgery. We reviewed the operative indications surgical technique and potential rehabilitation involved. Risks including, but not limited to pain, bleeding, infection, damage surrounding neurovascular structures, and other soft tissues, decreased range of motion, instability, poor cosmetic result, scarring/painful scars, poor functional result, reflex sympathetic dystrophy. We discussed as well the risk of anesthesia, DVT/PE and possible need for additional surgical intervention in lay terms. Despite the risks as explained to them, they wished to proceed with surgery. She expressed understanding and agreement with the treatment  plan and understand that no guarantee of outcome is implied or expressed given.       Emily Pelayo will be scheduled for the following procedure(s):     Left shoulder arthroscopy with rotator cuff repair  Left shoulder arthroscopic biceps tenodesis  Left shoulder arthroscopic Subacromial decompression with acromioplasty      Post-Op Medications to be prescribed:   Percocet 5/325mg Take 1-2 tablets every 4-6 hours PRN pain #28  Zofran 4mg oral disintegrating tablets every 8 hours PRN nausea/vomiting   Motrin 600 mg TID PRN     DME/Bracing:  Shoulder abduction brace  Post-op Physical Therapy to begin: 4 weeks    Medical Clearance: No  Hx of DVT,PE, anesthetic complications: No    Additional notes/concerns:  None    Opioid Disclosure  Today we discussed the reasons why the prescription is necessary as well as: the risks of addiction and overdose associated with opioid drugs and the dangers of taking opioid drugs with alcohol, benzodiazepines and other central nervous system depressants; alternative treatments that may be available; the risks associated with the use of the drugs being prescribed, specifically that opioids are highly addictive, even when taken as prescribed, that there is a risk of developing a physical or psychological dependence on the controlled dangerous substance, and that the risks of taking more opioids than prescribed or mixing sedatives, benzodiazepines or alcohol with opioids can result in fatal respiratory depression.

## 2022-10-21 NOTE — H&P (VIEW-ONLY)
Patient ID: Emily Pelayo is a 37 y.o. female    No chief complaint on file.      History of Present Illness:    Emily Pelayo presents to clinic for left shoulder pain Patient denies known ADIEL. The pain started 10 months ago and is becoming progressively worse.  Pain is located over (points to) lateral left shoulder. She reports that the pain is a 8 /10 sharp and constant pain today. The pain is affecting ADLs and limiting desired level of activity. Denies numbness, tingling, radiation and inability to bear weight. Patient is experiencing nighttime pain.   Pain is 8 /10 at its worst.     [place] of Zanaflex, CSI, PT, anti-inflammatories, and rest  with no improvement. She feels like PT helped with ROM but not pain. She states she has gone 2x weekly for the past few months.     Occupation: technician at [employer]    Ambulating: unassisted  Diabetic: no  Smoking: no  Hx of DVT/PE: no     ____________________________________________________________________    Interval history 10/21/2022 : Patient returns today for MRI follow-up of their left shoulder.  They report no changes since I saw them last.  Some improvement with muscle relaxer but overall still having left shoulder pain despite therapy.      PAST MEDICAL HISTORY:   Past Medical History:   Diagnosis Date    Anxiety and depression     Constipation     Encounter for blood transfusion     Endometriosis     GERD (gastroesophageal reflux disease)     IBS (irritable bowel syndrome)     Neuromuscular disorder     nerve damage of right foot after hammertoe surgery    PCOS (polycystic ovarian syndrome)     Sleep disturbance     Thyroid disease     Vaginal prolapse      PAST SURGICAL HISTORY:   Past Surgical History:   Procedure Laterality Date    bladder suspension      COLONOSCOPY  ~24 years old    Dr. Neri, colon polyps per patient report    COLONOSCOPY N/A 8/21/2020    Procedure: COLONOSCOPY;  Surgeon: Yessi Pal MD;  Location: [facility];   Service: Endoscopy;  Laterality: N/A;    DIAGNOSTIC LAPAROSCOPY N/A 1/6/2022    Procedure: LAPAROSCOPY, DIAGNOSTIC;  Surgeon: Jack Sargent MD;  Location: [facility] OR;  Service: OB/GYN;  Laterality: N/A;    dx lap      ESOPHAGOGASTRODUODENOSCOPY N/A 1/22/2020    Procedure: EGD (ESOPHAGOGASTRODUODENOSCOPY);  Surgeon: Yessi Pal MD;  Location: [facility];  Service: Endoscopy;  Laterality: N/A;    hammer toe Right 11/19/2015    heavenly toe repeair with callus filing    LAPAROSCOPIC SUSPENSION OF UTEROSACRAL LIGAMENT Bilateral 1/6/2022    Procedure: SUSPENSION, LIGAMENT, UTEROSACRAL, LAPAROSCOPIC;  Surgeon: Jack Sargent MD;  Location: [facility] OR;  Service: OB/GYN;  Laterality: Bilateral;    LAPAROSCOPY  ~2012    done for infertility concern    TONSILLECTOMY       FAMILY HISTORY:   Family History   Problem Relation Age of Onset    Thyroid disease Mother         hyperthyroidism    Hypertension Mother     Hypertension Sister     Heart disease Maternal Grandmother     Ovarian cancer Neg Hx     Breast cancer Neg Hx     Eczema Neg Hx     Lupus Neg Hx     Psoriasis Neg Hx     Melanoma Neg Hx     Cancer Neg Hx     Colon cancer Neg Hx     Crohn's disease Neg Hx     Ulcerative colitis Neg Hx     Stomach cancer Neg Hx     Esophageal cancer Neg Hx      SOCIAL HISTORY:   Social History     Occupational History    Not on file   Tobacco Use    Smoking status: Never    Smokeless tobacco: Never   Substance and Sexual Activity    Alcohol use: No    Drug use: No    Sexual activity: Yes     Partners: Male     Birth control/protection: OCP     Comment: OCP helps regulate cycle        MEDICATIONS:   Current Outpatient Medications:     albuterol (PROVENTIL/VENTOLIN HFA) 90 mcg/actuation inhaler, Inhale 1-2 puffs into the lungs every 6 (six) hours as needed. Rescue, Disp: 18 g, Rfl: 1    amoxicillin-clavulanate 875-125mg (AUGMENTIN) 875-125 mg per tablet, Take 1 tablet by mouth every 12 (twelve) hours. for 10 days, Disp: 20 tablet, Rfl:  0    cholecalciferol, vitamin D3, (VITAMIN D3) 50 mcg (2,000 unit) Tab, Take 2,000 Units by mouth once daily., Disp: , Rfl:     CITRANATAL HARMONY, IRON FUM, 27 mg iron-1 mg -50 mg-260 mg Cap, TAKE 1 CAPSULE BY MOUTH ONCE DAILY., Disp: 100 capsule, Rfl: 0    dexAMETHasone (DECADRON) 1 MG Tab, Take one tablet between 11-12 pm. Have labs drawn the following morning., Disp: 1 tablet, Rfl: 0    fluocinonide (LIDEX) 0.05 % external solution, APPLY THIN FILM TO SCALP EVERY NIGHT AT BEDTIME AS NEEDED FOR ITCHING OR SCALING, Disp: 60 mL, Rfl: 0    hydrOXYzine HCL (ATARAX) 25 MG tablet, TAKE 1 TABLET BY MOUTH 3 TIMES DAILY AS NEEDED FOR ANXIETY., Disp: 270 tablet, Rfl: 1    ipratropium (ATROVENT) 42 mcg (0.06 %) nasal spray, 2 sprays by Nasal route 3 (three) times daily., Disp: 15 mL, Rfl: 6    ketoconazole (NIZORAL) 2 % shampoo, LATHER SCALP, LET SIT 5 MIN, RINSE WELL. USE 2X WEEKLY, Disp: 120 mL, Rfl: 2    levothyroxine (SYNTHROID) 100 MCG tablet, Take 1 tablet (100 mcg total) by mouth once daily., Disp: 30 tablet, Rfl: 11    LINZESS 145 mcg Cap capsule, TAKE 1 CAPSULE (145 MCG TOTAL) BY MOUTH BEFORE BREAKFAST., Disp: 90 capsule, Rfl: 2    MARLISSA, 28, 0.15-0.03 mg per tablet, TAKE 1 TABLET BY MOUTH EVERY DAY, Disp: 84 tablet, Rfl: 0    metFORMIN (GLUCOPHAGE-XR) 500 MG ER 24hr tablet, Take 1 tablet (500 mg total) by mouth 2 (two) times daily with meals., Disp: 180 tablet, Rfl: 3    naproxen sodium (ANAPROX) 550 MG tablet, Take 1 tablet (550 mg total) by mouth 2 (two) times daily with meals., Disp: 20 tablet, Rfl: 0    omeprazole (PRILOSEC) 40 MG capsule, TAKE 1 CAPSULE BY MOUTH BEFORE BREAKFAST., Disp: 90 capsule, Rfl: 1    prazosin (MINIPRESS) 1 MG Cap, Take 1 capsule (1 mg total) by mouth every evening., Disp: 90 capsule, Rfl: 1    prednisoLONE acetate (PRED FORTE) 1 % DrpS, PLACE  1 DROP INTO OD QID FOR 5 DAYS, Disp: , Rfl:     predniSONE (DELTASONE) 20 MG tablet, Take 40mg (2 tablets) x 2 days, 30mg (1.5 tablets) x 2  days, 20mg (1 tablet) x 2 days, 10mg (0.5 tablet) x 2 days., Disp: 10 tablet, Rfl: 0    prenatal,calc.40-iron-folate 1 27-1 mg Tab, Take 1 tablet by mouth once daily., Disp: 90 each, Rfl: 2    sertraline (ZOLOFT) 50 MG tablet, Take 1 tablet (50 mg total) by mouth once daily., Disp: 90 tablet, Rfl: 1    tirzepatide 2.5 mg/0.5 mL PnIj, Inject 2.5 mg (one pen) into the skin every 7 days., Disp: 4 pen, Rfl: 5    tiZANidine (ZANAFLEX) 2 MG tablet, TAKE 1 TABLET BY MOUTH EVERY DAY IN THE EVENING, Disp: 30 tablet, Rfl: 0  ALLERGIES: Review of patient's allergies indicates:  No Known Allergies      Physical Exam     There were no vitals filed for this visit.    Alert and oriented to person, place and time. No acute distress. Well-groomed, not ill appearing. Pupils round and reactive, normal respiratory effort, no audible wheezing.     Shoulder / Upper Extremity Exam    OBSERVATION:     Swelling  none  Deformity  none   Discoloration  none   Scapular winging none   Scars   none  Atrophy  none    TENDERNESS                Clavicle   Negative         AC Jt.    Negative        SC Jt.    Negative          Acromion:  Negative        Scapular Spine Negative   Supraspinatus  positive       Infraspinatus  Negative   LH Biceps   positive   Greater Tub.  Negative   Trapezius  Negative   Cervical spine  Negative        ROM: (* = with pain)              FE    150°  with pain     ER at 0°    60°        ER at 90° ABD  70°  with pain     IR at 90°  ABD   NA         IR (spine level)   T10         STRENGTH: (* = with pain)    SCAPTION   4/5        IR    5/5       ER    5/5       BICEPS   5/5       Deltoid    5/5         SIGNS:  Painful side       NEER   +    OIVYS  neg    PATHAK   +    SPEEDS  +     DROP ARM   neg   BELLY PRESS neg   Superior escape none    LIFT-OFF  neg   X-Body ADD    +    MOVING VALGUS neg        Imaging:     Left shoulder X-rays ordered/reviewed by me showing no evidence of fracture or dislocation. There is no  obvious malalignment. No evidence of masses, lesions or foreign bodies.  Possible os acromiale with type 2 acromion.    MRI of the left shoulder reviewed showing near full-thickness rotator cuff tear of the supraspinatus with degenerative SLAP tear lesion.    Assessment & Plan    Nontraumatic complete tear of rotator cuff, left    Os acromiale of left shoulder    Biceps tendonitis, left       This is a pleasant 37-year-old female who has persistent left shoulder pain despite conservative treatment with therapy.  Her MRI is consistent with high-grade near full-thickness tear of the supraspinatus with degenerative SLAP lesion.  We discussed options for this including repair of the biceps and rotator cuff and indicated procedures include subacromial decompression with acromioplasty.  She would like to proceed with this due to persistent pain despite conservative treatment measures.  We discussed the options and she would like to have this done as soon as possible.    _________________________________________________________________      Diagnosis and findings were discussed with her in lay terms. I discussed the findings and treatment options with them at length. Questions were actively sought and all questions were answered to their apparent satisfaction. We discussed the risks and benefits of surgery. We reviewed the operative indications surgical technique and potential rehabilitation involved. Risks including, but not limited to pain, bleeding, infection, damage surrounding neurovascular structures, and other soft tissues, decreased range of motion, instability, poor cosmetic result, scarring/painful scars, poor functional result, reflex sympathetic dystrophy. We discussed as well the risk of anesthesia, DVT/PE and possible need for additional surgical intervention in lay terms. Despite the risks as explained to them, they wished to proceed with surgery. She expressed understanding and agreement with the treatment  plan and understand that no guarantee of outcome is implied or expressed given.       Emily Pelayo will be scheduled for the following procedure(s):     Left shoulder arthroscopy with rotator cuff repair  Left shoulder arthroscopic biceps tenodesis  Left shoulder arthroscopic Subacromial decompression with acromioplasty      Post-Op Medications to be prescribed:   Percocet 5/325mg Take 1-2 tablets every 4-6 hours PRN pain #28  Zofran 4mg oral disintegrating tablets every 8 hours PRN nausea/vomiting   Motrin 600 mg TID PRN     DME/Bracing:  Shoulder abduction brace  Post-op Physical Therapy to begin: 4 weeks    Medical Clearance: No  Hx of DVT,PE, anesthetic complications: No    Additional notes/concerns:  None    Opioid Disclosure  Today we discussed the reasons why the prescription is necessary as well as: the risks of addiction and overdose associated with opioid drugs and the dangers of taking opioid drugs with alcohol, benzodiazepines and other central nervous system depressants; alternative treatments that may be available; the risks associated with the use of the drugs being prescribed, specifically that opioids are highly addictive, even when taken as prescribed, that there is a risk of developing a physical or psychological dependence on the controlled dangerous substance, and that the risks of taking more opioids than prescribed or mixing sedatives, benzodiazepines or alcohol with opioids can result in fatal respiratory depression.

## 2022-11-03 ENCOUNTER — LAB VISIT (OUTPATIENT)
Dept: LAB | Facility: HOSPITAL | Age: 37
End: 2022-11-03
Attending: PHYSICIAN ASSISTANT
Payer: COMMERCIAL

## 2022-11-03 DIAGNOSIS — E06.3 HYPOTHYROIDISM DUE TO HASHIMOTO'S THYROIDITIS: ICD-10-CM

## 2022-11-03 DIAGNOSIS — E03.8 HYPOTHYROIDISM DUE TO HASHIMOTO'S THYROIDITIS: ICD-10-CM

## 2022-11-03 LAB
T4 FREE SERPL-MCNC: 1.08 NG/DL (ref 0.71–1.51)
TSH SERPL DL<=0.005 MIU/L-ACNC: 0.39 UIU/ML (ref 0.4–4)

## 2022-11-03 PROCEDURE — 84443 ASSAY THYROID STIM HORMONE: CPT | Performed by: PHYSICIAN ASSISTANT

## 2022-11-03 PROCEDURE — 84439 ASSAY OF FREE THYROXINE: CPT | Performed by: PHYSICIAN ASSISTANT

## 2022-11-03 PROCEDURE — 36415 COLL VENOUS BLD VENIPUNCTURE: CPT | Mod: PO | Performed by: PHYSICIAN ASSISTANT

## 2022-11-14 ENCOUNTER — HOSPITAL ENCOUNTER (OUTPATIENT)
Dept: PREADMISSION TESTING | Facility: HOSPITAL | Age: 37
Discharge: HOME | End: 2022-11-14
Attending: ORTHOPAEDIC SURGERY
Payer: COMMERCIAL

## 2022-11-14 VITALS — BODY MASS INDEX: 29.62 KG/M2 | WEIGHT: 200 LBS | HEIGHT: 69 IN

## 2022-11-14 DIAGNOSIS — M75.122 NONTRAUMATIC COMPLETE TEAR OF ROTATOR CUFF, LEFT: ICD-10-CM

## 2022-11-14 DIAGNOSIS — Z01.818 PRE-OP TESTING: ICD-10-CM

## 2022-11-14 LAB
ANION GAP SERPL CALC-SCNC: 8 MMOL/L (ref 8–16)
BASOPHILS # BLD AUTO: 0.04 K/UL (ref 0–0.2)
BASOPHILS NFR BLD: 0.9 % (ref 0–1.9)
BUN SERPL-MCNC: 13 MG/DL (ref 6–20)
CALCIUM SERPL-MCNC: 9.2 MG/DL (ref 8.7–10.5)
CHLORIDE SERPL-SCNC: 106 MMOL/L (ref 95–110)
CO2 SERPL-SCNC: 27 MMOL/L (ref 23–29)
CREAT SERPL-MCNC: 0.9 MG/DL (ref 0.5–1.4)
DIFFERENTIAL METHOD: ABNORMAL
EOSINOPHIL # BLD AUTO: 0.1 K/UL (ref 0–0.5)
EOSINOPHIL NFR BLD: 1.5 % (ref 0–8)
ERYTHROCYTE [DISTWIDTH] IN BLOOD BY AUTOMATED COUNT: 12.6 % (ref 11.5–14.5)
EST. GFR  (NO RACE VARIABLE): >60 ML/MIN/1.73 M^2
GLUCOSE SERPL-MCNC: 79 MG/DL (ref 70–110)
HCT VFR BLD AUTO: 38.6 % (ref 37–48.5)
HGB BLD-MCNC: 12 G/DL (ref 12–16)
IMM GRANULOCYTES # BLD AUTO: 0.01 K/UL (ref 0–0.04)
IMM GRANULOCYTES NFR BLD AUTO: 0.2 % (ref 0–0.5)
LYMPHOCYTES # BLD AUTO: 1.8 K/UL (ref 1–4.8)
LYMPHOCYTES NFR BLD: 39.6 % (ref 18–48)
MCH RBC QN AUTO: 28.6 PG (ref 27–31)
MCHC RBC AUTO-ENTMCNC: 31.1 G/DL (ref 32–36)
MCV RBC AUTO: 92 FL (ref 82–98)
MONOCYTES # BLD AUTO: 0.3 K/UL (ref 0.3–1)
MONOCYTES NFR BLD: 7.2 % (ref 4–15)
NEUTROPHILS # BLD AUTO: 2.3 K/UL (ref 1.8–7.7)
NEUTROPHILS NFR BLD: 50.6 % (ref 38–73)
NRBC BLD-RTO: 0 /100 WBC
PLATELET # BLD AUTO: 344 K/UL (ref 150–450)
PMV BLD AUTO: 10.1 FL (ref 9.2–12.9)
POTASSIUM SERPL-SCNC: 3.9 MMOL/L (ref 3.5–5.1)
RBC # BLD AUTO: 4.19 M/UL (ref 4–5.4)
SODIUM SERPL-SCNC: 141 MMOL/L (ref 136–145)
WBC # BLD AUTO: 4.57 K/UL (ref 3.9–12.7)

## 2022-11-14 PROCEDURE — 99900103 DSU ONLY-NO CHARGE-INITIAL HR (STAT)

## 2022-11-14 PROCEDURE — 36415 COLL VENOUS BLD VENIPUNCTURE: CPT | Performed by: ORTHOPAEDIC SURGERY

## 2022-11-14 PROCEDURE — 85025 COMPLETE CBC W/AUTO DIFF WBC: CPT | Performed by: ORTHOPAEDIC SURGERY

## 2022-11-14 PROCEDURE — 99900104 DSU ONLY-NO CHARGE-EA ADD'L HR (STAT)

## 2022-11-14 PROCEDURE — 80048 BASIC METABOLIC PNL TOTAL CA: CPT | Performed by: ORTHOPAEDIC SURGERY

## 2022-11-14 NOTE — DISCHARGE INSTRUCTIONS
To confirm, Your doctor has instructed you that surgery is scheduled for: 11/17/22    Please report to [facility], Registration the morning of surgery. You must check-in and receive a wristband before going to your procedure.    Pre-Op will call the afternoon prior to surgery between 1:00 and 6:00 PM with the final arrival time.  Phone number: [phone]    PLEASE NOTE:  The surgery schedule has many variables which may affect the time of your surgery case.  Family members should be available if your surgery time changes.  Plan to be here the day of your procedure between 4-6 hours.    MEDICATIONS:  TAKE ONLY THESE MEDICATIONS WITH A SMALL SIP OF WATER THE MORNING OF YOUR PROCEDURE:    SEE SHEET    NO METFORMIN PM/AM DOSE DAY BEFORE SURGERY        DO NOT TAKE THESE MEDICATIONS 5-7 DAYS PRIOR to your procedure or per your surgeon's request:   ASPIRIN, ALEVE, ADVIL, IBUPROFEN, FISH OIL VITAMIN E, HERBALS  (May take Tylenol)    ONLY if you are prescribed any types of blood thinners such as:  Aspirin, Coumadin, Plavix, Pradaxa, Xarelto, Aggrenox, Effient, Eliquis, Savasya, Brilinta, or any other, ask your surgeon whether you should stop taking them and how long before surgery you should stop.  You may also need to verify with the prescribing physician if it is ok to stop your medication.      INSTRUCTIONS IMPORTANT!!  Do not eat or drink anything between midnight and the time of your procedure- this includes gum, mints, and candy.  Do not smoke or drink alcoholic beverages 24 hours prior to your procedure.  Shower the night before AND the morning of your procedure with a Chlorhexidine wash such as Hibiclens or Dial antibacterial soap from the neck down.  Do not get it on your face or in your eyes.  You may use your own shampoo and face wash. This helps your skin to be as bacteria free as possible.    If you wear contact lenses, dentures, hearing aids or glasses, bring a container to put them in  during surgery and give to a family member for safe keeping.  Please leave all jewelry, piercing's and valuables at home.   DO NOT remove hair from the surgery site.  Do not shave the incision site unless you are given specific instructions to do so.    ONLY if you have been diagnosed with sleep apnea please bring your C-PAP machine.  ONLY if you wear home oxygen please bring your portable oxygen tank the day of your procedure.  ONLY if you have a history of OPEN HEART SURGERY you will need a clearance from your Cardiologist per Anesthesia.      ONLY for patients requiring bowel prep, written instructions will be given by your doctor's office.  ONLY if you have a neuro stimulator, please bring the controller with you the morning of surgery  ONLY if a type and screen test is needed before surgery, please return:  If your doctor has scheduled you for an overnight stay, bring a small overnight bag with any personal items you need.  Make arrangements in advance for transportation home by a responsible adult.  It is not safe to drive a vehicle during the 24 hours after anesthesia.      [facility] Visitor Policy    Effective September 26, 2022    [facility] will resume routine visitation for COVID-19 negative patients, including inpatients, outpatients, and procedural areas, in accordance with local campus procedures.    All [facility] facilities and properties are tobacco free.  Smoking is NOT allowed.   If you have any questions about these instructions, call Pre-Op Admit  Nursing at [phone] or the Pre-Op Day Surgery Unit at [phone].

## 2022-11-15 ENCOUNTER — ANESTHESIA EVENT (OUTPATIENT)
Dept: SURGERY | Facility: HOSPITAL | Age: 37
End: 2022-11-15
Payer: COMMERCIAL

## 2022-11-16 RX ORDER — IBUPROFEN 600 MG/1
600 TABLET ORAL 3 TIMES DAILY
Qty: 90 TABLET | Refills: 0 | Status: SHIPPED | OUTPATIENT
Start: 2022-11-16

## 2022-11-16 RX ORDER — ONDANSETRON 4 MG/1
4 TABLET, ORALLY DISINTEGRATING ORAL EVERY 8 HOURS PRN
Qty: 28 TABLET | Refills: 0 | Status: SHIPPED | OUTPATIENT
Start: 2022-11-16

## 2022-11-16 RX ORDER — OXYCODONE AND ACETAMINOPHEN 5; 325 MG/1; MG/1
1 TABLET ORAL EVERY 6 HOURS PRN
Qty: 28 TABLET | Refills: 0 | Status: SHIPPED | OUTPATIENT
Start: 2022-11-16

## 2022-11-17 ENCOUNTER — ANESTHESIA (OUTPATIENT)
Dept: SURGERY | Facility: HOSPITAL | Age: 37
End: 2022-11-17
Payer: COMMERCIAL

## 2022-11-17 ENCOUNTER — HOSPITAL ENCOUNTER (OUTPATIENT)
Facility: HOSPITAL | Age: 37
Discharge: HOME | End: 2022-11-17
Attending: ORTHOPAEDIC SURGERY | Admitting: ORTHOPAEDIC SURGERY
Payer: COMMERCIAL

## 2022-11-17 DIAGNOSIS — S43.432S LABRAL TEAR OF SHOULDER, LEFT, SEQUELA: ICD-10-CM

## 2022-11-17 DIAGNOSIS — M75.122 NONTRAUMATIC COMPLETE TEAR OF ROTATOR CUFF, LEFT: ICD-10-CM

## 2022-11-17 DIAGNOSIS — Z01.818 PRE-OP TESTING: ICD-10-CM

## 2022-11-17 LAB
B-HCG UR QL: NEGATIVE
CTP QC/QA: YES

## 2022-11-17 PROCEDURE — 81025 URINE PREGNANCY TEST: CPT | Performed by: ANESTHESIOLOGY

## 2022-11-17 PROCEDURE — 94799 UNLISTED PULMONARY SVC/PX: CPT

## 2022-11-17 PROCEDURE — C1713 ANCHOR/SCREW BN/BN,TIS/BN: HCPCS | Performed by: ORTHOPAEDIC SURGERY

## 2022-11-17 PROCEDURE — 64415 PR NERVE BLOCK INJ, ANES/STEROID, BRACHIAL PLEXUS, INCL IMAG GUIDANCE: ICD-10-PCS | Mod: 59,LT,, | Performed by: ANESTHESIOLOGY

## 2022-11-17 PROCEDURE — 29826 SHO ARTHRS SRG DECOMPRESSION: CPT | Mod: LT,,, | Performed by: ORTHOPAEDIC SURGERY

## 2022-11-17 PROCEDURE — 29827 SHO ARTHRS SRG RT8TR CUF RPR: CPT | Mod: LT,,, | Performed by: ORTHOPAEDIC SURGERY

## 2022-11-17 PROCEDURE — 71000015 HC POSTOP RECOV 1ST HR: Performed by: ORTHOPAEDIC SURGERY

## 2022-11-17 PROCEDURE — 25000003 PHARM REV CODE 250: Performed by: NURSE ANESTHETIST, CERTIFIED REGISTERED

## 2022-11-17 PROCEDURE — 63600175 PHARM REV CODE 636 W HCPCS: Performed by: ORTHOPAEDIC SURGERY

## 2022-11-17 PROCEDURE — 99900104 DSU ONLY-NO CHARGE-EA ADD'L HR (STAT): Performed by: ORTHOPAEDIC SURGERY

## 2022-11-17 PROCEDURE — 63600175 PHARM REV CODE 636 W HCPCS: Performed by: ANESTHESIOLOGY

## 2022-11-17 PROCEDURE — 27201423 OPTIME MED/SURG SUP & DEVICES STERILE SUPPLY: Performed by: ORTHOPAEDIC SURGERY

## 2022-11-17 PROCEDURE — 25000003 PHARM REV CODE 250: Performed by: ANESTHESIOLOGY

## 2022-11-17 PROCEDURE — D9220A PRA ANESTHESIA: ICD-10-PCS | Mod: CRNA,,, | Performed by: NURSE ANESTHETIST, CERTIFIED REGISTERED

## 2022-11-17 PROCEDURE — 76942 ECHO GUIDE FOR BIOPSY: CPT | Mod: 26,,, | Performed by: ANESTHESIOLOGY

## 2022-11-17 PROCEDURE — 76942 PR U/S GUIDANCE FOR NEEDLE GUIDANCE: ICD-10-PCS | Mod: 26,,, | Performed by: ANESTHESIOLOGY

## 2022-11-17 PROCEDURE — 64415 NJX AA&/STRD BRCH PLXS IMG: CPT | Mod: 59,LT,, | Performed by: ANESTHESIOLOGY

## 2022-11-17 PROCEDURE — 63600175 PHARM REV CODE 636 W HCPCS: Performed by: NURSE ANESTHETIST, CERTIFIED REGISTERED

## 2022-11-17 PROCEDURE — 29827 PR SHLDR ARTHROSCOP,SURG,W/ROTAT CUFF REPR: ICD-10-PCS | Mod: LT,,, | Performed by: ORTHOPAEDIC SURGERY

## 2022-11-17 PROCEDURE — D9220A PRA ANESTHESIA: Mod: CRNA,,, | Performed by: NURSE ANESTHETIST, CERTIFIED REGISTERED

## 2022-11-17 PROCEDURE — 76942 ECHO GUIDE FOR BIOPSY: CPT | Performed by: ANESTHESIOLOGY

## 2022-11-17 PROCEDURE — 37000008 HC ANESTHESIA 1ST 15 MINUTES: Performed by: ORTHOPAEDIC SURGERY

## 2022-11-17 PROCEDURE — 36000710: Performed by: ORTHOPAEDIC SURGERY

## 2022-11-17 PROCEDURE — D9220A PRA ANESTHESIA: ICD-10-PCS | Mod: ANES,,, | Performed by: ANESTHESIOLOGY

## 2022-11-17 PROCEDURE — C9290 INJ, BUPIVACAINE LIPOSOME: HCPCS | Performed by: ANESTHESIOLOGY

## 2022-11-17 PROCEDURE — 71000039 HC RECOVERY, EACH ADD'L HOUR: Performed by: ORTHOPAEDIC SURGERY

## 2022-11-17 PROCEDURE — 36000711: Performed by: ORTHOPAEDIC SURGERY

## 2022-11-17 PROCEDURE — D9220A PRA ANESTHESIA: Mod: ANES,,, | Performed by: ANESTHESIOLOGY

## 2022-11-17 PROCEDURE — 63600175 PHARM REV CODE 636 W HCPCS

## 2022-11-17 PROCEDURE — 99900103 DSU ONLY-NO CHARGE-INITIAL HR (STAT): Performed by: ORTHOPAEDIC SURGERY

## 2022-11-17 PROCEDURE — 37000009 HC ANESTHESIA EA ADD 15 MINS: Performed by: ORTHOPAEDIC SURGERY

## 2022-11-17 PROCEDURE — 71000033 HC RECOVERY, INTIAL HOUR: Performed by: ORTHOPAEDIC SURGERY

## 2022-11-17 PROCEDURE — 29826 PR SHLDR ARTHROSCOP,PART ACROMIOPLAS: ICD-10-PCS | Mod: LT,,, | Performed by: ORTHOPAEDIC SURGERY

## 2022-11-17 DEVICE — IMPLANTABLE DEVICE: Type: IMPLANTABLE DEVICE | Site: SHOULDER | Status: FUNCTIONAL

## 2022-11-17 RX ORDER — ROCURONIUM BROMIDE 10 MG/ML
INJECTION, SOLUTION INTRAVENOUS
Status: DISCONTINUED | OUTPATIENT
Start: 2022-11-17 | End: 2022-11-17

## 2022-11-17 RX ORDER — MIDAZOLAM HYDROCHLORIDE 1 MG/ML
0.5 INJECTION INTRAMUSCULAR; INTRAVENOUS
Status: DISCONTINUED | OUTPATIENT
Start: 2022-11-17 | End: 2022-11-17 | Stop reason: HOSPADM

## 2022-11-17 RX ORDER — OXYCODONE HYDROCHLORIDE 5 MG/1
5 TABLET ORAL EVERY 4 HOURS PRN
Status: CANCELLED | OUTPATIENT
Start: 2022-11-17

## 2022-11-17 RX ORDER — OXYCODONE HYDROCHLORIDE 5 MG/1
5 TABLET ORAL
Status: DISCONTINUED | OUTPATIENT
Start: 2022-11-17 | End: 2022-11-17 | Stop reason: HOSPADM

## 2022-11-17 RX ORDER — HYDROMORPHONE HYDROCHLORIDE 2 MG/ML
0.2 INJECTION, SOLUTION INTRAMUSCULAR; INTRAVENOUS; SUBCUTANEOUS EVERY 5 MIN PRN
Status: DISCONTINUED | OUTPATIENT
Start: 2022-11-17 | End: 2022-11-17 | Stop reason: HOSPADM

## 2022-11-17 RX ORDER — CEFAZOLIN SODIUM 2 G/50ML
2 SOLUTION INTRAVENOUS
Status: COMPLETED | OUTPATIENT
Start: 2022-11-17 | End: 2022-11-17

## 2022-11-17 RX ORDER — ONDANSETRON 2 MG/ML
INJECTION INTRAMUSCULAR; INTRAVENOUS
Status: DISCONTINUED | OUTPATIENT
Start: 2022-11-17 | End: 2022-11-17

## 2022-11-17 RX ORDER — ESMOLOL HYDROCHLORIDE 10 MG/ML
INJECTION INTRAVENOUS
Status: DISCONTINUED | OUTPATIENT
Start: 2022-11-17 | End: 2022-11-17

## 2022-11-17 RX ORDER — FENTANYL CITRATE 50 UG/ML
25 INJECTION, SOLUTION INTRAMUSCULAR; INTRAVENOUS EVERY 5 MIN PRN
Status: COMPLETED | OUTPATIENT
Start: 2022-11-17 | End: 2022-11-17

## 2022-11-17 RX ORDER — ONDANSETRON 4 MG/1
8 TABLET, ORALLY DISINTEGRATING ORAL EVERY 8 HOURS PRN
Status: CANCELLED | OUTPATIENT
Start: 2022-11-17

## 2022-11-17 RX ORDER — FENTANYL CITRATE 50 UG/ML
25 INJECTION, SOLUTION INTRAMUSCULAR; INTRAVENOUS EVERY 5 MIN PRN
Status: DISCONTINUED | OUTPATIENT
Start: 2022-11-17 | End: 2022-11-17 | Stop reason: HOSPADM

## 2022-11-17 RX ORDER — MIDAZOLAM HYDROCHLORIDE 1 MG/ML
INJECTION, SOLUTION INTRAMUSCULAR; INTRAVENOUS
Status: DISCONTINUED | OUTPATIENT
Start: 2022-11-17 | End: 2022-11-17

## 2022-11-17 RX ORDER — FENTANYL CITRATE 50 UG/ML
INJECTION, SOLUTION INTRAMUSCULAR; INTRAVENOUS
Status: DISCONTINUED | OUTPATIENT
Start: 2022-11-17 | End: 2022-11-17

## 2022-11-17 RX ORDER — LIDOCAINE HYDROCHLORIDE 10 MG/ML
1 INJECTION, SOLUTION EPIDURAL; INFILTRATION; INTRACAUDAL; PERINEURAL ONCE
Status: COMPLETED | OUTPATIENT
Start: 2022-11-17 | End: 2022-11-17

## 2022-11-17 RX ORDER — MORPHINE SULFATE 2 MG/ML
3 INJECTION, SOLUTION INTRAMUSCULAR; INTRAVENOUS
Status: CANCELLED | OUTPATIENT
Start: 2022-11-17

## 2022-11-17 RX ORDER — DEXAMETHASONE SODIUM PHOSPHATE 4 MG/ML
INJECTION, SOLUTION INTRA-ARTICULAR; INTRALESIONAL; INTRAMUSCULAR; INTRAVENOUS; SOFT TISSUE
Status: DISCONTINUED | OUTPATIENT
Start: 2022-11-17 | End: 2022-11-17

## 2022-11-17 RX ORDER — NEOSTIGMINE METHYLSULFATE 1 MG/ML
INJECTION, SOLUTION INTRAVENOUS
Status: DISCONTINUED | OUTPATIENT
Start: 2022-11-17 | End: 2022-11-17

## 2022-11-17 RX ORDER — PROCHLORPERAZINE EDISYLATE 5 MG/ML
5 INJECTION INTRAMUSCULAR; INTRAVENOUS EVERY 6 HOURS PRN
Status: CANCELLED | OUTPATIENT
Start: 2022-11-17

## 2022-11-17 RX ORDER — PROPOFOL 10 MG/ML
VIAL (ML) INTRAVENOUS
Status: DISCONTINUED | OUTPATIENT
Start: 2022-11-17 | End: 2022-11-17

## 2022-11-17 RX ORDER — EPINEPHRINE 1 MG/ML
INJECTION, SOLUTION, CONCENTRATE INTRAVENOUS
Status: DISCONTINUED | OUTPATIENT
Start: 2022-11-17 | End: 2022-11-17 | Stop reason: HOSPADM

## 2022-11-17 RX ORDER — IBUPROFEN 600 MG/1
600 TABLET ORAL EVERY 6 HOURS PRN
Status: CANCELLED | OUTPATIENT
Start: 2022-11-17

## 2022-11-17 RX ORDER — LIDOCAINE HYDROCHLORIDE 20 MG/ML
INJECTION INTRAVENOUS
Status: DISCONTINUED | OUTPATIENT
Start: 2022-11-17 | End: 2022-11-17

## 2022-11-17 RX ORDER — BUPIVACAINE HYDROCHLORIDE 5 MG/ML
INJECTION, SOLUTION EPIDURAL; INTRACAUDAL
Status: COMPLETED | OUTPATIENT
Start: 2022-11-17 | End: 2022-11-17

## 2022-11-17 RX ADMIN — ESMOLOL HYDROCHLORIDE 15 MG: 10 INJECTION, SOLUTION INTRAVENOUS at 11:11

## 2022-11-17 RX ADMIN — ROCURONIUM BROMIDE 50 MG: 10 INJECTION, SOLUTION INTRAVENOUS at 10:11

## 2022-11-17 RX ADMIN — FENTANYL CITRATE 25 MCG: 50 INJECTION, SOLUTION INTRAMUSCULAR; INTRAVENOUS at 01:11

## 2022-11-17 RX ADMIN — OXYCODONE 5 MG: 5 TABLET ORAL at 01:11

## 2022-11-17 RX ADMIN — SODIUM CHLORIDE, SODIUM GLUCONATE, SODIUM ACETATE, POTASSIUM CHLORIDE, MAGNESIUM CHLORIDE, SODIUM PHOSPHATE, DIBASIC, AND POTASSIUM PHOSPHATE: .53; .5; .37; .037; .03; .012; .00082 INJECTION, SOLUTION INTRAVENOUS at 08:11

## 2022-11-17 RX ADMIN — MIDAZOLAM 2 MG: 1 INJECTION INTRAMUSCULAR; INTRAVENOUS at 09:11

## 2022-11-17 RX ADMIN — FENTANYL CITRATE 50 MCG: 50 INJECTION, SOLUTION INTRAMUSCULAR; INTRAVENOUS at 11:11

## 2022-11-17 RX ADMIN — LIDOCAINE HYDROCHLORIDE 10 MG: 10 INJECTION, SOLUTION EPIDURAL; INFILTRATION; INTRACAUDAL; PERINEURAL at 08:11

## 2022-11-17 RX ADMIN — ONDANSETRON 4 MG: 2 INJECTION INTRAMUSCULAR; INTRAVENOUS at 10:11

## 2022-11-17 RX ADMIN — HYDROMORPHONE HYDROCHLORIDE 0.2 MG: 2 INJECTION INTRAMUSCULAR; INTRAVENOUS; SUBCUTANEOUS at 02:11

## 2022-11-17 RX ADMIN — CEFAZOLIN SODIUM 2 G: 2 SOLUTION INTRAVENOUS at 10:11

## 2022-11-17 RX ADMIN — PROPOFOL 150 MG: 10 INJECTION, EMULSION INTRAVENOUS at 10:11

## 2022-11-17 RX ADMIN — MIDAZOLAM 2 MG: 1 INJECTION INTRAMUSCULAR; INTRAVENOUS at 10:11

## 2022-11-17 RX ADMIN — FENTANYL CITRATE 25 MCG: 50 INJECTION, SOLUTION INTRAMUSCULAR; INTRAVENOUS at 02:11

## 2022-11-17 RX ADMIN — DEXAMETHASONE SODIUM PHOSPHATE 4 MG: 4 INJECTION, SOLUTION INTRA-ARTICULAR; INTRALESIONAL; INTRAMUSCULAR; INTRAVENOUS; SOFT TISSUE at 10:11

## 2022-11-17 RX ADMIN — NEOSTIGMINE METHYLSULFATE 5 MG: 1 INJECTION INTRAVENOUS at 01:11

## 2022-11-17 RX ADMIN — LIDOCAINE HYDROCHLORIDE 100 MG: 20 INJECTION, SOLUTION INTRAVENOUS at 10:11

## 2022-11-17 RX ADMIN — BUPIVACAINE HYDROCHLORIDE 10 ML: 5 INJECTION, SOLUTION EPIDURAL; INTRACAUDAL; PERINEURAL at 09:11

## 2022-11-17 RX ADMIN — GLYCOPYRROLATE 0.6 MG: 0.2 INJECTION, SOLUTION INTRAMUSCULAR; INTRAVITREAL at 01:11

## 2022-11-17 RX ADMIN — FENTANYL CITRATE 100 MCG: 50 INJECTION, SOLUTION INTRAMUSCULAR; INTRAVENOUS at 09:11

## 2022-11-17 RX ADMIN — BUPIVACAINE 10 ML: 13.3 INJECTION, SUSPENSION, LIPOSOMAL INFILTRATION at 09:11

## 2022-11-17 NOTE — OP NOTE
11/17/2022     PREOPERATIVE DIAGNOSIS:       Left shoulder rotator cuff tear  Degenerative SLAP tear left shoulder  Biceps tenosynovitis left shoulder  Os acromiale left shoulder     POSTOPERATIVE DIAGNOSIS: Same     PROCEDURE:       Left shoulder arthroscopic rotator cuff repair  Left shoulder arthroscopic biceps tenotomy  Extensive debridement of the left shoulder arthroscopically with debridement of labrum, bursa rotator interval  Subacromial decompression with acromioplasty left shoulder        SURGEON: Steven Zarate MD     ASSISTANT: Craig Coppola _ PAC     He was present and scrubbed for the entirety of the procedure. They were required for patient positioning, retraction, insertion of implants and closure. Without him I would have been unable to safely perform the case due to only one scrub tech available.      ANESTHESIA:  General      ESTIMATED BLOOD LOSS:  25 mL     INDICATIONS:  Yuridia Liang is a 49 y.o. year-old female who presented to my clinic with a chief complaint of left shoulder pain which had failed conservative treatment. Imaging revealed a full-thickness tear of the rotator cuff as well as degenerative SLAP tear and os acromiale.  She elected to proceed with surgical intervention due to consistent pain and dysfunction despite PT and conservative treatment.     We discussed the risks and benefits of the surgery in detail including stiffness, CRPS, damage to surrounding neurovascular structures, failure of fixation as well as need for revision surgery. Despite these risks they elected to proceed.         COMPONENTS USED:    Implant Name Type Inv. Item Serial No.  Lot No. LRB No. Used Action   4.75 MM THEO KNOTLESS SP WITH 1 MM HI-FI RIBBON     [employer] [id number] Left 1 Implanted                DESCRIPTION OF PROCEDURE:  The patient was seen in the pre-operative area where consents were verified and the surgical site marked. They did receive a preoperative block for  intra-operative and postoperative analgesia. The patient was taken to the Operating Room where anesthesia was administered by the Anesthesia Department. She was then placed in the lateral decubitus  position and all superficial neurovascular structures were well padded.  The left shoulder  was then sterilely prepped and draped in the normal fashion.  Preoperative antibiotics were administered.  A time-out was performed verifying the procedure and laterality, all agreed and elected to proceed as planned.     We began by injecting 30 cc of normal saline into the glenohumeral joint using a spinal needle.  We then made a stab incision posteriorly and the camera was inserted.  Upon insertion of the camera into the glenohumeral joint we noticed markedly increased synovitis and scarring of the anterior shoulder.  There was significant erythema of the biceps tendon.  There was degenerative SLAP lesion as well.  There was obvious tear of the undersurface of the rotator cuff with significant fraying.  We established an anterior portal as well as an anterior superior lateral portal using a spinal needle.  We performed significant lysis of adhesions anteriorly using an arthroscopic shaver and a ablator.  A rotator interval was performed anteriorly from the superior border of the subscapularis and extending this laterally.  An arthroscopic shaver was used to debride the anterior structures including the subscapularis and labrum.  Labral debridement was performed from the 9 o'clock to 3 o'clock position.  We did elect to perform a biceps tenotomy due to significant scarring.  I was unable to perform a tenodesis due to the quality of the tendon.  There was very minimal if any changes of the glenoid and humeral head.  There was no significant arthritic change.  We then turned our attention to the subacromial space.  We did create a lateral portal lateral to the acromion.  Extensive bursectomy was performed using an arthroscopic  shaver.  We released the anterior cuff from the base of the coracoid.  We then noticed a large spur anteriorly consistent with os acromiale.  We used a 5.5 mm bur from the lateral portal and flattened this down to a flat stable base.  There was a large crescent shaped tear at the interval of the supraspinatus and infraspinatus.  This was still attached distally at the footprint.  I felt that this could be repaired with independent side-to-side sutures.  We placed a cannula posterior and anterior and visualized our cuff laterally.  We then used a bird's beak tissue penetrator and pierced the cuff posterior and anterior and brought the sutures out my anterior portal.  We did 3 sutures in total.  These were then tied from superior to inferior with excellent compression.  This repaired our rotator cuff nicely.  We then removed our instrumentation and closed our wounds with 3-0 nylon.  She was placed in a shoulder abduction pillow and sterile dressing.       Disposition:       She will be nonweightbearing.  We will begin physical therapy in the next 2-3 weeks.  We will allow pendulums and shoulder shrugs and then proceed with passive range of motion and active assisted range of motion.  I will see her back in 2 weeks for wound check.     Steven Zarate MD  [phone] (c)

## 2022-11-17 NOTE — BRIEF OP NOTE
[facility]  Brief Operative Note    Surgery Date: 11/17/2022     Surgeon(s) and Role:     * Steven Zarate MD - Primary    Assisting Surgeon: None    Pre-op Diagnosis:  Nontraumatic complete tear of rotator cuff, left [M75.122]  Pre-op testing [Z01.818]    Post-op Diagnosis:  Post-Op Diagnosis Codes:     * Nontraumatic complete tear of rotator cuff, left [M75.122]     * Pre-op testing [Z01.818]    Procedure(s) (LRB):  ARTHROSCOPY, SHOULDER, WITH SUBACROMIAL SPACE DECOMPRESSION (Left)  ACROMIOPLASTY, ARTHROSCOPIC (Left)  TENOTOMY, BICEPS, ARTHROSCOPIC (Left)  RTC Repair Left Shoulder     Anesthesia: General    Operative Findings: significant scarring and synovitis of the rotator interval, high grade articular tear SS and IS.     Estimated Blood Loss: 25cc          Specimens:   Specimen (24h ago, onward)      None              Discharge Note    OUTCOME: Patient tolerated treatment/procedure well without complication and is now ready for discharge.    DISPOSITION: Home or Self Care    FINAL DIAGNOSIS:  <principal problem not specified>    FOLLOWUP: In clinic    DISCHARGE INSTRUCTIONS:  No discharge procedures on file.

## 2022-11-17 NOTE — PATIENT INSTRUCTIONS
Shoulder Arthroscopy Post-op Instructions    Wound Care  Maintain your operative dressing.  It is normal for the shoulder to bleed and swell following surgery - if blood soaks into the bandage, do not become alarmed - reinforce with additional dressing.  Please maintain steri-strips in place.  Remove surgical dressing on the second postoperative day and apply waterproof Band-Aids over incisions and change daily.   Keep surgical incisions clean and dry.  You may shower after removing the first dressing on the second postoperative day  by placing waterproof Band-Aids over incision areas.   Do NOT immerse the operative shoulder until 14 days after surgery     Icing  Icing is very important for the first 5-7 days after surgery.   Use an ice machine continuously or ice packs every 2 hours for 20 minutes daily until your first postoperative visit.   Do not place the ice bag or cooling device directly on the skin. Care must be taken to avoid frostbite to the skin    Activity  Gentle range of motion of your hand and elbow is encouraged.   While exercises are important, dont overdo it. Common sense is the rule. Increased swelling and/or pain is usually an indication youre overdoing it.  Start physical therapy within 1-3 weeks.   When sleeping or resting, inclined positions (ie: reclining chair) and a pillow under the forearm for support may provide better comfort STILL IN SLING   Avoid long periods of sitting or long distance traveling for 2 weeks.   NO driving until instructed otherwise by physician, it is illegal to drive in a sling     Sling  Wear your sling at all times.   May remove your sling to shower, or to do home exercise program (pendulums, elbow and wrist range of motion etc)     Medications  Do not drive a car or operate heavy machinery while taking narcotics.   You have been prescribed a narcotic (either Norco or Percocet) for pain control. This is to be used for a short time period.   Take 1-2 tablets every  4-6 hours as needed  Max of 12 pills per day  Plan on using for 2-5 days, depending on the level of pain.  Do not take additional Tylenol (Acetaminophen) while taking Percocet.      Common side effects include nausea, drowsiness and constipation. Take medication with food to decrease side effects.   Ibuprofen (600-800mg) may be taken in between the narcotic medication.   You should resume your normal medications for other conditions the day after surgery. You may not drive or operate heavy equipment while on narcotics. It is important not to drink while taking narcotic medication.     Diet  Resume normal diet as tolerated this evening. We have no specific diet restrictions after surgery, but extensive use of narcotics can lead to constipation. High fiber diets, lots of fluids and muscle activity can prevent this occurrence.  The anesthetic drugs used during surgery may cause nausea for the first 24 hours. If nausea is encountered, drink only clear liquids. The only solids should be dry crackers or toast. If the nausea and vomiting become severe or you show signs of being dehydrated (lack of urination), please call.      EMERGENCIES  Contact Dr. Zarate or his nurse if any of the following are present:   Difficulty breathing  Painful swelling or numbness   Unrelenting pain   Fever (over 101° - it is normal to have a low grade fever for the first day or two following surgery) or chills   Redness around incisions   Color of lower extremity   Continuous drainage or bleeding from incision (a small amount of drainage is expected)   Excessive nausea/vomiting       **If you have an emergency after office hours or on weekends, call our office and you will be connected to our page service - they will contact Dr. Zarate or one of his partners if he is unavailable.      **If you have an emergency that requires immediate attention, proceed to the nearest emergency room or call 911.

## 2022-11-17 NOTE — ANESTHESIA PROCEDURE NOTES
Intubation    Date/Time: 11/17/2022 10:37 AM  Performed by: Everardo Bowie CRNA  Authorized by: Figueroa Wilson MD     Intubation:     Induction:  Intravenous    Intubated:  Postinduction    Mask Ventilation:  Easy mask    Attempts:  1    Attempted By:  BRIJESH    Blade:  Chris 3    Laryngeal View Grade: Grade I - full view of cords      Difficult Airway Encountered?: No      Complications:  None    Airway Device:  Oral endotracheal tube    Airway Device Size:  7.5    Style/Cuff Inflation:  Cuffed    Secured at:  The lips    Placement Verified By:  Capnometry    Complicating Factors:  None    Findings Post-Intubation:  BS equal bilateral and atraumatic/condition of teeth unchanged

## 2022-11-17 NOTE — TRANSFER OF CARE
"Anesthesia Transfer of Care Note    Patient: Emily Pelayo    Procedure(s) Performed: Procedure(s) (LRB):  ARTHROSCOPY, SHOULDER, WITH SUBACROMIAL SPACE DECOMPRESSION (Left)  ACROMIOPLASTY, ARTHROSCOPIC (Left)  TENOTOMY, BICEPS, ARTHROSCOPIC (Left)    Patient location: PACU    Anesthesia Type: general    Transport from OR: Transported from OR on 2-3 L/min O2 by NC with adequate spontaneous ventilation    Post pain: adequate analgesia    Post assessment: no apparent anesthetic complications and tolerated procedure well    Post vital signs: stable    Level of consciousness: awake    Nausea/Vomiting: no nausea/vomiting    Complications: none    Transfer of care protocol was followed      Last vitals:   Visit Vitals  /88   Pulse 82   Temp 36.8 °C (98.2 °F) (Skin)   Resp 18   Ht 5' 9" (1.753 m)   Wt 90.7 kg (200 lb)   LMP 11/12/2022   SpO2 98%   Breastfeeding No   BMI 29.53 kg/m²     "

## 2022-11-17 NOTE — ANESTHESIA PREPROCEDURE EVALUATION
11/17/2022  Emily Pelayo is a 37 y.o., female.      Pre-op Assessment    I have reviewed the Patient Summary Reports.     I have reviewed the Nursing Notes. I have reviewed the NPO Status.   I have reviewed the Medications.     Review of Systems  Anesthesia Hx:  No problems with previous Anesthesia Denies Hx of Anesthetic complications    Social:  Non-Smoker    Cardiovascular:   Denies Hypertension.  Denies MI.  Denies CAD.    Denies CABG/stent.   Denies Angina.    Pulmonary:   Denies COPD.  Denies Asthma.  Denies Recent URI.    Renal/:   Denies Chronic Renal Disease.     Hepatic/GI:   Denies GERD. Denies Liver Disease.    Neurological:   Denies TIA. Denies CVA. Denies Seizures.    Endocrine:   Denies Diabetes. Denies Hypothyroidism.    Psych:   Denies Psychiatric History.          Physical Exam  General: Well nourished, Cooperative, Alert and Oriented    Airway:  Mallampati: II / II  Mouth Opening: Normal  TM Distance: 4 - 6 cm  Tongue: Normal    Dental:  Intact    Chest/Lungs:  Clear to auscultation, Normal Respiratory Rate    Heart:  Rate: Normal  Rhythm: Regular Rhythm  Sounds: Normal        Anesthesia Plan  Type of Anesthesia, risks & benefits discussed:    Anesthesia Type: Gen Natural Airway  Intra-op Monitoring Plan: Standard ASA Monitors  Induction:  IV  Informed Consent: Informed consent signed with the Patient and all parties understand the risks and agree with anesthesia plan.  All questions answered.   ASA Score: 1    Ready For Surgery From Anesthesia Perspective.     .

## 2022-11-17 NOTE — ANESTHESIA PROCEDURE NOTES
Peripheral Block    Patient location during procedure: pre-op   Block not for primary anesthetic.  Reason for block: at surgeon's request and post-op pain management   Post-op Pain Location: COMPLETE TEAR OF THE LEFT SHOULDER ROTATOR CUFF-REPAIR OF TORN LEFT SHOULDER  ROTATOR CUFF   Start time: 11/17/2022 9:45 AM  Timeout: 11/17/2022 9:45 AM   End time: 11/17/2022 9:55 AM    Staffing  Authorizing Provider: Figueroa Wilson MD  Performing Provider: Figueroa Wilson MD    Preanesthetic Checklist  Completed: patient identified, IV checked, site marked, risks and benefits discussed, surgical consent, monitors and equipment checked, pre-op evaluation and timeout performed  Peripheral Block  Patient position: right lateral decubitus  Prep: ChloraPrep  Patient monitoring: cardiac monitor, heart rate, continuous pulse ox, continuous capnometry and frequent blood pressure checks  Block type: interscalene  Laterality: left  Injection technique: single shot  Needle  Needle type: Stimuplex   Needle gauge: 21 G  Needle localization: ultrasound guidance   -ultrasound image captured on disc.  Assessment  Injection assessment: negative parasthesia, local visualized surrounding nerve and negative aspiration  Paresthesia pain: none  Heart rate change: no  Slow fractionated injection: yes  Pain Tolerance: comfortable throughout block and no complaints  Medications:    Medications: bupivacaine (pf) (MARCAINE) injection 0.5% - Perineural   10 mL - 11/17/2022 9:50:00 AM    Additional Notes  EXPAREL

## 2022-11-17 NOTE — ANESTHESIA POSTPROCEDURE EVALUATION
Anesthesia Post Evaluation    Patient: Emily Pelayo    Procedure(s) Performed: Procedure(s) (LRB):  ARTHROSCOPY, SHOULDER, WITH SUBACROMIAL SPACE DECOMPRESSION (Left)  ACROMIOPLASTY, ARTHROSCOPIC (Left)  TENOTOMY, BICEPS, ARTHROSCOPIC (Left)  REPAIR, ROTATOR CUFF, ARTHROSCOPIC (Left)    Final Anesthesia Type: general      Patient location during evaluation: PACU  Patient participation: Yes- Able to Participate  Level of consciousness: awake and alert and oriented  Post-procedure vital signs: reviewed and stable  Pain management: adequate  Airway patency: patent    PONV status at discharge: No PONV  Anesthetic complications: no      Cardiovascular status: blood pressure returned to baseline  Respiratory status: unassisted, spontaneous ventilation and room air  Hydration status: euvolemic  Follow-up not needed.          Vitals Value Taken Time   /91 11/17/22 1424   Temp  11/17/22 1424   Pulse 82 11/17/22 1424   Resp 18 11/17/22 1424   SpO2 100 % 11/17/22 1424   Vitals shown include unvalidated device data.      No case tracking events are documented in the log.      Pain/Godwin Score: Pain Rating Prior to Med Admin: 8 (11/17/2022  1:55 PM)  Godwin Score: 8 (11/17/2022  2:00 PM)

## 2022-11-18 VITALS
DIASTOLIC BLOOD PRESSURE: 83 MMHG | SYSTOLIC BLOOD PRESSURE: 131 MMHG | RESPIRATION RATE: 20 BRPM | OXYGEN SATURATION: 97 % | TEMPERATURE: 98 F | WEIGHT: 200 LBS | BODY MASS INDEX: 29.62 KG/M2 | HEIGHT: 69 IN | HEART RATE: 96 BPM

## 2022-11-18 NOTE — PLAN OF CARE
Patient in bathroom with nurse at door in case needed to help in bathroom.  Patient sitting on toilet and still urinating.

## 2022-11-18 NOTE — PLAN OF CARE
Patient still sitting on toilet in bathroom and states still urinating.  Pad in underwear but still got pants wet in a small area.  Nurse suggested a gown to change in and to wear home and a new pad to put in underwear.  Patient refused.

## 2022-11-18 NOTE — PLAN OF CARE
Patient given a pad to put in underwear while getting dressed.  Patient's friend at bedside and helping patient get dressed.  Nurse suggested walking to bathroom and urinating some more in toilet.  Patient getting dressed at this time.

## 2022-11-18 NOTE — PLAN OF CARE
Patient brought from pacu to post op area on bedpan.  Patient too sleepy to get out of bed to go to bathroom.  Patient on bedpan for now.  Patient states urine is still coming out.  Ce, rn

## 2022-11-18 NOTE — PROGRESS NOTES
Pleased with instructions and discharge instructions clear to patient.  Needed to void several times post op and given bed pan, taken to bathroom and voided.

## 2022-11-18 NOTE — PLAN OF CARE
Patient still on bedpan with urine still coming out.  A pad was given to put in patient's underwear when ready to get dressed and a pad on bed for leakage.  Patient and patient's friend at bedside given discharge instructions and patient's friend interrupting while instructions given.  Patient still on bedpan and wants to sit on it a while longer.

## 2022-11-22 ENCOUNTER — PATIENT MESSAGE (OUTPATIENT)
Dept: ENDOCRINOLOGY | Facility: CLINIC | Age: 37
End: 2022-11-22
Payer: COMMERCIAL

## 2022-11-22 DIAGNOSIS — R79.89 ELEVATED CORTISOL LEVEL: Primary | ICD-10-CM

## 2022-12-02 ENCOUNTER — OFFICE VISIT (OUTPATIENT)
Dept: ORTHOPEDICS | Facility: CLINIC | Age: 37
End: 2022-12-02
Payer: COMMERCIAL

## 2022-12-02 ENCOUNTER — TELEPHONE (OUTPATIENT)
Dept: ENDOCRINOLOGY | Facility: CLINIC | Age: 37
End: 2022-12-02
Payer: COMMERCIAL

## 2022-12-02 VITALS — RESPIRATION RATE: 19 BRPM | HEIGHT: 69 IN | WEIGHT: 200 LBS | BODY MASS INDEX: 29.62 KG/M2

## 2022-12-02 DIAGNOSIS — Z98.890 S/P ROTATOR CUFF REPAIR: Primary | ICD-10-CM

## 2022-12-02 DIAGNOSIS — R79.89 ELEVATED CORTISOL LEVEL: Primary | ICD-10-CM

## 2022-12-02 PROCEDURE — 3008F BODY MASS INDEX DOCD: CPT | Mod: CPTII,S$GLB,, | Performed by: ORTHOPAEDIC SURGERY

## 2022-12-02 PROCEDURE — 99024 POSTOP FOLLOW-UP VISIT: CPT | Mod: S$GLB,,, | Performed by: ORTHOPAEDIC SURGERY

## 2022-12-02 PROCEDURE — 3008F PR BODY MASS INDEX (BMI) DOCUMENTED: ICD-10-PCS | Mod: CPTII,S$GLB,, | Performed by: ORTHOPAEDIC SURGERY

## 2022-12-02 PROCEDURE — 99999 PR PBB SHADOW E&M-EST. PATIENT-LVL III: CPT | Mod: PBBFAC,,, | Performed by: ORTHOPAEDIC SURGERY

## 2022-12-02 PROCEDURE — 1159F PR MEDICATION LIST DOCUMENTED IN MEDICAL RECORD: ICD-10-PCS | Mod: CPTII,S$GLB,, | Performed by: ORTHOPAEDIC SURGERY

## 2022-12-02 PROCEDURE — 1159F MED LIST DOCD IN RCRD: CPT | Mod: CPTII,S$GLB,, | Performed by: ORTHOPAEDIC SURGERY

## 2022-12-02 PROCEDURE — 3044F HG A1C LEVEL LT 7.0%: CPT | Mod: CPTII,S$GLB,, | Performed by: ORTHOPAEDIC SURGERY

## 2022-12-02 PROCEDURE — 3044F PR MOST RECENT HEMOGLOBIN A1C LEVEL <7.0%: ICD-10-PCS | Mod: CPTII,S$GLB,, | Performed by: ORTHOPAEDIC SURGERY

## 2022-12-02 PROCEDURE — 99999 PR PBB SHADOW E&M-EST. PATIENT-LVL III: ICD-10-PCS | Mod: PBBFAC,,, | Performed by: ORTHOPAEDIC SURGERY

## 2022-12-02 PROCEDURE — 99024 PR POST-OP FOLLOW-UP VISIT: ICD-10-PCS | Mod: S$GLB,,, | Performed by: ORTHOPAEDIC SURGERY

## 2022-12-02 NOTE — PROGRESS NOTES
Post-op Note    HPI    Emily Pelayo is here 2 weeks s/p the following procedure:     PROCEDURE:       Left shoulder arthroscopic rotator cuff repair  Left shoulder arthroscopic biceps tenotomy  Extensive debridement of the left shoulder arthroscopically with debridement of labrum, bursa rotator interval  Subacromial decompression with acromioplasty left shoulder    Overall doing well. Pain controlled on current regimen.  Scheduled to start physical therapy on Monday.. Denies any chest pain or shortness of breathe. Denies any drainage from the incision. Denies any fevers, chills or paresthesias.  Compliant with sling use.        Physical Exam:     Patient is alert and oriented no acute distress.   Assistive Device:  Shoulder abduction pillow    Portal site Incision(s) are well healed.  There is no evidence of dehiscence.  There is no induration erythema or signs of infection.  Appropriate soft tissue swelling.  Compartments are soft and compressible.  Warm well-perfused extremity.    Assessment    Emily Pelayo is 2 weeks Post-op     Plan:    Overall doing as expected.  We discussed expectations of surgery and postoperative course.     Continued postoperative pain regimen -- Tylenol ibuprofen as needed    Continue physical therapy (weight bearing status:  Nonweightbearing) rotator cuff repair protocol with passive range of motion and can progress to active assisted motion.  Wean out of sling at postoperative week 4    Follow-up: 4 weeks   X-rays next visit:  Left shoulder

## 2022-12-05 ENCOUNTER — LAB VISIT (OUTPATIENT)
Dept: LAB | Facility: HOSPITAL | Age: 37
End: 2022-12-05
Attending: PHYSICIAN ASSISTANT
Payer: COMMERCIAL

## 2022-12-05 ENCOUNTER — TELEPHONE (OUTPATIENT)
Dept: FAMILY MEDICINE | Facility: CLINIC | Age: 37
End: 2022-12-05
Payer: COMMERCIAL

## 2022-12-05 ENCOUNTER — CLINICAL SUPPORT (OUTPATIENT)
Dept: REHABILITATION | Facility: HOSPITAL | Age: 37
End: 2022-12-05
Attending: ORTHOPAEDIC SURGERY
Payer: COMMERCIAL

## 2022-12-05 DIAGNOSIS — R79.89 ELEVATED CORTISOL LEVEL: Primary | ICD-10-CM

## 2022-12-05 DIAGNOSIS — M25.512 CHRONIC LEFT SHOULDER PAIN: Primary | ICD-10-CM

## 2022-12-05 DIAGNOSIS — S43.432S LABRAL TEAR OF SHOULDER, LEFT, SEQUELA: ICD-10-CM

## 2022-12-05 DIAGNOSIS — M25.60 DECREASED RANGE OF MOTION: ICD-10-CM

## 2022-12-05 DIAGNOSIS — G89.29 CHRONIC LEFT SHOULDER PAIN: Primary | ICD-10-CM

## 2022-12-05 DIAGNOSIS — R79.89 ELEVATED CORTISOL LEVEL: ICD-10-CM

## 2022-12-05 DIAGNOSIS — M75.122 NONTRAUMATIC COMPLETE TEAR OF ROTATOR CUFF, LEFT: ICD-10-CM

## 2022-12-05 DIAGNOSIS — M62.81 MUSCLE WEAKNESS: ICD-10-CM

## 2022-12-05 PROCEDURE — 82530 CORTISOL FREE: CPT | Performed by: PHYSICIAN ASSISTANT

## 2022-12-05 PROCEDURE — 97110 THERAPEUTIC EXERCISES: CPT | Mod: PN | Performed by: PHYSICAL THERAPIST

## 2022-12-05 PROCEDURE — 82570 ASSAY OF URINE CREATININE: CPT | Performed by: PHYSICIAN ASSISTANT

## 2022-12-05 PROCEDURE — 97161 PT EVAL LOW COMPLEX 20 MIN: CPT | Mod: PN | Performed by: PHYSICAL THERAPIST

## 2022-12-05 NOTE — PLAN OF CARE
[facility]  Physical Therapy Initial Evaluation    Name: Emily Pelayo  Clinic Number: [id number]    Therapy Diagnosis:   Encounter Diagnoses   Name Primary?    Nontraumatic complete tear of rotator cuff, left     Labral tear of shoulder, left, sequela      Physician: Steven Zarate MD    Physician Orders: PT Eval and Treat s/p RTC repair, SAD, biceps tenotomy  Medical Diagnosis from Referral:   M75.122 (ICD-10-CM) - Nontraumatic complete tear of rotator cuff, left   S43.432S (ICD-10-CM) - Labral tear of shoulder, left, sequela     Evaluation Date: 12/5/2022    DOS 11-17-22  Authorization Period Expiration: 12-31-22  Plan of Care Expiration: 2-27-23  Progress Note Due: 12-30-22 prior to MD appt  Visit # / Visits authorized: 1/ 1  FOTO: Issued Visit #: 1    MD Follow up appointment: 12-30-22    Precautions: Standard and RC precautions    Time In: 10:55 Pt late she was lost  Time Out: 11:50  Total Billable Time: 55 minutes    Subjective   Date of onset: November 17 had RC repair and SLAP tear and underwent RC and labrum repair  History of current condition - Poppy reports: originally no known injury, pain since last year had PT and still pain.  MRI October and recommended surgery     Medical History:   Past Medical History:   Diagnosis Date    Anxiety and depression     Constipation     Encounter for blood transfusion     Endometriosis     GERD (gastroesophageal reflux disease)     IBS (irritable bowel syndrome)     Neuromuscular disorder     nerve damage of right foot after hammertoe surgery    PCOS (polycystic ovarian syndrome)     Sleep disturbance     Thyroid disease     Vaginal prolapse        Surgical History:   Emily Pelayo  has a past surgical history that includes bladder suspension; hammer toe (Right, 11/19/2015); Tonsillectomy; dx lap; Laparoscopy (~2012); Colonoscopy (~24 years old); Esophagogastroduodenoscopy (N/A, 1/22/2020); Colonoscopy (N/A, 8/21/2020); Diagnostic  laparoscopy (N/A, 1/6/2022); laparoscopic suspension of uterosacral ligament (Bilateral, 1/6/2022); Arthroscopy of shoulder with decompression of subacromial space (Left, 11/17/2022); Arthroscopic acromioplasty of shoulder (Left, 11/17/2022); Arthroscopic tenotomy of biceps tendon (Left, 11/17/2022); and Arthroscopic repair of rotator cuff of shoulder (Left, 11/17/2022).    Medications:   Emily has a current medication list which includes the following prescription(s): albuterol, cholecalciferol (vitamin d3), citranatal harmony (iron fum), fluocinonide, hydroxyzine hcl, ibuprofen, ipratropium, ketoconazole, levothyroxine, linzess, marlissa (28), metformin, naproxen sodium, omeprazole, ondansetron, oxycodone-acetaminophen, prazosin, prenatal,calc.40-iron-folate 1, sertraline, tirzepatide, and tizanidine.    Allergies:   Review of patient's allergies indicates:  No Known Allergies     Imaging, MRI studies: 1. High-grade partial thickness articular surface tear of the posterior fibers supraspinatus tendon/anterior fibers infraspinatus tendon at the conjoined tendon.  There is supraspinatus and infraspinatus tendinopathy.  2. Subscapularis tendinopathy with articular surface fraying/low-grade partial-thickness tearing.  3. Increased signal within the superior labrum concerning for SLAP type superior labral tear.  4. Incidental note made of an os acromiale.       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  Pt states not working Pt states she has to lift up to 50# to lift a door to do cleaning  Prior Level of Function: no limitations   Current Level of Function: limited with L UE use    Pain:  Current 2/10, worst 8/10, best 2/10   Location: left shoulder   Description: Dull can get a sharp pain  Aggravating Factors: if  let arm hang down  Easing Factors: rest in sling  Sleep is  disturbed. Sleeping position: sleeping on sofa     Pts goals: get normal range of motion with at least amount of pain as possible     Objective     Postural examination in standing:  - forward head  - forward shoulder  - in sling with abd pillow     Postural examination in sitting:   - forward head  - forward shoulder  - normal thoracic kyphosis  - normal lumbar lordosis      Functional assessment:   - walking/gait: no deficits  - sit to stand: no deficits  - sit to supine: min assist to avoid use of L UE        - supine to sit: min assist to avoid use of L UE  - supine to prone: not tested    Cervical ROM:  WNL     Shoulder ROM: (measured in degrees supine)   Shoulder  LUE   Flexion in scaption plane 60   Abduction in scaption plane 45   External Rotation neutral 15   External Rotation with abd  N/T   Internal Rotation Palm to stomach     Muscle Strength deferred      Endurance is not tested    Special tests: scope sites healed, no redness no swelling     Palpation: no TTP     Joint mobility: not tested    Sensation: Intact      CMS Impairment/Limitation/Restriction for FOTO shoulder Survey    Therapist reviewed FOTO scores for Emily Pelayo on 12/5/2022.   FOTO documents entered into [email or web address] - see Media section.    Limitation Score: 71%         TREATMENT   Treatment Time In: 11:20  Treatment Time Out: 11:45  Total Treatment time separate from Evaluation: 25 minutes    Poppy received therapeutic exercises to develop endurance, ROM, and core stabilization for 25 minutes including:  Pt able to doff sling I, did provide assistance to don sling  Gentle joint oscillation  PROM x 10 with PT flexion abd and ER neutral within RC protocol  Open and close hand x 10  Wrist flexion/extension x 10  Wrist RD/UD x 10  Pro/sup x 10      Standing with elbow flexed to 90 lean forward then allow elbow to straighten and perform pendulum x 10  Gentle scap squeeze x 10    Pt  with much difficulty relaxing with PROM and with pendulum.  Emphasis on pain free zone.  With arm in dependent position 4/10 in dependent position with elbow flexed to 90 2/10    Poppy received cold pack for 10 minutes to L shoulder sitting with pillow for support into abd .  Printed and reviewed HEP while pt on ice    Home Exercises and Patient Education Provided    Education provided:   - proper posture   - continued use of sling  - HEP   - stretch to point of tightness not pain   - exercise in pain free zone     Written Home Exercises Provided: Yes   Exercises were reviewed and Poppy was able to demonstrate them prior to the end of the session.  Poppy demonstrated good understanding of the education provided.     See EMR under Patient Instructions for exercises provided 12/5/2022.    Assessment   Emily is a 37 y.o. female referred to outpatient Physical Therapy with a medical diagnosis of   M75.122 (ICD-10-CM) - Nontraumatic complete tear of rotator cuff, left   S43.432S (ICD-10-CM) - Labral tear of shoulder, left, sequela   . Pt presents with s/p RC repair slightly less than 3 weeks with large tear supraspinatus and infraspinatus.  Pt underwent   Left shoulder arthroscopic rotator cuff repair  Left shoulder arthroscopic biceps tenotomy  Extensive debridement of the left shoulder arthroscopically with debridement of labrum, bursa rotator interval  Subacromial decompression with acromioplasty left shoulder.  Pt with loss of ROM and strength.. Initially pain with attempts at Codman's but with modification of keeping her arm at side and then straightening arm she is able to perform with min pain.  Pt does have difficulty relaxing to achieve PROM and Codman's, but will continue to work with pt on relaxation techniques to assist her in performing treatment as per RC protocol.      Pt prognosis is Good.   Pt will benefit from skilled outpatient Physical Therapy to address the deficits stated above and in the chart  below, provide pt/family education, and to maximize pt's level of independence.     Plan of care discussed with patient: Yes  Pt's spiritual, cultural and educational needs considered and patient is agreeable to the plan of care and goals as stated below:     Anticipated Barriers for therapy: relying on others for transportation    Medical Necessity is demonstrated by the following  History  Co-morbidities and personal factors that may impact the plan of care Co-morbidities:   none    Personal Factors:   no deficits     low   Examination  Body Structures and Functions, activity limitations and participation restrictions that may impact the plan of care Body Regions:   neck  upper extremities  trunk    Body Systems:    ROM  strength    Participation Restrictions:   ADL    Activity limitations:   Learning and applying knowledge  no deficits    General Tasks and Commands  no deficits    Communication  no deficits    Mobility  lifting and carrying objects    Self care  washing oneself (bathing, drying, washing hands)  caring for body parts (brushing teeth, shaving, grooming)  dressing    Domestic Life  shopping  cooking  doing house work (cleaning house, washing dishes, laundry)  assisting others    Interactions/Relationships  no deficits    Life Areas  no deficits    Community and Social Life  no deficits         high   Clinical Presentation stable and uncomplicated low   Decision Making/ Complexity Score: low     GOALS:   Short Term Goals:  6 weeks    Increase passive range of motion 25%  Increase postural awareness to normal  Wean from sling  Be able to perform HEP with minimal cueing required    Long Term Goals: 12 weeks  Increase range of motion to 75%to 100% of full  Increase strength to 3/5 to 4-/5  Improve scapular stabilization to normal  Restore ability to drive without increased pain  Restore ability to reach fully overhead without increased pain  Restore ability to reach in all planes without increased pain  or difficulty  Restore ability to perform ADL's and household activities independently and without increased pain  Pt to be independent with HEP to improve ROM and independence with ADL's    Plan   Plan of care Certification: 12/5/2022 to 2-27-23.    Outpatient Physical Therapy 2-3 times weekly for 12 weeks to include the following interventions: Therapeutic exercises, manual therapy, neuromuscular re-education, therapeutic activities, modalities such as moist heat, ice, ultrasound and electrical stimulation and dry needling.    Essie Nolasco, PT

## 2022-12-06 LAB
CREAT 24H UR-MRATE: 180 MG/HR (ref 40–75)
CREAT UR-MCNC: 160 MG/DL (ref 15–325)
CREATININE, URINE (MG/SPEC): 4320 MG/SPEC
URINE COLLECTION DURATION: 24 HR
URINE VOLUME: 2700 ML

## 2022-12-07 ENCOUNTER — PATIENT MESSAGE (OUTPATIENT)
Dept: PSYCHIATRY | Facility: CLINIC | Age: 37
End: 2022-12-07
Payer: COMMERCIAL

## 2022-12-07 ENCOUNTER — CLINICAL SUPPORT (OUTPATIENT)
Dept: REHABILITATION | Facility: HOSPITAL | Age: 37
End: 2022-12-07
Payer: COMMERCIAL

## 2022-12-07 DIAGNOSIS — M25.60 DECREASED RANGE OF MOTION: ICD-10-CM

## 2022-12-07 DIAGNOSIS — M62.81 MUSCLE WEAKNESS: ICD-10-CM

## 2022-12-07 DIAGNOSIS — M25.512 CHRONIC LEFT SHOULDER PAIN: Primary | ICD-10-CM

## 2022-12-07 DIAGNOSIS — G89.29 CHRONIC LEFT SHOULDER PAIN: Primary | ICD-10-CM

## 2022-12-07 PROCEDURE — 97110 THERAPEUTIC EXERCISES: CPT | Mod: PN,CQ

## 2022-12-07 PROCEDURE — 97140 MANUAL THERAPY 1/> REGIONS: CPT | Mod: PN,CQ

## 2022-12-07 NOTE — PROGRESS NOTES
[facility]   Physical Therapy Treatment Note     Name: Emily Pelayo  Clinic Number: [id number]    Therapy Diagnosis:   Encounter Diagnoses   Name Primary?    Chronic left shoulder pain Yes    Decreased range of motion     Muscle weakness      Physician: Steven Zarate MD    Visit Date: 12/7/2022    Physician Orders: PT Eval and Treat s/p RTC repair, SAD, biceps tenotomy  Medical Diagnosis from Referral:   M75.122 (ICD-10-CM) - Nontraumatic complete tear of rotator cuff, left   S43.432S (ICD-10-CM) - Labral tear of shoulder, left, sequela      Evaluation Date: 12/5/2022     DOS 11-17-22  Authorization Period Expiration: 12-31-22  Plan of Care Expiration: 2-27-23  Progress Note Due: 12-30-22 prior to MD appt  Visit # / Visits authorized: 2 / 21  FOTO: Issued Visit #: 1     MD Follow up appointment: 12-30-22     Precautions: Standard and RC precautions    PTA Visit #: 1/5     Time In: 12:57 pm  Time Out: 1:30 pm  Total Billable Time: 33 minutes    SUBJECTIVE     Pt reports: still having stiffness into the shoulder with occasional swelling and pain. Tightness felt in the shoulder and arm feels heavy when not in sling.  She was compliant with home exercise program.  Response to previous treatment: felt okay  Functional change: too soon to tell    Pain: 2/10  Location: left shoulder      OBJECTIVE     Objective Measures updated at progress report unless specified.     Treatment     Poppy received the treatments listed below:      therapeutic exercises to develop endurance, ROM, and core stabilization for 25 minutes including:  Pt able to doff sling I, did provide assistance to don sling  Gentle joint oscillation  PROM x 10 with PT flexion abd and ER neutral within RC protocol  Open and close hand x 10  Wrist flexion/extension x 10  Wrist RD/UD x 10  Pro/sup x 10        Standing with elbow flexed to 90 lean forward then allow elbow to straighten and perform pendulum x 10  Gentle scap  squeeze x 10 standing     Pt with much difficulty relaxing with PROM and with pendulum.  Emphasis on pain free zone.  With arm in dependent position 4/10 in dependent position with elbow flexed to 90 2/10     Possible for future sessions: gentle submax isometrics (except flexion), seated scap retraction into towel roll    Poppy received cold pack for 10 minutes to L shoulder sitting with pillow for support into abd .  Printed and reviewed HEP while pt on ice    manual therapy techniques: Soft tissue Mobilization were applied to the: left shoulder for 08 minutes, including:  STM along L pec and upper trap for reduced tension and muscle guarding, supine with elbow supported on towel roll and forearm resting on abdomen        Patient Education and Home Exercises     Home Exercises Provided and Patient Education Provided     Education provided:   - allowing brace to support arm to avoid creating upper trap tightness and guarding  - importance of breathing while exercising to allow muscle relaxation  - compliance to daily exercises  - expectation of symptom and pain fluctuation throughout the healing process, muscle soreness and some discomfort to be expected    Written Home Exercises Provided: Patient instructed to cont prior HEP. Exercises were reviewed and Poppy was able to demonstrate them prior to the end of the session.  Poppy demonstrated good  understanding of the education provided. See EMR under Patient Instructions in Notes section for exercises provided during therapy sessions    ASSESSMENT     Patient presents with overall good tolerance to completed exercises today. Guarding occasionally throughout PROM into all planes, partially due to anticipation of pain. Improved ability to perform scapular retractions without arm guarding or lumbar compensation. Tightness along L pec and UT reduced with gentle STM. Pt will benefit from continued progression of ROM and scapular stability per protocol.    Poppy Is  progressing well towards her goals.   Pt prognosis is Good.     Pt will continue to benefit from skilled outpatient physical therapy to address the deficits listed in the problem list box on initial evaluation, provide pt/family education and to maximize pt's level of independence in the home and community environment.     Pt's spiritual, cultural and educational needs considered and pt agreeable to plan of care and goals.     Anticipated barriers to physical therapy: currently reliant on transportation    Goals: Short Term Goals:  6 weeks    Increase passive range of motion 25%  Increase postural awareness to normal  Wean from sling  Be able to perform HEP with minimal cueing required     Long Term Goals: 12 weeks  Increase range of motion to 75%to 100% of full  Increase strength to 3/5 to 4-/5  Improve scapular stabilization to normal  Restore ability to drive without increased pain  Restore ability to reach fully overhead without increased pain  Restore ability to reach in all planes without increased pain or difficulty  Restore ability to perform ADL's and household activities independently and without increased pain  Pt to be independent with HEP to improve ROM and independence with ADL's    PLAN     Continue with current POC toward therapy goals established at initial evaluation.    Yasmine Lala, PTA

## 2022-12-08 ENCOUNTER — OFFICE VISIT (OUTPATIENT)
Dept: PSYCHIATRY | Facility: CLINIC | Age: 37
End: 2022-12-08
Payer: COMMERCIAL

## 2022-12-08 VITALS
SYSTOLIC BLOOD PRESSURE: 118 MMHG | HEIGHT: 69 IN | DIASTOLIC BLOOD PRESSURE: 83 MMHG | WEIGHT: 208.31 LBS | HEART RATE: 85 BPM | BODY MASS INDEX: 30.85 KG/M2

## 2022-12-08 DIAGNOSIS — F43.10 PTSD (POST-TRAUMATIC STRESS DISORDER): ICD-10-CM

## 2022-12-08 DIAGNOSIS — F33.40 RECURRENT MAJOR DEPRESSIVE DISORDER, IN REMISSION: Primary | ICD-10-CM

## 2022-12-08 DIAGNOSIS — F41.1 GAD (GENERALIZED ANXIETY DISORDER): ICD-10-CM

## 2022-12-08 LAB
COLLECT DURATION TIME UR: 24 H
CORTIS 24H UR-MRATE: 35 MCG/24 H (ref 3.5–45)
SPECIMEN VOL ?TM UR: 2700 ML

## 2022-12-08 PROCEDURE — 3008F BODY MASS INDEX DOCD: CPT | Mod: CPTII,S$GLB,,

## 2022-12-08 PROCEDURE — 3044F PR MOST RECENT HEMOGLOBIN A1C LEVEL <7.0%: ICD-10-PCS | Mod: CPTII,S$GLB,,

## 2022-12-08 PROCEDURE — 1160F RVW MEDS BY RX/DR IN RCRD: CPT | Mod: CPTII,S$GLB,,

## 2022-12-08 PROCEDURE — 99999 PR PBB SHADOW E&M-EST. PATIENT-LVL IV: CPT | Mod: PBBFAC,,,

## 2022-12-08 PROCEDURE — 3079F DIAST BP 80-89 MM HG: CPT | Mod: CPTII,S$GLB,,

## 2022-12-08 PROCEDURE — 1159F PR MEDICATION LIST DOCUMENTED IN MEDICAL RECORD: ICD-10-PCS | Mod: CPTII,S$GLB,,

## 2022-12-08 PROCEDURE — 1160F PR REVIEW ALL MEDS BY PRESCRIBER/CLIN PHARMACIST DOCUMENTED: ICD-10-PCS | Mod: CPTII,S$GLB,,

## 2022-12-08 PROCEDURE — 99999 PR PBB SHADOW E&M-EST. PATIENT-LVL IV: ICD-10-PCS | Mod: PBBFAC,,,

## 2022-12-08 PROCEDURE — 3074F SYST BP LT 130 MM HG: CPT | Mod: CPTII,S$GLB,,

## 2022-12-08 PROCEDURE — 99214 PR OFFICE/OUTPT VISIT, EST, LEVL IV, 30-39 MIN: ICD-10-PCS | Mod: S$GLB,,,

## 2022-12-08 PROCEDURE — 1159F MED LIST DOCD IN RCRD: CPT | Mod: CPTII,S$GLB,,

## 2022-12-08 PROCEDURE — 3074F PR MOST RECENT SYSTOLIC BLOOD PRESSURE < 130 MM HG: ICD-10-PCS | Mod: CPTII,S$GLB,,

## 2022-12-08 PROCEDURE — 99214 OFFICE O/P EST MOD 30 MIN: CPT | Mod: S$GLB,,,

## 2022-12-08 PROCEDURE — 90833 PR PSYCHOTHERAPY W/PATIENT W/E&M, 30 MIN (ADD ON): ICD-10-PCS | Mod: S$GLB,,,

## 2022-12-08 PROCEDURE — 3044F HG A1C LEVEL LT 7.0%: CPT | Mod: CPTII,S$GLB,,

## 2022-12-08 PROCEDURE — 3079F PR MOST RECENT DIASTOLIC BLOOD PRESSURE 80-89 MM HG: ICD-10-PCS | Mod: CPTII,S$GLB,,

## 2022-12-08 PROCEDURE — 90833 PSYTX W PT W E/M 30 MIN: CPT | Mod: S$GLB,,,

## 2022-12-08 PROCEDURE — 3008F PR BODY MASS INDEX (BMI) DOCUMENTED: ICD-10-PCS | Mod: CPTII,S$GLB,,
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
## 2022-12-12 ENCOUNTER — CLINICAL SUPPORT (OUTPATIENT)
Dept: REHABILITATION | Facility: HOSPITAL | Age: 37
End: 2022-12-12
Payer: COMMERCIAL

## 2022-12-12 DIAGNOSIS — M25.60 DECREASED RANGE OF MOTION: ICD-10-CM

## 2022-12-12 DIAGNOSIS — G89.29 CHRONIC LEFT SHOULDER PAIN: Primary | ICD-10-CM

## 2022-12-12 DIAGNOSIS — M62.81 MUSCLE WEAKNESS: ICD-10-CM

## 2022-12-12 DIAGNOSIS — M25.512 CHRONIC LEFT SHOULDER PAIN: Primary | ICD-10-CM

## 2022-12-12 PROCEDURE — 97140 MANUAL THERAPY 1/> REGIONS: CPT | Mod: PN,CQ

## 2022-12-12 PROCEDURE — 97110 THERAPEUTIC EXERCISES: CPT | Mod: PN,CQ

## 2022-12-12 NOTE — PROGRESS NOTES
[facility]   Physical Therapy Treatment Note     Name: Emily Pelayo  Clinic Number: [id number]    Therapy Diagnosis:   Encounter Diagnoses   Name Primary?    Chronic left shoulder pain Yes    Decreased range of motion     Muscle weakness        Physician: Steven Zarate MD    Visit Date: 12/12/2022    Physician Orders: PT Eval and Treat s/p RTC repair, SAD, biceps tenotomy  Medical Diagnosis from Referral:   M75.122 (ICD-10-CM) - Nontraumatic complete tear of rotator cuff, left   S43.432S (ICD-10-CM) - Labral tear of shoulder, left, sequela      Evaluation Date: 12/5/2022     DOS 11-17-22  Authorization Period Expiration: 12-31-22  Plan of Care Expiration: 2-27-23  Progress Note Due: 12-30-22 prior to MD appt  Visit # / Visits authorized: 3 / 21  FOTO: Issued Visit #: 1     MD Follow up appointment: 12-30-22     Precautions: Standard and RC precautions    PTA Visit #: 2/5     Time In: 12:50 pm  Time Out: 1:30 pm  Total Billable Time: 40 minutes    SUBJECTIVE     Pt reports: mild increase of soreness to the upper arm, still icing the shoulder at least 2x/day. Some discomfort sleeping, possibly due to where she is sleeping on the couch  She was compliant with home exercise program.  Response to previous treatment: felt pretty good  Functional change: too soon to tell    Pain: 1-2/10  Location: left shoulder      OBJECTIVE     Objective Measures updated at progress report unless specified.     Treatment     Poppy received the treatments listed below:      therapeutic exercises to develop endurance, ROM, and core stabilization for 30 minutes including:  Pt able to doff sling I, did provide assistance to don sling  Gentle joint oscillation  PROM x 10 with PT flexion abd and ER neutral within RC protocol  Open and close hand x 10   Supine biceps stretch with towel roll under elbow (clinician assist to get into/out of position) x 30 sec   Supine median nerve glide (elbow extended supported  on towel roll with wrist flexion and extension x 10  Wrist flexion/extension x 10  Wrist RD/UD x 10  Pro/sup x 10        Standing with elbow flexed to 90 lean forward then allow elbow to straighten and perform pendulum x 10  Gentle scap squeeze x 10 standing     Pt with much difficulty relaxing with PROM and with pendulum.  Emphasis on pain free zone.  With arm in dependent position 4/10 in dependent position with elbow flexed to 90 2/10     Possible for future sessions: gentle submax isometrics (except flexion), seated scap retraction into towel roll    Poppy received cold pack for 10 minutes to L shoulder sitting with pillow for support into abd .  Printed and reviewed HEP while pt on ice    manual therapy techniques: Soft tissue Mobilization were applied to the: left shoulder for 10 minutes, including:  STM along L pec and upper trap for reduced tension and muscle guarding, supine with elbow supported on towel roll and forearm resting on abdomen  STM to distal biceps mm belly and area superior to the medial epicondyle of the elbow for reduced soft tissue restriction and neural sensations.        Patient Education and Home Exercises     Home Exercises Provided and Patient Education Provided     Education provided:   - allowing brace to support arm to avoid creating upper trap tightness and guarding  - importance of breathing while exercising to allow muscle relaxation  - compliance to daily exercises  - expectation of symptom and pain fluctuation throughout the healing process, muscle soreness and some discomfort to be expected    Written Home Exercises Provided: Patient instructed to cont prior HEP. Exercises were reviewed and Poppy was able to demonstrate them prior to the end of the session.  Poppy demonstrated good  understanding of the education provided. See EMR under Patient Instructions in Notes section for exercises provided during therapy sessions    ASSESSMENT     Patient presenting with improved tolerance  to PROM into all planes of motion, slightly less guarding noted. Patient does remain guarded with attempt of pendulums. Noted onset of some median nerve tension with attempt of biceps stretch, lessened with gentle flossing and soft tissue mobilization. Educated patient further in expectation of fluctuating symptoms and pains with healing process including muscle soreness. Pt will benefit from continued progression of ROM and scapular stability per protocol.    Poppy Is progressing well towards her goals.   Pt prognosis is Good.     Pt will continue to benefit from skilled outpatient physical therapy to address the deficits listed in the problem list box on initial evaluation, provide pt/family education and to maximize pt's level of independence in the home and community environment.     Pt's spiritual, cultural and educational needs considered and pt agreeable to plan of care and goals.     Anticipated barriers to physical therapy: currently reliant on transportation    Goals: Short Term Goals:  6 weeks    Increase passive range of motion 25%  Increase postural awareness to normal  Wean from sling  Be able to perform HEP with minimal cueing required     Long Term Goals: 12 weeks  Increase range of motion to 75%to 100% of full  Increase strength to 3/5 to 4-/5  Improve scapular stabilization to normal  Restore ability to drive without increased pain  Restore ability to reach fully overhead without increased pain  Restore ability to reach in all planes without increased pain or difficulty  Restore ability to perform ADL's and household activities independently and without increased pain  Pt to be independent with HEP to improve ROM and independence with ADL's    PLAN     Continue with current POC toward therapy goals established at initial evaluation.    Yasmine Lala, PTA

## 2022-12-13 NOTE — PROGRESS NOTES
[facility]   Physical Therapy Treatment Note     Name: Emily Pelayo  Clinic Number: [id number]    Therapy Diagnosis:   Encounter Diagnoses   Name Primary?    Chronic left shoulder pain Yes    Decreased range of motion     Muscle weakness        Physician: Steven Zarate MD    Visit Date: 12/14/2022    Physician Orders: PT Eval and Treat s/p RTC repair, SAD, biceps tenotomy  Medical Diagnosis from Referral:   M75.122 (ICD-10-CM) - Nontraumatic complete tear of rotator cuff, left   S43.432S (ICD-10-CM) - Labral tear of shoulder, left, sequela      Evaluation Date: 12/5/2022     DOS 11-17-22  Authorization Period Expiration: 12-31-22  Plan of Care Expiration: 2-27-23  Progress Note Due: 12-30-22 prior to MD appt  Visit # / Visits authorized: 4/ 21  FOTO: Issued Visit #: 1     MD Follow up appointment: 12-30-22     Precautions: Standard and RC precautions    PTA Visit #: 2/5     Time In: 8:35  Time Out: 9:18  Total Billable Time: 43 minutes    SUBJECTIVE     Pt reports: she is able to sleep on couch ok.  Pt states ant shoulder more sore after last session and has been icing shoulder more   She was compliant with home exercise program.  Response to previous treatment: felt pretty good  Functional change: too soon to tell    Pain: 1-2/10  Location: left shoulder      OBJECTIVE       Shoulder ROM: (measured in degrees) 12-14-22  Shoulder  LUE   Flexion 100   Abduction 90   External Rotation neutral 30   External Rotation with abd  N/T   Internal Rotation Palm to stomach        Shoulder ROM: (measured in degrees supine) initial eval  Shoulder  LUE   Flexion in scaption plane 60   Abduction in scaption plane 45   External Rotation neutral 15   External Rotation with abd  N/T   Internal Rotation Palm to stomach       Treatment     Poppy received the treatments listed below:      Post op week # 4 12-15-22    therapeutic exercises to develop endurance, ROM, and core stabilization for 33 minutes  including:  Pt able to doff sling I, did provide assistance to don sling    Open and close hand x 10  Wrist flexion/extension x 10  Wrist RD/UD x 10  Pro/sup x 10 cueing to place upper arm by side     Elbow flexion/ext standing x 10        Standing with elbow flexed to 90 lean forward then allow elbow to straighten and perform pendulum x 10 all planes   Gentle scap squeeze x 10 standing with flexed elbows      Gentle joint oscillation  PROM x 10 with PT flexion abd and ER neutral within RC protocol  (NP)Supine biceps stretch with towel roll under elbow (clinician assist to get into/out of position) x 30 sec  Supine median nerve glide (elbow extended supported on towel roll with elbow flexion/extension x 10 and wrist flexion and extension x 10  Pt reported less numbness with elbow extension supine after glides         Possible for future sessions: gentle submax isometrics (except flexion), seated scap retraction into towel roll    Poppy received cold pack for 10 minutes to L shoulder sitting with pillow for support into abd .  Printed and reviewed HEP while pt on ice    manual therapy techniques: Soft tissue Mobilization were applied to the: left shoulder for 10 minutes, including:  STM along L pec and upper trap for reduced tension and muscle guarding, supine with elbow supported on towel roll and forearm resting on abdomen  STM to distal biceps mm belly and area superior to the medial epicondyle of the elbow for reduced soft tissue restriction and neural sensations.        Patient Education and Home Exercises     Home Exercises Provided and Patient Education Provided     Education provided:   - allowing brace to support arm to avoid creating upper trap tightness and guarding  - importance of breathing while exercising to allow muscle relaxation  - compliance to daily exercises  - expectation of symptom and pain fluctuation throughout the healing process, muscle soreness and some discomfort to be expected    Written  Home Exercises Provided: Patient instructed to cont prior HEP. Exercises were reviewed and Poppy was able to demonstrate them prior to the end of the session.  Poppy demonstrated good  understanding of the education provided. See EMR under Patient Instructions in Notes section for exercises provided during therapy sessions    ASSESSMENT     Pt with some residual soreness after last session though now recovered and held biceps stretch.  Pt with + effect to neuroglide and will continue as needed while in sling and will monitor as pt progresses.  Pt with improved ROM and performing as per protocol.   Pt still with difficulty relaxing with PROM, but is less guarded than noted at IE.      Poppy Is progressing well towards her goals.   Pt prognosis is Good.     Pt will continue to benefit from skilled outpatient physical therapy to address the deficits listed in the problem list box on initial evaluation, provide pt/family education and to maximize pt's level of independence in the home and community environment.     Pt's spiritual, cultural and educational needs considered and pt agreeable to plan of care and goals.     Anticipated barriers to physical therapy: currently reliant on transportation    Goals: Short Term Goals:  6 weeks  (Progressing, not met)  Increase passive range of motion 25%  Increase postural awareness to normal  Wean from sling  Be able to perform HEP with minimal cueing required     Long Term Goals: 12 weeks (Progressing, not met)  Increase range of motion to 75%to 100% of full  Increase strength to 3/5 to 4-/5  Improve scapular stabilization to normal  Restore ability to drive without increased pain  Restore ability to reach fully overhead without increased pain  Restore ability to reach in all planes without increased pain or difficulty  Restore ability to perform ADL's and household activities independently and without increased pain  Pt to be independent with HEP to improve ROM and independence  with ADL's    PLAN     Continue with current POC toward therapy goals established at initial evaluation.    Essie Nolasco, PT

## 2022-12-14 ENCOUNTER — CLINICAL SUPPORT (OUTPATIENT)
Dept: REHABILITATION | Facility: HOSPITAL | Age: 37
End: 2022-12-14
Payer: COMMERCIAL

## 2022-12-14 DIAGNOSIS — M25.512 CHRONIC LEFT SHOULDER PAIN: Primary | ICD-10-CM

## 2022-12-14 DIAGNOSIS — M25.60 DECREASED RANGE OF MOTION: ICD-10-CM

## 2022-12-14 DIAGNOSIS — M62.81 MUSCLE WEAKNESS: ICD-10-CM

## 2022-12-14 DIAGNOSIS — G89.29 CHRONIC LEFT SHOULDER PAIN: Primary | ICD-10-CM

## 2022-12-14 PROCEDURE — 97140 MANUAL THERAPY 1/> REGIONS: CPT | Mod: PN | Performed by: PHYSICAL THERAPIST

## 2022-12-14 PROCEDURE — 97110 THERAPEUTIC EXERCISES: CPT | Mod: PN | Performed by: PHYSICAL THERAPIST

## 2022-12-19 ENCOUNTER — CLINICAL SUPPORT (OUTPATIENT)
Dept: REHABILITATION | Facility: HOSPITAL | Age: 37
End: 2022-12-19
Payer: COMMERCIAL

## 2022-12-19 DIAGNOSIS — G89.29 CHRONIC LEFT SHOULDER PAIN: Primary | ICD-10-CM

## 2022-12-19 DIAGNOSIS — Z98.890 S/P ROTATOR CUFF REPAIR: Primary | ICD-10-CM

## 2022-12-19 DIAGNOSIS — M25.60 DECREASED RANGE OF MOTION: ICD-10-CM

## 2022-12-19 DIAGNOSIS — M62.81 MUSCLE WEAKNESS: ICD-10-CM

## 2022-12-19 DIAGNOSIS — M25.512 CHRONIC LEFT SHOULDER PAIN: Primary | ICD-10-CM

## 2022-12-19 PROCEDURE — 97110 THERAPEUTIC EXERCISES: CPT | Mod: PN,CQ

## 2022-12-19 PROCEDURE — 97140 MANUAL THERAPY 1/> REGIONS: CPT | Mod: PN,CQ

## 2022-12-19 NOTE — PROGRESS NOTES
[facility]   Physical Therapy Treatment Note     Name: Emily Pelayo  Clinic Number: [id number]    Therapy Diagnosis:   Encounter Diagnoses   Name Primary?    Chronic left shoulder pain Yes    Decreased range of motion     Muscle weakness          Physician: Steven Zarate MD    Visit Date: 12/19/2022    Physician Orders: PT Eval and Treat s/p RTC repair, SAD, biceps tenotomy  Medical Diagnosis from Referral:   M75.122 (ICD-10-CM) - Nontraumatic complete tear of rotator cuff, left   S43.432S (ICD-10-CM) - Labral tear of shoulder, left, sequela      Evaluation Date: 12/5/2022     DOS 11-17-22  Authorization Period Expiration: 12-31-22  Plan of Care Expiration: 2-27-23  Progress Note Due: 12-30-22 prior to MD appt  Visit # / Visits authorized: 5/ 21  FOTO: Issued Visit #: 1     MD Follow up appointment: 12-30-22     Precautions: Standard and RC precautions    PTA Visit #: 1/5     Time In: 3:10  Time Out: 3:50  Total Billable Time: 40 minutes    SUBJECTIVE     Pt reports: still having pain and tightness into the anterior arm when allowing the arm to straighten out. Sleep remains interrupted.   She was compliant with home exercise program.  Response to previous treatment: felt pretty good  Functional change: too soon to tell    Pain: 1-2/10  Location: left shoulder      OBJECTIVE       Shoulder ROM: (measured in degrees) 12-19-22  Shoulder  LUE   Flexion 100   Abduction 90   External Rotation neutral 30   External Rotation with abd  N/T   Internal Rotation Palm to stomach        Shoulder ROM: (measured in degrees supine) initial eval  Shoulder  LUE   Flexion in scaption plane 60   Abduction in scaption plane 45   External Rotation neutral 15   External Rotation with abd  N/T   Internal Rotation Palm to stomach       Treatment     Poppy received the treatments listed below:      Post op week # 4 12-15-22    therapeutic exercises to develop endurance, ROM, and core stabilization for 30  minutes including:  Pt able to doff sling I, did provide assistance to don sling    Open and close hand x 10  Wrist flexion/extension x 10  Wrist RD/UD x 10  Pro/sup x 10 cueing to place upper arm by side     Elbow flexion/ext standing x 10        Standing with elbow flexed to 90 lean forward then allow elbow to straighten and perform pendulum x 10 all planes   Gentle scap squeeze x 10 standing with flexed elbows      Gentle joint oscillation  PROM x 10 with PT flexion abd and ER neutral within RC protocol  (NP)Supine biceps stretch with towel roll under elbow (clinician assist to get into/out of position) x 30 sec  Supine median nerve glide (elbow extended supported on towel roll with elbow flexion/extension x 10 and wrist flexion and extension x 10  Pt reported less numbness with elbow extension supine after glides         Possible for future sessions: gentle submax isometrics (except flexion), seated scap retraction into towel roll    (NP today) Poppy received cold pack for 10 minutes to L shoulder sitting with pillow for support into abd .  Printed and reviewed HEP while pt on ice    manual therapy techniques: Soft tissue Mobilization were applied to the: left shoulder for 10 minutes, including:  STM along L pec and upper trap for reduced tension and muscle guarding, supine with elbow supported on towel roll and forearm resting on abdomen  STM to distal biceps mm belly and area superior to the medial epicondyle of the elbow for reduced soft tissue restriction and neural sensations.        Patient Education and Home Exercises     Home Exercises Provided and Patient Education Provided     Education provided:   - allowing brace to support arm to avoid creating upper trap tightness and guarding  - importance of breathing while exercising to allow muscle relaxation  - compliance to daily exercises  - expectation of symptom and pain fluctuation throughout the healing process, muscle soreness and some discomfort to be  expected    Written Home Exercises Provided: Patient instructed to cont prior HEP. Exercises were reviewed and Poppy was able to demonstrate them prior to the end of the session.  Poppy demonstrated good  understanding of the education provided. See EMR under Patient Instructions in Notes section for exercises provided during therapy sessions    ASSESSMENT     Pt with continued guarding of the shoulder with limited ROM into all planes of motion and pain at end ranges. Soreness and tightness continues along the anterior biceps and forearm. Pt still with difficulty relaxing with PROM and pendulums. Verbalizes understanding of importance to allow full elbow extension a few times a day to reduce biceps tightness and neural tension. Pt will benefit from continued progression per protocol.    Poppy Is progressing well towards her goals.     Pt prognosis is Good.     Pt will continue to benefit from skilled outpatient physical therapy to address the deficits listed in the problem list box on initial evaluation, provide pt/family education and to maximize pt's level of independence in the home and community environment.     Pt's spiritual, cultural and educational needs considered and pt agreeable to plan of care and goals.     Anticipated barriers to physical therapy: currently reliant on transportation    Goals: Short Term Goals:  6 weeks  (Progressing, not met)  Increase passive range of motion 25%  Increase postural awareness to normal  Wean from sling  Be able to perform HEP with minimal cueing required     Long Term Goals: 12 weeks (Progressing, not met)  Increase range of motion to 75%to 100% of full range  Increase strength to 3/5 to 4-/5  Improve scapular stabilization to normal  Restore ability to drive without increased pain  Restore ability to reach fully overhead without increased pain  Restore ability to reach in all planes without increased pain or difficulty  Restore ability to perform ADL's and household  activities independently and without increased pain  Pt to be independent with HEP to improve ROM and independence with ADL's    PLAN     Continue with current POC toward therapy goals established at initial evaluation.    Yasmine Lala, PTA

## 2022-12-20 ENCOUNTER — TELEPHONE (OUTPATIENT)
Dept: ORTHOPEDICS | Facility: CLINIC | Age: 37
End: 2022-12-20
Payer: COMMERCIAL

## 2022-12-20 NOTE — PROGRESS NOTES
[facility]   Physical Therapy Treatment Note     Name: Emily Pelayo  Clinic Number: [id number]    Therapy Diagnosis:   Encounter Diagnoses   Name Primary?    Chronic left shoulder pain Yes    Decreased range of motion     Muscle weakness          Physician: Steven Zarate MD    Visit Date: 12/21/2022    Physician Orders: PT Eval and Treat s/p RTC repair, SAD, biceps tenotomy  Medical Diagnosis from Referral:   M75.122 (ICD-10-CM) - Nontraumatic complete tear of rotator cuff, left   S43.432S (ICD-10-CM) - Labral tear of shoulder, left, sequela      Evaluation Date: 12/5/2022     DOS 11-17-22  Authorization Period Expiration: 12-31-22  Plan of Care Expiration: 2-27-23  Progress Note Due: 12-30-22 prior to MD appt  Visit # / Visits authorized: 6/ 21  FOTO: Issued Visit #: 1     MD Follow up appointment: 12-30-22     Precautions: Standard and RC precautions    PTA Visit #: 1/5     Time In: 8:45  Time Out: 9:35  Total Billable Time: 40 minutes    SUBJECTIVE     Pt reports: off and on pain in ant shoulder mainly some in tricep region.  Pt feeling better overall.  Pt presently with 0/10 pain  Pt reports less numbness when arm is extended   She was compliant with home exercise program.  Response to previous treatment: felt pretty good  Functional change: too soon to tell    Pain: 0/10  Location: left shoulder      OBJECTIVE       Shoulder ROM: (measured in degrees) 12-21-22  Shoulder  LUE   Flexion 110   Abduction 90   External Rotation neutral 30   External Rotation with abd  N/T   Internal Rotation Palm to stomach        Shoulder ROM: (measured in degrees supine) initial eval  Shoulder  LUE   Flexion in scaption plane 60   Abduction in scaption plane 45   External Rotation neutral 15   External Rotation with abd  N/T   Internal Rotation Palm to stomach       Treatment     Poppy received the treatments listed below:      Post op week # 5 12-22-22    therapeutic exercises to develop  "endurance, ROM, and core stabilization for 30 minutes including:  Pt able to doff sling I, did provide assistance to don sling    Open and close hand x 10  Wrist flexion/extension x 10  Wrist RD/UD x 10  Pro/sup x 10 cueing to place upper arm by side x 10    Elbow flexion/ext standing x 10 pt reported tightness at cubital fossa but no pain         Standing with elbow flexed to 90 lean forward then allow elbow to straighten and perform pendulum x 10 all planes   Shoulder shrug with retraction, pt able to allow R elbow extended during ex x 10     Gentle joint oscillation  PROM x 10 with PT flexion abd and ER neutral within RC protocol  (NP)Supine median nerve glide (elbow extended supported on towel roll with elbow flexion/extension x 10 and wrist flexion and extension x 10  Pt reported less numbness with elbow extension supine after glides         Possible for future sessions: gentle submax isometrics (except flexion), seated scap retraction into towel roll    Poppy received cold pack for 10 minutes to L shoulder sitting with pillow for support into abd . Pt expressed interest in getting ice pack like our clinic pack for home use.   Provided pt on obtaining [place] ice pack 18x12" which is size we use in clinic    manual therapy techniques: Soft tissue Mobilization were applied to the: left shoulder for 10 minutes, including:  STM along L pec and upper trap for reduced tension and muscle guarding, supine with elbow supported on towel roll and forearm resting on abdomen  STM to distal biceps mm belly and area superior to the medial epicondyle of the elbow for reduced soft tissue restriction and neural sensations.  Gentle oscillation GH and gentle scap mob SL         Patient Education and Home Exercises     Home Exercises Provided and Patient Education Provided     Education provided:   - importance of breathing while exercising to allow muscle relaxation  - compliance to daily exercises  - expectation of symptom " and pain fluctuation throughout the healing process, muscle soreness and some discomfort to be expected    Written Home Exercises Provided: Patient instructed to cont prior HEP. Exercises were reviewed and Poppy was able to demonstrate them prior to the end of the session.  Poppy demonstrated good  understanding of the education provided. See EMR under Patient Instructions in Notes section for exercises provided during therapy sessions    ASSESSMENT     Pt continues to have difficulty relaxing with all ex and PROM, but ROM is doing well as per protocol,  Pt with less c/o pain and soreness in UE.   Verbalizes understanding to continue to allow full elbow extension a few times a day to reduce biceps tightness and neural tension. Pt will benefit from continued progression per protocol.    Poppy Is progressing well towards her goals.     Pt prognosis is Good.     Pt will continue to benefit from skilled outpatient physical therapy to address the deficits listed in the problem list box on initial evaluation, provide pt/family education and to maximize pt's level of independence in the home and community environment.     Pt's spiritual, cultural and educational needs considered and pt agreeable to plan of care and goals.     Anticipated barriers to physical therapy: currently reliant on transportation    Goals: Short Term Goals:  6 weeks  (Progressing, not met)  Increase passive range of motion 25%  Increase postural awareness to normal  Wean from sling  Be able to perform HEP with minimal cueing required     Long Term Goals: 12 weeks (Progressing, not met)  Increase range of motion to 75%to 100% of full range  Increase strength to 3/5 to 4-/5  Improve scapular stabilization to normal  Restore ability to drive without increased pain  Restore ability to reach fully overhead without increased pain  Restore ability to reach in all planes without increased pain or difficulty  Restore ability to perform ADL's and household  activities independently and without increased pain  Pt to be independent with HEP to improve ROM and independence with ADL's    PLAN     Continue with current POC toward therapy goals established at initial evaluation.    Essie Nolasco, PT

## 2022-12-20 NOTE — TELEPHONE ENCOUNTER
----- Message from Jessica Cadena sent at 12/20/2022  9:13 AM CST -----  Contact: patient  Type: Needs Medical Advice  Who Called:  patient  Best Call Back Number: [phone] (home)   Additional Information: patient is requesting a call back- the [email or web address] company that she is supposed to be receiving her disability from says they have not received verification paperwork from the

## 2022-12-21 ENCOUNTER — CLINICAL SUPPORT (OUTPATIENT)
Dept: REHABILITATION | Facility: HOSPITAL | Age: 37
End: 2022-12-21
Payer: COMMERCIAL

## 2022-12-21 DIAGNOSIS — G89.29 CHRONIC LEFT SHOULDER PAIN: Primary | ICD-10-CM

## 2022-12-21 DIAGNOSIS — M25.60 DECREASED RANGE OF MOTION: ICD-10-CM

## 2022-12-21 DIAGNOSIS — M25.512 CHRONIC LEFT SHOULDER PAIN: Primary | ICD-10-CM

## 2022-12-21 DIAGNOSIS — M62.81 MUSCLE WEAKNESS: ICD-10-CM

## 2022-12-21 PROCEDURE — 97140 MANUAL THERAPY 1/> REGIONS: CPT | Mod: PN | Performed by: PHYSICAL THERAPIST

## 2022-12-21 PROCEDURE — 97110 THERAPEUTIC EXERCISES: CPT | Mod: PN | Performed by: PHYSICAL THERAPIST

## 2022-12-23 ENCOUNTER — TELEPHONE (OUTPATIENT)
Dept: ORTHOPEDICS | Facility: CLINIC | Age: 37
End: 2022-12-23
Payer: COMMERCIAL

## 2022-12-23 NOTE — TELEPHONE ENCOUNTER
----- Message from Namita Andersen MA sent at 12/23/2022  7:59 AM CST -----  Contact: pt  States her short term disability is not paying full benefits   Call back

## 2022-12-23 NOTE — PROGRESS NOTES
[facility]   Physical Therapy Treatment Note     Name: Emily Pelayo  Clinic Number: [id number]    Therapy Diagnosis:   Encounter Diagnoses   Name Primary?    Chronic left shoulder pain Yes    Decreased range of motion     Muscle weakness          Physician: Steven Zarate MD    Visit Date: 12/27/2022    Physician Orders: PT Eval and Treat s/p RTC repair, SAD, biceps tenotomy  Medical Diagnosis from Referral:   M75.122 (ICD-10-CM) - Nontraumatic complete tear of rotator cuff, left   S43.432S (ICD-10-CM) - Labral tear of shoulder, left, sequela      Evaluation Date: 12/5/2022     DOS 11-17-22  Authorization Period Expiration: 12-31-22  Plan of Care Expiration: 2-27-23  Progress Note Due: 12-30-22 prior to MD appt  Visit # / Visits authorized: 7/ 21  FOTO: Issued Visit #: 1     MD Follow up appointment: 12-30-22     Precautions: Standard and RC precautions    PTA Visit #: 1/5     Time In: 8:14 pt late  Reassess next visit  Time Out: 8:44   Total Billable Time: 30 minutes    SUBJECTIVE     Pt reports: she realized yesterday that if she does not take ibuprofen she will have pain.  Pt states she has now taken ibuprofen.   She was compliant with home exercise program.  Response to previous treatment: felt pretty good  Functional change: too soon to tell    Pain: 2/10  Location: left shoulder      OBJECTIVE       Shoulder ROM: (measured in degrees) 12-23-22  Shoulder  LUE   Flexion 110   Abduction 90   External Rotation neutral 30   External Rotation with abd  N/T   Internal Rotation Palm to stomach        Shoulder ROM: (measured in degrees supine) initial eval  Shoulder  LUE   Flexion in scaption plane 60   Abduction in scaption plane 45   External Rotation neutral 15   External Rotation with abd  N/T   Internal Rotation Palm to stomach       Treatment     Poppy received the treatments listed below:      Post op week # 6 12-29-22    therapeutic exercises to develop endurance, ROM, and core  "stabilization for 25 minutes including:  Pt attempted to move arm on her own out of sling slightly upon asking pt what was her pain level. Instructed ptagain no active movement only passive movement   Pt able to doff sling I, did provide assistance to don sling    Open and close hand x 10  Wrist flexion/extension x 10  Wrist RD/UD x 10  Pro/sup x 10 cueing to place upper arm by side x 10    Elbow flexion/ext standing x 10 pt reported tightness at cubital fossa but no pain        pendulum x 10 all planes   Shoulder shrug with retraction, pt able to allow R elbow extended during ex x 10    PROM x 10 with PT flexion abd and ER neutral within RC protocol  (NP)Supine median nerve glide (elbow extended supported on towel roll with elbow flexion/extension x 10 and wrist flexion and extension x 10  Pt reported less numbness with elbow extension supine after glides         Possible for future sessions: gentle submax isometrics (except flexion), seated scap retraction into towel roll    Poppy received cold pack for 10 minutes to L shoulder sitting with pillow for support into abd . Pt expressed interest in getting ice pack like our clinic pack for home use.   Provided pt on obtaining [place] ice pack 18x12" which is size we use in clinic    manual therapy techniques: Soft tissue Mobilization were applied to the: left shoulder for 5 minutes, including:  (NP)STM along L pec and upper trap for reduced tension and muscle guarding, supine with elbow supported on towel roll and forearm resting on abdomen  (NP)STM to distal biceps mm belly and area superior to the medial epicondyle of the elbow for reduced soft tissue restriction and neural sensations.  Gentle oscillation GH and gentle scap mob SL         Patient Education and Home Exercises     Home Exercises Provided and Patient Education Provided     Education provided:   - importance of breathing while exercising to allow muscle relaxation  - compliance to daily exercises  - " expectation of symptom and pain fluctuation throughout the healing process, muscle soreness and some discomfort to be expected    Written Home Exercises Provided: Patient instructed to cont prior HEP. Exercises were reviewed and Poppy was able to demonstrate them prior to the end of the session.  Poppy demonstrated good  understanding of the education provided. See EMR under Patient Instructions in Notes section for exercises provided during therapy sessions    ASSESSMENT     Pt continues to have difficulty relaxing with all ex and PROM, but ROM is doing well as per protocol,  Pt appears to understand need to avoid active motion and has such difficulty relaxing that have held isometrics for concern pt would have difficulty doing submax levels and due to her inability to relax she is getting contractions of muscles with activity.   Pt does continue to benefit from use of ibuprofen for pain.      Poppy Is progressing well towards her goals.     Pt prognosis is Good.     Pt will continue to benefit from skilled outpatient physical therapy to address the deficits listed in the problem list box on initial evaluation, provide pt/family education and to maximize pt's level of independence in the home and community environment.     Pt's spiritual, cultural and educational needs considered and pt agreeable to plan of care and goals.     Anticipated barriers to physical therapy: currently reliant on transportation    Goals: Short Term Goals:  6 weeks  (Progressing, not met)  Increase passive range of motion 25%  Increase postural awareness to normal  Wean from sling  Be able to perform HEP with minimal cueing required     Long Term Goals: 12 weeks (Progressing, not met)  Increase range of motion to 75%to 100% of full range  Increase strength to 3/5 to 4-/5  Improve scapular stabilization to normal  Restore ability to drive without increased pain  Restore ability to reach fully overhead without increased pain  Restore ability to  reach in all planes without increased pain or difficulty  Restore ability to perform ADL's and household activities independently and without increased pain  Pt to be independent with HEP to improve ROM and independence with ADL's    PLAN     Continue with current POC toward therapy goals established at initial evaluation.   Reassess next visit    Essie Nolasco, PT

## 2022-12-27 ENCOUNTER — CLINICAL SUPPORT (OUTPATIENT)
Dept: REHABILITATION | Facility: HOSPITAL | Age: 37
End: 2022-12-27
Payer: COMMERCIAL

## 2022-12-27 DIAGNOSIS — G89.29 CHRONIC LEFT SHOULDER PAIN: Primary | ICD-10-CM

## 2022-12-27 DIAGNOSIS — M62.81 MUSCLE WEAKNESS: ICD-10-CM

## 2022-12-27 DIAGNOSIS — M25.512 CHRONIC LEFT SHOULDER PAIN: Primary | ICD-10-CM

## 2022-12-27 DIAGNOSIS — M25.60 DECREASED RANGE OF MOTION: ICD-10-CM

## 2022-12-27 PROCEDURE — 97110 THERAPEUTIC EXERCISES: CPT | Mod: PN | Performed by: PHYSICAL THERAPIST

## 2022-12-29 ENCOUNTER — CLINICAL SUPPORT (OUTPATIENT)
Dept: REHABILITATION | Facility: HOSPITAL | Age: 37
End: 2022-12-29
Payer: COMMERCIAL

## 2022-12-29 DIAGNOSIS — G89.29 CHRONIC LEFT SHOULDER PAIN: Primary | ICD-10-CM

## 2022-12-29 DIAGNOSIS — M62.81 MUSCLE WEAKNESS: ICD-10-CM

## 2022-12-29 DIAGNOSIS — M25.60 DECREASED RANGE OF MOTION: ICD-10-CM

## 2022-12-29 DIAGNOSIS — M25.512 CHRONIC LEFT SHOULDER PAIN: Primary | ICD-10-CM

## 2022-12-29 PROCEDURE — 97140 MANUAL THERAPY 1/> REGIONS: CPT | Mod: PN | Performed by: PHYSICAL THERAPIST

## 2022-12-29 PROCEDURE — 97110 THERAPEUTIC EXERCISES: CPT | Mod: PN | Performed by: PHYSICAL THERAPIST

## 2022-12-29 NOTE — PLAN OF CARE
[facility]   Physical Therapy Progress and  Treatment Note     Name: Emily Pelayo  Clinic Number: [id number]    Therapy Diagnosis:   Encounter Diagnoses   Name Primary?    Chronic left shoulder pain Yes    Decreased range of motion     Muscle weakness          Physician: Steven Zarate MD    Visit Date: 12/29/2022    Physician Orders: PT Eval and Treat s/p RTC repair, SAD, biceps tenotomy  Medical Diagnosis from Referral:   M75.122 (ICD-10-CM) - Nontraumatic complete tear of rotator cuff, left   S43.432S (ICD-10-CM) - Labral tear of shoulder, left, sequela      Evaluation Date: 12/5/2022     DOS 11-17-22  Authorization Period Expiration: 12-31-22  Plan of Care Expiration: 2-27-23  Progress Note Due: 12-30-22 prior to MD appt  Visit # / Visits authorized: 8/ 21  FOTO: Issued Visit #: 1     MD Follow up appointment: 12-30-22     Precautions: Standard and RC precautions    PTA Visit #: 1/5     Time In: 8:47   Time Out: 9:35  Total Billable Time: 48 minutes    SUBJECTIVE     Pt reports: yesterday she woke up with pain in neck to L scapula and had pain all day and still with pain.  Pt states she is sleeping on sofa elevated with sling and does get uncomfortable on back so will try to roll to side  She was compliant with home exercise program.  Response to previous treatment: felt okay  Functional change: pt still in sling    Pain: 6/10 in neck. Shoulder pain 2/10  at worst 5/10 at best 2/10  After treatment neck pain 2/10  Initial eval Current 2/10, worst 8/10, best 2/10  Location: left shoulder      OBJECTIVE       Shoulder ROM: (measured in degrees) 12-29-22  Shoulder  LUE   Flexion 120   Abduction 95   External Rotation neutral 35   External Rotation with abd  N/T   Internal Rotation Palm to stomach        Shoulder ROM: (measured in degrees supine) initial eval  Shoulder  LUE   Flexion in scaption plane 60   Abduction in scaption plane 45   External Rotation neutral 15   External  Rotation with abd  N/T   Internal Rotation Palm to stomach     Cervical ROM: (measured in degrees sitting) 12-29-22  - flexion -  35                    - extension -  40                        - right side bending -  40        - left side bending -  40      - right rotation - 70  - left rotation - 65    Mod-severe tightness levator scapulae, mod upper trap    Treatment     Poppy received the treatments listed below:      Post op week # 6 12-29-22    therapeutic exercises to develop endurance, ROM, and core stabilization for 33 minutes including:  Pt attempted to move arm on her own out of sling slightly upon asking pt what was her pain level. Instructed ptagain no active movement only passive movement   Pt able to doff sling I, did provide assistance to don sling    Open and close hand x 10  Wrist flexion/extension x 10  Wrist RD/UD x 10  Pro/sup x 10 cueing to place upper arm by side x 10    Elbow flexion/ext standing x 10 pt reported tightness at cubital fossa but no pain        pendulum x 10 all planes   Shoulder shrug with retraction, pt able to allow R elbow extended during ex x 10    PROM x 10 with PT flexion abd and ER neutral within RC protocol  (NP)Supine median nerve glide (elbow extended supported on towel roll with elbow flexion/extension x 10 and wrist flexion and extension x 10  Pt reported less numbness with elbow extension supine after glides     Chin tuck x 10  Cervical flexion x 10 pain free zone  Cervical extension x 10 pain free zone  Cervical SB x 10 pain free zone  Cervical rotation x 10 pain free zone   Levator scapulae stretch for L with SB R 5 sec hold     Pt to ice at home today Poppy received cold pack for 10 minutes to L shoulder sitting with pillow for support into abd .       manual therapy techniques: Soft tissue Mobilization were applied to the: left shoulder for 15 minutes, including:  (NP)STM along L pec and upper trap for reduced tension and muscle guarding, supine with elbow  supported on towel roll and forearm resting on abdomen  (NP)STM to distal biceps mm belly and area superior to the medial epicondyle of the elbow for reduced soft tissue restriction and neural sensations.  Gentle oscillation GH and gentle scap mob SL   STM neck paraspinals, and upper trap, levator scapulae    Patient Education and Home Exercises     Home Exercises Provided and Patient Education Provided     Education provided:   - proper posture   - compliance to daily exercises  - expectation of symptom and pain fluctuation throughout the healing process, muscle soreness and some discomfort to be expected    Written Home Exercises Provided: yes Exercises were reviewed and Poppy was able to demonstrate them prior to the end of the session.  Poppy demonstrated good  understanding of the education provided. See EMR under Patient Instructions in Notes section for exercises provided during therapy sessions    ASSESSMENT   Pt developed increased neck symptoms possibly related to wearing of sling and position required to sleep in at this time.  Pt with decreased symptoms after treatment and should benefit from additional ROM and stretching ex.  Pt continues with some shoulder discomfort, pt has difficulty relaxing and is generally guarding while performing HEP and while at rest.  Pt displays improved ROM and if MD agrees pt should be ready to progress with RC protocol to next phase.      Poppy Is progressing well towards her goals.     Pt prognosis is Good.     Pt will continue to benefit from skilled outpatient physical therapy to address the deficits listed in the problem list box on initial evaluation, provide pt/family education and to maximize pt's level of independence in the home and community environment.     Pt's spiritual, cultural and educational needs considered and pt agreeable to plan of care and goals.     Anticipated barriers to physical therapy: currently reliant on transportation    Goals: Short Term Goals:   6 weeks  (Progressing, not met)  Increase passive range of motion 25%  Increase postural awareness to normal  Wean from sling  Be able to perform HEP with minimal cueing required     Long Term Goals: 12 weeks (Progressing, not met)  Increase range of motion to 75%to 100% of full range  Increase strength to 3/5 to 4-/5  Improve scapular stabilization to normal  Restore ability to drive without increased pain  Restore ability to reach fully overhead without increased pain  Restore ability to reach in all planes without increased pain or difficulty  Restore ability to perform ADL's and household activities independently and without increased pain  Pt to be independent with HEP to improve ROM and independence with ADL's    PLAN   If you concur, I recommend patient continue with physical therapy as per plan of care 2-3 times a week  for 8 more weeks. As per protocol, if pt is ready will start to wean from sling and progress with AA, A and PROM and scapular stabilization. Please advise us of your recommendations. Thank you for allowing us to assist in the care of your patient.      Essie Nolasco, MS, PT          I certify the need for these services furnished under this plan of treatment and while under my care.    ____________________________________ Physician/Referring Practitioner                                Date of Signature

## 2022-12-29 NOTE — PROGRESS NOTES
[facility]   Physical Therapy Progress and  Treatment Note     Name: Emily Pelayo  Clinic Number: [id number]    Therapy Diagnosis:   Encounter Diagnoses   Name Primary?    Chronic left shoulder pain Yes    Decreased range of motion     Muscle weakness          Physician: Steven Zarate MD    Visit Date: 12/29/2022    Physician Orders: PT Eval and Treat s/p RTC repair, SAD, biceps tenotomy  Medical Diagnosis from Referral:   M75.122 (ICD-10-CM) - Nontraumatic complete tear of rotator cuff, left   S43.432S (ICD-10-CM) - Labral tear of shoulder, left, sequela      Evaluation Date: 12/5/2022     DOS 11-17-22  Authorization Period Expiration: 12-31-22  Plan of Care Expiration: 2-27-23  Progress Note Due: 12-30-22 prior to MD appt  Visit # / Visits authorized: 8/ 21  FOTO: Issued Visit #: 1     MD Follow up appointment: 12-30-22     Precautions: Standard and RC precautions    PTA Visit #: 1/5     Time In: 8:47   Time Out: 9:35  Total Billable Time: 48 minutes    SUBJECTIVE     Pt reports: yesterday she woke up with pain in neck to L scapula and had pain all day and still with pain.  Pt states she is sleeping on sofa elevated with sling and does get uncomfortable on back so will try to roll to side  She was compliant with home exercise program.  Response to previous treatment: felt okay  Functional change: pt still in sling    Pain: 6/10 in neck. Shoulder pain 2/10  at worst 5/10 at best 2/10  After treatment neck pain 2/10  Initial eval Current 2/10, worst 8/10, best 2/10  Location: left shoulder      OBJECTIVE       Shoulder ROM: (measured in degrees) 12-29-22  Shoulder  LUE   Flexion 120   Abduction 95   External Rotation neutral 35   External Rotation with abd  N/T   Internal Rotation Palm to stomach        Shoulder ROM: (measured in degrees supine) initial eval  Shoulder  LUE   Flexion in scaption plane 60   Abduction in scaption plane 45   External Rotation neutral 15   External  Rotation with abd  N/T   Internal Rotation Palm to stomach     Cervical ROM: (measured in degrees sitting) 12-29-22  - flexion -  35                    - extension -  40                        - right side bending -  40        - left side bending -  40      - right rotation - 70  - left rotation - 65    Mod-severe tightness levator scapulae, mod upper trap    Treatment     Poppy received the treatments listed below:      Post op week # 6 12-29-22    therapeutic exercises to develop endurance, ROM, and core stabilization for 33 minutes including:  Pt attempted to move arm on her own out of sling slightly upon asking pt what was her pain level. Instructed ptagain no active movement only passive movement   Pt able to doff sling I, did provide assistance to don sling    Open and close hand x 10  Wrist flexion/extension x 10  Wrist RD/UD x 10  Pro/sup x 10 cueing to place upper arm by side x 10    Elbow flexion/ext standing x 10 pt reported tightness at cubital fossa but no pain        pendulum x 10 all planes   Shoulder shrug with retraction, pt able to allow R elbow extended during ex x 10    PROM x 10 with PT flexion abd and ER neutral within RC protocol  (NP)Supine median nerve glide (elbow extended supported on towel roll with elbow flexion/extension x 10 and wrist flexion and extension x 10  Pt reported less numbness with elbow extension supine after glides     Chin tuck x 10  Cervical flexion x 10 pain free zone  Cervical extension x 10 pain free zone  Cervical SB x 10 pain free zone  Cervical rotation x 10 pain free zone   Levator scapulae stretch for L with SB R 5 sec hold     Pt to ice at home today Poppy received cold pack for 10 minutes to L shoulder sitting with pillow for support into abd .       manual therapy techniques: Soft tissue Mobilization were applied to the: left shoulder for 15 minutes, including:  (NP)STM along L pec and upper trap for reduced tension and muscle guarding, supine with elbow  supported on towel roll and forearm resting on abdomen  (NP)STM to distal biceps mm belly and area superior to the medial epicondyle of the elbow for reduced soft tissue restriction and neural sensations.  Gentle oscillation GH and gentle scap mob SL   STM neck paraspinals, and upper trap, levator scapulae    Patient Education and Home Exercises     Home Exercises Provided and Patient Education Provided     Education provided:   - proper posture   - compliance to daily exercises  - expectation of symptom and pain fluctuation throughout the healing process, muscle soreness and some discomfort to be expected    Written Home Exercises Provided: yes Exercises were reviewed and Poppy was able to demonstrate them prior to the end of the session.  Poppy demonstrated good  understanding of the education provided. See EMR under Patient Instructions in Notes section for exercises provided during therapy sessions    ASSESSMENT   Pt developed increased neck symptoms possibly related to wearing of sling and position required to sleep in at this time.  Pt with decreased symptoms after treatment and should benefit from additional ROM and stretching ex.  Pt continues with some shoulder discomfort, pt has difficulty relaxing and is generally guarding while performing HEP and while at rest.  Pt displays improved ROM and if MD agrees pt should be ready to progress with RC protocol to next phase.      Poppy Is progressing well towards her goals.     Pt prognosis is Good.     Pt will continue to benefit from skilled outpatient physical therapy to address the deficits listed in the problem list box on initial evaluation, provide pt/family education and to maximize pt's level of independence in the home and community environment.     Pt's spiritual, cultural and educational needs considered and pt agreeable to plan of care and goals.     Anticipated barriers to physical therapy: currently reliant on transportation    Goals: Short Term Goals:   6 weeks  (Progressing, not met)  Increase passive range of motion 25%  Increase postural awareness to normal  Wean from sling  Be able to perform HEP with minimal cueing required     Long Term Goals: 12 weeks (Progressing, not met)  Increase range of motion to 75%to 100% of full range  Increase strength to 3/5 to 4-/5  Improve scapular stabilization to normal  Restore ability to drive without increased pain  Restore ability to reach fully overhead without increased pain  Restore ability to reach in all planes without increased pain or difficulty  Restore ability to perform ADL's and household activities independently and without increased pain  Pt to be independent with HEP to improve ROM and independence with ADL's    PLAN   If you concur, I recommend patient continue with physical therapy as per plan of care 2-3 times a week  for 8 more weeks. As per protocol, if pt is ready will start to wean from sling and progress with AA, A and PROM and scapular stabilization. Please advise us of your recommendations. Thank you for allowing us to assist in the care of your patient.      Essie Nolasco, MS, PT          I certify the need for these services furnished under this plan of treatment and while under my care.    ____________________________________ Physician/Referring Practitioner                                Date of Signature

## 2022-12-30 ENCOUNTER — HOSPITAL ENCOUNTER (OUTPATIENT)
Dept: RADIOLOGY | Facility: HOSPITAL | Age: 37
Discharge: HOME | End: 2022-12-30
Attending: ORTHOPAEDIC SURGERY
Payer: COMMERCIAL

## 2022-12-30 ENCOUNTER — OFFICE VISIT (OUTPATIENT)
Dept: ORTHOPEDICS | Facility: CLINIC | Age: 37
End: 2022-12-30
Payer: COMMERCIAL

## 2022-12-30 VITALS — BODY MASS INDEX: 30.81 KG/M2 | WEIGHT: 208 LBS | HEIGHT: 69 IN

## 2022-12-30 DIAGNOSIS — Z98.890 S/P ROTATOR CUFF REPAIR: Primary | ICD-10-CM

## 2022-12-30 DIAGNOSIS — Z98.890 S/P ROTATOR CUFF REPAIR: ICD-10-CM

## 2022-12-30 PROCEDURE — 99999 PR PBB SHADOW E&M-EST. PATIENT-LVL III: ICD-10-PCS | Mod: PBBFAC,,, | Performed by: ORTHOPAEDIC SURGERY

## 2022-12-30 PROCEDURE — 73030 X-RAY EXAM OF SHOULDER: CPT | Mod: TC,PN,LT

## 2022-12-30 PROCEDURE — 1159F PR MEDICATION LIST DOCUMENTED IN MEDICAL RECORD: ICD-10-PCS | Mod: CPTII,S$GLB,, | Performed by: ORTHOPAEDIC SURGERY

## 2022-12-30 PROCEDURE — 3008F PR BODY MASS INDEX (BMI) DOCUMENTED: ICD-10-PCS | Mod: CPTII,S$GLB,, | Performed by: ORTHOPAEDIC SURGERY

## 2022-12-30 PROCEDURE — 73030 XR SHOULDER COMPLETE 2 OR MORE VIEWS LEFT: ICD-10-PCS | Mod: 26,LT,, | Performed by: RADIOLOGY

## 2022-12-30 PROCEDURE — 99024 PR POST-OP FOLLOW-UP VISIT: ICD-10-PCS | Mod: S$GLB,,, | Performed by: ORTHOPAEDIC SURGERY

## 2022-12-30 PROCEDURE — 99024 POSTOP FOLLOW-UP VISIT: CPT | Mod: S$GLB,,, | Performed by: ORTHOPAEDIC SURGERY

## 2022-12-30 PROCEDURE — 73030 X-RAY EXAM OF SHOULDER: CPT | Mod: 26,LT,, | Performed by: RADIOLOGY

## 2022-12-30 PROCEDURE — 99999 PR PBB SHADOW E&M-EST. PATIENT-LVL III: CPT | Mod: PBBFAC,,, | Performed by: ORTHOPAEDIC SURGERY

## 2022-12-30 PROCEDURE — 3008F BODY MASS INDEX DOCD: CPT | Mod: CPTII,S$GLB,, | Performed by: ORTHOPAEDIC SURGERY

## 2022-12-30 PROCEDURE — 3044F PR MOST RECENT HEMOGLOBIN A1C LEVEL <7.0%: ICD-10-PCS | Mod: CPTII,S$GLB,, | Performed by: ORTHOPAEDIC SURGERY

## 2022-12-30 PROCEDURE — 1159F MED LIST DOCD IN RCRD: CPT | Mod: CPTII,S$GLB,, | Performed by: ORTHOPAEDIC SURGERY

## 2022-12-30 PROCEDURE — 3044F HG A1C LEVEL LT 7.0%: CPT | Mod: CPTII,S$GLB,, | Performed by: ORTHOPAEDIC SURGERY

## 2022-12-30 NOTE — PROGRESS NOTES
Post-op Note    HPI    Emily Pelayo is here 6 weeks s/p the following procedure:     PROCEDURE:       Left shoulder arthroscopic rotator cuff repair  Left shoulder arthroscopic biceps tenotomy  Extensive debridement of the left shoulder arthroscopically with debridement of labrum, bursa rotator interval  Subacromial decompression with acromioplasty left shoulder    Overall doing well. Pain controlled on current regimen.  Compliant with sling use.  Has started PT.        Physical Exam:     Patient is alert and oriented no acute distress.   Assistive Device:  Shoulder abduction pillow    Portal site Incision(s) are well healed.  There is no evidence of dehiscence.  There is no induration erythema or signs of infection.  Appropriate soft tissue swelling.  Compartments are soft and compressible.  Warm well-perfused extremity.  External rotation at the side 60.  Passive forward flexion 90.  Abduction limited to 60 due to pain.    X-rays of the left shoulder showing previous subacromial decompression.  No evidence of complication.  No evidence of fracture.    Assessment    Emily Pelayo is 6 weeks Post-op     Plan:    Overall doing as expected.  We discussed expectations of surgery and postoperative course.     Continued postoperative pain regimen -- Tylenol ibuprofen as needed    Continue physical therapy.  Discontinue sling.  Continue active assisted range of motion and passive range of motion.    Follow-up:6 weeks   No x-rays

## 2023-01-05 ENCOUNTER — CLINICAL SUPPORT (OUTPATIENT)
Dept: REHABILITATION | Facility: HOSPITAL | Age: 38
End: 2023-01-05
Payer: COMMERCIAL

## 2023-01-05 DIAGNOSIS — M62.81 MUSCLE WEAKNESS: ICD-10-CM

## 2023-01-05 DIAGNOSIS — M25.512 CHRONIC LEFT SHOULDER PAIN: Primary | ICD-10-CM

## 2023-01-05 DIAGNOSIS — M25.60 DECREASED RANGE OF MOTION: ICD-10-CM

## 2023-01-05 DIAGNOSIS — G89.29 CHRONIC LEFT SHOULDER PAIN: Primary | ICD-10-CM

## 2023-01-05 PROCEDURE — 97110 THERAPEUTIC EXERCISES: CPT | Mod: PN,CQ

## 2023-01-05 PROCEDURE — 97140 MANUAL THERAPY 1/> REGIONS: CPT | Mod: PN,CQ

## 2023-01-05 NOTE — PROGRESS NOTES
"[facility]   Physical Therapy Progress and  Treatment Note     Name: Emily Pelayo  Clinic Number: [id number]    Therapy Diagnosis:   Encounter Diagnoses   Name Primary?    Chronic left shoulder pain Yes    Decreased range of motion     Muscle weakness        Physician: Steven Zarate MD    Visit Date: 1/5/2023    Physician Orders: PT Eval and Treat s/p RTC repair, SAD, biceps tenotomy  Medical Diagnosis from Referral:   M75.122 (ICD-10-CM) - Nontraumatic complete tear of rotator cuff, left   S43.432S (ICD-10-CM) - Labral tear of shoulder, left, sequela      Evaluation Date: 12/5/2022     DOS 11-17-22  Authorization Period Expiration: 12-31-22  Plan of Care Expiration: 2-27-23  Progress Note Due: 12-30-22 prior to MD appt  Visit # / Visits authorized: 1 / 21 (+8 from prior auth in 2022)  FOTO: Issued Visit #: 1     MD Follow up appointment: 12-30-22     Precautions: Standard and RC precautions    PTA Visit #: 1/5     Time In: 10:04   Time Out: 10:45  Total Billable Time: 40 minutes    SUBJECTIVE     Pt reports: still having symptoms along the lateral and posterior upper arm. States her scars have keloids and is now using a cream to help with that. State the "crick" in her neck has improved.  She was compliant with home exercise program.  Response to previous treatment: felt okay  Functional change: pt still in sling    Pain:  Shoulder pain 2/10  at worst 5/10 at best 2/10  Initial eval Current 2/10, worst 8/10, best 2/10  Location: left shoulder      OBJECTIVE       Shoulder ROM: (measured in degrees) 1-5-22  Shoulder  LUE   Flexion 122   Abduction 103   External Rotation neutral 45   External Rotation with abd  N/T   Internal Rotation Palm to stomach        Shoulder ROM: (measured in degrees supine) initial eval  Shoulder  LUE   Flexion in scaption plane 60   Abduction in scaption plane 45   External Rotation neutral 15   External Rotation with abd  N/T   Internal Rotation Palm to " "stomach       Treatment     Poppy received the treatments listed below:      Post op week # 6 12-29-22    therapeutic exercises to develop endurance, ROM, and core stabilization for 30 minutes including:     Supine gentle submax isometrics ER, IR, and abd  10 x 5" each    Open and close hand x 10  Wrist flexion/extension x 10  Wrist RD/UD x 10  Pro/sup x 10 cueing to place upper arm by side x 10    Elbow flexion/ext standing x 10 pt reported tightness at cubital fossa but no pain      Pendulum x 10 all planes   Shoulder shrug with retraction, pt able to allow R elbow extended during ex x 10    PROM x 10 with PT all planes within comfortable ranges  (NP)Supine median nerve glide (elbow extended supported on towel roll with elbow flexion/extension x 10 and wrist flexion and extension x 10  Pt reported less numbness with elbow extension supine after glides    Chin tuck x 10  Cervical flexion x 10 pain free zone  Cervical extension x 10 pain free zone  Cervical SB x 10 pain free zone  Cervical rotation x 10 pain free zone   Levator scapulae stretch for L with SB R 5 sec hold  -D/C neck exercises to HEP only after today's session    Possible to add for next session: AAROM ER and flexion with wand pulleys for AAROM, standing submax isometrics in doorway     Pt to ice at home today Poppy received cold pack for 10 minutes to L shoulder sitting with pillow for support into abd .       manual therapy techniques: Soft tissue Mobilization were applied to the: left shoulder for 10 minutes, including:  (NP)STM along L pec and upper trap for reduced tension and muscle guarding, supine with elbow supported on towel roll and forearm resting on abdomen  (NP)STM to distal biceps mm belly and area superior to the medial epicondyle of the elbow for reduced soft tissue restriction and neural sensations.  Gentle oscillation GH and gentle scap mob SL   STM neck paraspinals, and upper trap, levator scapulae    Patient Education and Home " Exercises     Home Exercises Provided and Patient Education Provided     Education provided:   - proper posture   - compliance to daily exercises  - expectation of symptom and pain fluctuation throughout the healing process, muscle soreness and some discomfort to be expected    Written Home Exercises Provided: yes Exercises were reviewed and Poppy was able to demonstrate them prior to the end of the session.  Poppy demonstrated good  understanding of the education provided. See EMR under Patient Instructions in Notes section for exercises provided during therapy sessions    ASSESSMENT   Pt with continued mild guarding of the left arm while at rest in sitting and standing though demonstrated improved resting position by end of session allowing arm to rest at side instead of in sling position at stomach. Patient understands to avoid pushing/pulling with the arm for now despite being out of sling. Able to progress with passive ROM and initiate gentle sub-maximal isometrics in supine without pain. Patient appears ready to progress RC protocol to next phase to begin AAROM and scapular stabilization.    Poppy Is progressing well towards her goals.     Pt prognosis is Good.     Pt will continue to benefit from skilled outpatient physical therapy to address the deficits listed in the problem list box on initial evaluation, provide pt/family education and to maximize pt's level of independence in the home and community environment.     Pt's spiritual, cultural and educational needs considered and pt agreeable to plan of care and goals.     Anticipated barriers to physical therapy: currently reliant on transportation    Goals: Short Term Goals:  6 weeks  (Progressing, not met)  Increase passive range of motion 25%  Increase postural awareness to normal  Wean from sling  Be able to perform HEP with minimal cueing required     Long Term Goals: 12 weeks (Progressing, not met)  Increase range of motion to 75%to 100% of full  range  Increase strength to 3/5 to 4-/5  Improve scapular stabilization to normal  Restore ability to drive without increased pain  Restore ability to reach fully overhead without increased pain  Restore ability to reach in all planes without increased pain or difficulty  Restore ability to perform ADL's and household activities independently and without increased pain  Pt to be independent with HEP to improve ROM and independence with ADL's    PLAN     Continue with updated POC toward established therapy goals.    Yasmine Lala, PTA

## 2023-01-10 ENCOUNTER — CLINICAL SUPPORT (OUTPATIENT)
Dept: REHABILITATION | Facility: HOSPITAL | Age: 38
End: 2023-01-10
Payer: COMMERCIAL

## 2023-01-10 DIAGNOSIS — M62.81 MUSCLE WEAKNESS: ICD-10-CM

## 2023-01-10 DIAGNOSIS — G89.29 CHRONIC LEFT SHOULDER PAIN: Primary | ICD-10-CM

## 2023-01-10 DIAGNOSIS — M25.60 DECREASED RANGE OF MOTION: ICD-10-CM

## 2023-01-10 DIAGNOSIS — M25.512 CHRONIC LEFT SHOULDER PAIN: Primary | ICD-10-CM

## 2023-01-10 PROCEDURE — 97110 THERAPEUTIC EXERCISES: CPT | Mod: PN,CQ

## 2023-01-10 PROCEDURE — 97140 MANUAL THERAPY 1/> REGIONS: CPT | Mod: PN,CQ

## 2023-01-12 ENCOUNTER — CLINICAL SUPPORT (OUTPATIENT)
Dept: REHABILITATION | Facility: HOSPITAL | Age: 38
End: 2023-01-12
Payer: COMMERCIAL

## 2023-01-12 DIAGNOSIS — M25.512 CHRONIC LEFT SHOULDER PAIN: Primary | ICD-10-CM

## 2023-01-12 DIAGNOSIS — M25.60 DECREASED RANGE OF MOTION: ICD-10-CM

## 2023-01-12 DIAGNOSIS — M62.81 MUSCLE WEAKNESS: ICD-10-CM

## 2023-01-12 DIAGNOSIS — G89.29 CHRONIC LEFT SHOULDER PAIN: Primary | ICD-10-CM

## 2023-01-12 PROCEDURE — 97140 MANUAL THERAPY 1/> REGIONS: CPT | Mod: PN,CQ

## 2023-01-12 PROCEDURE — 97110 THERAPEUTIC EXERCISES: CPT | Mod: PN,CQ

## 2023-01-12 NOTE — PROGRESS NOTES
[facility]   Physical Therapy Progress and  Treatment Note     Name: Emily Pelayo  Clinic Number: [id number]    Therapy Diagnosis:   Encounter Diagnoses   Name Primary?    Chronic left shoulder pain Yes    Decreased range of motion     Muscle weakness        Physician: Steven Zarate MD    Visit Date: 1/12/2023    Physician Orders: PT Eval and Treat s/p RTC repair, SAD, biceps tenotomy  Medical Diagnosis from Referral:   M75.122 (ICD-10-CM) - Nontraumatic complete tear of rotator cuff, left   S43.432S (ICD-10-CM) - Labral tear of shoulder, left, sequela      Evaluation Date: 12/5/2022     DOS 11-17-22  Authorization Period Expiration: 12-31-22  Plan of Care Expiration: 2-27-23  Progress Note Due: 12-30-22 prior to MD appt  Visit # / Visits authorized: 1 / 21 (+8 from prior auth in 2022)  FOTO: Issued Visit #: 1     MD Follow up appointment: 12-30-22     Precautions: Standard and RC precautions    PTA Visit #: 1/5     Time In: 1:12 (pt late)  Time Out: 1:50   Total Billable Time: 38 minutes     SUBJECTIVE     Pt reports: has been having increased soreness and pain to the anterior shoulder. Has increased her use of cold pack and ibuprofen to manage symptoms.  She was compliant with home exercise program.  Response to previous treatment: increased soreness and pain  Functional change: trying to use the arm a bit more and let it rest at her side.    Pain:  Shoulder pain 1/10  at worst 5/10 at best 2/10  Initial eval Current 2/10, worst 8/10, best 2/10  Location: left shoulder      OBJECTIVE       Shoulder ROM: (measured in degrees) 1-10-22  Shoulder  LUE   Flexion 130   Abduction 110   External Rotation neutral 58   External Rotation with 45* abd  65   Internal Rotation with 45* abd 65        Shoulder ROM: (measured in degrees supine) initial eval  Shoulder  LUE   Flexion in scaption plane 60   Abduction in scaption plane 45   External Rotation neutral 15   External Rotation with abd   "N/T   Internal Rotation Palm to stomach       Treatment     Poppy received the treatments listed below:      Post op week # 6 12-29-22    therapeutic exercises to develop endurance, ROM, and core stabilization for 30 minutes including:    PROM x 10 with PT all planes within comfortable ranges  AAROM wand ER neutral x 10  AAROM wand chest press x 10   Sidelying scapular retraction (towel under arm) x 10    Pendulum x 10 all planes   Shoulder shrug with retraction, pt able to allow R elbow extended during ex x 10  Standing gentle submax isometrics ER, IR, abd, and ext in doorway with towel 10 x 5" each    (NP)Supine median nerve glide (elbow extended supported on towel roll with elbow flexion/extension x 10 and wrist flexion and extension x 10  Pt reported less numbness with elbow extension supine after glides    Possible to add next session: Sidelying scapular mobs and stabilization exercises, supine wand flexion    Below exercises not performed this date:  Open and close hand x 10  Wrist flexion/extension x 10  Wrist RD/UD x 10  Pro/sup x 10 cueing to place upper arm by side x 10    -D/C neck exercises to HEP only after today's session  Chin tuck x 10  Cervical flexion x 10 pain free zone  Cervical extension x 10 pain free zone  Cervical SB x 10 pain free zone  Cervical rotation x 10 pain free zone   Levator scapulae stretch for L with SB R 5 sec hold    Possible to add for next session: AAROM ER and flexion with wand, pulleys for AAROM, standing submax isometrics in doorway     Pt to ice at home today Poppy received cold pack for 10 minutes to L shoulder sitting with pillow for support into abd .       manual therapy techniques: Soft tissue Mobilization were applied to the: left shoulder for 8 minutes, including:  (NP)STM along L pec and upper trap for reduced tension and muscle guarding, supine with elbow supported on towel roll and forearm resting on abdomen  (NP)STM to distal biceps mm belly and area superior to " the medial epicondyle of the elbow for reduced soft tissue restriction and neural sensations.  Gentle oscillation GH supine and gentle scap mob in sidelying   STM neck paraspinals, and upper trap, levator scapulae    Patient Education and Home Exercises     Home Exercises Provided and Patient Education Provided     Education provided:   - proper posture   - compliance to daily exercises  - expectation of symptom and pain fluctuation throughout the healing process, muscle soreness and some discomfort to be expected    Written Home Exercises Provided: yes Exercises were reviewed and Poppy was able to demonstrate them prior to the end of the session.  Poppy demonstrated good  understanding of the education provided. See EMR under Patient Instructions in Notes section for exercises provided during therapy sessions    ASSESSMENT   Pt presents with mild increase of guarding at rest and with PROM this date likely due to present soreness. Able to achieve good ranges of motion passively though patient continues with report of painful motion with AA chest press. Good tolerance to gentle sub-max isometrics in the door frame, occasional cues for proper scapular positioning and avoiding leaning into motion with body. Poppy will benefit from continued progression as able into AAROM and scapular stabilization per protocol.    Poppy Is progressing well towards her goals.     Pt prognosis is Good.     Pt will continue to benefit from skilled outpatient physical therapy to address the deficits listed in the problem list box on initial evaluation, provide pt/family education and to maximize pt's level of independence in the home and community environment.     Pt's spiritual, cultural and educational needs considered and pt agreeable to plan of care and goals.     Anticipated barriers to physical therapy: currently reliant on transportation    Goals: Short Term Goals:  6 weeks  (Progressing, not met)  Increase passive range of motion  25%  Increase postural awareness to normal  Wean from sling  Be able to perform HEP with minimal cueing required     Long Term Goals: 12 weeks (Progressing, not met)  Increase range of motion to 75%to 100% of full range  Increase strength to 3/5 to 4-/5  Improve scapular stabilization to normal  Restore ability to drive without increased pain  Restore ability to reach fully overhead without increased pain  Restore ability to reach in all planes without increased pain or difficulty  Restore ability to perform ADL's and household activities independently and without increased pain  Pt to be independent with HEP to improve ROM and independence with ADL's    PLAN     Continue with updated POC toward established therapy goals.    Yasmine Lala, PTA

## 2023-01-18 NOTE — PROGRESS NOTES
[facility]   Physical Therapy Treatment Note     Name: Emily Pelayo  Clinic Number: [id number]    Therapy Diagnosis:   Encounter Diagnoses   Name Primary?    Chronic left shoulder pain Yes    Decreased range of motion     Muscle weakness        Physician: Steven Zarate MD    Visit Date: 1/19/2023    Physician Orders: PT Eval and Treat s/p RTC repair, SAD, biceps tenotomy  Medical Diagnosis from Referral:   M75.122 (ICD-10-CM) - Nontraumatic complete tear of rotator cuff, left   S43.432S (ICD-10-CM) - Labral tear of shoulder, left, sequela      Evaluation Date: 12/5/2022     DOS 11-17-22  Authorization Period Expiration: 12-31-22  Plan of Care Expiration: 2-27-23  Progress Note Due: 1-29-23  Visit # / Visits authorized: 4 / 21 (+8 from prior auth in 2022)  FOTO: Issued Visit #: 1     MD Follow up appointment: 2-10-23     Precautions: Standard and RC precautions    PTA Visit #: 1/5     Time In: 11:45  Time Out: 12:25  Total Billable Time: 40 minutes     SUBJECTIVE     Pt reports: she feels she is getting more ROM, doing exercises Pt states did some self massage to superior pects is better, but still some pain posterior shoulder and used ice.  Pt states she tried gentle massage gun, but stopped due to increased pain after activity.   She was compliant with home exercise program.  Response to previous treatment: increased soreness and pain  Functional change: trying to use the arm a bit more and let it rest at her side.    Pain:  Shoulder pain 1/10     Location: left shoulder      OBJECTIVE       Shoulder ROM: (measured in degrees) 1-19-23  Shoulder  LUE   Flexion 145   Abduction 115   External Rotation neutral 60   External Rotation with abd  60   Internal Rotation 50   Active standing flexion 105   Active standing abduction 110         Shoulder ROM: (measured in degrees) 1-10-22  Shoulder  LUE   Flexion 130   Abduction 110   External Rotation neutral 58   External Rotation with 45*  "abd  65   Internal Rotation with 45* abd 65        Shoulder ROM: (measured in degrees supine) initial eval  Shoulder  LUE   Flexion in scaption plane 60   Abduction in scaption plane 45   External Rotation neutral 15   External Rotation with abd  N/T   Internal Rotation Palm to stomach   Out of sling fully     Treatment     Poppy received the treatments listed below:      Post op week # 9 1-19-23    therapeutic exercises to develop endurance, ROM, and core stabilization for 32 minutes including:    PROM x 10 with PT all planes within comfortable ranges   Protraction supine x 2/10  AAROM wand chest press x 2/10  Sidelying scapular retraction/protraction with gentle AR with PT  (towel under arm) x 10   Row to neutral prone x 2/10   Shoulder ext prone neutral x 10    Pendulum x 10 all planes   Shoulder shrug with retraction, pt able to allow R elbow extended during ex x 10  Standing gentle submax isometrics ER, IR, abd, and ext in doorway with towel 10 x 5" each  Biceps curls active 2/10    Verbal and tactile cueing provided for proper performance and recruitment livier for proper hand position with education on neutral position of hand and proper positioning of body with isometrics.      (NP)Supine median nerve glide (elbow extended supported on towel roll with elbow flexion/extension x 10 and wrist flexion and extension x 10  Pt reported less numbness with elbow extension supine after glides    Possible to add next session: active chest press, horizontal abd prone neutral hand position    Pt to ice at home today Poppy received cold pack for 10 minutes to L shoulder sitting with pillow for support into abd .       manual therapy techniques: Soft tissue Mobilization were applied to the: left shoulder for 8 minutes, including:  STM pect, biceps tendon  Gentle oscillation GH supine and gentle scap mob in sidelying       Patient Education and Home Exercises     Home Exercises Provided and Patient Education Provided "     Education provided:   - proper posture   - compliance to daily exercises  - expectation of symptom and pain fluctuation throughout the healing process, muscle soreness and some discomfort to be expected    Written Home Exercises Provided: yes Exercises were reviewed and Poppy was able to demonstrate them prior to the end of the session.  Poppy demonstrated good  understanding of the education provided. See EMR under Patient Instructions in Notes section for exercises provided during therapy sessions    ASSESSMENT   Pt continues to improve with PROM and range is going so well within protocol with no significant tightness noted at end range that pt was discontinued on wand ER ex.  Pt jung treatment well and able to progress further with scap strengthening.      Poppy Is progressing well towards her goals.     Pt prognosis is Good.     Pt will continue to benefit from skilled outpatient physical therapy to address the deficits listed in the problem list box on initial evaluation, provide pt/family education and to maximize pt's level of independence in the home and community environment.     Pt's spiritual, cultural and educational needs considered and pt agreeable to plan of care and goals.     Anticipated barriers to physical therapy:none    Goals: Short Term Goals:  6 weeks MET STG's  Increase passive range of motion 25%  Increase postural awareness to normal  Wean from sling  Be able to perform HEP with minimal cueing required     Long Term Goals: 12 weeks (Progressing, not met)  Increase range of motion to 75%to 100% of full range  Increase strength to 3/5 to 4-/5  Improve scapular stabilization to normal  Restore ability to drive without increased pain  Restore ability to reach fully overhead without increased pain  Restore ability to reach in all planes without increased pain or difficulty  Restore ability to perform ADL's and household activities independently and without increased pain  Pt to be independent  with HEP to improve ROM and independence with ADL's    PLAN     Continue with updated POC toward established therapy goals.    Essie Nolasco, PT

## 2023-01-19 ENCOUNTER — CLINICAL SUPPORT (OUTPATIENT)
Dept: REHABILITATION | Facility: HOSPITAL | Age: 38
End: 2023-01-19
Payer: COMMERCIAL

## 2023-01-19 DIAGNOSIS — G89.29 CHRONIC LEFT SHOULDER PAIN: Primary | ICD-10-CM

## 2023-01-19 DIAGNOSIS — M62.81 MUSCLE WEAKNESS: ICD-10-CM

## 2023-01-19 DIAGNOSIS — M25.60 DECREASED RANGE OF MOTION: ICD-10-CM

## 2023-01-19 DIAGNOSIS — M25.512 CHRONIC LEFT SHOULDER PAIN: Primary | ICD-10-CM

## 2023-01-19 PROCEDURE — 97110 THERAPEUTIC EXERCISES: CPT | Mod: PN | Performed by: PHYSICAL THERAPIST

## 2023-01-19 PROCEDURE — 97140 MANUAL THERAPY 1/> REGIONS: CPT | Mod: PN | Performed by: PHYSICAL THERAPIST

## 2023-01-24 ENCOUNTER — CLINICAL SUPPORT (OUTPATIENT)
Dept: REHABILITATION | Facility: HOSPITAL | Age: 38
End: 2023-01-24
Payer: COMMERCIAL

## 2023-01-24 DIAGNOSIS — M62.81 MUSCLE WEAKNESS: ICD-10-CM

## 2023-01-24 DIAGNOSIS — M25.60 DECREASED RANGE OF MOTION: ICD-10-CM

## 2023-01-24 DIAGNOSIS — G89.29 CHRONIC LEFT SHOULDER PAIN: Primary | ICD-10-CM

## 2023-01-24 DIAGNOSIS — M25.512 CHRONIC LEFT SHOULDER PAIN: Primary | ICD-10-CM

## 2023-01-24 PROCEDURE — 97112 NEUROMUSCULAR REEDUCATION: CPT | Mod: PN,CQ

## 2023-01-24 PROCEDURE — 97110 THERAPEUTIC EXERCISES: CPT | Mod: PN,CQ

## 2023-01-24 NOTE — PROGRESS NOTES
[facility]   Physical Therapy Treatment Note     Name: Emily Pelayo  Clinic Number: [id number]    Therapy Diagnosis:   Encounter Diagnoses   Name Primary?    Chronic left shoulder pain Yes    Decreased range of motion     Muscle weakness        Physician: Steven Zarate MD    Visit Date: 1/24/2023    Physician Orders: PT Eval and Treat s/p RTC repair, SAD, biceps tenotomy  Medical Diagnosis from Referral:   M75.122 (ICD-10-CM) - Nontraumatic complete tear of rotator cuff, left   S43.432S (ICD-10-CM) - Labral tear of shoulder, left, sequela      Evaluation Date: 12/5/2022     DOS 11-17-22  Authorization Period Expiration: 12-31-22  Plan of Care Expiration: 2-27-23  Progress Note Due: 1-29-23  Visit # / Visits authorized: 4 / 21 (+8 from prior auth in 2022)  FOTO: Issued Visit #: 1     MD Follow up appointment: 2-10-23     Precautions: Standard and RC precautions    PTA Visit #: 1/5     Time In: 10:55 (pt late)  Time Out: 11:33  Total Billable Time: 38 minutes     SUBJECTIVE     Pt reports: overall feeling better, gaining some range of motion but her strength and endurance still feels very limited. Had difficulty styling her daughter's hair.   She was compliant with home exercise program.  Response to previous treatment: increased soreness and pain  Functional change: trying to use the arm a bit more and let it rest at her side.    Pain:  Shoulder pain 1/10     Location: left shoulder      OBJECTIVE     Objective measures not taken this date    Treatment     Poppy received the treatments listed below:      Post op week # 9 1-19-23    therapeutic exercises to develop endurance, ROM, and core stabilization for 25 minutes including:    PROM x 10 with PT all planes within comfortable ranges  Protraction supine x 2/10   Supine active chest press x 10  AAROM wand chest press into flexion x 2/10    Pendulum x 10 all planes   Shoulder shrug with retraction, pt able to allow R elbow extended  "during ex x 10  Standing gentle submax isometrics ER, IR, abd, and ext in doorway with towel 10 x 5" each  Biceps curls active 2/10    Verbal and tactile cueing provided for proper performance and recruitment livier for proper hand position with education on neutral position of hand and proper positioning of body with isometrics.  Tactile and verbal cues with prone exercises for proper scapular positioning    (NP)Supine median nerve glide (elbow extended supported on towel roll with elbow flexion/extension x 10 and wrist flexion and extension x 10  Pt reported less numbness with elbow extension supine after glides    Possible to add next session: zachary    Neuromuscular re-education for posture and neuromotor control for 13 minutes including:  Sidelying scapular retraction/protraction with gentle AR with PT  (towel under arm) x 10 - tactile cues for proper scapular motor control   Sidelying shoulder ER with towel under arm x 10 through comfortable ROM - cues for proper scapular motor control  Row to neutral prone x 2/10 - tactile cues for proper scapular positioning and motor control  Shoulder ext prone neutral x 10 - tactile cues for proper scapular positioning and motor control   Shoulder horiz abd  neutral x 10 - tactile cues for scap positioning and motor control      Pt to ice at home today Poppy received cold pack for 10 minutes to L shoulder sitting with pillow for support into abd .       manual therapy techniques: Soft tissue Mobilization were applied to the: left shoulder for 00 minutes, including:  STM pect, biceps tendon  Gentle oscillation GH supine and gentle scap mob in sidelying       Patient Education and Home Exercises     Home Exercises Provided and Patient Education Provided     Education provided:   - proper posture   - compliance to daily exercises  - expectation of symptom and pain fluctuation throughout the healing process, muscle soreness and some discomfort to be expected    Written Home " Exercises Provided: yes Exercises were reviewed and Poppy was able to demonstrate them prior to the end of the session.  Poppy demonstrated good  understanding of the education provided. See EMR under Patient Instructions in Notes section for exercises provided during therapy sessions    ASSESSMENT   Pt presents with improving L shoulder AA and AROM into overhead flexion in supine, less pain overall. Emphasis placed with prone and sidelying exercises this date for neuromuscular reeducation of the scapula for positioning, stabilization and motor control with arm movements; difficulty noted and required frequent tactile cueing. Cues with isometrics in standing for body placement to maximize shoulder muscular activation. Pt jung treatment well and able to progress further with scap strengthening.  Fatigue but no pain by end of session.    Poppy Is progressing well towards her goals.     Pt prognosis is Good.     Pt will continue to benefit from skilled outpatient physical therapy to address the deficits listed in the problem list box on initial evaluation, provide pt/family education and to maximize pt's level of independence in the home and community environment.     Pt's spiritual, cultural and educational needs considered and pt agreeable to plan of care and goals.     Anticipated barriers to physical therapy:none    Goals: Short Term Goals:  6 weeks MET STG's  Increase passive range of motion 25%  Increase postural awareness to normal  Wean from sling  Be able to perform HEP with minimal cueing required     Long Term Goals: 12 weeks (Progressing, not met)  Increase range of motion to 75%to 100% of full range  Increase strength to 3/5 to 4-/5  Improve scapular stabilization to normal  Restore ability to drive without increased pain  Restore ability to reach fully overhead without increased pain  Restore ability to reach in all planes without increased pain or difficulty  Restore ability to perform ADL's and household  activities independently and without increased pain  Pt to be independent with HEP to improve ROM and independence with ADL's    PLAN     Continue with updated POC toward established therapy goals.    Yasmine Lala, PTA

## 2023-01-25 NOTE — PROGRESS NOTES
[facility]   Physical Therapy Progress and Treatment Note     Name: Emily Pelayo  Clinic Number: [id number]    Therapy Diagnosis:   Encounter Diagnoses   Name Primary?    Chronic left shoulder pain Yes    Decreased range of motion     Muscle weakness        Physician: Steven Zarate MD    Visit Date: 1/26/2023    Physician Orders: PT Eval and Treat s/p RTC repair, SAD, biceps tenotomy  Medical Diagnosis from Referral:   M75.122 (ICD-10-CM) - Nontraumatic complete tear of rotator cuff, left   S43.432S (ICD-10-CM) - Labral tear of shoulder, left, sequela      Evaluation Date: 12/5/2022     DOS 11-17-22  Authorization Period Expiration: 12-31-22  Plan of Care Expiration: 2-27-23  Progress Note Due: 2-10-23 prior to MD  Visit # / Visits authorized: 5/ 21 (+8 from prior auth in 2022)  FOTO: Issued Visit #: 1     MD Follow up appointment: 2-10-23     Precautions: Standard and RC precautions    PTA Visit #: 1/5     Time In: 1:50  Time Out: 2:31  Total Billable Time: 41 minutes     SUBJECTIVE     Pt reports: she did ex this AM and was a little sore so iced and now feeling ok Pt reports no numnbess    She was compliant with home exercise program.  Response to previous treatment: increased soreness and pain  Functional change: using arm for ADL, limited with sustained overhead activities    Pain:  Shoulder pain 1/10 at worst 4-5/10 at best 1/10    Location: left shoulder      OBJECTIVE        Shoulder ROM: (measured in degrees) 1-26-23  Shoulder  LUE   Flexion 155   Abduction 115   External Rotation neutral 65   External Rotation with abd  65   Internal Rotation 55   Active standing flexion 150   Active standing abduction 155 pain with eccentric            Shoulder ROM: (measured in degrees supine) initial eval  Shoulder  LUE   Flexion in scaption plane 60   Abduction in scaption plane 45   External Rotation neutral 15   External Rotation with abd  N/T   Internal Rotation Palm to stomach  "  Out of sling fully     Treatment     Poppy received the treatments listed below:      Post op week # 10 1-26-23    therapeutic exercises to develop endurance, ROM, and core stabilization for 23 minutes including:    Instructed pt further in shoulder girdle anatomy and biomechanics and role of scapula    PROM x 10 with PT all planes within comfortable ranges  Protraction supine x 2/10  Supine active chest press x 2/10   Rhythmic stabilization supine x 2 x 30 sec   (NP due to good flexion achieved )AAROM wand chest press into flexion x 2/10    Pendulum x 10 all planes   Shoulder shrug with retraction, pt able to allow R elbow extended during ex x 10  (NP) Instructed pt to hold since using arm more at home Standing gentle submax isometrics ER, IR, abd, and ext in doorway with towel 10 x 5" each  Biceps curls active 2/10    Verbal and tactile cueing provided for proper performance and recruitment livier for proper hand position with education on neutral position of hand and proper positioning of body with isometrics.  Tactile and verbal cues with prone exercises for proper scapular positioning    (NP)Supine median nerve glide (elbow extended supported on towel roll with elbow flexion/extension x 10 and wrist flexion and extension x 10  Pt reported less numbness with elbow extension supine after glides    Possible to add next session: progress scap stab     Neuromuscular re-education for posture and neuromotor control for 15 minutes including:  Sidelying scapular retraction/protraction with gentle AR with PT  (towel under arm) x 10 - tactile cues for proper scapular motor control  Sidelying shoulder ER with towel under arm x 2/10 through comfortable ROM - cues for proper scapular motor control   Shoulder abd SL x 2/10  (Pain ant shoulder so held, unable to stabilize) Shoulder flexion SL x 10 with scap control    Horizontal abd SL x 10  Row to neutral prone x 2/10 - tactile cues for proper scapular positioning and motor " control  Shoulder ext prone neutral x 10 - tactile cues for proper scapular positioning and motor control  Shoulder horiz abd prone neutral x 10 - tactile cues for scap positioning and motor control      Pt to ice at home today Poppy received cold pack for 10 minutes to L shoulder sitting with pillow for support into abd .       manual therapy techniques: Soft tissue Mobilization were applied to the: left shoulder for 3 minutes, including:  STM pect, biceps tendon  Gentle oscillation GH supine and gentle scap mob in sidelying       Patient Education and Home Exercises     Home Exercises Provided and Patient Education Provided     Education provided:   - proper posture   - shoulder girdle anatomy and biomechanics   - compliance to daily exercises  - HEP    Written Home Exercises Provided: yes Exercises were reviewed and Poppy was able to demonstrate them prior to the end of the session.  Poppy demonstrated good  understanding of the education provided. See EMR under Patient Instructions in Notes section for exercises provided during therapy sessions    ASSESSMENT   Patient demonstrates improvement in range of motion, strength, stabilization and function.  Pt jung treatment well and able to progress further with scap stabilization.  Pt will benefit from additional strengthening to improve scap dyskinesia and shoulder function    Poppy Is progressing well towards her goals.     Pt prognosis is Good.     Pt will continue to benefit from skilled outpatient physical therapy to address the deficits listed in the problem list box on initial evaluation, provide pt/family education and to maximize pt's level of independence in the home and community environment.     Pt's spiritual, cultural and educational needs considered and pt agreeable to plan of care and goals.     Anticipated barriers to physical therapy:none    Goals: Short Term Goals:  6 weeks MET STG's  Increase passive range of motion 25%  Increase postural awareness to  normal  Wean from sling  Be able to perform HEP with minimal cueing required     Long Term Goals: 12 weeks (Progressing, not met)  Increase range of motion to 75%to 100% of full range  Increase strength to 3/5 to 4-/5  Improve scapular stabilization to normal  Restore ability to drive without increased pain  Restore ability to reach fully overhead without increased pain  Restore ability to reach in all planes without increased pain or difficulty  Restore ability to perform ADL's and household activities independently and without increased pain  Pt to be independent with HEP to improve ROM and independence with ADL's    PLAN     Continue with updated POC toward established therapy goals.  Progress scap stab as tolerated    Essie Nolasco, PT

## 2023-01-26 ENCOUNTER — CLINICAL SUPPORT (OUTPATIENT)
Dept: REHABILITATION | Facility: HOSPITAL | Age: 38
End: 2023-01-26
Payer: COMMERCIAL

## 2023-01-26 DIAGNOSIS — G89.29 CHRONIC LEFT SHOULDER PAIN: Primary | ICD-10-CM

## 2023-01-26 DIAGNOSIS — M25.512 CHRONIC LEFT SHOULDER PAIN: Primary | ICD-10-CM

## 2023-01-26 DIAGNOSIS — M25.60 DECREASED RANGE OF MOTION: ICD-10-CM

## 2023-01-26 DIAGNOSIS — M62.81 MUSCLE WEAKNESS: ICD-10-CM

## 2023-01-26 PROCEDURE — 97110 THERAPEUTIC EXERCISES: CPT | Mod: PN | Performed by: PHYSICAL THERAPIST

## 2023-01-26 PROCEDURE — 97112 NEUROMUSCULAR REEDUCATION: CPT | Mod: PN | Performed by: PHYSICAL THERAPIST

## 2023-01-27 NOTE — PLAN OF CARE
[facility]   Physical Therapy Progress and Treatment Note     Name: Emily Pelayo  Clinic Number: [id number]    Therapy Diagnosis:   Encounter Diagnoses   Name Primary?    Chronic left shoulder pain Yes    Decreased range of motion     Muscle weakness        Physician: Steven Zarate MD    Visit Date: 1/26/2023    Physician Orders: PT Eval and Treat s/p RTC repair, SAD, biceps tenotomy  Medical Diagnosis from Referral:   M75.122 (ICD-10-CM) - Nontraumatic complete tear of rotator cuff, left   S43.432S (ICD-10-CM) - Labral tear of shoulder, left, sequela      Evaluation Date: 12/5/2022     DOS 11-17-22  Authorization Period Expiration: 12-31-22  Plan of Care Expiration: 2-27-23  Progress Note Due: 2-10-23 prior to MD  Visit # / Visits authorized: 5/ 21 (+8 from prior auth in 2022)  FOTO: Issued Visit #: 1     MD Follow up appointment: 2-10-23     Precautions: Standard and RC precautions    PTA Visit #: 1/5     Time In: 1:50  Time Out: 2:31  Total Billable Time: 41 minutes     SUBJECTIVE     Pt reports: she did ex this AM and was a little sore so iced and now feeling ok Pt reports no numnbess    She was compliant with home exercise program.  Response to previous treatment: increased soreness and pain  Functional change: using arm for ADL, limited with sustained overhead activities    Pain:  Shoulder pain 1/10 at worst 4-5/10 at best 1/10    Location: left shoulder      OBJECTIVE        Shoulder ROM: (measured in degrees) 1-26-23  Shoulder  LUE   Flexion 155   Abduction 115   External Rotation neutral 65   External Rotation with abd  65   Internal Rotation 55   Active standing flexion 150   Active standing abduction 155 pain with eccentric            Shoulder ROM: (measured in degrees supine) initial eval  Shoulder  LUE   Flexion in scaption plane 60   Abduction in scaption plane 45   External Rotation neutral 15   External Rotation with abd  N/T   Internal Rotation Palm to stomach  "  Out of sling fully     Treatment     Poppy received the treatments listed below:      Post op week # 10 1-26-23    therapeutic exercises to develop endurance, ROM, and core stabilization for 23 minutes including:    Instructed pt further in shoulder girdle anatomy and biomechanics and role of scapula    PROM x 10 with PT all planes within comfortable ranges  Protraction supine x 2/10  Supine active chest press x 2/10   Rhythmic stabilization supine x 2 x 30 sec   (NP due to good flexion achieved )AAROM wand chest press into flexion x 2/10    Pendulum x 10 all planes   Shoulder shrug with retraction, pt able to allow R elbow extended during ex x 10  (NP) Instructed pt to hold since using arm more at home Standing gentle submax isometrics ER, IR, abd, and ext in doorway with towel 10 x 5" each  Biceps curls active 2/10    Verbal and tactile cueing provided for proper performance and recruitment livier for proper hand position with education on neutral position of hand and proper positioning of body with isometrics.  Tactile and verbal cues with prone exercises for proper scapular positioning    (NP)Supine median nerve glide (elbow extended supported on towel roll with elbow flexion/extension x 10 and wrist flexion and extension x 10  Pt reported less numbness with elbow extension supine after glides    Possible to add next session: progress scap stab     Neuromuscular re-education for posture and neuromotor control for 15 minutes including:  Sidelying scapular retraction/protraction with gentle AR with PT  (towel under arm) x 10 - tactile cues for proper scapular motor control  Sidelying shoulder ER with towel under arm x 2/10 through comfortable ROM - cues for proper scapular motor control   Shoulder abd SL x 2/10  (Pain ant shoulder so held, unable to stabilize) Shoulder flexion SL x 10 with scap control    Horizontal abd SL x 10  Row to neutral prone x 2/10 - tactile cues for proper scapular positioning and motor " control  Shoulder ext prone neutral x 10 - tactile cues for proper scapular positioning and motor control  Shoulder horiz abd prone neutral x 10 - tactile cues for scap positioning and motor control      Pt to ice at home today Poppy received cold pack for 10 minutes to L shoulder sitting with pillow for support into abd .       manual therapy techniques: Soft tissue Mobilization were applied to the: left shoulder for 3 minutes, including:  STM pect, biceps tendon  Gentle oscillation GH supine and gentle scap mob in sidelying       Patient Education and Home Exercises     Home Exercises Provided and Patient Education Provided     Education provided:   - proper posture   - shoulder girdle anatomy and biomechanics   - compliance to daily exercises  - HEP    Written Home Exercises Provided: yes Exercises were reviewed and Poppy was able to demonstrate them prior to the end of the session.  Poppy demonstrated good  understanding of the education provided. See EMR under Patient Instructions in Notes section for exercises provided during therapy sessions    ASSESSMENT   Patient demonstrates improvement in range of motion, strength, stabilization and function.  Pt jung treatment well and able to progress further with scap stabilization.  Pt will benefit from additional strengthening to improve scap dyskinesia and shoulder function    Poppy Is progressing well towards her goals.     Pt prognosis is Good.     Pt will continue to benefit from skilled outpatient physical therapy to address the deficits listed in the problem list box on initial evaluation, provide pt/family education and to maximize pt's level of independence in the home and community environment.     Pt's spiritual, cultural and educational needs considered and pt agreeable to plan of care and goals.     Anticipated barriers to physical therapy:none    Goals: Short Term Goals:  6 weeks MET STG's  Increase passive range of motion 25%  Increase postural awareness to  normal  Wean from sling  Be able to perform HEP with minimal cueing required     Long Term Goals: 12 weeks (Progressing, not met)  Increase range of motion to 75%to 100% of full range  Increase strength to 3/5 to 4-/5  Improve scapular stabilization to normal  Restore ability to drive without increased pain  Restore ability to reach fully overhead without increased pain  Restore ability to reach in all planes without increased pain or difficulty  Restore ability to perform ADL's and household activities independently and without increased pain  Pt to be independent with HEP to improve ROM and independence with ADL's    PLAN     Continue with updated POC toward established therapy goals.  Progress scap stab as tolerated    Essie Nolasco, PT

## 2023-01-30 NOTE — PROGRESS NOTES
[facility]   Physical Therapy Treatment Note     Name: Emily Pelayo  Clinic Number: [id number]    Therapy Diagnosis:   Encounter Diagnoses   Name Primary?    Chronic left shoulder pain Yes    Decreased range of motion     Muscle weakness        Physician: Steven Zarate MD    Visit Date: 1/31/2023    Physician Orders: PT Eval and Treat s/p RTC repair, SAD, biceps tenotomy  Medical Diagnosis from Referral:   M75.122 (ICD-10-CM) - Nontraumatic complete tear of rotator cuff, left   S43.432S (ICD-10-CM) - Labral tear of shoulder, left, sequela      Evaluation Date: 12/5/2022     DOS 11-17-22  Authorization Period Expiration: 12-31-22  Plan of Care Expiration: 2-27-23  Progress Note Due: 2-10-23 prior to MD  Visit # / Visits authorized: 6/ 21 (+8 from prior auth in 2022)  FOTO: Issued Visit #: 1     MD Follow up appointment: 2-10-23     Precautions: Standard and RC precautions    PTA Visit #: 1/5     Time In:11:40 pt late coming from dentist   Time Out:12:23  Total Billable Time: 43 minutes     SUBJECTIVE     Pt reports: she has been taking ibuprofen due to needing a root canal.  Had root canal and was getting HA, now no more HA and decreased ibuprofen and so arm is iced if busy with activity but no complications  Pt states she massages arm and using medicine for keloid of scars also     She was compliant with home exercise program.  Response to previous treatment: increased soreness and pain  Functional change: using arm for ADL, limited with sustained overhead activities back in bed and able to sleep on side with pillows supporting area and no pain upon awakening    Pain:  Shoulder pain 1/10 at worst 3/10 since last visit previously worst was 4-5/10 at best 1/10  at end 0/10    Location: left shoulder      OBJECTIVE        Shoulder ROM: (measured in degrees) 1-31-23  Shoulder  LUE   Flexion 170   Abduction 170   External Rotation neutral 75   External Rotation with abd  75   Internal  Rotation 60   Active standing flexion 165 slight pain with eccentric   Active standing abduction 170 slight pain with eccentric            Shoulder ROM: (measured in degrees supine) initial eval  Shoulder  LUE   Flexion in scaption plane 60   Abduction in scaption plane 45   External Rotation neutral 15   External Rotation with abd  N/T   Internal Rotation Palm to stomach   Out of sling fully     Treatment     Poppy received the treatments listed below:      Post op week # 11 2-2-23    therapeutic exercises to develop endurance, ROM, and core stabilization for 15 minutes including:  Instructed pt further in shoulder girdle anatomy and biomechanics and role of scapula  PROM with PT all planes within comfortable ranges and improving  ROM as per protocol  Protraction supine x 2/10 with 1#  Supine active chest press x 2/10 with 1#    (NP)Pendulum x 10 all planes   Shoulder shrug with retraction,  Biceps curls active 2/10 WITH 1#    Neuromuscular re-education for posture and neuromotor control for 25 minutes including:  Rhythmic stabilization supine x 2 x 30 sec   Sidelying scapular retraction/protraction with gentle AR with PT  (towel under arm) x 10 - tactile cues for proper scapular motor control  Sidelying shoulder ER with towel under arm x 2/10- cues for proper scapular motor control and full ROM, improved recruitment noted with cueing for full ROM  Shoulder abd SL x 2/10  Shoulder flexion SL x 10 with scap control   Horizontal abd SL x 2/10  Row to neutral prone x 2/10 - tactile cues for proper scapular positioning and motor control  Shoulder ext prone neutral x 2/10 - tactile cues for proper scapular positioning and motor control  Shoulder horiz abd prone neutral x 2/10 - tactile cues for scap positioning and motor control      Pt to ice at home today Poppy received cold pack for 10 minutes to L shoulder sitting with pillow for support into abd .     manual therapy techniques: Soft tissue Mobilization were applied  to the: left shoulder for 3 minutes, including:  STM pect, biceps tendon  Gentle oscillation GH supine and gentle scap mob in sidelying     Patient Education and Home Exercises     Home Exercises Provided and Patient Education Provided     Education provided:   - proper posture   - shoulder girdle anatomy and biomechanics   - compliance to daily exercises  - HEP    Written Home Exercises Provided: yes Exercises were reviewed and Poppy was able to demonstrate them prior to the end of the session.  Poppy demonstrated good  understanding of the education provided. See EMR under Patient Instructions in Notes section for exercises provided during therapy sessions    ASSESSMENT   Patient demonstrates improvement in range of motion, Pt jung treatment well and able to progress with scap strengthening Pt with good control with active strengthening and able to handle light progression with resistance Pt able to progress to shoulder flexion SL without pain. Pt also reported no pain or soreness at end of session    Poppy Is progressing well towards her goals.     Pt prognosis is Good.     Pt will continue to benefit from skilled outpatient physical therapy to address the deficits listed in the problem list box on initial evaluation, provide pt/family education and to maximize pt's level of independence in the home and community environment.     Pt's spiritual, cultural and educational needs considered and pt agreeable to plan of care and goals.     Anticipated barriers to physical therapy:none    Goals: Short Term Goals:  6 weeks MET STG's  Increase passive range of motion 25%  Increase postural awareness to normal  Wean from sling  Be able to perform HEP with minimal cueing required     Long Term Goals: 12 weeks (Progressing, not met)  Increase range of motion to 75%to 100% of full range  Increase strength to 3/5 to 4-/5  Improve scapular stabilization to normal  Restore ability to drive without increased pain  Restore ability to  reach fully overhead without increased pain  Restore ability to reach in all planes without increased pain or difficulty  Restore ability to perform ADL's and household activities independently and without increased pain  Pt to be independent with HEP to improve ROM and independence with ADL's    PLAN     Continue with updated POC toward established therapy goals.  Progress scap stab as tolerated    Essie Nolasco, PT

## 2023-01-31 ENCOUNTER — CLINICAL SUPPORT (OUTPATIENT)
Dept: REHABILITATION | Facility: HOSPITAL | Age: 38
End: 2023-01-31
Payer: COMMERCIAL

## 2023-01-31 DIAGNOSIS — G89.29 CHRONIC LEFT SHOULDER PAIN: Primary | ICD-10-CM

## 2023-01-31 DIAGNOSIS — M62.81 MUSCLE WEAKNESS: ICD-10-CM

## 2023-01-31 DIAGNOSIS — M25.60 DECREASED RANGE OF MOTION: ICD-10-CM

## 2023-01-31 DIAGNOSIS — M25.512 CHRONIC LEFT SHOULDER PAIN: Primary | ICD-10-CM

## 2023-01-31 PROCEDURE — 97112 NEUROMUSCULAR REEDUCATION: CPT | Mod: PN | Performed by: PHYSICAL THERAPIST

## 2023-01-31 PROCEDURE — 97110 THERAPEUTIC EXERCISES: CPT | Mod: PN | Performed by: PHYSICAL THERAPIST

## 2023-02-01 NOTE — PROGRESS NOTES
[facility]   Physical Therapy Treatment Note     Name: Emily Pelayo  Clinic Number: [id number]    Therapy Diagnosis:   Encounter Diagnoses   Name Primary?    Chronic left shoulder pain Yes    Decreased range of motion     Muscle weakness        Physician: Steven Zarate MD    Visit Date: 2/2/2023    Physician Orders: PT Eval and Treat s/p RTC repair, SAD, biceps tenotomy  Medical Diagnosis from Referral:   M75.122 (ICD-10-CM) - Nontraumatic complete tear of rotator cuff, left   S43.432S (ICD-10-CM) - Labral tear of shoulder, left, sequela      Evaluation Date: 12/5/2022     DOS 11-17-22  Authorization Period Expiration: 12-31-22  Plan of Care Expiration: 2-27-23  Progress Note Due: 2-10-23 prior to MD  Visit # / Visits authorized: 7/ 21 (+8 from prior auth in 2022)  FOTO: Issued Visit #: 1     MD Follow up appointment: 2-10-23     Precautions: Standard and RC precautions    PTA Visit #: 1/5     Time In: 11:35  Time Out:12:15  Total Billable Time: 40 minutes     SUBJECTIVE     Pt reports: she did not need to ice shoulder and no ibuprofen this AM.  Pt states no pain at rest only slight pain with movement     She was compliant with home exercise program.  Response to previous treatment: increased soreness and pain  Functional change: using arm for ADL, limited with sustained overhead activities back in bed and able to sleep on side with pillows supporting area and no pain upon awakening    Pain:  Shoulder pain 0/10 rest 1/10 with movement   at end 0/10    Location: left shoulder      OBJECTIVE        Shoulder ROM: (measured in degrees) 2-2-23  Shoulder  LUE   Flexion 170   Abduction 170   External Rotation neutral 75   External Rotation with abd  75   Internal Rotation 65   Active standing flexion 165 slight pain with eccentric   Active standing abduction 170 slight pain with eccentric            Shoulder ROM: (measured in degrees supine) initial eval  Shoulder  LUE   Flexion in  scaption plane 60   Abduction in scaption plane 45   External Rotation neutral 15   External Rotation with abd  N/T   Internal Rotation Palm to stomach   Out of sling fully     Treatment     Poppy received the treatments listed below:      Post op week # 11 2-2-23    therapeutic exercises to develop endurance, ROM, and core stabilization for 12 minutes including:    PROM with PT all planes within comfortable ranges and improving  ROM as per protocol  Protraction supine x 2/10 with 1#  Supine active chest press x 2/10 with 1#    Pendulum x 10 all planes reminded pt to perform shoulder shrug retraction after to make sure maintaining good posture   Shoulder shrug with retraction,  Biceps curls active 2/10 WITH 1#   Triceps with RTB x 10  Provided RTB for home use    Neuromuscular re-education for posture and neuromotor control for 25 minutes including:  Rhythmic stabilization supine x 2 x 30 sec   Sidelying scapular retraction/protraction with gentle AR with PT  (towel under arm) x 10 - tactile cues for proper scapular motor control  Sidelying shoulder ER with towel under arm x 2/10- cues for proper scapular motor control and full ROM, improved recruitment noted with cueing for full ROM  Shoulder abd SL x 2/10 less cueing required for keeping arm below 90 abd, but cues to maintain abd plane  Shoulder flexion SL x 10 with scap control fatigue at 10 so hold to 10  Horizontal abd SL x 2/10  Row to neutral prone x 2/10 - tactile cues for proper scapular positioning and motor control  Shoulder ext prone neutral x 2/10 - tactile cues for proper scapular positioning and motor control  Shoulder horiz abd prone neutral x 2/10 - tactile cues for scap positioning and motor control      Pt to ice at home today Poppy received cold pack for 10 minutes to L shoulder sitting with pillow for support into abd .     manual therapy techniques: Soft tissue Mobilization were applied to the: left shoulder for 3 minutes, including:  STM pect,  biceps tendon  Gentle oscillation GH supine and gentle scap mob in sidelying     Patient Education and Home Exercises     Home Exercises Provided and Patient Education Provided     Education provided:   - proper posture   - shoulder girdle anatomy and biomechanics   - compliance to daily exercises  - HEP    Written Home Exercises Provided: yes Exercises were reviewed and Poppy was able to demonstrate them prior to the end of the session.  Poppy demonstrated good  understanding of the education provided. See EMR under Patient Instructions in Notes section for exercises provided during therapy sessions    ASSESSMENT   Patient demonstrates improvement in internal rotation today , Pt jung treatment well and able to progress with triceps strengthening Pt has been able to decrease ice and ibuprofen usage.  Pt continues to require mod cueing to maintain proper scapula control through scap strengthening.  No pain at end of session    Poppy Is progressing well towards her goals.     Pt prognosis is Good.     Pt will continue to benefit from skilled outpatient physical therapy to address the deficits listed in the problem list box on initial evaluation, provide pt/family education and to maximize pt's level of independence in the home and community environment.     Pt's spiritual, cultural and educational needs considered and pt agreeable to plan of care and goals.     Anticipated barriers to physical therapy:none    Goals: Short Term Goals:  6 weeks MET STG's  Increase passive range of motion 25%  Increase postural awareness to normal  Wean from sling  Be able to perform HEP with minimal cueing required     Long Term Goals: 12 weeks (Progressing, not met)  Increase range of motion to 75%to 100% of full range  Increase strength to 3/5 to 4-/5  Improve scapular stabilization to normal  Restore ability to drive without increased pain  Restore ability to reach fully overhead without increased pain  Restore ability to reach in all  planes without increased pain or difficulty  Restore ability to perform ADL's and household activities independently and without increased pain  Pt to be independent with HEP to improve ROM and independence with ADL's    PLAN     Continue with updated POC toward established therapy goals.  Progress scap stab as tolerated    Essie Nolasco, PT

## 2023-02-02 ENCOUNTER — CLINICAL SUPPORT (OUTPATIENT)
Dept: REHABILITATION | Facility: HOSPITAL | Age: 38
End: 2023-02-02
Payer: COMMERCIAL

## 2023-02-02 DIAGNOSIS — G89.29 CHRONIC LEFT SHOULDER PAIN: Primary | ICD-10-CM

## 2023-02-02 DIAGNOSIS — M62.81 MUSCLE WEAKNESS: ICD-10-CM

## 2023-02-02 DIAGNOSIS — M25.512 CHRONIC LEFT SHOULDER PAIN: Primary | ICD-10-CM

## 2023-02-02 DIAGNOSIS — M25.60 DECREASED RANGE OF MOTION: ICD-10-CM

## 2023-02-02 PROCEDURE — 97110 THERAPEUTIC EXERCISES: CPT | Mod: PN | Performed by: PHYSICAL THERAPIST

## 2023-02-02 PROCEDURE — 97112 NEUROMUSCULAR REEDUCATION: CPT | Mod: PN | Performed by: PHYSICAL THERAPIST

## 2023-02-07 ENCOUNTER — CLINICAL SUPPORT (OUTPATIENT)
Dept: REHABILITATION | Facility: HOSPITAL | Age: 38
End: 2023-02-07
Payer: COMMERCIAL

## 2023-02-07 DIAGNOSIS — M25.512 CHRONIC LEFT SHOULDER PAIN: Primary | ICD-10-CM

## 2023-02-07 DIAGNOSIS — G89.29 CHRONIC LEFT SHOULDER PAIN: Primary | ICD-10-CM

## 2023-02-07 DIAGNOSIS — M62.81 MUSCLE WEAKNESS: ICD-10-CM

## 2023-02-07 DIAGNOSIS — M25.60 DECREASED RANGE OF MOTION: ICD-10-CM

## 2023-02-07 PROCEDURE — 97112 NEUROMUSCULAR REEDUCATION: CPT | Mod: PN | Performed by: PHYSICAL THERAPIST

## 2023-02-07 PROCEDURE — 97110 THERAPEUTIC EXERCISES: CPT | Mod: PN | Performed by: PHYSICAL THERAPIST

## 2023-02-07 NOTE — PROGRESS NOTES
[facility]   Physical Therapy Treatment Note     Name: Emily Pelayo  Clinic Number: [id number]    Therapy Diagnosis:   Encounter Diagnoses   Name Primary?    Chronic left shoulder pain Yes    Decreased range of motion     Muscle weakness        Physician: Alisia Olvera FNP    Visit Date: 2/7/2023    Physician Orders: PT Eval and Treat s/p RTC repair, SAD, biceps tenotomy  Medical Diagnosis from Referral:   M75.122 (ICD-10-CM) - Nontraumatic complete tear of rotator cuff, left   S43.432S (ICD-10-CM) - Labral tear of shoulder, left, sequela      Evaluation Date: 12/5/2022     DOS 11-17-22  Authorization Period Expiration: 12-31-22  Plan of Care Expiration: 2-27-23  Progress Note Due: 2-10-23 prior to MD  Visit # / Visits authorized: 8/ 21 (+8 from prior auth in 2022)  FOTO: Issued Visit #: 1     MD Follow up appointment: 2-10-23     Precautions: Standard and RC precautions    PTA Visit #: 1/5     Time In: 1:53  Time Out:2:30  Total Billable Time: 37 minutes     SUBJECTIVE     Pt reports: she had 1 day of no pain.  Pt states next day some pain.      She was compliant with home exercise program.  Response to previous treatment: increased soreness and pain  Functional change: using arm for ADL, limited with sustained overhead activities back in bed and able to sleep on side with pillows supporting area and no pain upon awakening    Pain:  Shoulder pain 0/10 rest 1/10 with movement   at end 0/10    Location: left shoulder      OBJECTIVE        Shoulder ROM: (measured in degrees) 2-7-23  Shoulder  LUE   Flexion 170   Abduction 170   External Rotation neutral 75   External Rotation with abd  80   Internal Rotation 65   Active standing flexion 170 slight pain with eccentric   Active standing abduction 170 slight pain with eccentric            Shoulder ROM: (measured in degrees supine) initial eval  Shoulder  LUE   Flexion in scaption plane 60   Abduction in scaption plane 45   External Rotation  neutral 15   External Rotation with abd  N/T   Internal Rotation Palm to stomach   Out of sling fully     Treatment     Poppy received the treatments listed below:      Post op week # 12 2-9-23    therapeutic exercises to develop endurance, ROM, and core stabilization for 11 minutes including:    PROM with PT all planes within comfortable ranges and improving  ROM as per protocol  Protraction supine x 2/10 with 1#  Supine active chest press x 2/10 with 1#    (NP)Pendulum x 10 all planes Instructed pt that she needs to only do this is shoulder feeling tight to allow gentle stretching of shoulder   Shoulder shrug with retraction x 10  Biceps curls active 2/10 WITH 1#  Triceps with RTB x 2/10 cueing for proper positioning and getting band short enough for proper resistance.        Neuromuscular re-education for posture and neuromotor control for 23 minutes including:  Rhythmic stabilization supine x 2 x 30 sec   Sidelying scapular retraction/protraction with gentle AR with PT  (towel under arm) x 10 - tactile cues for proper scapular motor control  Sidelying shoulder ER with towel under arm x 2/10 1# - cues for proper scapular motor control and full ROM, improved recruitment noted with cueing for full ROM  Shoulder abd SL x 2/10  with 1# less cueing required for keeping arm below 90 abd, but cues to maintain abd plane  Shoulder flexion SL x 2/10 with scap control no fatigue today  Horizontal abd SL x 2/10     Row to neutral prone x 2/10 - tactile cues for proper scapular positioning and motor control  Shoulder ext prone neutral x 2/10 - tactile cues for proper scapular positioning and motor control  Shoulder horiz abd prone neutral x 2/10 - tactile cues for scap positioning and motor control atigue started at 14, had pt rest     Pul[place]own with retraction with RTB x 10      Pt to ice at home today Poppy received cold pack for 10 minutes to L shoulder sitting with pillow for support into abd .     manual therapy techniques:  Soft tissue Mobilization were applied to the: left shoulder for 3 minutes, including:  STM pect, biceps tendon  Gentle oscillation GH supine and gentle scap mob in sidelying     Patient Education and Home Exercises     Home Exercises Provided and Patient Education Provided     Education provided:   - proper posture   - shoulder girdle anatomy and biomechanics   - compliance to daily exercises  - HEP    Written Home Exercises Provided: yes Exercises were reviewed and Poppy was able to demonstrate them prior to the end of the session.  Poppy demonstrated good  understanding of the education provided. See EMR under Patient Instructions in Notes section for exercises provided during therapy sessions    ASSESSMENT   Pt continues to progress well with program.  Maury treatment well and able to further progress with scap stab   Poppy Is progressing well towards her goals.     Pt prognosis is Good.     Pt will continue to benefit from skilled outpatient physical therapy to address the deficits listed in the problem list box on initial evaluation, provide pt/family education and to maximize pt's level of independence in the home and community environment.     Pt's spiritual, cultural and educational needs considered and pt agreeable to plan of care and goals.     Anticipated barriers to physical therapy:none    Goals: Short Term Goals:  6 weeks MET STG's  Increase passive range of motion 25%  Increase postural awareness to normal  Wean from sling  Be able to perform HEP with minimal cueing required     Long Term Goals: 12 weeks (Progressing, not met)  Increase range of motion to 75%to 100% of full range  Increase strength to 3/5 to 4-/5  Improve scapular stabilization to normal  Restore ability to drive without increased pain  Restore ability to reach fully overhead without increased pain  Restore ability to reach in all planes without increased pain or difficulty  Restore ability to perform ADL's and household activities  independently and without increased pain  Pt to be independent with HEP to improve ROM and independence with ADL's    PLAN     Continue with updated POC toward established therapy goals.  Progress scap stab as tolerated    Essie Nolasco, PT

## 2023-02-08 NOTE — PROGRESS NOTES
[facility]   Physical Therapy Progress and Treatment Note     Name: Emily Pelayo  Clinic Number: [id number]    Therapy Diagnosis:   Encounter Diagnoses   Name Primary?    Chronic left shoulder pain Yes    Decreased range of motion     Muscle weakness        Physician: Alisia Olvera FNP    Visit Date: 2/9/2023    Physician Orders: PT Eval and Treat s/p RTC repair, SAD, biceps tenotomy  Medical Diagnosis from Referral:   M75.122 (ICD-10-CM) - Nontraumatic complete tear of rotator cuff, left   S43.432S (ICD-10-CM) - Labral tear of shoulder, left, sequela      Evaluation Date: 12/5/2022     DOS 11-17-22  Authorization Period Expiration: 12-31-22  Plan of Care Expiration: 3-23-23  Progress Note Due: 3-9-23  Visit # / Visits authorized: 9/ 21 (+8 from prior auth in 2022)  FOTO: Issued Visit #: 1     MD Follow up appointment: 2-10-23     Precautions: Standard and RC precautions    PTA Visit #: 1/5     Time In: 1:52  Time Out:2:34  Total Billable Time: 42 minutes     SUBJECTIVE     Pt reports: no arm pain.  Pt states she had an episode of pain that evening with a little soreness.  Pt states not bad able to move shoulder and do exercises Pt states she is taking ibuprofen due to recently having braces     She was compliant with home exercise program.  Response to previous treatment: increased soreness and pain  Functional change: using arm for ADL, limited with sustained overhead activities back in bed and able to sleep on side with pillows supporting area and no pain upon awakening    Pain:  Shoulder pain 0/10 rest 1/10 with movement   at end 0/10    Location: left shoulder      OBJECTIVE        Shoulder ROM: (measured in degrees) 2-9-23  Shoulder  LUE   Flexion 170   Abduction 170   External Rotation neutral 75   External Rotation with abd  85   Internal Rotation 65   Active standing flexion 170 slight pain with movement   Active standing abduction 170 slight pain  with movement            Shoulder ROM: (measured in degrees supine) initial eval  Shoulder  LUE   Flexion in scaption plane 60   Abduction in scaption plane 45   External Rotation neutral 15   External Rotation with abd  N/T   Internal Rotation Palm to stomach   Out of sling fully     Pt with slight scapular instability L with elevation and abd with increased protraction, lateral rotation     Muscle Strength 2-9-23  MMT R L   Elbow flexion 5/5 4/5   Elbow extension 5/5 4/5   Shoulder flexion 4+/5 3+/5   Shoulder abduction 4+/5 3+/5   Shoulder external rotation 4+/5 3+/5   Shoulder internal rotation 5/5 3+/5        Muscle Strength deferred at IE         Endurance is fair at IE not tested     Special tests: scope sites healed, no redness no swelling      Palpation: no TTP      Joint mobility: not tested        CMS Impairment/Limitation/Restriction for FOTO shoulder Survey     Therapist reviewed FOTO scores for Emily Pelayo   FOTO documents entered into [employer] - see Media section.     Limitation Score: 43% at IE 71%       Treatment     Poppy received the treatments listed below:      Post op week # 12 2-9-23    therapeutic exercises to develop endurance, ROM, and core stabilization for 14 minutes including:    PROM with PT all planes within comfortable ranges and improving  ROM as per protocol  Protraction supine x 2/10 with 1#  Supine active chest press x 2/10 with 1#    (NP)Pendulum x 10 all planes Instructed pt that she needs to only do this is shoulder feeling tight to allow gentle stretching of shoulder   Shoulder shrug with retraction x 10  Biceps curls active 2/10 WITH 1#  Triceps with RTB x 2/10 cueing for proper positioning and getting band short enough for proper resistance.        Neuromuscular re-education for posture and neuromotor control for 25 minutes including:  Rhythmic stabilization supine x 2 x 30 sec   Sidelying scapular retraction/protraction with gentle AR with PT  (towel under arm) x 10 - tactile cues for proper  scapular motor control  Sidelying shoulder ER with towel under arm x 2/10 1# - cues for proper scapular motor control and full ROM, improved recruitment noted with cueing for full ROM  Shoulder abd SL x 2/10  with 1# less cueing required for keeping arm below 90 abd, but cues to maintain abd plane  Shoulder flexion SL x 2/10 with scap control no fatigue today  Horizontal abd SL x 2/10     Row to neutral prone x 2/10 with 1# - tactile cues for proper scapular positioning and motor control  Shoulder ext prone neutral x 2/10 with 1#  - tactile cues for proper scapular positioning and motor control  Shoulder horiz abd prone neutral x 2/10 - tactile cues for scap positioning and motor control     Pulldown with retraction with RTB x 10   Retraction with RTB x10   ER with RTB x 10   IR with RTB x 10      Pt to ice at home today as needed     manual therapy techniques: Soft tissue Mobilization were applied to the: left shoulder for 3 minutes, including:  STM pect, biceps tendon  Gentle oscillation GH supine and gentle scap mob in sidelying     Patient Education and Home Exercises     Home Exercises Provided and Patient Education Provided     Education provided:   - proper posture   - shoulder girdle anatomy and biomechanics   - compliance to daily exercises  - HEP    Written Home Exercises Provided: yes Exercises were reviewed and Poppy was able to demonstrate them prior to the end of the session.  Poppy demonstrated good  understanding of the education provided. See EMR under Patient Instructions in Notes section for exercises provided during therapy sessions    ASSESSMENT   Patient demonstrates improvement in range of motion, strength, stabilization and function.    Pt jung treatment well and able to progress further with scapular stabilization.  Pt is doing well with ADL and light household activities. Pt may be ready for gradual progression with strengthening as her job does require heavy lifting up to 50#.      Poppy Is  progressing well towards her goals.     Pt prognosis is Good.     Pt will continue to benefit from skilled outpatient physical therapy to address the deficits listed in the problem list box on initial evaluation, provide pt/family education and to maximize pt's level of independence in the home and community environment.     Pt's spiritual, cultural and educational needs considered and pt agreeable to plan of care and goals.     Anticipated barriers to physical therapy:none    Goals: Short Term Goals:  6 weeks MET STG's  Increase passive range of motion 25%  Increase postural awareness to normal  Wean from sling  Be able to perform HEP with minimal cueing required     Long Term Goals: 12 weeks   Increase range of motion to 75%to 100% of full range MET   Increase strength to 3/5 to 4-/5 (Progressing, not met)  Improve scapular stabilization to normal(Progressing, not met)  Restore ability to drive without increased pain MET   Restore ability to reach fully overhead without increased painMET   Restore ability to reach in all planes without increased pain or difficultyMET   Restore ability to perform ADL's and household activities independently and without increased pain (Progressing, not met) Limited with heavy activities  Pt to be independent with HEP to improve ROM and independence with ADL's (Progressing, not met)    Restore ability to perform lifting activities that she will need to perform for work (Progressing, not met)  Improve shoulder strength to 4+-5/5 (Progressing, not met)    PLAN   If you concur, I recommend patient continue with physical therapy 2 times a week  for 4-6 weeks.  Please advise us of your  recommendations and any additional precautions and restrictions you deem necessary at this time.  Thank you for allowing us to assist in the care of your patient.      Essie Nolasco, MS, PT          I certify the need for these services furnished under this plan of treatment and while under my  care.    ____________________________________ Physician/Referring Practitioner                                Date of Signature

## 2023-02-09 ENCOUNTER — CLINICAL SUPPORT (OUTPATIENT)
Dept: REHABILITATION | Facility: HOSPITAL | Age: 38
End: 2023-02-09
Payer: COMMERCIAL

## 2023-02-09 DIAGNOSIS — M62.81 MUSCLE WEAKNESS: ICD-10-CM

## 2023-02-09 DIAGNOSIS — M25.60 DECREASED RANGE OF MOTION: ICD-10-CM

## 2023-02-09 DIAGNOSIS — G89.29 CHRONIC LEFT SHOULDER PAIN: Primary | ICD-10-CM

## 2023-02-09 DIAGNOSIS — M25.512 CHRONIC LEFT SHOULDER PAIN: Primary | ICD-10-CM

## 2023-02-09 PROCEDURE — 97110 THERAPEUTIC EXERCISES: CPT | Mod: PN | Performed by: PHYSICAL THERAPIST

## 2023-02-09 PROCEDURE — 97112 NEUROMUSCULAR REEDUCATION: CPT | Mod: PN | Performed by: PHYSICAL THERAPIST

## 2023-02-10 ENCOUNTER — OFFICE VISIT (OUTPATIENT)
Dept: ORTHOPEDICS | Facility: CLINIC | Age: 38
End: 2023-02-10
Payer: COMMERCIAL

## 2023-02-10 VITALS — WEIGHT: 208 LBS | BODY MASS INDEX: 30.81 KG/M2 | HEIGHT: 69 IN | RESPIRATION RATE: 18 BRPM

## 2023-02-10 DIAGNOSIS — Z98.890 S/P ROTATOR CUFF REPAIR: Primary | ICD-10-CM

## 2023-02-10 PROCEDURE — 3008F PR BODY MASS INDEX (BMI) DOCUMENTED: ICD-10-PCS | Mod: CPTII,S$GLB,, | Performed by: ORTHOPAEDIC SURGERY

## 2023-02-10 PROCEDURE — 99999 PR PBB SHADOW E&M-EST. PATIENT-LVL II: ICD-10-PCS | Mod: PBBFAC,,, | Performed by: ORTHOPAEDIC SURGERY

## 2023-02-10 PROCEDURE — 3008F BODY MASS INDEX DOCD: CPT | Mod: CPTII,S$GLB,, | Performed by: ORTHOPAEDIC SURGERY

## 2023-02-10 PROCEDURE — 99024 POSTOP FOLLOW-UP VISIT: CPT | Mod: S$GLB,,, | Performed by: ORTHOPAEDIC SURGERY

## 2023-02-10 PROCEDURE — 99024 PR POST-OP FOLLOW-UP VISIT: ICD-10-PCS | Mod: S$GLB,,, | Performed by: ORTHOPAEDIC SURGERY

## 2023-02-10 PROCEDURE — 99999 PR PBB SHADOW E&M-EST. PATIENT-LVL II: CPT | Mod: PBBFAC,,, | Performed by: ORTHOPAEDIC SURGERY

## 2023-02-10 NOTE — LETTER
February 10, 2023    Emily Pelayo  [address]  [place] [address]         [facility]  [address] [place] [address] [address]  [place] [address]  Phone: [phone] February 10, 2023     Patient: Emily Pelayo   YOB: 1985   Date of Visit: 2/10/2023       To Whom It May Concern:    It is my medical opinion that Emily Pelayo may return to work on 02/22/2023. Patient is to be weight restricted to 15lbs on the left upper extremity/ left arm .    If you have any questions or concerns, please don't hesitate to call.    Sincerely,      BUDDY Gan MD

## 2023-02-10 NOTE — PLAN OF CARE
[facility]   Physical Therapy Progress and Treatment Note     Name: Emily Pelayo  Clinic Number: [id number]    Therapy Diagnosis:   Encounter Diagnoses   Name Primary?    Chronic left shoulder pain Yes    Decreased range of motion     Muscle weakness        Physician: Alisia Olvera FNP    Visit Date: 2/9/2023    Physician Orders: PT Eval and Treat s/p RTC repair, SAD, biceps tenotomy  Medical Diagnosis from Referral:   M75.122 (ICD-10-CM) - Nontraumatic complete tear of rotator cuff, left   S43.432S (ICD-10-CM) - Labral tear of shoulder, left, sequela      Evaluation Date: 12/5/2022     DOS 11-17-22  Authorization Period Expiration: 12-31-22  Plan of Care Expiration: 3-23-23  Progress Note Due: 3-9-23  Visit # / Visits authorized: 9/ 21 (+8 from prior auth in 2022)  FOTO: Issued Visit #: 1     MD Follow up appointment: 2-10-23     Precautions: Standard and RC precautions    PTA Visit #: 1/5     Time In: 1:52  Time Out:2:34  Total Billable Time: 42 minutes     SUBJECTIVE     Pt reports: no arm pain.  Pt states she had an episode of pain that evening with a little soreness.  Pt states not bad able to move shoulder and do exercises Pt states she is taking ibuprofen due to recently having braces     She was compliant with home exercise program.  Response to previous treatment: increased soreness and pain  Functional change: using arm for ADL, limited with sustained overhead activities back in bed and able to sleep on side with pillows supporting area and no pain upon awakening    Pain:  Shoulder pain 0/10 rest 1/10 with movement   at end 0/10    Location: left shoulder      OBJECTIVE        Shoulder ROM: (measured in degrees) 2-9-23  Shoulder  LUE   Flexion 170   Abduction 170   External Rotation neutral 75   External Rotation with abd  85   Internal Rotation 65   Active standing flexion 170 slight pain with movement   Active standing abduction 170 slight pain  with movement            Shoulder ROM: (measured in degrees supine) initial eval  Shoulder  LUE   Flexion in scaption plane 60   Abduction in scaption plane 45   External Rotation neutral 15   External Rotation with abd  N/T   Internal Rotation Palm to stomach   Out of sling fully     Pt with slight scapular instability L with elevation and abd with increased protraction, lateral rotation     Muscle Strength 2-9-23  MMT R L   Elbow flexion 5/5 4/5   Elbow extension 5/5 4/5   Shoulder flexion 4+/5 3+/5   Shoulder abduction 4+/5 3+/5   Shoulder external rotation 4+/5 3+/5   Shoulder internal rotation 5/5 3+/5        Muscle Strength deferred at IE         Endurance is fair at IE not tested     Special tests: scope sites healed, no redness no swelling      Palpation: no TTP      Joint mobility: not tested        CMS Impairment/Limitation/Restriction for FOTO shoulder Survey     Therapist reviewed FOTO scores for Emily Pelayo   FOTO documents entered into [employer] - see Media section.     Limitation Score: 43% at IE 71%       Treatment     Poppy received the treatments listed below:      Post op week # 12 2-9-23    therapeutic exercises to develop endurance, ROM, and core stabilization for 14 minutes including:    PROM with PT all planes within comfortable ranges and improving  ROM as per protocol  Protraction supine x 2/10 with 1#  Supine active chest press x 2/10 with 1#    (NP)Pendulum x 10 all planes Instructed pt that she needs to only do this is shoulder feeling tight to allow gentle stretching of shoulder   Shoulder shrug with retraction x 10  Biceps curls active 2/10 WITH 1#  Triceps with RTB x 2/10 cueing for proper positioning and getting band short enough for proper resistance.        Neuromuscular re-education for posture and neuromotor control for 25 minutes including:  Rhythmic stabilization supine x 2 x 30 sec   Sidelying scapular retraction/protraction with gentle AR with PT  (towel under arm) x 10 - tactile cues for proper  scapular motor control  Sidelying shoulder ER with towel under arm x 2/10 1# - cues for proper scapular motor control and full ROM, improved recruitment noted with cueing for full ROM  Shoulder abd SL x 2/10  with 1# less cueing required for keeping arm below 90 abd, but cues to maintain abd plane  Shoulder flexion SL x 2/10 with scap control no fatigue today  Horizontal abd SL x 2/10     Row to neutral prone x 2/10 with 1# - tactile cues for proper scapular positioning and motor control  Shoulder ext prone neutral x 2/10 with 1#  - tactile cues for proper scapular positioning and motor control  Shoulder horiz abd prone neutral x 2/10 - tactile cues for scap positioning and motor control     Pulldown with retraction with RTB x 10   Retraction with RTB x10   ER with RTB x 10   IR with RTB x 10      Pt to ice at home today as needed     manual therapy techniques: Soft tissue Mobilization were applied to the: left shoulder for 3 minutes, including:  STM pect, biceps tendon  Gentle oscillation GH supine and gentle scap mob in sidelying     Patient Education and Home Exercises     Home Exercises Provided and Patient Education Provided     Education provided:   - proper posture   - shoulder girdle anatomy and biomechanics   - compliance to daily exercises  - HEP    Written Home Exercises Provided: yes Exercises were reviewed and Poppy was able to demonstrate them prior to the end of the session.  Poppy demonstrated good  understanding of the education provided. See EMR under Patient Instructions in Notes section for exercises provided during therapy sessions    ASSESSMENT   Patient demonstrates improvement in range of motion, strength, stabilization and function.    Pt jung treatment well and able to progress further with scapular stabilization.  Pt is doing well with ADL and light household activities. Pt may be ready for gradual progression with strengthening as her job does require heavy lifting up to 50#.      Poppy Is  progressing well towards her goals.     Pt prognosis is Good.     Pt will continue to benefit from skilled outpatient physical therapy to address the deficits listed in the problem list box on initial evaluation, provide pt/family education and to maximize pt's level of independence in the home and community environment.     Pt's spiritual, cultural and educational needs considered and pt agreeable to plan of care and goals.     Anticipated barriers to physical therapy:none    Goals: Short Term Goals:  6 weeks MET STG's  Increase passive range of motion 25%  Increase postural awareness to normal  Wean from sling  Be able to perform HEP with minimal cueing required     Long Term Goals: 12 weeks   Increase range of motion to 75%to 100% of full range MET   Increase strength to 3/5 to 4-/5 (Progressing, not met)  Improve scapular stabilization to normal(Progressing, not met)  Restore ability to drive without increased pain MET   Restore ability to reach fully overhead without increased painMET   Restore ability to reach in all planes without increased pain or difficultyMET   Restore ability to perform ADL's and household activities independently and without increased pain (Progressing, not met) Limited with heavy activities  Pt to be independent with HEP to improve ROM and independence with ADL's (Progressing, not met)    Restore ability to perform lifting activities that she will need to perform for work (Progressing, not met)  Improve shoulder strength to 4+-5/5 (Progressing, not met)    PLAN   If you concur, I recommend patient continue with physical therapy 2 times a week  for 4-6 weeks.  Please advise us of your  recommendations and any additional precautions and restrictions you deem necessary at this time.  Thank you for allowing us to assist in the care of your patient.      Essie Nolasco, MS, PT          I certify the need for these services furnished under this plan of treatment and while under my  care.    ____________________________________ Physician/Referring Practitioner                                Date of Signature

## 2023-02-10 NOTE — PROGRESS NOTES
Post-op Note    HPI    Emily Pelayo is here 12 weeks s/p the following procedure:     PROCEDURE:       Left shoulder arthroscopic rotator cuff repair  Left shoulder arthroscopic biceps tenotomy  Extensive debridement of the left shoulder arthroscopically with debridement of labrum, bursa rotator interval  Subacromial decompression with acromioplasty left shoulder    Overall doing very well.  Pain 2/10 today.  She is actively involved in physical therapy and working on range of motion and strengthening.  Her range of motion is excellent.  She is had a few days where she has no pain at all.        Physical Exam:     Patient is alert and oriented no acute distress.   Assistive Device:  None    Portal site Incision(s) are well healed.  There is no evidence of dehiscence.  There is no induration erythema or signs of infection.  Appropriate soft tissue swelling.  Compartments are soft and compressible.  Warm well-perfused extremity.  External rotation at the side 60.  Passive forward flexion 170.  Abduction 150.  Strength 4/5 in all planes.        Assessment    Emily Pelayo is 12 weeks Post-op     Plan:    Overall doing as expected.  We discussed expectations of surgery and postoperative course.   Continue physical therapy working on strengthening.  15 lb weight limit.  We will allow for her to return to work in the next 2 weeks with restriction of the left upper extremity including no heavy lifting and weight limit 15 lb.  I will see her back in 2 months or so and see how she is doing.

## 2023-02-13 ENCOUNTER — CLINICAL SUPPORT (OUTPATIENT)
Dept: REHABILITATION | Facility: HOSPITAL | Age: 38
End: 2023-02-13
Payer: COMMERCIAL

## 2023-02-13 DIAGNOSIS — M25.512 CHRONIC LEFT SHOULDER PAIN: Primary | ICD-10-CM

## 2023-02-13 DIAGNOSIS — M25.60 DECREASED RANGE OF MOTION: ICD-10-CM

## 2023-02-13 DIAGNOSIS — M62.81 MUSCLE WEAKNESS: ICD-10-CM

## 2023-02-13 DIAGNOSIS — G89.29 CHRONIC LEFT SHOULDER PAIN: Primary | ICD-10-CM

## 2023-02-13 PROCEDURE — 97112 NEUROMUSCULAR REEDUCATION: CPT | Mod: PN,CQ

## 2023-02-13 PROCEDURE — 97110 THERAPEUTIC EXERCISES: CPT | Mod: PN,CQ

## 2023-02-13 NOTE — PROGRESS NOTES
[facility]   Physical Therapy Progress and Treatment Note     Name: Emily Pelayo  Clinic Number: [id number]    Therapy Diagnosis:   Encounter Diagnoses   Name Primary?    Chronic left shoulder pain Yes    Decreased range of motion     Muscle weakness          Physician: Alisia Olvera FNP    Visit Date: 2/13/2023    Physician Orders: PT Eval and Treat s/p RTC repair, SAD, biceps tenotomy  Medical Diagnosis from Referral:   M75.122 (ICD-10-CM) - Nontraumatic complete tear of rotator cuff, left   S43.432S (ICD-10-CM) - Labral tear of shoulder, left, sequela      Evaluation Date: 12/5/2022     DOS 11-17-22  Authorization Period Expiration: 12-31-22  Plan of Care Expiration: 3-23-23  Progress Note Due: 3-9-23  Visit # / Visits authorized: 10/ 21 (+8 from prior auth in 2022)  FOTO: Issued Visit #: 1     MD Follow up appointment: 2-10-23     Precautions: Standard and RC precautions    PTA Visit #: 1/5     Time In: 10:05  Time Out: 10:45  Total Billable Time: 40 minutes     SUBJECTIVE     Pt reports: she was carrying a lot of clothes in her left arm over the weekend and had some increased soreness. Iced it and then it felt better next day.    She was compliant with home exercise program.  Response to previous treatment: muscle soreness  Functional change: using the arm more frequently    Pain:  Shoulder pain 0/10 rest 1/10 with movement   at end 0/10    Location: left shoulder      OBJECTIVE        Shoulder ROM: (measured in degrees) 2-9-23  Shoulder  LUE   Flexion 170   Abduction 170   External Rotation neutral 75   External Rotation with abd  85   Internal Rotation 65   Active standing flexion 170 slight pain with movement   Active standing abduction 170 slight pain  with movement           Shoulder ROM: (measured in degrees supine) initial eval  Shoulder  LUE   Flexion in scaption plane 60   Abduction in scaption plane 45   External Rotation neutral 15   External Rotation with abd  N/T    Internal Rotation Palm to stomach         Treatment     Poppy received the treatments listed below:      Post op week # 12 2-9-23    therapeutic exercises to develop endurance, ROM, and core stabilization for 25 minutes including:    PROM with PT all planes within comfortable ranges and improving  ROM as per protocol  Protraction supine x 2/10 with 2#  Supine active chest press x 2/10 with 2#    (NP)Pendulum x 10 all planes Instructed pt that she needs to only do this is shoulder feeling tight to allow gentle stretching of shoulder   Shoulder shrug with retraction x 10  Biceps curls active 2/10 WITH 2#  Triceps with RTB x 2/10 cueing for proper positioning and getting band short enough for proper resistance.      Pulldown with retraction with RTB x 15  Retraction with RTB x15  ER with RTB x 15  IR with RTB x 15    Possible to add next time: Low row to simulate box lifts. Floor to waist stability ball lifts for lifting mechanics for work    Neuromuscular re-education for posture and neuromotor control for 15 minutes including:  Rhythmic stabilization supine x 2 x 30 sec   Sidelying scapular retraction/protraction with gentle AR with PT  (towel under arm) x 10 - tactile cues for proper scapular motor control  Sidelying shoulder ER with towel under arm x 2/10 1# - cues for proper scapular motor control and full ROM, improved recruitment noted with cueing for full ROM  Shoulder abd SL x 2/10  with 1# less cueing required for keeping arm below 90 abd, but cues to maintain abd plane  Shoulder flexion SL x 2/10 with 1#, improving scap control, some tactile cues for positioning  Horizontal abd SL x 2/10     Row to neutral prone x 2/10 with 1# - tactile cues for proper scapular positioning and motor control  Shoulder ext prone with ER x 2/10 with 1#  - tactile cues for proper scapular positioning and motor control, fatigue with added ER  Shoulder horiz abd prone neutral x 2/10 - tactile cues for scap positioning and motor  control         Pt to ice at home today as needed     manual therapy techniques: Soft tissue Mobilization were applied to the: left shoulder for 00 minutes, including:  STM pect, biceps tendon  Gentle oscillation GH supine and gentle scap mob in sidelying     Patient Education and Home Exercises     Home Exercises Provided and Patient Education Provided     Education provided:   - proper posture   - shoulder girdle anatomy and biomechanics   - compliance to daily exercises  - HEP    Written Home Exercises Provided: yes Exercises were reviewed and Poppy was able to demonstrate them prior to the end of the session.  Poppy demonstrated good  understanding of the education provided. See EMR under Patient Instructions in Notes section for exercises provided during therapy sessions    ASSESSMENT    oPppy presents with improving overall strength and ROM to the L shoulder. She continues to require occasional tactile input for scapular positioning with sidelying and prone exercises. Patient with tendency to compensate for scapular stabilization by adducting humerus to scapula . Overall scapular and shoulder strength and endurance continue to slowly improve, patient increasing functional use of the left arm. Able to progress with weight or reps for several exercises, educated on expectation for muscular soreness.     Poppy Is progressing well towards her goals.     Pt prognosis is Good.     Pt will continue to benefit from skilled outpatient physical therapy to address the deficits listed in the problem list box on initial evaluation, provide pt/family education and to maximize pt's level of independence in the home and community environment.     Pt's spiritual, cultural and educational needs considered and pt agreeable to plan of care and goals.     Anticipated barriers to physical therapy:none    Goals: Short Term Goals:  6 weeks MET STG's  Increase passive range of motion 25%  Increase postural awareness to normal  Wean from  sling  Be able to perform HEP with minimal cueing required     Long Term Goals: 12 weeks   Increase range of motion to 75%to 100% of full range MET   Increase strength to 3/5 to 4-/5 (Progressing, not met)  Improve scapular stabilization to normal(Progressing, not met)  Restore ability to drive without increased pain MET   Restore ability to reach fully overhead without increased painMET   Restore ability to reach in all planes without increased pain or difficultyMET   Restore ability to perform ADL's and household activities independently and without increased pain (Progressing, not met) Limited with heavy activities  Pt to be independent with HEP to improve ROM and independence with ADL's (Progressing, not met)    Restore ability to perform lifting activities that she will need to perform for work (Progressing, not met)  Improve shoulder strength to 4+-5/5 (Progressing, not met)    PLAN     Continue with updated POC toward established physical therapy goals.    Yasmine Lala, PTA

## 2023-02-15 ENCOUNTER — CLINICAL SUPPORT (OUTPATIENT)
Dept: REHABILITATION | Facility: HOSPITAL | Age: 38
End: 2023-02-15
Payer: COMMERCIAL

## 2023-02-15 DIAGNOSIS — G89.29 CHRONIC LEFT SHOULDER PAIN: Primary | ICD-10-CM

## 2023-02-15 DIAGNOSIS — M25.512 CHRONIC LEFT SHOULDER PAIN: Primary | ICD-10-CM

## 2023-02-15 DIAGNOSIS — M25.60 DECREASED RANGE OF MOTION: ICD-10-CM

## 2023-02-15 DIAGNOSIS — M62.81 MUSCLE WEAKNESS: ICD-10-CM

## 2023-02-15 PROCEDURE — 97112 NEUROMUSCULAR REEDUCATION: CPT | Mod: PN,CQ

## 2023-02-15 PROCEDURE — 97110 THERAPEUTIC EXERCISES: CPT | Mod: PN,CQ

## 2023-02-15 NOTE — PROGRESS NOTES
[facility]   Physical Therapy Progress and Treatment Note     Name: Emily Pelayo  Clinic Number: [id number]    Therapy Diagnosis:   Encounter Diagnoses   Name Primary?    Chronic left shoulder pain Yes    Decreased range of motion     Muscle weakness          Physician: Alisia Olvera FNP    Visit Date: 2/15/2023    Physician Orders: PT Eval and Treat s/p RTC repair, SAD, biceps tenotomy  Medical Diagnosis from Referral:   M75.122 (ICD-10-CM) - Nontraumatic complete tear of rotator cuff, left   S43.432S (ICD-10-CM) - Labral tear of shoulder, left, sequela      Evaluation Date: 12/5/2022     DOS 11-17-22  Authorization Period Expiration: 12-31-22  Plan of Care Expiration: 3-23-23  Progress Note Due: 3-9-23  Visit # / Visits authorized: 11/ 21 (+8 from prior auth in 2022)  FOTO: Issued Visit #: 1     MD Follow up appointment: 2-10-23     Precautions: Standard and RC precautions    PTA Visit #: 2/5     Time In: 1:00  Time Out: 1:45  Total Billable Time: 45 minutes     SUBJECTIVE     Pt reports: she is hurting a little because she did not ice or use ibuprofen in the last couple days. Noticing pain to the front of the shoulder primarily with exercises at home.    She was compliant with home exercise program.  Response to previous treatment: muscle soreness  Functional change: using the arm more frequently    Pain:  Shoulder pain 0/10 rest 1/10 with movement   at end 0/10    Location: left shoulder      OBJECTIVE        Shoulder ROM: (measured in degrees) 2-9-23  Shoulder  LUE   Flexion 170   Abduction 170   External Rotation neutral 75   External Rotation with abd  85   Internal Rotation 65   Active standing flexion 170 slight pain with movement   Active standing abduction 170 slight pain  with movement           Shoulder ROM: (measured in degrees supine) initial eval  Shoulder  LUE   Flexion in scaption plane 60   Abduction in scaption plane 45   External Rotation neutral 15   External  Rotation with abd  N/T   Internal Rotation Palm to stomach         Treatment     Poppy received the treatments listed below:      Post op week # 12 2-9-23    therapeutic exercises to develop endurance, ROM, and core stabilization for 25 minutes including:    PROM with PT all planes within comfortable ranges and improving  ROM as per protocol  Protraction supine x 2/10 with 2#  Supine active chest press x 2/10 with 2#    (NP)Pendulum x 10 all planes Instructed pt that she needs to only do this is shoulder feeling tight to allow gentle stretching of shoulder   Shoulder shrug with retraction x 10  Biceps curls active 2/10 WITH 2#  Triceps with RTB x 2/10 cueing for proper positioning and getting band short enough for proper resistance.      Pulldown with retraction with RTB x 20   Shoulder Extension with RTB x 15  Retraction with RTB x15  ER with RTB x 2/10  IR with RTB x 15 (NP time)     Possible to add next time: Low row to simulate box lifts. Floor to waist stability ball lifts for lifting mechanics for work    Neuromuscular re-education for posture and neuromotor control for 20 minutes including:  Rhythmic stabilization supine x 2 x 30 sec (1 at 90 deg flexion and 1 at 110 deg flexion)  Sidelying scapular retraction/protraction with gentle AR with PT  (towel under arm) x 10 - tactile cues for proper scapular motor control  Sidelying shoulder ER with towel under arm x 2/10 2# - cues for proper scapular motor control and full ROM, improved recruitment noted with cueing for full ROM  Shoulder abd SL x 2/10  with 2# less cueing required for keeping arm below 90 abd, but cues to maintain abd plane  Shoulder flexion SL x 2/10 with 2#, improving scap control, some tactile cues for positioning  Horizontal abd SL with 1# x 2/10     Row to neutral prone x 2/10 with 1# - tactile cues for proper scapular positioning and motor control  Shoulder ext prone with ER x 2/10 with 1#  - tactile cues for proper scapular positioning and  motor control  Shoulder horiz abd prone neutral x 2/10 with 1#- tactile cues for scap positioning and motor control         Pt to ice at home today as needed     manual therapy techniques: Soft tissue Mobilization were applied to the: left shoulder for 00 minutes, including:  STM pect, biceps tendon  Gentle oscillation GH supine and gentle scap mob in sidelying     Patient Education and Home Exercises     Home Exercises Provided and Patient Education Provided     Education provided:   - proper posture   - shoulder girdle anatomy and biomechanics   - compliance to daily exercises  - HEP    Written Home Exercises Provided: yes Exercises were reviewed and Poppy was able to demonstrate them prior to the end of the session.  Poppy demonstrated good  understanding of the education provided. See EMR under Patient Instructions in Notes section for exercises provided during therapy sessions    ASSESSMENT    Poppy tolerated progression of exercises well this date without symptom provocation. She was able to perform several exercises with progression of weight or reps. Increasing L scapular motor control with prone and sidelying exercises. Reiterated potential and expectation for delayed onset muscular soreness. Pt will benefit from further progression for functional mobility and strength in order to improve tolerance to daily activities.    Poppy Is progressing well towards her goals.     Pt prognosis is Good.     Pt will continue to benefit from skilled outpatient physical therapy to address the deficits listed in the problem list box on initial evaluation, provide pt/family education and to maximize pt's level of independence in the home and community environment.     Pt's spiritual, cultural and educational needs considered and pt agreeable to plan of care and goals.     Anticipated barriers to physical therapy:none    Goals: Short Term Goals:  6 weeks MET STG's  Increase passive range of motion 25%  Increase postural  awareness to normal  Wean from sling  Be able to perform HEP with minimal cueing required     Long Term Goals: 12 weeks   Increase range of motion to 75%to 100% of full range MET   Increase strength to 3/5 to 4-/5 (Progressing, not met)  Improve scapular stabilization to normal(Progressing, not met)  Restore ability to drive without increased pain MET   Restore ability to reach fully overhead without increased painMET   Restore ability to reach in all planes without increased pain or difficultyMET   Restore ability to perform ADL's and household activities independently and without increased pain (Progressing, not met) Limited with heavy activities  Pt to be independent with HEP to improve ROM and independence with ADL's (Progressing, not met)    Restore ability to perform lifting activities that she will need to perform for work (Progressing, not met)  Improve shoulder strength to 4+-5/5 (Progressing, not met)    PLAN     Continue with updated POC toward established physical therapy goals.    Yasmine Lala, PTA

## 2023-02-16 ENCOUNTER — LAB VISIT (OUTPATIENT)
Dept: LAB | Facility: HOSPITAL | Age: 38
End: 2023-02-16
Payer: COMMERCIAL

## 2023-02-16 DIAGNOSIS — E03.8 HYPOTHYROIDISM DUE TO HASHIMOTO'S THYROIDITIS: ICD-10-CM

## 2023-02-16 DIAGNOSIS — E06.3 HYPOTHYROIDISM DUE TO HASHIMOTO'S THYROIDITIS: ICD-10-CM

## 2023-02-16 PROCEDURE — 84439 ASSAY OF FREE THYROXINE: CPT | Performed by: PHYSICIAN ASSISTANT

## 2023-02-16 PROCEDURE — 84443 ASSAY THYROID STIM HORMONE: CPT | Performed by: PHYSICIAN ASSISTANT

## 2023-02-16 PROCEDURE — 36415 COLL VENOUS BLD VENIPUNCTURE: CPT | Mod: PO | Performed by: PHYSICIAN ASSISTANT

## 2023-02-17 ENCOUNTER — PATIENT MESSAGE (OUTPATIENT)
Dept: ORTHOPEDICS | Facility: CLINIC | Age: 38
End: 2023-02-17
Payer: COMMERCIAL

## 2023-02-17 LAB
T4 FREE SERPL-MCNC: 0.98 NG/DL (ref 0.71–1.51)
TSH SERPL DL<=0.005 MIU/L-ACNC: 2.86 UIU/ML (ref 0.4–4)

## 2023-02-22 ENCOUNTER — CLINICAL SUPPORT (OUTPATIENT)
Dept: REHABILITATION | Facility: HOSPITAL | Age: 38
End: 2023-02-22
Payer: COMMERCIAL

## 2023-02-22 DIAGNOSIS — M62.81 MUSCLE WEAKNESS: ICD-10-CM

## 2023-02-22 DIAGNOSIS — G89.29 CHRONIC LEFT SHOULDER PAIN: Primary | ICD-10-CM

## 2023-02-22 DIAGNOSIS — M25.60 DECREASED RANGE OF MOTION: ICD-10-CM

## 2023-02-22 DIAGNOSIS — M25.512 CHRONIC LEFT SHOULDER PAIN: Primary | ICD-10-CM

## 2023-02-22 PROCEDURE — 97530 THERAPEUTIC ACTIVITIES: CPT | Mod: PN | Performed by: PHYSICAL THERAPIST

## 2023-02-22 PROCEDURE — 97112 NEUROMUSCULAR REEDUCATION: CPT | Mod: PN | Performed by: PHYSICAL THERAPIST

## 2023-02-22 PROCEDURE — 97110 THERAPEUTIC EXERCISES: CPT | Mod: PN | Performed by: PHYSICAL THERAPIST

## 2023-02-22 NOTE — PROGRESS NOTES
[facility]   Physical Therapy Treatment Note     Name: Emily Pelayo  Clinic Number: [id number]    Therapy Diagnosis:   Encounter Diagnoses   Name Primary?    Chronic left shoulder pain Yes    Decreased range of motion     Muscle weakness          Physician: Alisia Olvera FNP    Visit Date: 2/22/2023    Physician Orders: PT Eval and Treat s/p RTC repair, SAD, biceps tenotomy  Medical Diagnosis from Referral:   M75.122 (ICD-10-CM) - Nontraumatic complete tear of rotator cuff, left   S43.432S (ICD-10-CM) - Labral tear of shoulder, left, sequela      Evaluation Date: 12/5/2022     DOS 11-17-22  Authorization Period Expiration: 12-31-22  Plan of Care Expiration: 3-23-23  Progress Note Due: 3-9-23  Visit # / Visits authorized: 12/ 21 (+8 from prior auth in 2022)  FOTO: Issued Visit #: 1     MD Follow up appointment: not scheduled yet      Precautions: Standard and RC precautions    PTA Visit #: 2/5     Time In: 9:24  Time Out: 10:20  Total Billable Time: 49 minutes     SUBJECTIVE     Pt reports: she is eager to return to work.  Pt states she has requirement of lifting 50# which she feels she does not usually need to do for she can get help  MD gave her a letter stating she is at 20# limit   She was compliant with home exercise program.  Response to previous treatment: muscle soreness  Functional change: using the arm more frequently    Pain:  Shoulder pain 0/10 rest 1/10 with movement   at end 0/10    Location: left shoulder      OBJECTIVE        Shoulder ROM: (measured in degrees) 2-22-23  Shoulder  LUE   Flexion 170   Abduction 170   External Rotation neutral 75   External Rotation with abd  90   Internal Rotation 70   Active standing flexion 170 slight pain with movement   Active standing abduction 170 slight pain  with movement           Shoulder ROM: (measured in degrees supine) initial eval  Shoulder  LUE   Flexion in scaption plane 60   Abduction in scaption plane 45   External  Rotation neutral 15   External Rotation with abd  N/T   Internal Rotation Palm to stomach         Treatment     Poppy received the treatments listed below:      Post op week # 12 2-9-23    Pt has letter from MD in chart dated 2-17-23 allowing her to lift up to 20# waist and chest height and need for assistance with overhead lifting.      Therapeutic activity: Patient participated in dynamic functional therapeutic activities to improve functional performance for 23 minutes. Including:   Discussed job duties and pt reports though she has 50# lift requirement she does not lift that much and will talk to HR/supervisor to determine if light duty or lifting within MD restrictions would be available  Instructed pt in testing load and proper body mechanics and posture with lifting.    Level lift from mat x 12.5# which is box for box lift x 10 reps  Peg board on OT wall 5 pegs move down and up x 2 min      therapeutic exercises to develop endurance, ROM, and core stabilization for 13 minutes including:    (NP)PROM with PT all planes within comfortable ranges and improving  ROM as per protocol  (NP)Protraction supine x 2/10 with 3#  (NP)Supine active chest press x 2/10 with 3#    (NP)Shoulder shrug with retraction x 10  (NP)Biceps curls active 2/10 WITH 4#  Triceps with RTB x 2/10 blue cord in clinic B     Blue cord in clinic pt had forgotten her TB   Pulldown with retraction with RTB x 20   Shoulder Extension with RTB x 20  Retraction with RTB x 20  Modified to horizontal abd standing due to inability to get good recruitment of scap stab   ER with RTB x 2/10  IR with RTB x 20    Possible to add next time: Low row to simulate box lifts. Floor to waist stability ball lifts for lifting mechanics for work    Neuromuscular re-education for posture and neuromotor control for 13 minutes including:  Rhythmic stabilization supine x 2 x 30 sec (1 at 90 deg flexion and 1 at 110 deg flexion)  Sidelying scapular retraction/protraction  with gentle AR with PT  (towel under arm) x 10 - tactile cues for proper scapular motor control  Sidelying shoulder ER with towel under arm x 2/10 2# - cues for proper scapular motor control and full ROM, improved recruitment noted with cueing for full ROM  Shoulder abd SL x 2/10  with 2#   Shoulder flexion SL x 2/10 with 2#, improving scap control, some tactile cues for positioning  Horizontal abd SL with 2# x 2/10     Row to neutral prone x 2/10 with 2# - tactile cues for proper scapular positioning and motor control  Shoulder ext prone with ER x 2/10 with 2#  - tactile cues for proper scapular positioning and motor control  Shoulder horiz abd prone neutral x 2/10 with 0#- tactile cues for scap positioning and motor control  pt with fatigue at end modified to 0# instead of 1 # Also attempted ER and unable to perform in that plane    Pt to ice at home today as needed       Patient Education and Home Exercises     Home Exercises Provided and Patient Education Provided     Education provided:   - proper posture   - shoulder girdle anatomy and biomechanics   - compliance to daily exercises  - HEP    Written Home Exercises Provided: yes Exercises were reviewed and Poppy was able to demonstrate them prior to the end of the session.  Poppy demonstrated good  understanding of the education provided. See EMR under Patient Instructions in Notes section for exercises provided during therapy sessions    ASSESSMENT    Pt cleared by MD to lift up to 20# chest or waist height.  Pt able to perform 12.5# waist height without difficulty.  Also initiated unweighted  work reaching overhead to simulate job duties required.  Pt jung treatment well and ready to progress with strengthening as tolerated along with there act to assist in return to work function within MD restrictions.      Poppy Is progressing well towards her goals.     Pt prognosis is Good.     Pt will continue to benefit from skilled outpatient physical therapy to address  the deficits listed in the problem list box on initial evaluation, provide pt/family education and to maximize pt's level of independence in the home and community environment.     Pt's spiritual, cultural and educational needs considered and pt agreeable to plan of care and goals.     Anticipated barriers to physical therapy:none    Goals: Short Term Goals:  6 weeks MET STG's  Increase passive range of motion 25%  Increase postural awareness to normal  Wean from sling  Be able to perform HEP with minimal cueing required     Long Term Goals: 12 weeks   Increase range of motion to 75%to 100% of full range MET   Increase strength to 3/5 to 4-/5 (Progressing, not met)  Improve scapular stabilization to normal(Progressing, not met)  Restore ability to drive without increased pain MET   Restore ability to reach fully overhead without increased painMET   Restore ability to reach in all planes without increased pain or difficultyMET   Restore ability to perform ADL's and household activities independently and without increased pain (Progressing, not met) Limited with heavy activities  Pt to be independent with HEP to improve ROM and independence with ADL's (Progressing, not met)    Restore ability to perform lifting activities that she will need to perform for work (Progressing, not met)  Improve shoulder strength to 4+-5/5 (Progressing, not met)    PLAN     Continue with updated POC toward established physical therapy goals.    Essie Nolasco, PT

## 2023-02-23 ENCOUNTER — OFFICE VISIT (OUTPATIENT)
Dept: ENDOCRINOLOGY | Facility: CLINIC | Age: 38
End: 2023-02-23
Payer: COMMERCIAL

## 2023-02-23 VITALS
BODY MASS INDEX: 31.12 KG/M2 | HEIGHT: 69 IN | OXYGEN SATURATION: 96 % | SYSTOLIC BLOOD PRESSURE: 110 MMHG | HEART RATE: 78 BPM | TEMPERATURE: 99 F | WEIGHT: 210.13 LBS | DIASTOLIC BLOOD PRESSURE: 82 MMHG

## 2023-02-23 DIAGNOSIS — E03.8 HYPOTHYROIDISM DUE TO HASHIMOTO'S THYROIDITIS: Primary | ICD-10-CM

## 2023-02-23 DIAGNOSIS — E06.3 HYPOTHYROIDISM DUE TO HASHIMOTO'S THYROIDITIS: Primary | ICD-10-CM

## 2023-02-23 PROCEDURE — 3074F SYST BP LT 130 MM HG: CPT | Mod: CPTII,S$GLB,, | Performed by: PHYSICIAN ASSISTANT

## 2023-02-23 PROCEDURE — 99214 PR OFFICE/OUTPT VISIT, EST, LEVL IV, 30-39 MIN: ICD-10-PCS | Mod: S$GLB,,, | Performed by: PHYSICIAN ASSISTANT

## 2023-02-23 PROCEDURE — 99999 PR PBB SHADOW E&M-EST. PATIENT-LVL V: CPT | Mod: PBBFAC,,, | Performed by: PHYSICIAN ASSISTANT

## 2023-02-23 PROCEDURE — 99999 PR PBB SHADOW E&M-EST. PATIENT-LVL V: ICD-10-PCS | Mod: PBBFAC,,, | Performed by: PHYSICIAN ASSISTANT

## 2023-02-23 PROCEDURE — 3008F BODY MASS INDEX DOCD: CPT | Mod: CPTII,S$GLB,, | Performed by: PHYSICIAN ASSISTANT

## 2023-02-23 PROCEDURE — 1160F RVW MEDS BY RX/DR IN RCRD: CPT | Mod: CPTII,S$GLB,, | Performed by: PHYSICIAN ASSISTANT

## 2023-02-23 PROCEDURE — 3079F PR MOST RECENT DIASTOLIC BLOOD PRESSURE 80-89 MM HG: ICD-10-PCS | Mod: CPTII,S$GLB,, | Performed by: PHYSICIAN ASSISTANT

## 2023-02-23 PROCEDURE — 3074F PR MOST RECENT SYSTOLIC BLOOD PRESSURE < 130 MM HG: ICD-10-PCS | Mod: CPTII,S$GLB,, | Performed by: PHYSICIAN ASSISTANT

## 2023-02-23 PROCEDURE — 1160F PR REVIEW ALL MEDS BY PRESCRIBER/CLIN PHARMACIST DOCUMENTED: ICD-10-PCS | Mod: CPTII,S$GLB,, | Performed by: PHYSICIAN ASSISTANT

## 2023-02-23 PROCEDURE — 3008F PR BODY MASS INDEX (BMI) DOCUMENTED: ICD-10-PCS | Mod: CPTII,S$GLB,, | Performed by: PHYSICIAN ASSISTANT

## 2023-02-23 PROCEDURE — 3079F DIAST BP 80-89 MM HG: CPT | Mod: CPTII,S$GLB,, | Performed by: PHYSICIAN ASSISTANT

## 2023-02-23 PROCEDURE — 1159F PR MEDICATION LIST DOCUMENTED IN MEDICAL RECORD: ICD-10-PCS | Mod: CPTII,S$GLB,, | Performed by: PHYSICIAN ASSISTANT

## 2023-02-23 PROCEDURE — 99214 OFFICE O/P EST MOD 30 MIN: CPT | Mod: S$GLB,,, | Performed by: PHYSICIAN ASSISTANT

## 2023-02-23 PROCEDURE — 1159F MED LIST DOCD IN RCRD: CPT | Mod: CPTII,S$GLB,, | Performed by: PHYSICIAN ASSISTANT

## 2023-02-23 RX ORDER — LEVOTHYROXINE SODIUM 112 UG/1
112 TABLET ORAL
Qty: 30 TABLET | Refills: 11 | Status: SHIPPED | OUTPATIENT
Start: 2023-02-23 | End: 2024-03-15

## 2023-02-23 NOTE — PROGRESS NOTES
CC: Hypothyroidism    HPI: Emily Pelayo is a 37 y.o. female here for hypothyroidism along with pending conditions listed in the Visit Diagnosis. Diagnosed in 2011. +FHx of thyroid disease in her mother. Diet is low in seafood, soy and cabbage. No hx of neck radiation. [place] in the [place]. NM uptake and scan in 2010 was consistent w/ Graves.' Pt has never had to take Methimazole. +TPO. -TSI    She had should surgery in 11/22.    Taking 100 mcg qd.  + fatigue, constipation, cold intolerance, hair loss (alopecia)  No diarrhea, sweating, palpitations.   Cycles are regular.  On ocps    No easy bruising, muscle weakness. No facial hair or deeping of voice.    Thyroid u/s 6/22 wnl.    Taking metformin 500 mg bid.  She is going to PT 2x weekly. Works at [employer]    Dex suppression was elevated. No easy bruising or muscle weakness.     PMHx, PSHx: reviewed in epic.  Social Hx: no ETOH/tobacco use. Works nights/days.    Wt Readings from Last 15 Encounters:   02/23/23 95.3 kg (210 lb 1.6 oz)   02/10/23 94.3 kg (208 lb)   12/30/22 94.3 kg (208 lb)   12/02/22 90.7 kg (200 lb)   11/17/22 90.7 kg (200 lb)   11/14/22 90.7 kg (200 lb)   10/11/22 97.5 kg (215 lb)   10/07/22 99.3 kg (219 lb)   09/23/22 99.3 kg (219 lb 0.4 oz)   08/22/22 97 kg (213 lb 13.5 oz)   06/21/22 97.2 kg (214 lb 4.6 oz)   06/16/22 97.7 kg (215 lb 6.2 oz)   06/15/22 98 kg (216 lb 0.8 oz)   06/10/22 98.5 kg (217 lb 2.5 oz)   05/26/22 98.5 kg (217 lb 2.5 oz)      ROS:   Constitutional: + wt loss 9 lbs  Eyes: No recent visual changes  Cardiovascular: Denies current anginal symptoms  Respiratory: Denies current respiratory difficulty  Gastrointestinal: Denies recent bowel disturbances  GenitoUrinary - No dysuria  Skin: No new skin rash  Neurologic: No focal neurologic complaints  Musculoskeletal: no joint pain  Endocrine: no polyphagia, polydipsia or polyuria  Remainder ROS negative     /82 (BP Location: Left arm, Patient Position: Sitting, BP Method: Large  "(Manual))   Pulse 78   Temp 98.5 °F (36.9 °C) (Oral)   Ht 5' 9" (1.753 m)   Wt 95.3 kg (210 lb 1.6 oz)   SpO2 96%   BMI 31.03 kg/m²      Personally reviewed labs below:    Lab Results   Component Value Date    TSH 2.861 02/16/2023    I0QKNQY 92 03/23/2016    E6DKCFE 11.2 09/25/2017    FREET4 0.98 02/16/2023          Chemistry        Component Value Date/Time     11/14/2022 0845    K 3.9 11/14/2022 0845     11/14/2022 0845    CO2 27 11/14/2022 0845    BUN 13 11/14/2022 0845    CREATININE 0.9 11/14/2022 0845    GLU 79 11/14/2022 0845        Component Value Date/Time    CALCIUM 9.2 11/14/2022 0845    ALKPHOS 54 (L) 06/11/2022 0752    AST 14 06/11/2022 0752    ALT 14 06/11/2022 0752    BILITOT 0.6 06/11/2022 0752    ESTGFRAFRICA >60 06/11/2022 0752    EGFRNONAA >60 06/11/2022 0752           Lab Results   Component Value Date    HGBA1C 4.6 06/11/2022      PE:  GENERAL: middle aged female, well developed, well nourished  NECK: Supple neck, normal thyroid. No bruit  LYMPHATIC: No cervical or supraclavicular lymphadenopathy  CARDIOVASCULAR: Normal heart sounds, no pedal edema  RESPIRATORY: Normal effort, clear to auscultation  MUSC: 2+ DTR UE/LE  NEURO: steady gait, CN ll-Xll grossly intact  PSYCH: normal mood and affect    Assessment/Plan:   1. Hypothyroidism due to Hashimoto's thyroiditis  T4, Free    TSH    TSH    T4, Free    Ambulatory referral/consult to Dermatology    levothyroxine (SYNTHROID) 112 MCG tablet        Hypothyroidism-TSH is >2.5. Increase Levothyroxine to 112 mcg daily. Recheck in six weeks.  Iron deficiency-iron has been stable.  Obesity-Body mass index is 31.03 kg/m². Pt is losing wt. Monitor.  Hair loss-referral to derm.    TFTs in six weeks  Referral to Derm  F/u in 4 mths -tsh, t4      "

## 2023-02-27 ENCOUNTER — OFFICE VISIT (OUTPATIENT)
Dept: URGENT CARE | Facility: CLINIC | Age: 38
End: 2023-02-27
Payer: COMMERCIAL

## 2023-02-27 VITALS
DIASTOLIC BLOOD PRESSURE: 92 MMHG | HEIGHT: 69 IN | TEMPERATURE: 99 F | RESPIRATION RATE: 16 BRPM | BODY MASS INDEX: 31.1 KG/M2 | SYSTOLIC BLOOD PRESSURE: 135 MMHG | WEIGHT: 210 LBS | OXYGEN SATURATION: 98 % | HEART RATE: 89 BPM

## 2023-02-27 DIAGNOSIS — J02.9 SORE THROAT: ICD-10-CM

## 2023-02-27 DIAGNOSIS — J02.9 VIRAL PHARYNGITIS: Primary | ICD-10-CM

## 2023-02-27 LAB
CTP QC/QA: YES
CTP QC/QA: YES
MOLECULAR STREP A: NEGATIVE
SARS-COV-2 AG RESP QL IA.RAPID: NEGATIVE

## 2023-02-27 PROCEDURE — 3080F DIAST BP >= 90 MM HG: CPT | Mod: CPTII,S$GLB,, | Performed by: NURSE PRACTITIONER

## 2023-02-27 PROCEDURE — 1159F MED LIST DOCD IN RCRD: CPT | Mod: CPTII,S$GLB,, | Performed by: NURSE PRACTITIONER

## 2023-02-27 PROCEDURE — 3075F PR MOST RECENT SYSTOLIC BLOOD PRESS GE 130-139MM HG: ICD-10-PCS | Mod: CPTII,S$GLB,, | Performed by: NURSE PRACTITIONER

## 2023-02-27 PROCEDURE — 3075F SYST BP GE 130 - 139MM HG: CPT | Mod: CPTII,S$GLB,, | Performed by: NURSE PRACTITIONER

## 2023-02-27 PROCEDURE — 3008F BODY MASS INDEX DOCD: CPT | Mod: CPTII,S$GLB,, | Performed by: NURSE PRACTITIONER

## 2023-02-27 PROCEDURE — 1160F RVW MEDS BY RX/DR IN RCRD: CPT | Mod: CPTII,S$GLB,, | Performed by: NURSE PRACTITIONER

## 2023-02-27 PROCEDURE — 87811 SARS-COV-2 COVID19 W/OPTIC: CPT | Mod: QW,S$GLB,, | Performed by: NURSE PRACTITIONER

## 2023-02-27 PROCEDURE — 3008F PR BODY MASS INDEX (BMI) DOCUMENTED: ICD-10-PCS | Mod: CPTII,S$GLB,, | Performed by: NURSE PRACTITIONER

## 2023-02-27 PROCEDURE — 87651 STREP A DNA AMP PROBE: CPT | Mod: QW,S$GLB,, | Performed by: NURSE PRACTITIONER

## 2023-02-27 PROCEDURE — 3080F PR MOST RECENT DIASTOLIC BLOOD PRESSURE >= 90 MM HG: ICD-10-PCS | Mod: CPTII,S$GLB,, | Performed by: NURSE PRACTITIONER

## 2023-02-27 PROCEDURE — 1160F PR REVIEW ALL MEDS BY PRESCRIBER/CLIN PHARMACIST DOCUMENTED: ICD-10-PCS | Mod: CPTII,S$GLB,, | Performed by: NURSE PRACTITIONER

## 2023-02-27 PROCEDURE — 87811 SARS CORONAVIRUS 2 ANTIGEN POCT, MANUAL READ: ICD-10-PCS | Mod: QW,S$GLB,, | Performed by: NURSE PRACTITIONER

## 2023-02-27 PROCEDURE — 1159F PR MEDICATION LIST DOCUMENTED IN MEDICAL RECORD: ICD-10-PCS | Mod: CPTII,S$GLB,, | Performed by: NURSE PRACTITIONER

## 2023-02-27 PROCEDURE — 99213 PR OFFICE/OUTPT VISIT, EST, LEVL III, 20-29 MIN: ICD-10-PCS | Mod: S$GLB,,, | Performed by: NURSE PRACTITIONER

## 2023-02-27 PROCEDURE — 87651 POCT STREP A MOLECULAR: ICD-10-PCS | Mod: QW,S$GLB,, | Performed by: NURSE PRACTITIONER

## 2023-02-27 PROCEDURE — 99213 OFFICE O/P EST LOW 20 MIN: CPT | Mod: S$GLB,,, | Performed by: NURSE PRACTITIONER

## 2023-02-27 NOTE — PATIENT INSTRUCTIONS

## 2023-02-27 NOTE — PROGRESS NOTES
"Subjective:       Patient ID: Emily Pelayo is a 37 y.o. female.    Vitals:  height is 5' 9" (1.753 m) and weight is 95.3 kg (210 lb). Her oral temperature is 98.7 °F (37.1 °C). Her blood pressure is 135/92 (abnormal) and her pulse is 89. Her respiration is 16 and oxygen saturation is 98%.     Chief Complaint: Sore Throat    Patient presents today with sore throat that began about 2 days ago.  Patient has not taken any medication for symptom.    Provider note begins below:  Patient reports that she only has a irritated/scratchy throat today.  Denies any fever, chills, cough, chest pain, SOB, vomiting or abdominal pain.  Patient is awake and alert.  Denies any sick contacts.  Afebrile.    Sore Throat   This is a new problem. The current episode started in the past 7 days. The pain is mild. Pertinent negatives include no congestion, coughing, diarrhea, ear pain or vomiting.     Constitution: Negative. Negative for chills, sweating and fatigue.   HENT:  Positive for sore throat. Negative for ear pain, facial swelling and congestion.    Neck: Negative for painful lymph nodes.   Cardiovascular: Negative.  Negative for chest trauma, chest pain and sob on exertion.   Eyes: Negative.  Negative for eye itching and eye pain.   Respiratory: Negative.  Negative for chest tightness, cough and asthma.    Gastrointestinal: Negative.  Negative for nausea, vomiting and diarrhea.   Endocrine: negative. cold intolerance and excessive thirst.   Genitourinary: Negative.  Negative for dysuria, frequency, urgency and hematuria.   Musculoskeletal:  Negative for pain, trauma and joint pain.   Skin: Negative.  Negative for rash, wound and hives.   Allergic/Immunologic: Negative.  Negative for eczema, asthma, hives and itching.   Neurological: Negative.  Negative for disorientation and altered mental status.   Hematologic/Lymphatic: Negative.  Negative for swollen lymph nodes.   Psychiatric/Behavioral: Negative.  Negative for altered mental " status, disorientation and confusion.      Objective:      Physical Exam   Constitutional: She is oriented to person, place, and time. She appears well-developed. She is cooperative.  Non-toxic appearance. She does not appear ill. No distress.   HENT:   Head: Normocephalic and atraumatic.   Ears:   Right Ear: Hearing, tympanic membrane, external ear and ear canal normal. impacted cerumen  Left Ear: Hearing, tympanic membrane, external ear and ear canal normal. impacted cerumen  Nose: Nose normal. No mucosal edema, rhinorrhea, nasal deformity or congestion. No epistaxis. Right sinus exhibits no maxillary sinus tenderness and no frontal sinus tenderness. Left sinus exhibits no maxillary sinus tenderness and no frontal sinus tenderness.   Mouth/Throat: Uvula is midline and mucous membranes are normal. No trismus in the jaw. Normal dentition. No uvula swelling. Posterior oropharyngeal erythema (Mild.) present. No oropharyngeal exudate or posterior oropharyngeal edema.   Eyes: Conjunctivae and lids are normal. No scleral icterus.   Neck: Trachea normal and phonation normal. Neck supple. Carotid bruit is not present. No edema present. No erythema present. No neck rigidity present.   Cardiovascular: Normal rate, regular rhythm, normal heart sounds and normal pulses.   Pulmonary/Chest: Effort normal and breath sounds normal. No stridor. No respiratory distress. She has no decreased breath sounds. She has no wheezes. She has no rhonchi. She has no rales. She exhibits no tenderness.   Abdominal: Normal appearance. She exhibits no distension. Soft. flat abdomen There is no abdominal tenderness. There is no rebound, no guarding, no left CVA tenderness and no right CVA tenderness.   Musculoskeletal: Normal range of motion.         General: No deformity. Normal range of motion.   Lymphadenopathy:     She has no cervical adenopathy.   Neurological: no focal deficit. She is alert, oriented to person, place, and time and at  baseline. She exhibits normal muscle tone. Coordination normal.   Skin: Skin is warm, dry, intact, not diaphoretic and not pale.   Psychiatric: Her speech is normal and behavior is normal. Mood, judgment and thought content normal.   Nursing note and vitals reviewed.  The following results have been reviewed with the patient:  LABS-  Results for orders placed or performed in visit on 02/27/23   SARS Coronavirus 2 Antigen, POCT Manual Read   Result Value Ref Range    SARS Coronavirus 2 Antigen Negative Negative     Acceptable Yes    POCT Strep A, Molecular   Result Value Ref Range    Molecular Strep A, POC Negative Negative     Acceptable Yes         IMAGING-  No results found.      Assessment:       1. Viral pharyngitis    2. Sore throat          Plan:       FOLLOWUP  Follow up if symptoms worsen or fail to improve, for PLEASE CONTACT PCP OR CONTACT THE EMERGENCY ROOM..     PATIENT INSTRUCTIONS  Patient Instructions   INSTRUCTIONS:  - Rest.  - Drink plenty of fluids.  - Take Tylenol and/or Ibuprofen as directed as needed for fever/pain.  Do not take more than the recommended dose.  - follow up with your PCP within the next 1-2 weeks as needed.  - You must understand that you have received an Urgent Care treatment only and that you may be released before all of your medical problems are known or treated.   - You, the patient, will arrange for follow up care as instructed.   - If your condition worsens or fails to improve we recommend that you receive another evaluation at the ER immediately or contact your PCP to discuss your concerns.   - You can call [phone] or [phone] to help schedule an appointment with the appropriate provider.     -If you smoke cigarettes, it would be beneficial for you to stop.         THANK YOU FOR ALLOWING ME TO PARTICIPATE IN YOUR HEALTHCARE,     Nolan Billy NP   Viral pharyngitis    Sore throat  -     SARS Coronavirus 2 Antigen, POCT Manual  Read  -     POCT Strep A, Molecular  -     (Magic mouthwash) 1:1:1 diphenhydrAMINE(Benadryl) 12.5mg/5ml liq, aluminum & magnesium hydroxide-simethicone (Maalox), LIDOcaine viscous 2%; Swish and spit 5 mLs every 4 (four) hours as needed (throat pain). for mouth sores  Dispense: 90 mL; Refill: 0

## 2023-02-28 ENCOUNTER — CLINICAL SUPPORT (OUTPATIENT)
Dept: REHABILITATION | Facility: HOSPITAL | Age: 38
End: 2023-02-28
Payer: COMMERCIAL

## 2023-02-28 ENCOUNTER — PATIENT MESSAGE (OUTPATIENT)
Dept: ORTHOPEDICS | Facility: CLINIC | Age: 38
End: 2023-02-28
Payer: COMMERCIAL

## 2023-02-28 DIAGNOSIS — M25.512 CHRONIC LEFT SHOULDER PAIN: Primary | ICD-10-CM

## 2023-02-28 DIAGNOSIS — M25.60 DECREASED RANGE OF MOTION: ICD-10-CM

## 2023-02-28 DIAGNOSIS — M62.81 MUSCLE WEAKNESS: ICD-10-CM

## 2023-02-28 DIAGNOSIS — G89.29 CHRONIC LEFT SHOULDER PAIN: Primary | ICD-10-CM

## 2023-02-28 PROCEDURE — 97110 THERAPEUTIC EXERCISES: CPT | Mod: PN,CQ

## 2023-02-28 PROCEDURE — 97112 NEUROMUSCULAR REEDUCATION: CPT | Mod: PN,CQ

## 2023-02-28 PROCEDURE — 97530 THERAPEUTIC ACTIVITIES: CPT | Mod: PN,CQ

## 2023-02-28 NOTE — PROGRESS NOTES
[facility]   Physical Therapy Treatment Note     Name: Emily Pelayo  Clinic Number: [id number]    Therapy Diagnosis:   Encounter Diagnoses   Name Primary?    Chronic left shoulder pain Yes    Decreased range of motion     Muscle weakness          Physician: Alisia Olvera FNP    Visit Date: 2/28/2023    Physician Orders: PT Eval and Treat s/p RTC repair, SAD, biceps tenotomy  Medical Diagnosis from Referral:   M75.122 (ICD-10-CM) - Nontraumatic complete tear of rotator cuff, left   S43.432S (ICD-10-CM) - Labral tear of shoulder, left, sequela      Evaluation Date: 12/5/2022     DOS 11-17-22  Authorization Period Expiration: 12-31-22  Plan of Care Expiration: 3-23-23  Progress Note Due: 3-9-23  Visit # / Visits authorized: 13/ 21 (+8 from prior auth in 2022)  FOTO: Issued Visit #: 1     MD Follow up appointment: not scheduled yet      Precautions: Standard and RC precautions    PTA Visit #: 2/5     Time In: 3:20  Time Out: 4:03  Total Billable Time: 43 minutes     SUBJECTIVE     Pt reports: she is returning to work on March 13 with an updated letter from her MD giving authorization. Notices some popping into the shoulder with some active movements, does not state which ones.  She was compliant with home exercise progr  Response to previous treatment: muscle soreness but no increased pain  Functional change: using the arm more frequently    Pain:  Shoulder pain 0/10 rest 1/10 with movement   at end 0/10    Location: left shoulder      OBJECTIVE        Shoulder ROM: (measured in degrees) 2-22-23  Shoulder  LUE   Flexion 170   Abduction 170   External Rotation neutral 75   External Rotation with abd  90   Internal Rotation 70   Active standing flexion 170 slight pain with movement   Active standing abduction 170 slight pain  with movement           Shoulder ROM: (measured in degrees supine) initial eval  Shoulder  LUE   Flexion in scaption plane 60   Abduction in scaption plane 45   External  "Rotation neutral 15   External Rotation with abd  N/T   Internal Rotation Palm to stomach         Treatment     Poppy received the treatments listed below:      Post op week # 12 2-9-23    Pt has letter from MD in chart dated 2-17-23 allowing her to lift up to 20# waist and chest height and need for assistance with overhead lifting.      Therapeutic activity: Patient participated in dynamic functional therapeutic activities to improve functional performance for 23 minutes including:   Discussed job duties and pt reports though she has 50# lift requirement which she performs with assistance from another person. Reports having new letter from MD to specify 25# weight lifting restriction.  Instructed pt in testing load and proper body mechanics and posture with lifting.    Squat mechanics from 24" box x 10 then x 5 with addition of orange ball, reported knee pain which recovered with seated rest break  Level lift from mat x 12.5# which is box for box lift x 10 reps  Peg board on OT wall 5 pegs move down and up x 2 min     therapeutic exercises to develop endurance, ROM, and core stabilization for 17 minutes including:    (NP)PROM with PT all planes within comfortable ranges and improving  ROM as per protocol  (NP)Protraction supine x 2/10 with 3#  (NP)Supine active chest press x 2/10 with 3#    (NP)Shoulder shrug with retraction x 10  (NP)Biceps curls active 2/10 WITH 4#  Triceps with RTB x 2/10 blue cord in clinic B     Blue cord in clinic pt had forgotten her TB   [place] with retraction with RTB x 20   Shoulder Extension with RTB x 20  Retraction with RTB x 20  Modified to horizontal abd standing due to inability to get good recruitment of scap stab   ER with RTB x 2/10  IR with RTB x 20    Possible to add next time: Low row to simulate box lifts. Floor to waist stability ball lifts for lifting mechanics for work    Neuromuscular re-education for posture and neuromotor control for 08 minutes including:  (NP) " Rhythmic stabilization supine x 2 x 30 sec (1 at 90 deg flexion and 1 at 110 deg flexion)  (NP) Sidelying scapular retraction/protraction with gentle AR with PT  (towel under arm) x 10 - tactile cues for proper scapular motor control  (NP) Sidelying shoulder ER with towel under arm x 2/10 2# - cues for proper scapular motor control and full ROM, improved recruitment noted with cueing for full ROM  (NP) Shoulder abd SL x 2/10  with 2#   (NP) Shoulder flexion SL x 2/10 with 2#, improving scap control, some tactile cues for positioning  (NP) Horizontal abd SL with 2# x 2/10     Row to neutral prone x 2/10 with 2# - tactile cues for proper scapular positioning and motor control  Shoulder ext prone with ER x 2/10 with 2#  - tactile cues for proper scapular positioning and motor control  Shoulder horiz abd prone neutral x 2/10 with 0#- tactile cues for scap positioning and motor control  pt with fatigue at end modified to 0# instead of 1 # Also attempted ER and unable to perform in that plane    Pt to ice at home today as needed       Patient Education and Home Exercises     Home Exercises Provided and Patient Education Provided     Education provided:   - proper posture   - shoulder girdle anatomy and biomechanics   - compliance to daily exercises  - HEP    Written Home Exercises Provided: yes Exercises were reviewed and Poppy was able to demonstrate them prior to the end of the session.  Poppy demonstrated good  understanding of the education provided. See EMR under Patient Instructions in Notes section for exercises provided during therapy sessions    ASSESSMENT   Patient with overall good tolerance to progression of treatment this date, muscle fatigue reported by end of session. Some bilateral knee discomfort with review of lifting mechanics from floor to waist, which resolved with seated rest. She is progressing well with and will benefit from further strengthening with functional activities to prepare for return to  work function within MD restrictions.      Poppy Is progressing well towards her goals.     Pt prognosis is Good.     Pt will continue to benefit from skilled outpatient physical therapy to address the deficits listed in the problem list box on initial evaluation, provide pt/family education and to maximize pt's level of independence in the home and community environment.     Pt's spiritual, cultural and educational needs considered and pt agreeable to plan of care and goals.     Anticipated barriers to physical therapy:none    Goals: Short Term Goals:  6 weeks MET STG's  Increase passive range of motion 25%  Increase postural awareness to normal  Wean from sling  Be able to perform HEP with minimal cueing required     Long Term Goals: 12 weeks   Increase range of motion to 75%to 100% of full range MET   Increase strength to 3/5 to 4-/5 (Progressing, not met)  Improve scapular stabilization to normal(Progressing, not met)  Restore ability to drive without increased pain MET   Restore ability to reach fully overhead without increased painMET   Restore ability to reach in all planes without increased pain or difficultyMET   Restore ability to perform ADL's and household activities independently and without increased pain (Progressing, not met) Limited with heavy activities  Pt to be independent with HEP to improve ROM and independence with ADL's (Progressing, not met)    Restore ability to perform lifting activities that she will need to perform for work (Progressing, not met)  Improve shoulder strength to 4+-5/5 (Progressing, not met)    PLAN     Continue with updated POC toward established physical therapy goals.    Yasmine Lala, PTA

## 2023-03-02 ENCOUNTER — CLINICAL SUPPORT (OUTPATIENT)
Dept: REHABILITATION | Facility: HOSPITAL | Age: 38
End: 2023-03-02
Payer: COMMERCIAL

## 2023-03-02 DIAGNOSIS — M25.512 CHRONIC LEFT SHOULDER PAIN: Primary | ICD-10-CM

## 2023-03-02 DIAGNOSIS — M25.60 DECREASED RANGE OF MOTION: ICD-10-CM

## 2023-03-02 DIAGNOSIS — G89.29 CHRONIC LEFT SHOULDER PAIN: Primary | ICD-10-CM

## 2023-03-02 DIAGNOSIS — M62.81 MUSCLE WEAKNESS: ICD-10-CM

## 2023-03-02 PROCEDURE — 97110 THERAPEUTIC EXERCISES: CPT | Mod: PN,CQ

## 2023-03-02 PROCEDURE — 97530 THERAPEUTIC ACTIVITIES: CPT | Mod: PN,CQ

## 2023-03-02 PROCEDURE — 97112 NEUROMUSCULAR REEDUCATION: CPT | Mod: PN,CQ

## 2023-03-02 NOTE — PROGRESS NOTES
[facility]   Physical Therapy Treatment Note     Name: Emily Pelayo  Clinic Number: [id number]    Therapy Diagnosis:   Encounter Diagnoses   Name Primary?    Chronic left shoulder pain Yes    Decreased range of motion     Muscle weakness          Physician: Alisia Olvera FNP    Visit Date: 3/2/2023    Physician Orders: PT Eval and Treat s/p RTC repair, SAD, biceps tenotomy  Medical Diagnosis from Referral:   M75.122 (ICD-10-CM) - Nontraumatic complete tear of rotator cuff, left   S43.432S (ICD-10-CM) - Labral tear of shoulder, left, sequela      Evaluation Date: 12/5/2022     DOS 11-17-22  Authorization Period Expiration: 12-31-22  Plan of Care Expiration: 3-23-23  Progress Note Due: 3-9-23  Visit # / Visits authorized: 14/ 21 (+8 from prior auth in 2022)  FOTO: Issued Visit #: 1     MD Follow up appointment: not scheduled yet      Precautions: Standard and RC precautions    PTA Visit #: 3/5     Time In: 10:58 (Pt late)  Time Out: 11:31  Total Billable Time: 33 minutes     SUBJECTIVE     Pt reports: the shoulder is feeling okay today. Did her physical at work earlier and was cleared for return to work  She was compliant with home exercise program  Response to previous treatment: pain to the knees but the shoulder is okay.  Functional change: using the arm more frequently    Pain:  Shoulder pain 0/10 rest 1/10 with movement   at end 0/10    Location: left shoulder      OBJECTIVE        Shoulder ROM: (measured in degrees) 2-22-23  Shoulder  LUE   Flexion 170   Abduction 170   External Rotation neutral 75   External Rotation with abd  90   Internal Rotation 70   Active standing flexion 170 slight pain with movement   Active standing abduction 170 slight pain  with movement           Shoulder ROM: (measured in degrees supine) initial eval  Shoulder  LUE   Flexion in scaption plane 60   Abduction in scaption plane 45   External Rotation neutral 15   External Rotation with abd  N/T   Internal  "Rotation Palm to stomach         Treatment     Poppy received the treatments listed below:      Post op week # 12 2-9-23    Pt has letter from MD in chart dated 2-17-23 allowing her to lift up to 20# waist and chest height and need for assistance with overhead lifting.      Therapeutic activity: Patient participated in dynamic functional therapeutic activities to improve functional performance for 10 minutes including:   Discussed job duties and pt reports though she has 50# lift requirement which she performs with assistance from another person. Reports having new letter from MD to specify 25# weight lifting restriction.  Instructed pt in testing load and proper body mechanics and posture with lifting.    Squat mechanics with box lift (empty box is 12.5#), from 12" to waist height to simulate getting trays at work x 10, x 5 with 20# box  Level lift from mat x 12.5# which is box for box lift x 10 reps, 20# box x 10 with no problem  (NP) Peg board on OT wall 5 pegs move down and up x 2 min     therapeutic exercises to develop endurance, ROM, and core stabilization for 15 minutes including:     Supine AROM into flexion x 10  (NP)Biceps curls active 2/10 WITH 4#  Triceps with BTB x 2/10 Blue theraband in clinic B     Blue cord in clinic pt had forgotten her TB   Pulldown with retraction with RTB x 20   Shoulder Extension with RTB x 20  Retraction with RTB x 20  Modified to horizontal abd standing due to inability to get good recruitment of scap stab   ER with RTB x 2/10  IR with RTB x 20    Possible to add next time: Low row to simulate box lifts. Floor to waist stability ball lifts for lifting mechanics for work    Neuromuscular re-education for posture and neuromotor control for 08 minutes including:  (NP) Rhythmic stabilization supine x 2 x 30 sec (1 at 90 deg flexion and 1 at 110 deg flexion)  (NP) Sidelying scapular retraction/protraction with gentle AR with PT  (towel under arm) x 10 - tactile cues for proper " "scapular motor control  (NP) Sidelying shoulder ER with towel under arm x 2/10 2# - cues for proper scapular motor control and full ROM, improved recruitment noted with cueing for full ROM  (NP) Shoulder abd SL x 2/10  with 2#   (NP) Shoulder flexion SL x 2/10 with 2#, improving scap control, some tactile cues for positioning  (NP) Horizontal abd SL with 2# x 2/10     Row to neutral prone x 2/10 with 2# - tactile cues for proper scapular positioning and motor control  Shoulder ext prone with ER x 2/10 with 2#  - tactile cues for proper scapular positioning and motor control  Shoulder horiz abd prone neutral x 2/10 with 2#- tactile cues for scap positioning and motor control     Pt to ice at home today as needed       Patient Education and Home Exercises     Home Exercises Provided and Patient Education Provided     Education provided:   - proper posture   - shoulder girdle anatomy and biomechanics   - compliance to daily exercises  - HEP    Written Home Exercises Provided: yes Exercises were reviewed and Poppy was able to demonstrate them prior to the end of the session.  Poppy demonstrated good  understanding of the education provided. See EMR under Patient Instructions in Notes section for exercises provided during therapy sessions    ASSESSMENT   Patient improving with strength and AROM of the shoulder and is able to progress well with functional strengthening. Improved tolerance to lifting mechanics, able to lift 20# box easily from 12" box and from waist height. Less knee discomfort reported today with avoidance of lifting from floor, which we did not perform as patient reported she will not need to lift anything from the floor.  She continues to progress well with strength and endurance and will benefit from further strengthening with functional activities to prepare for return to work function within MD restrictions.      Poppy Is progressing well towards her goals.     Pt prognosis is Good.     Pt will continue " to benefit from skilled outpatient physical therapy to address the deficits listed in the problem list box on initial evaluation, provide pt/family education and to maximize pt's level of independence in the home and community environment.     Pt's spiritual, cultural and educational needs considered and pt agreeable to plan of care and goals.     Anticipated barriers to physical therapy:none    Goals: Short Term Goals:  6 weeks MET STG's  Increase passive range of motion 25%  Increase postural awareness to normal  Wean from sling  Be able to perform HEP with minimal cueing required     Long Term Goals: 12 weeks   Increase range of motion to 75%to 100% of full range MET   Increase strength to 3/5 to 4-/5 (Progressing, not met)  Improve scapular stabilization to normal(Progressing, not met)  Restore ability to drive without increased pain MET   Restore ability to reach fully overhead without increased painMET   Restore ability to reach in all planes without increased pain or difficultyMET   Restore ability to perform ADL's and household activities independently and without increased pain (Progressing, not met) Limited with heavy activities  Pt to be independent with HEP to improve ROM and independence with ADL's (Progressing, not met)    Restore ability to perform lifting activities that she will need to perform for work (Progressing, not met)  Improve shoulder strength to 4+-5/5 (Progressing, not met)    PLAN     Continue with updated POC toward established physical therapy goals.    Yasmine Lala, PTA

## 2023-03-03 NOTE — PROGRESS NOTES
[facility]   Physical Therapy Treatment Note     Name: Emily Pelayo  Clinic Number: [id number]    Therapy Diagnosis:   Encounter Diagnoses   Name Primary?    Chronic left shoulder pain Yes    Decreased range of motion     Muscle weakness          Physician: Alisia Olvera FNP    Visit Date: 3/6/2023    Physician Orders: PT Eval and Treat s/p RTC repair, SAD, biceps tenotomy  Medical Diagnosis from Referral:   M75.122 (ICD-10-CM) - Nontraumatic complete tear of rotator cuff, left   S43.432S (ICD-10-CM) - Labral tear of shoulder, left, sequela      Evaluation Date: 12/5/2022     DOS 11-17-22  Authorization Period Expiration: 12-31-22  Plan of Care Expiration: 3-23-23  Progress Note Due: 3-9-23  Visit # / Visits authorized: 15/ 21 (+8 from prior auth in 2022)  FOTO: Issued Visit #: 1     MD Follow up appointment: not scheduled yet      Precautions: Standard and RC precautions    PTA Visit #: 3/5     Time In: *** re-eval  Time Out: ***  Total Billable Time: 33 minutes     SUBJECTIVE     Pt reports: the shoulder is feeling okay today. Did her physical at work earlier and was cleared for return to work  She was compliant with home exercise program  Response to previous treatment: pain to the knees but the shoulder is okay.  Functional change: using the arm more frequently    Pain:  Shoulder pain 0/10 rest 1/10 with movement   at end 0/10    Location: left shoulder      OBJECTIVE        Shoulder ROM: (measured in degrees) 2-22-23  Shoulder  LUE   Flexion 170   Abduction 170   External Rotation neutral 75   External Rotation with abd  90   Internal Rotation 70   Active standing flexion 170 slight pain with movement   Active standing abduction 170 slight pain  with movement           Shoulder ROM: (measured in degrees supine) initial eval  Shoulder  LUE   Flexion in scaption plane 60   Abduction in scaption plane 45   External Rotation neutral 15   External Rotation with abd  N/T   Internal  "Rotation Palm to stomach         Treatment     Poppy received the treatments listed below:      Post op week # 12 2-9-23    Pt has letter from MD in chart dated 2-17-23 allowing her to lift up to 20# waist and chest height and need for assistance with overhead lifting.      Therapeutic activity: Patient participated in dynamic functional therapeutic activities to improve functional performance for 10 minutes including:   Discussed job duties and pt reports though she has 50# lift requirement which she performs with assistance from another person. Reports having new letter from MD to specify 25# weight lifting restriction.  Instructed pt in testing load and proper body mechanics and posture with lifting.    Squat mechanics with box lift (empty box is 12.5#), from 12" to waist height to simulate getting trays at work x 10, x 5 with 20# box  Level lift from mat x 12.5# which is box for box lift x 10 reps, 20# box x 10 with no problem  (NP) Peg board on OT wall 5 pegs move down and up x 2 min     therapeutic exercises to develop endurance, ROM, and core stabilization for 15 minutes including:     Supine AROM into flexion x 10  (NP)Biceps curls active 2/10 WITH 4#  Triceps with BTB x 2/10 Blue theraband in clinic B     Blue cord in clinic pt had forgotten her TB   Pulldown with retraction with RTB x 20   Shoulder Extension with RTB x 20  Retraction with RTB x 20  Modified to horizontal abd standing due to inability to get good recruitment of scap stab   ER with RTB x 2/10  IR with RTB x 20    Possible to add next time: Low row to simulate box lifts. Floor to waist stability ball lifts for lifting mechanics for work    Neuromuscular re-education for posture and neuromotor control for 08 minutes including:  (NP) Rhythmic stabilization supine x 2 x 30 sec (1 at 90 deg flexion and 1 at 110 deg flexion)  (NP) Sidelying scapular retraction/protraction with gentle AR with PT  (towel under arm) x 10 - tactile cues for proper " "scapular motor control  (NP) Sidelying shoulder ER with towel under arm x 2/10 2# - cues for proper scapular motor control and full ROM, improved recruitment noted with cueing for full ROM  (NP) Shoulder abd SL x 2/10  with 2#   (NP) Shoulder flexion SL x 2/10 with 2#, improving scap control, some tactile cues for positioning  (NP) Horizontal abd SL with 2# x 2/10     Row to neutral prone x 2/10 with 2# - tactile cues for proper scapular positioning and motor control  Shoulder ext prone with ER x 2/10 with 2#  - tactile cues for proper scapular positioning and motor control  Shoulder horiz abd prone neutral x 2/10 with 2#- tactile cues for scap positioning and motor control     Pt to ice at home today as needed       Patient Education and Home Exercises     Home Exercises Provided and Patient Education Provided     Education provided:   - proper posture   - shoulder girdle anatomy and biomechanics   - compliance to daily exercises  - HEP    Written Home Exercises Provided: yes Exercises were reviewed and Poppy was able to demonstrate them prior to the end of the session.  Poppy demonstrated good  understanding of the education provided. See EMR under Patient Instructions in Notes section for exercises provided during therapy sessions    ASSESSMENT   Patient improving with strength and AROM of the shoulder and is able to progress well with functional strengthening. Improved tolerance to lifting mechanics, able to lift 20# box easily from 12" box and from waist height. Less knee discomfort reported today with avoidance of lifting from floor, which we did not perform as patient reported she will not need to lift anything from the floor.  She continues to progress well with strength and endurance and will benefit from further strengthening with functional activities to prepare for return to work function within MD restrictions.      Poppy Is progressing well towards her goals.     Pt prognosis is Good.     Pt will continue " to benefit from skilled outpatient physical therapy to address the deficits listed in the problem list box on initial evaluation, provide pt/family education and to maximize pt's level of independence in the home and community environment.     Pt's spiritual, cultural and educational needs considered and pt agreeable to plan of care and goals.     Anticipated barriers to physical therapy:none    Goals: Short Term Goals:  6 weeks MET STG's  Increase passive range of motion 25%  Increase postural awareness to normal  Wean from sling  Be able to perform HEP with minimal cueing required     Long Term Goals: 12 weeks   Increase range of motion to 75%to 100% of full range MET   Increase strength to 3/5 to 4-/5 (Progressing, not met)  Improve scapular stabilization to normal(Progressing, not met)  Restore ability to drive without increased pain MET   Restore ability to reach fully overhead without increased painMET   Restore ability to reach in all planes without increased pain or difficultyMET   Restore ability to perform ADL's and household activities independently and without increased pain (Progressing, not met) Limited with heavy activities  Pt to be independent with HEP to improve ROM and independence with ADL's (Progressing, not met)    Restore ability to perform lifting activities that she will need to perform for work (Progressing, not met)  Improve shoulder strength to 4+-5/5 (Progressing, not met)    PLAN     Continue with updated POC toward established physical therapy goals.    Essie Nolasco, PT

## 2023-03-06 ENCOUNTER — CLINICAL SUPPORT (OUTPATIENT)
Dept: REHABILITATION | Facility: HOSPITAL | Age: 38
End: 2023-03-06
Payer: COMMERCIAL

## 2023-03-06 DIAGNOSIS — M62.81 MUSCLE WEAKNESS: ICD-10-CM

## 2023-03-06 DIAGNOSIS — G89.29 CHRONIC LEFT SHOULDER PAIN: Primary | ICD-10-CM

## 2023-03-06 DIAGNOSIS — M25.512 CHRONIC LEFT SHOULDER PAIN: Primary | ICD-10-CM

## 2023-03-06 DIAGNOSIS — M25.60 DECREASED RANGE OF MOTION: ICD-10-CM

## 2023-03-06 PROCEDURE — 97110 THERAPEUTIC EXERCISES: CPT | Mod: PN,CQ

## 2023-03-06 PROCEDURE — 97530 THERAPEUTIC ACTIVITIES: CPT | Mod: PN,CQ

## 2023-03-06 NOTE — PROGRESS NOTES
[facility]   Physical Therapy Treatment Note     Name: Emily Pelayo  Clinic Number: [id number]    Therapy Diagnosis:   Encounter Diagnoses   Name Primary?    Chronic left shoulder pain Yes    Decreased range of motion     Muscle weakness          Physician: Alisia Olvera FNP    Visit Date: 3/6/2023    Physician Orders: PT Eval and Treat s/p RTC repair, SAD, biceps tenotomy  Medical Diagnosis from Referral:   M75.122 (ICD-10-CM) - Nontraumatic complete tear of rotator cuff, left   S43.432S (ICD-10-CM) - Labral tear of shoulder, left, sequela      Evaluation Date: 12/5/2022     DOS 11-17-22  Authorization Period Expiration: 12-31-22  Plan of Care Expiration: 3-23-23  Progress Note Due: 3-9-23  Visit # / Visits authorized: 14/ 21 (+8 from prior auth in 2022)  FOTO: Issued Visit #: 1     MD Follow up appointment: April 4, 2023     Precautions: Standard and RC precautions    PTA Visit #: 3/5     Time In: 1:49 (Pt late)  Time Out: 2:39  Total Billable Time: 50 minutes     SUBJECTIVE     Pt reports: the shoulder is doing well overall, physically ready to go back to work she feels.  She was compliant with home exercise program  Response to previous treatment: some aching to the knees.  Functional change: minimal limitations with the shoulder    Pain:  Shoulder pain 0/10 rest 1/10 with movement   at end 0/10    Location: left shoulder      OBJECTIVE        Shoulder ROM: (measured in degrees) 3-6-23  Shoulder  LUE   Flexion 170   Abduction 170   External Rotation neutral 75   External Rotation with abd  90   Internal Rotation 70   Active standing flexion 170    Active standing abduction 170      Muscle Strength 3-6-23  MMT R L   Elbow flexion 5/5 4+/5   Elbow extension 5/5 4+/5   Shoulder flexion 4+/5 4/5   Shoulder abduction 4+/5 4/5   Shoulder external rotation 4+/5 4+/5   Shoulder internal rotation 5/5 5/5          Shoulder ROM: (measured in degrees supine) initial eval  Shoulder  LUE  "  Flexion in scaption plane 60   Abduction in scaption plane 45   External Rotation neutral 15   External Rotation with abd  N/T   Internal Rotation Palm to stomach         Treatment     Poppy received the treatments listed below:      Post op week # 12 2-9-23    Pt has letter from MD in chart dated 2-17-23 allowing her to lift up to 20# waist and chest height and need for assistance with overhead lifting.      Therapeutic activity: Patient participated in dynamic functional therapeutic activities to improve functional performance for 15 minutes including:   Discussed job duties and pt reports though she has 50# lift requirement which she performs with assistance from another person. Reports having new letter from MD to specify 25# weight lifting restriction.  Instructed pt in testing load and proper body mechanics and posture with lifting.    Squat mechanics with box lift (empty box is 12.5#), from 12" to waist height to simulate getting trays at work with 25# box,   Level lift from mat x 25# box x 10 with no problem  Peg board on OT wall 5 pegs move down and up x 3 min     therapeutic exercises to develop endurance, ROM, and core stabilization for 35 minutes including:    Supine AROM into flexion x 10  (NP)Biceps curls active 2/10 WITH 4#    Blue cord in clinic pt had forgotten her TB   Triceps with Blue tubing x 2/10 bilateral  Pulldown with retraction with RTB x 20   Shoulder Extension with RTB x 20  Retraction with RTB x 20  ER with RTB x 3/10  IR with RTB x 30   ER with RTB at 90/90 x 10     Standing with back on wall, L shoulder flexion with 1# weight x 10   Bent over row 5# (knee on 18" box, hand on 24" box) x 20 each   Scapular clocks with YTB loop 2 x 3 round   AROM  bilateral into flexion x 10 and abduction x 10 (mirror for cues)    Possible to add next session: scapular walks with loop at wall, horiz abduction with YTB    Neuromuscular re-education for posture and neuromotor control for 00 minutes " including:    (NP) Row to neutral prone x 2/10 with 2# - tactile cues for proper scapular positioning and motor control  (NP) Shoulder ext prone with ER x 2/10 with 2#  - tactile cues for proper scapular positioning and motor control  (NP) Shoulder horiz abd prone neutral x 2/10 with 2#- tactile cues for scap positioning and motor control     Pt to ice at home today as needed       Patient Education and Home Exercises     Home Exercises Provided and Patient Education Provided     Education provided:   - proper posture   - shoulder girdle anatomy and biomechanics   - compliance to daily exercises  - HEP    Written Home Exercises Provided: yes Exercises were reviewed and Poppy was able to demonstrate them prior to the end of the session.  Poppy demonstrated good  understanding of the education provided. See EMR under Patient Instructions in Notes section for exercises provided during therapy sessions    ASSESSMENT   Patient progressing with upper extremity strength and AROM, mild weakness present with flexion and abduction primarily in the L shoulder. Lifting mechanics are improving and is able to lift a 25# box without issue from knee height. Did not assess tolerance of lifting from floor height as patient reports she will never have to lift anything heavy from the ground. L shoulder AROM within normal ranges in all planes. Patient will benefit from further strengthening with functional activities to prepare for return to work function within MD restrictions.      Poppy Is progressing well towards her goals.     Pt prognosis is Good.     Pt will continue to benefit from skilled outpatient physical therapy to address the deficits listed in the problem list box on initial evaluation, provide pt/family education and to maximize pt's level of independence in the home and community environment.     Pt's spiritual, cultural and educational needs considered and pt agreeable to plan of care and goals.     Anticipated barriers  to physical therapy:none    Goals: Short Term Goals:  6 weeks MET STG's  Increase passive range of motion 25%  Increase postural awareness to normal  Wean from sling  Be able to perform HEP with minimal cueing required     Long Term Goals: 12 weeks   Increase range of motion to 75%to 100% of full range MET   Increase strength to 3/5 to 4-/5 (Progressing, not met)  Improve scapular stabilization to normal(Progressing, not met)  Restore ability to drive without increased pain MET   Restore ability to reach fully overhead without increased painMET   Restore ability to reach in all planes without increased pain or difficultyMET   Restore ability to perform ADL's and household activities independently and without increased pain (Progressing, not met) Limited with heavy activities  Pt to be independent with HEP to improve ROM and independence with ADL's (Progressing, not met)    Restore ability to perform lifting activities that she will need to perform for work (Progressing, not met)  Improve shoulder strength to 4+-5/5 (Progressing, not met)    PLAN     Continue with updated POC toward established physical therapy goals.    Yasmine Lala, PTA
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
## 2023-03-08 ENCOUNTER — OFFICE VISIT (OUTPATIENT)
Dept: PSYCHIATRY | Facility: CLINIC | Age: 38
End: 2023-03-08
Payer: COMMERCIAL

## 2023-03-08 VITALS
HEIGHT: 69 IN | WEIGHT: 211 LBS | BODY MASS INDEX: 31.25 KG/M2 | HEART RATE: 80 BPM | SYSTOLIC BLOOD PRESSURE: 122 MMHG | DIASTOLIC BLOOD PRESSURE: 91 MMHG

## 2023-03-08 DIAGNOSIS — F43.10 PTSD (POST-TRAUMATIC STRESS DISORDER): ICD-10-CM

## 2023-03-08 DIAGNOSIS — F33.40 RECURRENT MAJOR DEPRESSIVE DISORDER, IN REMISSION: Primary | ICD-10-CM

## 2023-03-08 DIAGNOSIS — F41.1 GAD (GENERALIZED ANXIETY DISORDER): ICD-10-CM

## 2023-03-08 PROCEDURE — 1159F PR MEDICATION LIST DOCUMENTED IN MEDICAL RECORD: ICD-10-PCS | Mod: CPTII,S$GLB,,

## 2023-03-08 PROCEDURE — 3008F BODY MASS INDEX DOCD: CPT | Mod: CPTII,S$GLB,,

## 2023-03-08 PROCEDURE — 1160F RVW MEDS BY RX/DR IN RCRD: CPT | Mod: CPTII,S$GLB,,

## 2023-03-08 PROCEDURE — 99214 OFFICE O/P EST MOD 30 MIN: CPT | Mod: S$GLB,,,

## 2023-03-08 PROCEDURE — 99999 PR PBB SHADOW E&M-EST. PATIENT-LVL IV: CPT | Mod: PBBFAC,,,

## 2023-03-08 PROCEDURE — 1159F MED LIST DOCD IN RCRD: CPT | Mod: CPTII,S$GLB,,

## 2023-03-08 PROCEDURE — 99999 PR PBB SHADOW E&M-EST. PATIENT-LVL IV: ICD-10-PCS | Mod: PBBFAC,,,

## 2023-03-08 PROCEDURE — 3008F PR BODY MASS INDEX (BMI) DOCUMENTED: ICD-10-PCS | Mod: CPTII,S$GLB,,

## 2023-03-08 PROCEDURE — 3080F DIAST BP >= 90 MM HG: CPT | Mod: CPTII,S$GLB,,

## 2023-03-08 PROCEDURE — 3074F SYST BP LT 130 MM HG: CPT | Mod: CPTII,S$GLB,,

## 2023-03-08 PROCEDURE — 99214 PR OFFICE/OUTPT VISIT, EST, LEVL IV, 30-39 MIN: ICD-10-PCS | Mod: S$GLB,,,

## 2023-03-08 PROCEDURE — 1160F PR REVIEW ALL MEDS BY PRESCRIBER/CLIN PHARMACIST DOCUMENTED: ICD-10-PCS | Mod: CPTII,S$GLB,,

## 2023-03-08 PROCEDURE — 3074F PR MOST RECENT SYSTOLIC BLOOD PRESSURE < 130 MM HG: ICD-10-PCS | Mod: CPTII,S$GLB,,

## 2023-03-08 PROCEDURE — 3080F PR MOST RECENT DIASTOLIC BLOOD PRESSURE >= 90 MM HG: ICD-10-PCS | Mod: CPTII,S$GLB,,

## 2023-03-09 ENCOUNTER — CLINICAL SUPPORT (OUTPATIENT)
Dept: REHABILITATION | Facility: HOSPITAL | Age: 38
End: 2023-03-09
Payer: COMMERCIAL

## 2023-03-09 DIAGNOSIS — M25.60 DECREASED RANGE OF MOTION: ICD-10-CM

## 2023-03-09 DIAGNOSIS — M25.512 CHRONIC LEFT SHOULDER PAIN: Primary | ICD-10-CM

## 2023-03-09 DIAGNOSIS — G89.29 CHRONIC LEFT SHOULDER PAIN: Primary | ICD-10-CM

## 2023-03-09 DIAGNOSIS — M62.81 MUSCLE WEAKNESS: ICD-10-CM

## 2023-03-09 PROCEDURE — 97110 THERAPEUTIC EXERCISES: CPT | Mod: PN,CQ

## 2023-03-09 PROCEDURE — 97530 THERAPEUTIC ACTIVITIES: CPT | Mod: PN,CQ

## 2023-03-09 NOTE — PROGRESS NOTES
[facility]   Physical Therapy Treatment Note     Name: Emily Pelayo  [facility] Number: [id number]    Therapy Diagnosis:   Encounter Diagnoses   Name Primary?    Chronic left shoulder pain Yes    Decreased range of motion     Muscle weakness          Physician: Alisia Olvera FNP    Visit Date: 3/9/2023    Physician Orders: PT Eval and Treat s/p RTC repair, SAD, biceps tenotomy  Medical Diagnosis from Referral:   M75.122 (ICD-10-CM) - Nontraumatic complete tear of rotator cuff, left   S43.432S (ICD-10-CM) - Labral tear of shoulder, left, sequela      Evaluation Date: 12/5/2022     DOS 11-17-22  Authorization Period Expiration: 12-31-22  Plan of Care Expiration: 3-23-23  Progress Note Due: 3-9-23  Visit # / Visits authorized: 16/ 21 (+8 from prior auth in 2022)  FOTO: Issued Visit #: 1     MD Follow up appointment: April 4, 2023     Precautions: Standard and RC precautions    PTA Visit #: 4/5     Time In: 8:00 (Pt late)  Time Out: 8:40  Total Billable Time: 40 minutes     SUBJECTIVE     Pt reports: doing well today, shoulder feeling pretty good   Response to previous treatment: some aching to the knees.  Functional change: minimal limitations with the shoulder    Pain:  Shoulder pain 0/10 rest 1/10 with movement   at end 0/10    Location: left shoulder      OBJECTIVE        Shoulder ROM: (measured in degrees) 3-6-23  Shoulder  LUE   Flexion 170   Abduction 170   External Rotation neutral 75   External Rotation with abd  90   Internal Rotation 70   Active standing flexion 170    Active standing abduction 170      Muscle Strength 3-6-23  MMT R L   Elbow flexion 5/5 5/5   Elbow extension 5/5 5/5   Shoulder flexion 4+/5 4/5   Shoulder abduction 4+/5 4/5   Shoulder external rotation 4+/5 4+/5   Shoulder internal rotation 5/5 5/5          Shoulder ROM: (measured in degrees supine) initial eval  Shoulder  LUE   Flexion in scaption plane 60   Abduction in scaption plane 45   External Rotation neutral  "15   External Rotation with abd  N/T   Internal Rotation Palm to stomach         Treatment     Poppy received the treatments listed below:      Post op week # 12 2-9-23    Pt has letter from MD in chart dated 2-17-23 allowing her to lift up to 20# waist and chest height and need for assistance with overhead lifting.      Therapeutic activity: Patient participated in dynamic functional therapeutic activities to improve functional performance for 10 minutes including:   Discussed job duties and pt reports though she has 50# lift requirement which she performs with assistance from another person. Reports having new letter from MD to specify 25# weight lifting restriction.  Instructed pt in testing load and proper body mechanics and posture with lifting.    (NP) Squat mechanics with box lift (empty box is 12.5#), from 12" to waist height to simulate getting trays at work with 25# box,   Level lift from mat x 25# box x 10 with no problem  Peg board on OT wall 5 pegs move down and up x 3 min     therapeutic exercises to develop endurance, ROM, and core stabilization for 30 minutes including:    (NP) Supine AROM into flexion x 10  (NP)Biceps curls active 2/10 WITH 4#    Blue cord in clinic pt had forgotten her TB   Triceps with Blue tubing x 2/10 bilateral  Pulldown with retraction with RTB x 20   Shoulder Extension with RTB x 20  Retraction with RTB x 20  ER with RTB x 3/10  IR with RTB x 30  ER with RTB at 90/90 2 x 10    AROM  bilateral into flexion x 10 and abduction x 10 (mirror for cues)  Standing with back on wall, L shoulder flexion with 1# weight 2 x 10  Bent over row 6# (knee on 18" box, hand on 24" box) x 20 each  Scapular clocks with YTB loop 2 x 5 round    Possible to add next session: scapular walks with loop at wall, horiz abduction with YTB    Neuromuscular re-education for posture and neuromotor control for 00 minutes including:    (NP) Row to neutral prone x 2/10 with 2# - tactile cues for proper scapular " positioning and motor control  (NP) Shoulder ext prone with ER x 2/10 with 2#  - tactile cues for proper scapular positioning and motor control  (NP) Shoulder horiz abd prone neutral x 2/10 with 2#- tactile cues for scap positioning and motor control     Pt to ice at home today as needed       Patient Education and Home Exercises     Home Exercises Provided and Patient Education Provided     Education provided:   - proper posture   - shoulder girdle anatomy and biomechanics   - compliance to daily exercises  - HEP    Written Home Exercises Provided: yes Exercises were reviewed and Poppy was able to demonstrate them prior to the end of the session.  Poppy demonstrated good  understanding of the education provided. See EMR under Patient Instructions in Notes section for exercises provided during therapy sessions    ASSESSMENT   Patient tolerated progression of exercises well overall this date and was able to complete all above with good tolerance though fatigue by end of session. Strength and ROM remains good and within functional limits. Tolerated lifting mechanics well. Patient will benefit from further strengthening with functional activities to prepare for return to work function within MD restrictions.      Poppy Is progressing well towards her goals.     Pt prognosis is Good.     Pt will continue to benefit from skilled outpatient physical therapy to address the deficits listed in the problem list box on initial evaluation, provide pt/family education and to maximize pt's level of independence in the home and community environment.     Pt's spiritual, cultural and educational needs considered and pt agreeable to plan of care and goals.     Anticipated barriers to physical therapy:none    Goals: Short Term Goals:  6 weeks MET STG's  Increase passive range of motion 25%  Increase postural awareness to normal  Wean from sling  Be able to perform HEP with minimal cueing required     Long Term Goals: 12 weeks   Increase  range of motion to 75%to 100% of full range MET   Increase strength to 3/5 to 4-/5 (Progressing, not met)  Improve scapular stabilization to normal(Progressing, not met)  Restore ability to drive without increased pain MET   Restore ability to reach fully overhead without increased painMET   Restore ability to reach in all planes without increased pain or difficultyMET   Restore ability to perform ADL's and household activities independently and without increased pain (Progressing, not met) Limited with heavy activities  Pt to be independent with HEP to improve ROM and independence with ADL's (Progressing, not met)    Restore ability to perform lifting activities that she will need to perform for work (Progressing, not met)  Improve shoulder strength to 4+-5/5 (Progressing, not met)    PLAN     Continue with updated POC toward established physical therapy goals.    Yasmine Lala, PTA

## 2023-03-16 NOTE — PROGRESS NOTES
[facility]   Physical Therapy Progress and  Treatment Note     Name: Emily Pelayo  Clinic Number: [id number]    Therapy Diagnosis:   Encounter Diagnoses   Name Primary?    Chronic left shoulder pain Yes    Decreased range of motion     Muscle weakness          Physician: Alisia Olvera FNP    Visit Date: 3/17/2023    Physician Orders: PT Eval and Treat s/p RTC repair, SAD, biceps tenotomy  Medical Diagnosis from Referral:   M75.122 (ICD-10-CM) - Nontraumatic complete tear of rotator cuff, left   S43.432S (ICD-10-CM) - Labral tear of shoulder, left, sequela      Evaluation Date: 12/5/2022     DOS 11-17-22  Authorization Period Expiration: 12-31-22  Plan of Care Expiration: 3-31-23   Progress Note Due: 3-31-23  Visit # / Visits authorized: 17/ 21 (+8 from prior auth in 2022)  FOTO: Issued Visit #: 1     MD Follow up appointment: April 4, 2023     Precautions: Standard and RC precautions    PTA Visit #: 4/5     Time In: 10:18 pt late  Time Out: 11:50  Total Billable Time: 33 minutes     SUBJECTIVE     Pt reports: she had increased shoulder pain due to cold weather at work.  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  Pt states using keloid medicine on scars and skin is itching some No redness noted   Response to previous treatment: felt ok   Functional change: minimal limitations with the shoulder    Pain:  Shoulder pain 0/10 rest 0/10 with movement   at end 0/10    Location: left shoulder      OBJECTIVE        Shoulder ROM: (measured in degrees) 3-17-23  Shoulder  LUE   Flexion 175   Abduction 175   External Rotation neutral 75   External Rotation with abd  90   Internal Rotation 70   Active standing flexion 170    Active standing abduction 170      Muscle Strength 3-6-23  MMT R L   Elbow flexion 5/5 5/5   Elbow extension 5/5 5/5   Shoulder flexion 4+/5 4/5    Shoulder abduction 4+/5 4/5   Shoulder external rotation 4+/5 4+/5   Shoulder internal rotation 5/5 5/5          Shoulder ROM: (measured in degrees supine) initial eval  Shoulder  LUE   Flexion in scaption plane 60   Abduction in scaption plane 45   External Rotation neutral 15   External Rotation with abd  N/T   Internal Rotation Palm to stomach           Pt with very min scapular instability L with elevation and abd with increased protraction, lateral rotation     Muscle Strength 3-17-23  MMT R L   Elbow flexion 5/5 5/5   Elbow extension 5/5 5/5   Shoulder flexion 4+/5 4/5   Shoulder abduction 4+/5 4/5   Shoulder external rotation 5/5 5/5   Shoulder internal rotation 5/5 5/5        Upper trap 5/5 5/5   Middle trap 3+/5 4-/5   Lower trap 3/5 3+/5   Rhomboids 4-/5 4/5         Muscle Strength 2-9-23  MMT R L   Elbow flexion 5/5 4/5   Elbow extension 5/5 4/5   Shoulder flexion 4+/5 3+/5   Shoulder abduction 4+/5 3+/5   Shoulder external rotation 4+/5 3+/5   Shoulder internal rotation 5/5 3+/5         Muscle Strength deferred at IE         Endurance is fair at IE not tested     Special tests: scope sites healed, no redness no swelling      Palpation: no TTP      Joint mobility: not tested        CMS Impairment/Limitation/Restriction for FOTO shoulder Survey     Therapist reviewed FOTO scores for Emily Pelayo   FOTO documents entered into [employer] - see Media section.     Limitation Score: 28% at IE 71%           Treatment     Poppy received the treatments listed below:      Post op week # 12 2-9-23    Pt has letter from MD in chart dated 2-17-23 allowing her to lift up to 20# waist and chest height and need for assistance with overhead lifting.      (NP)Therapeutic activity: Patient participated in dynamic functional therapeutic activities to improve functional performance for 0 minutes including:   Discussed job duties and pt reports though she has 50# lift requirement which she performs with assistance from another  "person. Reports having new letter from MD to specify 25# weight lifting restriction.  Instructed pt in testing load and proper body mechanics and posture with lifting.    (NP) Squat mechanics with box lift (empty box is 12.5#), from 12" to waist height to simulate getting trays at work with 25# box,   Level lift from mat x 25# box x 10 with no problem  Peg board on OT wall 5 pegs move down and up x 3 min     therapeutic exercises to develop endurance, ROM, and core stabilization for 23 minutes including:    (NP) Supine AROM into flexion x 10  (NP)Biceps curls active 2/10 WITH 4#    Blue cord in clinic pt had forgotten her TB   Triceps with Blue tubing x 2/10 bilateral  Pulldown with retraction with RTB x 20   Shoulder Extension with RTB x 20  Retraction with RTB x 20  ER with RTB x 3/10  IR with RTB x 30  ER with RTB at 90/90 2 x 10    AROM  bilateral into flexion x 10 and abduction x 10 (mirror for cues)  Standing with back on wall, L shoulder flexion with 1# weight 2 x 10  Bent over row 6# (knee on 18" box, hand on 24" box) x 20 each  Scapular clocks with YTB loop 2 x 5 round    Possible to add next session: scapular walks with loop at wall, horiz abduction with YTB    Neuromuscular re-education for posture and neuromotor control for 10 minutes including:   Arm lift prone x 10   Horizontal abd standing with RTB     (NP) Row to neutral prone x 2/10 with 2# - tactile cues for proper scapular positioning and motor control  Shoulder ext prone with ER x 2/10 with 0#  - tactile cues for proper scapular positioning and motor control Instructed pt to perform B   Shoulder horiz abd prone neutral x 2/10 with 0#- tactile cues for scap positioning and motor control Instructed pt to perform B    Pt to ice at home today as needed       Patient Education and Home Exercises     Home Exercises Provided and Patient Education Provided     Education provided:   - proper posture   - shoulder girdle anatomy and biomechanics   - " compliance to daily exercises  - HEP    Written Home Exercises Provided: yes Exercises were reviewed and Poppy was able to demonstrate them prior to the end of the session.  Poppy demonstrated good  understanding of the education provided. See EMR under Patient Instructions in Notes section for exercises provided during therapy sessions    ASSESSMENT   Pt has now returned to work and is overcoming initial soreness with return to work in shoulder and in LE's with walking in steel to boots again.  Pt reports she has been able to perform activities with main work for R UE as needed.  Patient demonstrates improvement in range of motion, strength, stabilization and function.  Pt will benefit from continued treatment prior to MD appt to assist pt in achieving highest potential.  Pt continues with difficulty with proper recruitment with scap stab and modified ex today to assist pt in better able to improve scap stabilization.  Pt will benefit from strengthening B UE's due to weakness in scap stab noted on R.        Poppy Is progressing well towards her goals.     Pt prognosis is Good.     Pt will continue to benefit from skilled outpatient physical therapy to address the deficits listed in the problem list box on initial evaluation, provide pt/family education and to maximize pt's level of independence in the home and community environment.     Pt's spiritual, cultural and educational needs considered and pt agreeable to plan of care and goals.     Anticipated barriers to physical therapy:none    Goals: Short Term Goals:  6 weeks MET STG's  Increase passive range of motion 25%  Increase postural awareness to normal  Wean from sling  Be able to perform HEP with minimal cueing required     Long Term Goals: 12 weeks   Increase range of motion to 75%to 100% of full range MET   Increase strength to 3/5 to 4-/5 MET in Shoulder, partially met in scap stab   Improve scapular stabilization to normal(Progressing, not met)  Restore  ability to drive without increased pain MET   Restore ability to reach fully overhead without increased painMET   Restore ability to reach in all planes without increased pain or difficultyMET   Restore ability to perform ADL's and household activities independently and without increased pain (Progressing, not met) Limited with heavy activities  Pt to be independent with HEP to improve ROM and independence with ADL's (Progressing, not met)    Restore ability to perform lifting activities that she will need to perform for work (Progressing, not met)  Improve shoulder strength to 4+-5/5 (Progressing, not met)    PLAN     If you concur, I recommend patient continue with physical therapy 2 times a week  for 2 more weeks prior to next MD appt.  Please advise us of your  recommendations. Thank you for allowing us to assist in the care of your patient.      Essie Nolasco, MS, PT          I certify the need for these services furnished under this plan of treatment and while under my care.    ____________________________________ Physician/Referring Practitioner                                Date of Signature         Essie Nolasco, PT

## 2023-03-17 ENCOUNTER — CLINICAL SUPPORT (OUTPATIENT)
Dept: REHABILITATION | Facility: HOSPITAL | Age: 38
End: 2023-03-17
Payer: COMMERCIAL

## 2023-03-17 DIAGNOSIS — M25.512 CHRONIC LEFT SHOULDER PAIN: Primary | ICD-10-CM

## 2023-03-17 DIAGNOSIS — M25.60 DECREASED RANGE OF MOTION: ICD-10-CM

## 2023-03-17 DIAGNOSIS — M62.81 MUSCLE WEAKNESS: ICD-10-CM

## 2023-03-17 DIAGNOSIS — G89.29 CHRONIC LEFT SHOULDER PAIN: Primary | ICD-10-CM

## 2023-03-17 PROCEDURE — 97112 NEUROMUSCULAR REEDUCATION: CPT | Mod: PN | Performed by: PHYSICAL THERAPIST

## 2023-03-17 PROCEDURE — 97110 THERAPEUTIC EXERCISES: CPT | Mod: PN | Performed by: PHYSICAL THERAPIST

## 2023-03-17 NOTE — PLAN OF CARE
[facility]   Physical Therapy Progress and  Treatment Note     Name: Emily Pelayo  Clinic Number: [id number]    Therapy Diagnosis:   Encounter Diagnoses   Name Primary?    Chronic left shoulder pain Yes    Decreased range of motion     Muscle weakness          Physician: Alisia Olvera FNP    Visit Date: 3/17/2023    Physician Orders: PT Eval and Treat s/p RTC repair, SAD, biceps tenotomy  Medical Diagnosis from Referral:   M75.122 (ICD-10-CM) - Nontraumatic complete tear of rotator cuff, left   S43.432S (ICD-10-CM) - Labral tear of shoulder, left, sequela      Evaluation Date: 12/5/2022     DOS 11-17-22  Authorization Period Expiration: 12-31-22  Plan of Care Expiration: 3-31-23   Progress Note Due: 3-31-23  Visit # / Visits authorized: 17/ 21 (+8 from prior auth in 2022)  FOTO: Issued Visit #: 1     MD Follow up appointment: April 4, 2023     Precautions: Standard and RC precautions    PTA Visit #: 4/5     Time In: 10:18 pt late  Time Out: 11:50  Total Billable Time: 33 minutes     SUBJECTIVE     Pt reports: she had increased shoulder pain due to cold weather at work.  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  Pt states using keloid medicine on scars and skin is itching some No redness noted   Response to previous treatment: felt ok   Functional change: minimal limitations with the shoulder    Pain:  Shoulder pain 0/10 rest 0/10 with movement   at end 0/10    Location: left shoulder      OBJECTIVE        Shoulder ROM: (measured in degrees) 3-17-23  Shoulder  LUE   Flexion 175   Abduction 175   External Rotation neutral 75   External Rotation with abd  90   Internal Rotation 70   Active standing flexion 170    Active standing abduction 170      Muscle Strength 3-6-23  MMT R L   Elbow flexion 5/5 5/5   Elbow extension 5/5 5/5   Shoulder flexion 4+/5 4/5    Shoulder abduction 4+/5 4/5   Shoulder external rotation 4+/5 4+/5   Shoulder internal rotation 5/5 5/5          Shoulder ROM: (measured in degrees supine) initial eval  Shoulder  LUE   Flexion in scaption plane 60   Abduction in scaption plane 45   External Rotation neutral 15   External Rotation with abd  N/T   Internal Rotation Palm to stomach           Pt with very min scapular instability L with elevation and abd with increased protraction, lateral rotation     Muscle Strength 3-17-23  MMT R L   Elbow flexion 5/5 5/5   Elbow extension 5/5 5/5   Shoulder flexion 4+/5 4/5   Shoulder abduction 4+/5 4/5   Shoulder external rotation 5/5 5/5   Shoulder internal rotation 5/5 5/5        Upper trap 5/5 5/5   Middle trap 3+/5 4-/5   Lower trap 3/5 3+/5   Rhomboids 4-/5 4/5         Muscle Strength 2-9-23  MMT R L   Elbow flexion 5/5 4/5   Elbow extension 5/5 4/5   Shoulder flexion 4+/5 3+/5   Shoulder abduction 4+/5 3+/5   Shoulder external rotation 4+/5 3+/5   Shoulder internal rotation 5/5 3+/5         Muscle Strength deferred at IE         Endurance is fair at IE not tested     Special tests: scope sites healed, no redness no swelling      Palpation: no TTP      Joint mobility: not tested        CMS Impairment/Limitation/Restriction for FOTO shoulder Survey     Therapist reviewed FOTO scores for Emily Pelayo   FOTO documents entered into [employer] - see Media section.     Limitation Score: 28% at IE 71%           Treatment     Poppy received the treatments listed below:      Post op week # 12 2-9-23    Pt has letter from MD in chart dated 2-17-23 allowing her to lift up to 20# waist and chest height and need for assistance with overhead lifting.      (NP)Therapeutic activity: Patient participated in dynamic functional therapeutic activities to improve functional performance for 0 minutes including:   Discussed job duties and pt reports though she has 50# lift requirement which she performs with assistance from another  "person. Reports having new letter from MD to specify 25# weight lifting restriction.  Instructed pt in testing load and proper body mechanics and posture with lifting.    (NP) Squat mechanics with box lift (empty box is 12.5#), from 12" to waist height to simulate getting trays at work with 25# box,   Level lift from mat x 25# box x 10 with no problem  Peg board on OT wall 5 pegs move down and up x 3 min     therapeutic exercises to develop endurance, ROM, and core stabilization for 23 minutes including:    (NP) Supine AROM into flexion x 10  (NP)Biceps curls active 2/10 WITH 4#    Blue cord in clinic pt had forgotten her TB   Triceps with Blue tubing x 2/10 bilateral  Pulldown with retraction with RTB x 20   Shoulder Extension with RTB x 20  Retraction with RTB x 20  ER with RTB x 3/10  IR with RTB x 30  ER with RTB at 90/90 2 x 10    AROM  bilateral into flexion x 10 and abduction x 10 (mirror for cues)  Standing with back on wall, L shoulder flexion with 1# weight 2 x 10  Bent over row 6# (knee on 18" box, hand on 24" box) x 20 each  Scapular clocks with YTB loop 2 x 5 round    Possible to add next session: scapular walks with loop at wall, horiz abduction with YTB    Neuromuscular re-education for posture and neuromotor control for 10 minutes including:   Arm lift prone x 10   Horizontal abd standing with RTB     (NP) Row to neutral prone x 2/10 with 2# - tactile cues for proper scapular positioning and motor control  Shoulder ext prone with ER x 2/10 with 0#  - tactile cues for proper scapular positioning and motor control Instructed pt to perform B   Shoulder horiz abd prone neutral x 2/10 with 0#- tactile cues for scap positioning and motor control Instructed pt to perform B    Pt to ice at home today as needed       Patient Education and Home Exercises     Home Exercises Provided and Patient Education Provided     Education provided:   - proper posture   - shoulder girdle anatomy and biomechanics   - " compliance to daily exercises  - HEP    Written Home Exercises Provided: yes Exercises were reviewed and Poppy was able to demonstrate them prior to the end of the session.  Poppy demonstrated good  understanding of the education provided. See EMR under Patient Instructions in Notes section for exercises provided during therapy sessions    ASSESSMENT   Pt has now returned to work and is overcoming initial soreness with return to work in shoulder and in LE's with walking in steel to boots again.  Pt reports she has been able to perform activities with main work for R UE as needed.  Patient demonstrates improvement in range of motion, strength, stabilization and function.  Pt will benefit from continued treatment prior to MD appt to assist pt in achieving highest potential.  Pt continues with difficulty with proper recruitment with scap stab and modified ex today to assist pt in better able to improve scap stabilization.  Pt will benefit from strengthening B UE's due to weakness in scap stab noted on R.        Poppy Is progressing well towards her goals.     Pt prognosis is Good.     Pt will continue to benefit from skilled outpatient physical therapy to address the deficits listed in the problem list box on initial evaluation, provide pt/family education and to maximize pt's level of independence in the home and community environment.     Pt's spiritual, cultural and educational needs considered and pt agreeable to plan of care and goals.     Anticipated barriers to physical therapy:none    Goals: Short Term Goals:  6 weeks MET STG's  Increase passive range of motion 25%  Increase postural awareness to normal  Wean from sling  Be able to perform HEP with minimal cueing required     Long Term Goals: 12 weeks   Increase range of motion to 75%to 100% of full range MET   Increase strength to 3/5 to 4-/5 MET in Shoulder, partially met in scap stab   Improve scapular stabilization to normal(Progressing, not met)  Restore  ability to drive without increased pain MET   Restore ability to reach fully overhead without increased painMET   Restore ability to reach in all planes without increased pain or difficultyMET   Restore ability to perform ADL's and household activities independently and without increased pain (Progressing, not met) Limited with heavy activities  Pt to be independent with HEP to improve ROM and independence with ADL's (Progressing, not met)    Restore ability to perform lifting activities that she will need to perform for work (Progressing, not met)  Improve shoulder strength to 4+-5/5 (Progressing, not met)    PLAN     If you concur, I recommend patient continue with physical therapy 2 times a week  for 2 more weeks prior to next MD appt.  Please advise us of your  recommendations. Thank you for allowing us to assist in the care of your patient.      Essie Nolasco, MS, PT          I certify the need for these services furnished under this plan of treatment and while under my care.    ____________________________________ Physician/Referring Practitioner                                Date of Signature

## 2023-03-20 ENCOUNTER — CLINICAL SUPPORT (OUTPATIENT)
Dept: REHABILITATION | Facility: HOSPITAL | Age: 38
End: 2023-03-20
Payer: COMMERCIAL

## 2023-03-20 DIAGNOSIS — G89.29 CHRONIC LEFT SHOULDER PAIN: Primary | ICD-10-CM

## 2023-03-20 DIAGNOSIS — M25.512 CHRONIC LEFT SHOULDER PAIN: Primary | ICD-10-CM

## 2023-03-20 DIAGNOSIS — M62.81 MUSCLE WEAKNESS: ICD-10-CM

## 2023-03-20 DIAGNOSIS — M25.60 DECREASED RANGE OF MOTION: ICD-10-CM

## 2023-03-20 PROCEDURE — 97112 NEUROMUSCULAR REEDUCATION: CPT | Mod: PN,CQ

## 2023-03-20 PROCEDURE — 97110 THERAPEUTIC EXERCISES: CPT | Mod: PN,CQ

## 2023-03-20 NOTE — PROGRESS NOTES
[facility] OUTPATIENT THERAPY AND WELLNESS   Physical Therapy Progress and  Treatment Note     Name: Emily Pelayo  Clinic Number: [id number]    Therapy Diagnosis:   Encounter Diagnoses   Name Primary?    Chronic left shoulder pain Yes    Decreased range of motion     Muscle weakness          Physician: Alisia Olvera FNP    Visit Date: 3/20/2023    Physician Orders: PT Eval and Treat s/p RTC repair, SAD, biceps tenotomy  Medical Diagnosis from Referral:   M75.122 (ICD-10-CM) - Nontraumatic complete tear of rotator cuff, left   S43.432S (ICD-10-CM) - Labral tear of shoulder, left, sequela      Evaluation Date: 12/5/2022     DOS 11-17-22  Authorization Period Expiration: 12-31-22  Plan of Care Expiration: 3-31-23   Progress Note Due: 3-31-23  Visit # / Visits authorized: 18/ 21 (+8 from prior auth in 2022)  FOTO: Issued Visit #: 1     MD Follow up appointment: April 4, 2023     Precautions: Standard and RC precautions    PTA Visit #: 1/5     Time In: 10:02 pt late  Time Out: 10:46  Total Billable Time: 43 minutes     SUBJECTIVE     Pt reports: she has been working everyday since last week, still adjusting to being back at work full time. Nothing so far at work that she is unable to do, just rebuilding her endurance.  Response to previous treatment: felt pretty good  Functional change: feeling better overall    Pain:  Shoulder pain 0/10 rest 0/10 with movement   at end 0/10    Location: left shoulder      OBJECTIVE     Objective measures not taken this date       Treatment     Poppy received the treatments listed below:      Post op week # 12 2-9-23    Pt has letter from MD in chart dated 2-17-23 allowing her to lift up to 20# waist and chest height and need for assistance with overhead lifting.      (NP)Therapeutic activity: Patient participated in dynamic functional therapeutic activities to improve functional performance for 0 minutes including:   Discussed job duties and pt reports though she has 50# lift requirement  "which she performs with assistance from another person. Reports having new letter from MD to specify 25# weight lifting restriction.  Instructed pt in testing load and proper body mechanics and posture with lifting.    (NP) Squat mechanics with box lift (empty box is 12.5#), from 12" to waist height to simulate getting trays at work with 25# box,   Level lift from mat x 25# box x 10 with no problem  Peg board on OT wall 5 pegs move down and up x 3 min     therapeutic exercises to develop endurance, ROM, and core stabilization for 33 minutes including:    (NP) Supine AROM into flexion x 10  (NP)Biceps curls active 2/10 WITH 4#    Blue cord in clinic pt had forgotten her TB   Triceps with Blue tubing x 2/10 bilateral  Pulldown with retraction with RTB x 20   Shoulder Extension with RTB x 20  Retraction with RTB x 20  ER with RTB x 3/10  IR with RTB x 30  ER with RTB at 90/90 2 x 10    AROM  bilateral into flexion x 10 and abduction x 10 (mirror for cues)  Standing with back on wall, L shoulder flexion and abduction to shoulder height with 2# weight 2 x 10  Bent over row 7# (knee on 18" box, hand on 24" box) x 20 each  Scapular clocks with YTB loop 2 x 5 round    Possible to add next session: scapular walks with loop at wall    Neuromuscular re-education for posture and neuromotor control for 10 minutes including:  Arm lift prone x 10 each, alternating  Horizontal abd standing with RTB     Row to neutral prone x 2/10 with 2# - tactile cues for proper scapular positioning and motor control  Shoulder ext prone with ER x 2/10 with 0#  - tactile cues for proper scapular positioning and motor control Instructed pt to perform B   Shoulder horiz abd prone neutral x 2/10 with 0#- tactile cues for scap positioning and motor control Instructed pt to perform B    Pt to ice at home today as needed       Patient Education and Home Exercises     Home Exercises Provided and Patient Education Provided     Education provided:   - " proper posture   - shoulder girdle anatomy and biomechanics   - compliance to daily exercises  - HEP    Written Home Exercises Provided: yes Exercises were reviewed and Poppy was able to demonstrate them prior to the end of the session.  Poppy demonstrated good  understanding of the education provided. See EMR under Patient Instructions in Notes section for exercises provided during therapy sessions    ASSESSMENT     Pt tolerating progression of exercises well overall with increased resistance, muscle fatigue by end of session but no pain. Able to increase L UE strength and stabilization throughout session, occasional cues given for scapular control and stabilization. Overall most limitations continue due to some difficulty with proper recruitment with scap stab.  Pt will benefit from strengthening B UE's due to weakness in scap stab noted on R.        Poppy Is progressing well towards her goals.     Pt prognosis is Good.     Pt will continue to benefit from skilled outpatient physical therapy to address the deficits listed in the problem list box on initial evaluation, provide pt/family education and to maximize pt's level of independence in the home and community environment.     Pt's spiritual, cultural and educational needs considered and pt agreeable to plan of care and goals.     Anticipated barriers to physical therapy:none    Goals: Short Term Goals:  6 weeks MET STG's  Increase passive range of motion 25%  Increase postural awareness to normal  Wean from sling  Be able to perform HEP with minimal cueing required     Long Term Goals: 12 weeks   Increase range of motion to 75%to 100% of full range MET   Increase strength to 3/5 to 4-/5 MET in Shoulder, partially met in scap stab   Improve scapular stabilization to normal(Progressing, not met)  Restore ability to drive without increased pain MET   Restore ability to reach fully overhead without increased painMET   Restore ability to reach in all planes without  increased pain or difficultyMET   Restore ability to perform ADL's and household activities independently and without increased pain (Progressing, not met) Limited with heavy activities  Pt to be independent with HEP to improve ROM and independence with ADL's (Progressing, not met)    Restore ability to perform lifting activities that she will need to perform for work (Progressing, not met)  Improve shoulder strength to 4+-5/5 (Progressing, not met)    PLAN     Continue with updated POC toward established physical therapy goals.    Yasmine Lala, PTA

## 2023-03-22 ENCOUNTER — CLINICAL SUPPORT (OUTPATIENT)
Dept: REHABILITATION | Facility: HOSPITAL | Age: 38
End: 2023-03-22
Payer: COMMERCIAL

## 2023-03-22 DIAGNOSIS — M25.512 CHRONIC LEFT SHOULDER PAIN: Primary | ICD-10-CM

## 2023-03-22 DIAGNOSIS — M25.60 DECREASED RANGE OF MOTION: ICD-10-CM

## 2023-03-22 DIAGNOSIS — G89.29 CHRONIC LEFT SHOULDER PAIN: Primary | ICD-10-CM

## 2023-03-22 DIAGNOSIS — M62.81 MUSCLE WEAKNESS: ICD-10-CM

## 2023-03-22 PROCEDURE — 97112 NEUROMUSCULAR REEDUCATION: CPT | Mod: PN,CQ

## 2023-03-22 PROCEDURE — 97110 THERAPEUTIC EXERCISES: CPT | Mod: PN,CQ

## 2023-03-22 NOTE — PROGRESS NOTES
[facility]   Physical Therapy Progress and  Treatment Note     Name: Emily Pelayo  Clinic Number: [id number]    Therapy Diagnosis:   Encounter Diagnoses   Name Primary?    Chronic left shoulder pain Yes    Decreased range of motion     Muscle weakness          Physician: Alisia Olvera FNP    Visit Date: 3/22/2023    Physician Orders: PT Eval and Treat s/p RTC repair, SAD, biceps tenotomy  Medical Diagnosis from Referral:   M75.122 (ICD-10-CM) - Nontraumatic complete tear of rotator cuff, left   S43.432S (ICD-10-CM) - Labral tear of shoulder, left, sequela      Evaluation Date: 12/5/2022     DOS 11-17-22  Authorization Period Expiration: 12-31-22  Plan of Care Expiration: 3-31-23   Progress Note Due: 3-31-23  Visit # / Visits authorized: 19/ 21 (+8 from prior auth in 2022)  FOTO: Issued Visit #: 1     MD Follow up appointment: April 4, 2023     Precautions: Standard and RC precautions    PTA Visit #: 2/5     Time In: 8:38 pt late  Time Out: 9:20  Total Billable Time: 42 minutes     SUBJECTIVE     Pt reports: the shoulder has been doing okay with returning to work, still getting used to her work shift but still has soreness into the shoulder.  Response to previous treatment: felt fine   Functional change: doing more with less limitations    Pain:  Shoulder pain 0/10 rest 0/10 with movement   at end 0/10    Location: left shoulder      OBJECTIVE     Objective measures not taken this date       Treatment     Poppy received the treatments listed below:      Post op week # 12 2-9-23    Pt has letter from MD in chart dated 2-17-23 allowing her to lift up to 20# waist and chest height and need for assistance with overhead lifting.      (NP)Therapeutic activity: Patient participated in dynamic functional therapeutic activities to improve functional performance for 0 minutes including:   Discussed job duties and pt reports though she has 50# lift requirement which she performs with assistance from  "another person. Reports having new letter from MD to specify 25# weight lifting restriction.  Instructed pt in testing load and proper body mechanics and posture with lifting.    (NP) Squat mechanics with box lift (empty box is 12.5#), from 12" to waist height to simulate getting trays at work with 25# box,   Level lift from mat x 25# box x 10 with no problem  Peg board on OT wall 5 pegs move down and up x 3 min     therapeutic exercises to develop endurance, ROM, and core stabilization for 32 minutes including:    (NP) Supine AROM into flexion x 10  (NP)Biceps curls active 2/10 WITH 4#    Blue cord in clinic pt had forgotten her TB   Triceps with Blue tubing x 2/10 bilateral  Pulldown with retraction with RTB x 20   Shoulder Extension with RTB x 20  Retraction with RTB x 20 - NP time  ER with RTB x 3/10  IR with RTB x 30 - NP time  ER with RTB at 90/90 2 x 10   Wall pushup x 10    AROM 1# bilateral into flexion x 10 and abduction x 10 (mirror for cues)  (NP) Standing with back on wall, L shoulder flexion and abduction to shoulder height with 2# weight 2 x 10  Bent over row 7# (knee on 18" box, hand on 24" box) x 20 each  Scapular clocks with YTB loop 2 x 5 round    Possible to add next session: scapular walks with loop at wall    Neuromuscular re-education for posture and neuromotor control for 10 minutes including:  Arm lift prone x 15 each, alternating  Shoulder ext (bent over at boxes as prone bothers stomach) with ER x 2/10 with 0#  - tactile cues for proper scapular positioning and motor control Instructed pt to perform B  Shoulder horiz abd (bent over at boxes as prone bothers stomach) neutral x 2/10 with 0#- tactile cues for scap positioning and motor control Instructed pt to perform B    Horizontal abd standing with RTB 2x10 - challenged for scapular stabilization    Pt to ice at home today as needed       Patient Education and Home Exercises     Home Exercises Provided and Patient Education Provided "     Education provided:   - proper posture   - shoulder girdle anatomy and biomechanics   - compliance to daily exercises  - HEP    Written Home Exercises Provided: yes Exercises were reviewed and Poppy was able to demonstrate them prior to the end of the session.  Poppy demonstrated good  understanding of the education provided. See EMR under Patient Instructions in Notes section for exercises provided during therapy sessions    ASSESSMENT     Pt with overall good tolerance to exercise progressions. Posterior shoulder remains most weak with strengthening exercises. No shoulder pain throughout session, only muscle fatigue. Progressed prone exercises to bent over position at boxes to reduce pressure to stomach while prone. L shoulder strength and scapular stability are slowly improving, still requires occasional cues for proper scap stab. Added wall push ups for closed chain strengthening, noted weakness through range of motion. Pt will benefit from strengthening B UE's due to weakness in scap stab noted on R.        Poppy Is progressing well towards her goals.     Pt prognosis is Good.     Pt will continue to benefit from skilled outpatient physical therapy to address the deficits listed in the problem list box on initial evaluation, provide pt/family education and to maximize pt's level of independence in the home and community environment.     Pt's spiritual, cultural and educational needs considered and pt agreeable to plan of care and goals.     Anticipated barriers to physical therapy:none    Goals: Short Term Goals:  6 weeks MET STG's  Increase passive range of motion 25%  Increase postural awareness to normal  Wean from sling  Be able to perform HEP with minimal cueing required     Long Term Goals: 12 weeks   Increase range of motion to 75%to 100% of full range MET   Increase strength to 3/5 to 4-/5 MET in Shoulder, partially met in scap stab   Improve scapular stabilization to normal(Progressing, not  met)  Restore ability to drive without increased pain MET   Restore ability to reach fully overhead without increased painMET   Restore ability to reach in all planes without increased pain or difficultyMET   Restore ability to perform ADL's and household activities independently and without increased pain (Progressing, not met) Limited with heavy activities  Pt to be independent with HEP to improve ROM and independence with ADL's (Progressing, not met)    Restore ability to perform lifting activities that she will need to perform for work (Progressing, not met)  Improve shoulder strength to 4+-5/5 (Progressing, not met)    PLAN     Continue with updated POC toward established physical therapy goals.    Yasmine Lala, PTA

## 2023-03-24 NOTE — PROGRESS NOTES
[facility]   Physical Therapy Treatment Note     Name: Emily Pelayo  Clinic Number: [id number]    Therapy Diagnosis:   Encounter Diagnoses   Name Primary?    Chronic left shoulder pain Yes    Decreased range of motion     Muscle weakness          Physician: Alisia Olvera FNP    Visit Date: 3/27/2023    Physician Orders: PT Eval and Treat s/p RTC repair, SAD, biceps tenotomy  Medical Diagnosis from Referral:   M75.122 (ICD-10-CM) - Nontraumatic complete tear of rotator cuff, left   S43.432S (ICD-10-CM) - Labral tear of shoulder, left, sequela      Evaluation Date: 12/5/2022     DOS 11-17-22  Authorization Period Expiration: 12-31-22  Plan of Care Expiration: 3-31-23   Progress Note Due: 3-31-23  Visit # / Visits authorized: 20/ 21 (+8 from prior auth in 2022)  FOTO: Issued Visit #: 1     MD Follow up appointment: April 4, 2023     Precautions: Standard and RC precautions    PTA Visit #: 2/5     Time In: 8:32  Time Out:9:18  Total Billable Time: 46 minutes     SUBJECTIVE     Pt reports: been working nonstop for 2 weeks and will be off on Thurs.  Pt states having some muscle soreness with increased activity.  Pt states last night she was able to reach overhead and and do her job she was able to perform the activity without pain whereas previously she had some difficulty.  Pt states get stiff with sleep  Response to previous treatment: felt fine   Functional change: doing more with less limitations    Pain:  Shoulder pain 1/10 rest 0/10 with movement   at end 0/10    Location: left shoulder      OBJECTIVE     Full AROM and PROM     Treatment     Poppy received the treatments listed below:      Post op week # 12 2-9-23    Pt has letter from MD in chart dated 2-17-23 allowing her to lift up to 20# waist and chest height and need for assistance with overhead lifting.      Therapeutic activity: Patient participated in dynamic functional therapeutic activities to improve functional performance  "for 15 minutes including:   Discussed job duties and pt reports though she has 50# lift requirement which she performs with assistance from another person. Reports having new letter from MD to specify 25# weight lifting restriction.  Instructed pt in testing load and proper body mechanics and posture with lifting.    (NP) Squat mechanics with box lift (empty box is 12.5#), from 12" to waist height to simulate getting trays at work with 25# box,   Level lift from mat x 25# box x 10 with no problem  Peg board on OT wall 5 pegs move down and up x 3 min   Statue of [place] with green 500gram ball x 1:30 fatigue at end      therapeutic exercises to develop endurance, ROM, and core stabilization for 23 minutes including:    (NP)Biceps curls active 2/10 WITH 4#    Triceps with Blue tubing x 2/10 bilateral  Pulldown with retraction with RTB x 20   (NP)Shoulder Extension with RTB x 20  Retraction with RTB x 20 -  ER with RTB x 3/10  IR with RTB x 30   ER with RTB at 90/9010  (NP)Wall pushup x 10      (NP) Standing with back on wall, L shoulder flexion and abduction to shoulder height with 2# weight 2 x 10  Bent over row 7# (knee on 18" box, hand on 24" box) x 20 each  Scapular clocks with YTB loop 2 x 5 round    Possible to add next session: scapular walks with loop at wall    Neuromuscular re-education for posture and neuromotor control for 8 minutes including:   Rhythmic stab 1 min  Shoulder ext (bent over at boxes as prone bothers stomach) with ER x 2/10 with 0#  - tactile cues for proper scapular positioning and motor control Instructed pt to perform B  Shoulder horiz abd (bent over at boxes as prone bothers stomach) neutral x 2/10 with 0#- tactile cues for scap positioning and motor control Instructed pt to perform B    Horizontal abd standing with RTB 2x10 - challenged for scapular stabilization    Pt to ice at home today as needed       Patient Education and Home Exercises     Home Exercises Provided and Patient " Education Provided     Education provided:   - proper posture   - shoulder girdle anatomy and biomechanics   - compliance to daily exercises  - HEP    Written Home Exercises Provided: yes Exercises were reviewed and Poppy was able to demonstrate them prior to the end of the session.  Poppy demonstrated good  understanding of the education provided. See EMR under Patient Instructions in Notes section for exercises provided during therapy sessions    ASSESSMENT   Pt continues with improved endurance with overhead work activities.  Pt jung treatment well and though fatigue at end no pain in shoulder.  Pt appears to be doing well with functional activities at work and at home.       Poppy Is progressing well towards her goals.     Pt prognosis is Good.     Pt will continue to benefit from skilled outpatient physical therapy to address the deficits listed in the problem list box on initial evaluation, provide pt/family education and to maximize pt's level of independence in the home and community environment.     Pt's spiritual, cultural and educational needs considered and pt agreeable to plan of care and goals.     Anticipated barriers to physical therapy:none    Goals: Short Term Goals:  6 weeks MET STG's  Increase passive range of motion 25%  Increase postural awareness to normal  Wean from sling  Be able to perform HEP with minimal cueing required     Long Term Goals: 12 weeks   Increase range of motion to 75%to 100% of full range MET   Increase strength to 3/5 to 4-/5 MET in Shoulder, partially met in scap stab   Improve scapular stabilization to normal(Progressing, not met)  Restore ability to drive without increased pain MET   Restore ability to reach fully overhead without increased painMET   Restore ability to reach in all planes without increased pain or difficultyMET   Restore ability to perform ADL's and household activities independently and without increased pain (Progressing, not met) Limited with heavy  activities  Pt to be independent with HEP to improve ROM and independence with ADL's (Progressing, not met)    Restore ability to perform lifting activities that she will need to perform for work (Progressing, not met)  Improve shoulder strength to 4+-5/5 (Progressing, not met)    PLAN     Continue with updated POC toward established physical therapy goals.  Reassess next visit prior to MD and should be ready for discharge soon with recommendation to continue with HEP    Essie Nolasco, PT

## 2023-03-27 ENCOUNTER — CLINICAL SUPPORT (OUTPATIENT)
Dept: REHABILITATION | Facility: HOSPITAL | Age: 38
End: 2023-03-27
Payer: COMMERCIAL

## 2023-03-27 DIAGNOSIS — M62.81 MUSCLE WEAKNESS: ICD-10-CM

## 2023-03-27 DIAGNOSIS — G89.29 CHRONIC LEFT SHOULDER PAIN: Primary | ICD-10-CM

## 2023-03-27 DIAGNOSIS — M25.512 CHRONIC LEFT SHOULDER PAIN: Primary | ICD-10-CM

## 2023-03-27 DIAGNOSIS — M25.60 DECREASED RANGE OF MOTION: ICD-10-CM

## 2023-03-27 PROCEDURE — 97530 THERAPEUTIC ACTIVITIES: CPT | Mod: PN | Performed by: PHYSICAL THERAPIST

## 2023-03-27 PROCEDURE — 97110 THERAPEUTIC EXERCISES: CPT | Mod: PN | Performed by: PHYSICAL THERAPIST

## 2023-03-27 PROCEDURE — 97112 NEUROMUSCULAR REEDUCATION: CPT | Mod: PN | Performed by: PHYSICAL THERAPIST

## 2023-03-29 NOTE — PROGRESS NOTES
[facility]   Physical Therapy Progress and Treatment Note     Name: Emily Pelayo  Clinic Number: [id number]    Therapy Diagnosis:   Encounter Diagnoses   Name Primary?    Chronic left shoulder pain Yes    Decreased range of motion     Muscle weakness          Physician: Alisia Olvera FNP    Visit Date: 3/30/2023    Physician Orders: PT Eval and Treat s/p RTC repair, SAD, biceps tenotomy  Medical Diagnosis from Referral:   M75.122 (ICD-10-CM) - Nontraumatic complete tear of rotator cuff, left   S43.432S (ICD-10-CM) - Labral tear of shoulder, left, sequela      Evaluation Date: 12/5/2022     DOS 11-17-22  Authorization Period Expiration: 12-31-22  Plan of Care Expiration: 3-31-23   Progress Note Due: 3-31-23  Visit # / Visits authorized: 21/ 25 (+8 from prior auth in 2022)  FOTO: Issued Visit #: 1     MD Follow up appointment: April 4, 2023     Precautions: Standard and RC precautions    PTA Visit #: 2/5     Time In: 12:56  Time Out: 1:10  Total Billable Time: 14 minutes     SUBJECTIVE     Pt reports: shoulder is doing fine Pt states she used ice once due to a lot of work and exercise with PT   Response to previous treatment: felt fine   Functional change: doing more with less limitations    Pain:  Shoulder pain 0/10 rest 0/10 with movement  at worst 2/10 at best 0/10  at end 0/10  Initial eval Current 2/10, worst 8/10, best 2/10   Location: left shoulder      OBJECTIVE     Shoulder ROM: (measured in degrees) 3-30-23  Shoulder  LUE   Flexion 175   Abduction 175   External Rotation neutral 75   External Rotation with abd  90   Internal Rotation 70   Active standing flexion 175    Active standing abduction 175          Shoulder ROM: (measured in degrees supine) initial eval  AROM not tested  Shoulder  LUE   Flexion in scaption plane 60   Abduction in scaption plane 45   External Rotation neutral 15   External Rotation with abd  N/T   Internal Rotation Palm to stomach      Pt with very min  "scapular instability L with elevation and abd with increased protraction, lateral rotation      Muscle Strength 3-30-23  MMT R L   Elbow flexion 5/5 5/5   Elbow extension 5/5 5/5   Shoulder flexion 4+/5 4/5   Shoulder abduction 5/5 4+/5   Shoulder external rotation 5/5 5/5   Shoulder internal rotation 5/5 5/5           Upper trap 5/5 5/5   Middle trap 4-/5 4-/5   Lower trap 4-/5 4-/5   Rhomboids 4/5 4/5         Muscle Strength 2-9-23  MMT R L   Elbow flexion 5/5 4/5   Elbow extension 5/5 4/5   Shoulder flexion 4+/5 3+/5   Shoulder abduction 4+/5 3+/5   Shoulder external rotation 4+/5 3+/5   Shoulder internal rotation 5/5 3+/5         Muscle Strength deferred at IE         Endurance is good at IE not tested     Special tests: scope sites healed, no redness no swelling      Palpation: no TTP      Joint mobility: not tested        CMS Impairment/Limitation/Restriction for FOTO shoulder Survey     Therapist reviewed FOTO scores for Emily Pelayo   FOTO documents entered into [employer] - see Media section.     Limitation Score: 28% at IE 71%                Treatment     Poppy received the treatments listed below:      Post op week # 12 2-9-23    Pt has letter from MD in chart dated 2-17-23 allowing her to lift up to 20# waist and chest height and need for assistance with overhead lifting.    Pt received Rx from DANILO Lala PTA   (NP)Therapeutic activity: Patient participated in dynamic functional therapeutic activities to improve functional performance for 0 minutes including:   Discussed job duties and pt reports though she has 50# lift requirement which she performs with assistance from another person. Reports having new letter from MD to specify 25# weight lifting restriction.  Instructed pt in testing load and proper body mechanics and posture with lifting.    (NP) Squat mechanics with box lift (empty box is 12.5#), from 12" to waist height to simulate getting trays at work with 25# box,   Level lift from mat x 25# box x 10 " "with no problem  Peg board on OT wall 5 pegs move down and up x 3 min     therapeutic exercises to develop endurance, ROM, and core stabilization for 14 minutes including:  Reassessment     Instructed pt that as MD removes lifting restrictions she should gradually progress with weighted activities as tolerated and within restrictions of MD    Verbal review to continue with HEP  (NP) Supine AROM into flexion x 10  (NP)Biceps curls active 2/10 WITH 4#    Blue cord in clinic pt had forgotten her TB   Triceps with Blue tubing x 2/10 bilateral  Pulldown with retraction with RTB x 20   Shoulder Extension with RTB x 20  Retraction with RTB x 20 - NP time  ER with RTB x 3/10  IR with RTB x 30 - NP time  ER with RTB at 90/90 2 x 10   Wall pushup x 10    AROM 1# bilateral into flexion x 10 and abduction x 10 (mirror for cues)  (NP) Standing with back on wall, L shoulder flexion and abduction to shoulder height with 2# weight 2 x 10  Bent over row 7# (knee on 18" box, hand on 24" box) x 20 each  Scapular clocks with YTB loop 2 x 5 round    Possible to add next session: scapular walks with loop at wall    (NP)Neuromuscular re-education for posture and neuromotor control for 0 minutes including:  Arm lift prone x 15 each, alternating  Shoulder ext (bent over at boxes as prone bothers stomach) with ER x 2/10 with 0#  - tactile cues for proper scapular positioning and motor control Instructed pt to perform B  Shoulder horiz abd (bent over at boxes as prone bothers stomach) neutral x 2/10 with 0#- tactile cues for scap positioning and motor control Instructed pt to perform B    Horizontal abd standing with RTB 2x10 - challenged for scapular stabilization    Pt to ice at home today as needed       Patient Education and Home Exercises     Home Exercises Provided and Patient Education Provided     Education provided:   - proper posture   - shoulder girdle anatomy and biomechanics   - compliance to daily exercises  - HEP and " continuation for wellness    Written Home Exercises Provided: continue with prior HEP Exercises were reviewed and Poppy was able to demonstrate them prior to the end of the session.  Poppy demonstrated good  understanding of the education provided. See EMR under Patient Instructions in Notes section for exercises provided during therapy sessions    ASSESSMENT   Patient demonstrates improvement in range of motion, strength, stabilization and function.    Pt has returned to work and performing all ADL within restrictions of MD.  Pt should be able to safely gradually progress with lifting capabilities independently as MD deems appropriate. Pt appears to understand to gradually progress with weight to tolerance and to stay within MD restrictions  Patient appears independent in the prescribed HEP and ready for discharge after nearly fully achieving the established goals.        Goals: Short Term Goals:  6 weeks MET STG's  Increase passive range of motion 25%  Increase postural awareness to normal  Wean from sling  Be able to perform HEP with minimal cueing required     Long Term Goals: 12 weeks   Increase range of motion to 75%to 100% of full range MET   Increase strength to 3/5 to 4-/5 MET i  Improve scapular stabilization to normal Nearly met  Restore ability to drive without increased pain MET   Restore ability to reach fully overhead without increased painMET   Restore ability to reach in all planes without increased pain or difficultyMET   Restore ability to perform ADL's and household activities independently and without increased pain MET  Pt to be independent with HEP to improve ROM and independence with ADL's MET    Restore ability to perform lifting activities that she will need to perform for work MET  Improve shoulder strength to 4+-5/5 Nearly met for shoulder    PLAN     If you concur, I recommend patient be discharged from physical therapy to continue with HEP.   Please advise us of your findings and  recommendations. Thank you for allowing us to assist in the care of your patient.       Essie Nolasco, PT    I certify the need for these services furnished under this plan of treatment and while under my care.    ____________________________________ Physician/Referring Practitioner                                  Date of Signature

## 2023-03-30 ENCOUNTER — CLINICAL SUPPORT (OUTPATIENT)
Dept: REHABILITATION | Facility: HOSPITAL | Age: 38
End: 2023-03-30
Payer: COMMERCIAL

## 2023-03-30 DIAGNOSIS — G89.29 CHRONIC LEFT SHOULDER PAIN: Primary | ICD-10-CM

## 2023-03-30 DIAGNOSIS — M62.81 MUSCLE WEAKNESS: ICD-10-CM

## 2023-03-30 DIAGNOSIS — M25.512 CHRONIC LEFT SHOULDER PAIN: Primary | ICD-10-CM

## 2023-03-30 DIAGNOSIS — M25.60 DECREASED RANGE OF MOTION: ICD-10-CM

## 2023-03-30 PROCEDURE — 97110 THERAPEUTIC EXERCISES: CPT | Mod: PN | Performed by: PHYSICAL THERAPIST

## 2023-03-30 PROCEDURE — 97110 THERAPEUTIC EXERCISES: CPT | Mod: PN,CQ

## 2023-03-30 NOTE — PROGRESS NOTES
[facility]   Physical Therapy Treatment Note     Name: Emily Pelayo  Clinic Number: [id number]    Therapy Diagnosis:   Encounter Diagnoses   Name Primary?    Chronic left shoulder pain Yes    Decreased range of motion     Muscle weakness          Physician: Alisia Olvera FNP    Visit Date: 3/30/2023    Physician Orders: PT Eval and Treat s/p RTC repair, SAD, biceps tenotomy  Medical Diagnosis from Referral:   M75.122 (ICD-10-CM) - Nontraumatic complete tear of rotator cuff, left   S43.432S (ICD-10-CM) - Labral tear of shoulder, left, sequela      Evaluation Date: 12/5/2022     DOS 11-17-22  Authorization Period Expiration: 12-31-23  Plan of Care Expiration: 3-31-23   Progress Note Due: 3-31-23  Visit # / Visits authorized: 21/ 25 (+8 from prior auth in 2022)  FOTO: Issued Visit #: 1     MD Follow up appointment: April 4, 2023     Precautions: Standard and RC precautions    PTA Visit #: 1/5     Time In: 1:12 (Took over pt care from WILLIAM Nolasco, PT)  Time Out: 1:45  Total Billable Time: 32 minutes     SUBJECTIVE     Pt reports: been working nonstop for 2 weeks and will be off on Thurs.  Pt states having some muscle soreness with increased activity.  Pt states last night she was able to reach overhead and and do her job she was able to perform the activity without pain whereas previously she had some difficulty.  Pt states get stiff with sleep  Response to previous treatment: felt fine   Functional change: doing more with less limitations    Pain:  Shoulder pain 1/10 rest 0/10 with movement   at end 0/10    Location: left shoulder      OBJECTIVE     Full AROM and PROM     Treatment     Poppy received the treatments listed below:      Post op week # 12 2-9-23    Pt has letter from MD in chart dated 2-17-23 allowing her to lift up to 20# waist and chest height and need for assistance with overhead lifting.      Therapeutic activity: Patient participated in dynamic functional therapeutic  "activities to improve functional performance for 05 minutes including:   Discussed job duties and pt reports though she has 50# lift requirement which she performs with assistance from another person. Reports having new letter from MD to specify 25# weight lifting restriction.  Reviewed instructions in testing load and proper body mechanics and posture with lifting, no matter the weight of the object.    Peg board on OT wall 5 pegs move down and up x 3 min  Statue of [place] with green 500gram ball x 1:30 fatigue at end   (NP) Squat mechanics with box lift (empty box is 12.5#), from 12" to waist height to simulate getting trays at work with 25# box,   (NP) Level lift from mat x 25# box x 10 with no problem     therapeutic exercises to develop endurance, ROM, and core stabilization for 23 minutes including:    (NP)Biceps curls active 2/10 WITH 4#    Triceps with Blue tubing x 2/10 bilateral  Pulldown with retraction with RTB x 20   (NP)Shoulder Extension with RTB x 20  Retraction with RTB x 20 -  ER with RTB x 3/10  IR with RTB x 30   ER with RTB at 90/9010  (NP)Wall pushup x 10      (NP) Standing with back on wall, L shoulder flexion and abduction to shoulder height with 2# weight 2 x 10  Scapular clocks with YTB loop 2 x 5 round  Scapular walk at wall with Y loop, elbows straight x 2 laps  Bent over row 7# (knee on 18" box, hand on 24" box) x 20 each  Shoulder ext (bent over at boxes as prone bothers stomach) with ER x 2/10 with 0#  - tactile cues for proper scapular positioning and motor control Instructed pt to perform B  Shoulder horiz abd (bent over at boxes as prone bothers stomach) neutral x 2/10 with 0#- tactile cues for scap positioning and motor control Instructed pt to perform B    Horizontal abd standing with RTB 2x10 - challenged for scapular stabilization      Pt to ice at home today as needed       Patient Education and Home Exercises     Home Exercises Provided and Patient Education Provided "     Education provided:   - proper posture   - shoulder girdle anatomy and biomechanics   - compliance to daily exercises  - HEP    Written Home Exercises Provided: yes Exercises were reviewed and Poppy was able to demonstrate them prior to the end of the session.  Poppy demonstrated good  understanding of the education provided. See EMR under Patient Instructions in Notes section for exercises provided during therapy sessions    ASSESSMENT   Pt tolerated completed exercises very well without pain or discomfort, only fatigue with overhead activities such as statue of [place] carries and peg board. Patient understands good lifting mechanics. Patient improving with endurance and understands need for continued work at home to further improve overall strength, endurance, and mobility.      Poppy Is progressing well towards her goals.     Pt prognosis is Good.     Pt's spiritual, cultural and educational needs considered and pt agreeable to plan of care and goals.     Anticipated barriers to physical therapy:none    Goals: Short Term Goals:  6 weeks MET STG's  Increase passive range of motion 25%  Increase postural awareness to normal  Wean from sling  Be able to perform HEP with minimal cueing required     Long Term Goals: 12 weeks   Increase range of motion to 75%to 100% of full range MET   Increase strength to 3/5 to 4-/5 MET in Shoulder, partially met in scap stab   Improve scapular stabilization to normal(Progressing, not met)  Restore ability to drive without increased pain MET   Restore ability to reach fully overhead without increased painMET   Restore ability to reach in all planes without increased pain or difficultyMET   Restore ability to perform ADL's and household activities independently and without increased pain (Progressing, not met) Limited with heavy activities  Pt to be independent with HEP to improve ROM and independence with ADL's (Progressing, not met)    Restore ability to perform lifting activities  that she will need to perform for work (Progressing, not met)  Improve shoulder strength to 4+-5/5 (Progressing, not met)    PLAN     See PT documentation for today's session for Plan.    Yasmine Lala, PTA

## 2023-03-31 NOTE — PLAN OF CARE
[facility]   Physical Therapy Progress and Treatment Note     Name: Emily Pelayo  Clinic Number: [id number]    Therapy Diagnosis:   Encounter Diagnoses   Name Primary?    Chronic left shoulder pain Yes    Decreased range of motion     Muscle weakness          Physician: Steven Zarate MD Gill, Erin E., FNP    Visit Date: 3/30/2023    Physician Orders: PT Eval and Treat s/p RTC repair, SAD, biceps tenotomy  Medical Diagnosis from Referral:   M75.122 (ICD-10-CM) - Nontraumatic complete tear of rotator cuff, left   S43.432S (ICD-10-CM) - Labral tear of shoulder, left, sequela      Evaluation Date: 12/5/2022     DOS 11-17-22  Authorization Period Expiration: 12-31-22  Plan of Care Expiration: 3-31-23   Progress Note Due: 3-31-23  Visit # / Visits authorized: 21/ 25 (+8 from prior auth in 2022)  FOTO: Issued Visit #: 1     MD Follow up appointment: April 4, 2023     Precautions: Standard and RC precautions    PTA Visit #: 2/5     Time In: 12:56  Time Out: 1:10  Total Billable Time: 14 minutes     SUBJECTIVE     Pt reports: shoulder is doing fine Pt states she used ice once due to a lot of work and exercise with PT   Response to previous treatment: felt fine   Functional change: doing more with less limitations    Pain:  Shoulder pain 0/10 rest 0/10 with movement  at worst 2/10 at best 0/10  at end 0/10  Initial eval Current 2/10, worst 8/10, best 2/10   Location: left shoulder      OBJECTIVE     Shoulder ROM: (measured in degrees) 3-30-23  Shoulder  LUE   Flexion 175   Abduction 175   External Rotation neutral 75   External Rotation with abd  90   Internal Rotation 70   Active standing flexion 175    Active standing abduction 175          Shoulder ROM: (measured in degrees supine) initial eval  AROM not tested  Shoulder  LUE   Flexion in scaption plane 60   Abduction in scaption plane 45   External Rotation neutral 15   External Rotation with abd  N/T   Internal Rotation Palm to stomach  "     Pt with very min scapular instability L with elevation and abd with increased protraction, lateral rotation      Muscle Strength 3-30-23  MMT R L   Elbow flexion 5/5 5/5   Elbow extension 5/5 5/5   Shoulder flexion 4+/5 4/5   Shoulder abduction 5/5 4+/5   Shoulder external rotation 5/5 5/5   Shoulder internal rotation 5/5 5/5           Upper trap 5/5 5/5   Middle trap 4-/5 4-/5   Lower trap 4-/5 4-/5   Rhomboids 4/5 4/5         Muscle Strength 2-9-23  MMT R L   Elbow flexion 5/5 4/5   Elbow extension 5/5 4/5   Shoulder flexion 4+/5 3+/5   Shoulder abduction 4+/5 3+/5   Shoulder external rotation 4+/5 3+/5   Shoulder internal rotation 5/5 3+/5         Muscle Strength deferred at IE         Endurance is good at IE not tested     Special tests: scope sites healed, no redness no swelling      Palpation: no TTP      Joint mobility: not tested        CMS Impairment/Limitation/Restriction for FOTO shoulder Survey     Therapist reviewed FOTO scores for Emily Pelayo   FOTO documents entered into [employer] - see Media section.     Limitation Score: 28% at IE 71%                Treatment     Poppy received the treatments listed below:      Post op week # 12 2-9-23    Pt has letter from MD in chart dated 2-17-23 allowing her to lift up to 20# waist and chest height and need for assistance with overhead lifting.    Pt received Rx from DANILO Lala PTA   (NP)Therapeutic activity: Patient participated in dynamic functional therapeutic activities to improve functional performance for 0 minutes including:   Discussed job duties and pt reports though she has 50# lift requirement which she performs with assistance from another person. Reports having new letter from MD to specify 25# weight lifting restriction.  Instructed pt in testing load and proper body mechanics and posture with lifting.    (NP) Squat mechanics with box lift (empty box is 12.5#), from 12" to waist height to simulate getting trays at work with 25# box,   Level lift " "from mat x 25# box x 10 with no problem  Peg board on OT wall 5 pegs move down and up x 3 min     therapeutic exercises to develop endurance, ROM, and core stabilization for 14 minutes including:  Reassessment     Instructed pt that as MD removes lifting restrictions she should gradually progress with weighted activities as tolerated and within restrictions of MD    Verbal review to continue with HEP  (NP) Supine AROM into flexion x 10  (NP)Biceps curls active 2/10 WITH 4#    Blue cord in clinic pt had forgotten her TB   Triceps with Blue tubing x 2/10 bilateral  Pulldown with retraction with RTB x 20   Shoulder Extension with RTB x 20  Retraction with RTB x 20 - NP time  ER with RTB x 3/10  IR with RTB x 30 - NP time  ER with RTB at 90/90 2 x 10   Wall pushup x 10    AROM 1# bilateral into flexion x 10 and abduction x 10 (mirror for cues)  (NP) Standing with back on wall, L shoulder flexion and abduction to shoulder height with 2# weight 2 x 10  Bent over row 7# (knee on 18" box, hand on 24" box) x 20 each  Scapular clocks with YTB loop 2 x 5 round    Possible to add next session: scapular walks with loop at wall    (NP)Neuromuscular re-education for posture and neuromotor control for 0 minutes including:  Arm lift prone x 15 each, alternating  Shoulder ext (bent over at boxes as prone bothers stomach) with ER x 2/10 with 0#  - tactile cues for proper scapular positioning and motor control Instructed pt to perform B  Shoulder horiz abd (bent over at boxes as prone bothers stomach) neutral x 2/10 with 0#- tactile cues for scap positioning and motor control Instructed pt to perform B    Horizontal abd standing with RTB 2x10 - challenged for scapular stabilization    Pt to ice at home today as needed       Patient Education and Home Exercises     Home Exercises Provided and Patient Education Provided     Education provided:   - proper posture   - shoulder girdle anatomy and biomechanics   - compliance to daily " exercises  - HEP and continuation for wellness    Written Home Exercises Provided: continue with prior HEP Exercises were reviewed and Poppy was able to demonstrate them prior to the end of the session.  Poppy demonstrated good  understanding of the education provided. See EMR under Patient Instructions in Notes section for exercises provided during therapy sessions    ASSESSMENT   Patient demonstrates improvement in range of motion, strength, stabilization and function.    Pt has returned to work and performing all ADL within restrictions of MD.  Pt should be able to safely gradually progress with lifting capabilities independently as MD deems appropriate. Pt appears to understand to gradually progress with weight to tolerance and to stay within MD restrictions  Patient appears independent in the prescribed HEP and ready for discharge after nearly fully achieving the established goals.        Goals: Short Term Goals:  6 weeks MET STG's  Increase passive range of motion 25%  Increase postural awareness to normal  Wean from sling  Be able to perform HEP with minimal cueing required     Long Term Goals: 12 weeks   Increase range of motion to 75%to 100% of full range MET   Increase strength to 3/5 to 4-/5 MET i  Improve scapular stabilization to normal Nearly met  Restore ability to drive without increased pain MET   Restore ability to reach fully overhead without increased painMET   Restore ability to reach in all planes without increased pain or difficultyMET   Restore ability to perform ADL's and household activities independently and without increased pain MET  Pt to be independent with HEP to improve ROM and independence with ADL's MET    Restore ability to perform lifting activities that she will need to perform for work MET  Improve shoulder strength to 4+-5/5 Nearly met for shoulder    PLAN     If you concur, I recommend patient be discharged from physical therapy to continue with HEP.   Please advise us of your  findings and recommendations. Thank you for allowing us to assist in the care of your patient.       Essie Nolasco, PT    I certify the need for these services furnished under this plan of treatment and while under my care.    ____________________________________ Physician/Referring Practitioner                                  Date of Signature

## 2023-04-04 ENCOUNTER — OFFICE VISIT (OUTPATIENT)
Dept: ORTHOPEDICS | Facility: CLINIC | Age: 38
End: 2023-04-04
Payer: COMMERCIAL

## 2023-04-04 VITALS — RESPIRATION RATE: 16 BRPM | HEIGHT: 69 IN | WEIGHT: 210 LBS | BODY MASS INDEX: 31.1 KG/M2

## 2023-04-04 DIAGNOSIS — M75.122 NONTRAUMATIC COMPLETE TEAR OF ROTATOR CUFF, LEFT: ICD-10-CM

## 2023-04-04 DIAGNOSIS — Z98.890 S/P ROTATOR CUFF REPAIR: Primary | ICD-10-CM

## 2023-04-04 PROCEDURE — 3008F BODY MASS INDEX DOCD: CPT | Mod: CPTII,S$GLB,, | Performed by: ORTHOPAEDIC SURGERY

## 2023-04-04 PROCEDURE — 99213 PR OFFICE/OUTPT VISIT, EST, LEVL III, 20-29 MIN: ICD-10-PCS | Mod: S$GLB,,, | Performed by: ORTHOPAEDIC SURGERY

## 2023-04-04 PROCEDURE — 99213 OFFICE O/P EST LOW 20 MIN: CPT | Mod: S$GLB,,, | Performed by: ORTHOPAEDIC SURGERY

## 2023-04-04 PROCEDURE — 99999 PR PBB SHADOW E&M-EST. PATIENT-LVL III: CPT | Mod: PBBFAC,,, | Performed by: ORTHOPAEDIC SURGERY

## 2023-04-04 PROCEDURE — 3008F PR BODY MASS INDEX (BMI) DOCUMENTED: ICD-10-PCS | Mod: CPTII,S$GLB,, | Performed by: ORTHOPAEDIC SURGERY

## 2023-04-04 PROCEDURE — 99999 PR PBB SHADOW E&M-EST. PATIENT-LVL III: ICD-10-PCS | Mod: PBBFAC,,, | Performed by: ORTHOPAEDIC SURGERY

## 2023-04-04 NOTE — PROGRESS NOTES
Post-op Note    HPI    Emily Pelayo is here 4 months s/p the following procedure:     PROCEDURE:       Left shoulder arthroscopic rotator cuff repair  Left shoulder arthroscopic biceps tenotomy  Extensive debridement of the left shoulder arthroscopically with debridement of labrum, bursa rotator interval  Subacromial decompression with acromioplasty left shoulder    Overall doing very well.  Pain 0/10 today.  She has finished physical therapy.  She is back to work without any limitation.  She is happy with her progress thus far.  Still working on strengthening which she knows will take a bed.        Physical Exam:     Patient is alert and oriented no acute distress.   Assistive Device:  None    Portal site Incision(s) are well healed.  There is no evidence of dehiscence.  There is no induration erythema or signs of infection.  No soft tissue swelling.  Compartments are soft and compressible.  Warm well-perfused extremity.  External rotation at the side 60.  Passive forward flexion 180.  Abduction 170.  Strength 4+/5 in all planes.        Assessment    Emily Pelayo is 4 months Post-op     Plan:    Overall doing very well.  We discussed continuation of home exercise program.  We discussed that strengthening can take up to a year for full improvement.  She is back to work and happy with her progress.  From my standpoint she can follow up as needed.  She has no restrictions from my standpoint.

## 2023-04-06 ENCOUNTER — LAB VISIT (OUTPATIENT)
Dept: LAB | Facility: HOSPITAL | Age: 38
End: 2023-04-06
Payer: COMMERCIAL

## 2023-04-06 DIAGNOSIS — E06.3 HYPOTHYROIDISM DUE TO HASHIMOTO'S THYROIDITIS: ICD-10-CM

## 2023-04-06 DIAGNOSIS — E03.8 HYPOTHYROIDISM DUE TO HASHIMOTO'S THYROIDITIS: ICD-10-CM

## 2023-04-06 LAB
T4 FREE SERPL-MCNC: 1.32 NG/DL (ref 0.71–1.51)
TSH SERPL DL<=0.005 MIU/L-ACNC: 0.79 UIU/ML (ref 0.4–4)

## 2023-04-06 PROCEDURE — 36415 COLL VENOUS BLD VENIPUNCTURE: CPT | Mod: PO | Performed by: PHYSICIAN ASSISTANT

## 2023-04-06 PROCEDURE — 84443 ASSAY THYROID STIM HORMONE: CPT | Performed by: PHYSICIAN ASSISTANT

## 2023-04-06 PROCEDURE — 84439 ASSAY OF FREE THYROXINE: CPT | Performed by: PHYSICIAN ASSISTANT

## 2023-05-03 NOTE — PROGRESS NOTES
35 yo BF presents for annual gyn evaluation. Pt without complaint with exception known uterine prolapse and occasional yeast vaginitis.  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    PCOS (polycystic ovarian syndrome)     Thyroid disease        Past Surgical History:   Procedure Laterality Date    bladder suspension      dx lap      hammer toe Right 11/19/2015    heavenly toe repeair with callus filing    TONSILLECTOMY         Family History   Problem Relation Age of Onset    Thyroid disease Mother         hyperthyroidism    Hypertension Mother     Hypertension Sister     Heart disease Maternal Grandmother     Ovarian cancer Neg Hx     Breast cancer Neg Hx     Eczema Neg Hx     Lupus Neg Hx     Psoriasis Neg Hx     Melanoma Neg Hx     Cancer Neg Hx        Social History     Socioeconomic History    Marital status: Single     Spouse name: Not on file    Number of children: Not on file    Years of education: Not on file    Highest education level: Not on file   Occupational History    Not on file   Social Needs    Financial resource strain: Somewhat hard    Food insecurity:     Worry: Never true     Inability: Never true    Transportation needs:     Medical: No     Non-medical: No   Tobacco Use    Smoking status: Never Smoker    Smokeless tobacco: Never Used   Substance and Sexual Activity    Alcohol use: No     Frequency: Monthly or less     Drinks per session: 1 or 2     Binge frequency: Never    Drug use: No    Sexual activity: Yes     Partners: Male     Birth control/protection: OCP     Comment: OCP helps regulate cycle   Lifestyle    Physical activity:     Days per week: 4 days     Minutes per session: 60 min    Stress: Only a little   Relationships    Social connections:     Talks on phone: Once a week     Gets together: Never     Attends [employer] service: Not on file     Active member of club or  organization: Yes     Attends meetings of clubs or organizations: More than 4 times per year     Relationship status: Never    Other Topics Concern    Are you pregnant or think you may be? Not Asked    Breast-feeding Not Asked   Social History Narrative    Not on file       Current Outpatient Medications   Medication Sig Dispense Refill    fluocinonide (LIDEX) 0.05 % external solution APPLY THIN FILM TO SCALP EVERY NIGHT AT BEDTIME AS NEEDED FOR ITCHING OR SCALING 60 mL 0    ketoconazole (NIZORAL) 2 % shampoo LATHER SCALP, LET SIT 5 MIN, RINSE WELL. USE 2X WEEKLY 120 mL 2    levonorgestrel-ethinyl estradiol (MARLISSA, 28,) 0.15-0.03 mg per tablet Take 1 tablet by mouth once daily. 84 tablet 0    levothyroxine (SYNTHROID) 75 MCG tablet TAKE 1 TABLET (75 MCG TOTAL) BY MOUTH BEFORE BREAKFAST DAILY 90 tablet 3    ASCORBATE CALCIUM (VITAMIN C ORAL) Take by mouth.      cholecalciferol, vitamin D3, (VITAMIN D3) 2,000 unit Tab Take 2,000 Units by mouth once daily.      CITRANATAL HARMONY, IRON FUM, 27 mg iron-1 mg -50 mg-260 mg Cap TAKE 1 CAPSULE BY MOUTH ONCE DAILY. 100 capsule 0     No current facility-administered medications for this visit.        Review of patient's allergies indicates:  No Known Allergies    Review of System:   General: no chills, fever, night sweats, weight gain or weight loss  Psychological: no depression or suicidal ideation  Breasts: no new or changing breast lumps, nipple discharge or masses.  Respiratory: no cough, shortness of breath, or wheezing  Cardiovascular: no chest pain or dyspnea on exertion  Gastrointestinal: no abdominal pain, change in bowel habits, or black or bloody stools  Genito-Urinary: no incontinence, urinary frequency/urgency or vulvar/vaginal symptoms, pelvic pain or abnormal vaginal bleeding.  Musculoskeletal: no gait disturbance or muscular weakness                                              General Appearance    A and O x 4, Cooperative, no distress    Breasts    Abdomen   Symmetrical, no masses, no discharge, skin changes , erythema or retraction. No adenopathy  Soft, non-tender, bowel sounds active all four quadrants,  no masses, no organomegaly    Genitourinary:   External rectal exam shows no thrombosed external hemorrhoids.   Pelvic exam was performed with patient supine.  No labial fusion.  There is no rash, lesion or injury on the right labia.  There is no rash, lesion or injury on the left labia.  No bleeding and no signs of injury around the vaginal introitus, urethra is without lesions and well supported. The cervix is visualized with no discharge, lesions or friability.  No vaginal discharge found.  No significant Cystocele, Enterocele or rectocele, and uterus prolapsed grade 1  Bimanual exam:  The urethra is normal to palpation and there are no palpable vaginal wall masses.  Uterus is not deviated, not enlarged, not fixed, normal shape and not tender.  Cervix exhibits no motion tenderness.   Right adnexum displays no mass and no tenderness.  Left adnexum displays no mass and no tenderness.   Extremities: Extremities normal, atraumatic, no cyanosis or edema                       PAP submitted    Plan Discussed prolapse and supportive measures          Will treat yeast prn          Continue OCPs   RTO 1 year/prn  At the end of patient visit, nurse and MD both asked pt if any improvements needed in visit experience and asked whether any further pt questions or concerns.   [facility]

## 2023-05-22 NOTE — PROGRESS NOTES
[facility] OUTPATIENT THERAPY AND WELLNESS   Physical Therapy Progress and  Treatment Note     Name: Emily Pelayo  Clinic Number: [id number]    Therapy Diagnosis:   Encounter Diagnoses   Name Primary?    Chronic left shoulder pain Yes    Decreased range of motion     Muscle weakness        Physician: Steven Zarate MD    Visit Date: 1/10/2023    Physician Orders: PT Eval and Treat s/p RTC repair, SAD, biceps tenotomy  Medical Diagnosis from Referral:   M75.122 (ICD-10-CM) - Nontraumatic complete tear of rotator cuff, left   S43.432S (ICD-10-CM) - Labral tear of shoulder, left, sequela      Evaluation Date: 12/5/2022     DOS 11-17-22  Authorization Period Expiration: 12-31-22  Plan of Care Expiration: 2-27-23  Progress Note Due: 12-30-22 prior to MD appt  Visit # / Visits authorized: 1 / 21 (+8 from prior auth in 2022)  FOTO: Issued Visit #: 1     MD Follow up appointment: 12-30-22     Precautions: Standard and RC precautions    PTA Visit #: 1/5     Time In: 1:10 (pt late)  Time Out: 2:03  Total Billable Time: 42 minutes + cold pack    SUBJECTIVE     Pt reports: ROM still feels limited. Having less numbness along the anterior arm but  still having symptoms along the lateral and posterior upper arm. Still having some pain to the shoulder itself and the scars still hurt from the keloids.  She was compliant with home exercise program.  Response to previous treatment: felt okay  Functional change: pt still in sling    Pain:  Shoulder pain 1/10  at worst 5/10 at best 2/10  Initial eval Current 2/10, worst 8/10, best 2/10  Location: left shoulder      OBJECTIVE       Shoulder ROM: (measured in degrees) 1-10-22  Shoulder  LUE   Flexion 130   Abduction 110   External Rotation neutral 58   External Rotation with 45* abd  65   Internal Rotation with 45* abd 65        Shoulder ROM: (measured in degrees supine) initial eval  Shoulder  LUE   Flexion in scaption plane 60   Abduction in scaption plane 45   External Rotation  "neutral 15   External Rotation with abd  N/T   Internal Rotation Palm to stomach       Treatment     Poppy received the treatments listed below:      Post op week # 6 12-29-22    therapeutic exercises to develop endurance, ROM, and core stabilization for 32 minutes including:    Supine gentle submax isometrics ER, IR, and abd  10 x 5" each   AAROM wand ER neutral x 10   AAROM wand chest press x 10    Pendulum x 10 all planes   Shoulder shrug with retraction, pt able to allow R elbow extended during ex x 10    PROM x 10 with PT all planes within comfortable ranges  (NP)Supine median nerve glide (elbow extended supported on towel roll with elbow flexion/extension x 10 and wrist flexion and extension x 10  Pt reported less numbness with elbow extension supine after glides    Possible to add next session: Sidelying scapular mobs and stabilization exercises, supine want flexion    Below exercises not performed this date:  Open and close hand x 10  Wrist flexion/extension x 10  Wrist RD/UD x 10  Pro/sup x 10 cueing to place upper arm by side x 10  -D/C neck exercises to HEP only after today's session  Chin tuck x 10  Cervical flexion x 10 pain free zone  Cervical extension x 10 pain free zone  Cervical SB x 10 pain free zone  Cervical rotation x 10 pain free zone   Levator scapulae stretch for L with SB R 5 sec hold    Possible to add for next session: AAROM ER and flexion with wand, pulleys for AAROM, standing submax isometrics in doorway     Pt to ice at home today Poppy received cold pack for 10 minutes to L shoulder sitting with pillow for support into abd .       manual therapy techniques: Soft tissue Mobilization were applied to the: left shoulder for 10 minutes, including:  (NP)STM along L pec and upper trap for reduced tension and muscle guarding, supine with elbow supported on towel roll and forearm resting on abdomen  (NP)STM to distal biceps mm belly and area superior to the medial epicondyle of the elbow for " reduced soft tissue restriction and neural sensations.  Gentle oscillation GH and gentle scap mob SL   STM neck paraspinals, and upper trap, levator scapulae    Patient Education and Home Exercises     Home Exercises Provided and Patient Education Provided     Education provided:   - proper posture   - compliance to daily exercises  - expectation of symptom and pain fluctuation throughout the healing process, muscle soreness and some discomfort to be expected    Written Home Exercises Provided: yes Exercises were reviewed and Poppy was able to demonstrate them prior to the end of the session.  Poppy demonstrated good  understanding of the education provided. See EMR under Patient Instructions in Notes section for exercises provided during therapy sessions    ASSESSMENT   Pt ambulates into clinic with arm more relaxed at side, allowing natural swing motion with gait. Patient remains guarded with the arm at end ranges into overhead motion of flexion and abduction though does demonstrate increased allowance of relaxation. Able to progress with gentle submaximal isometrics however continues with pain to anterior shoulder with AAROM with chest press. Poppy will benefit from continued progression as able into AAROM and scapular stabilization per protocol.    Poppy Is progressing well towards her goals.     Pt prognosis is Good.     Pt will continue to benefit from skilled outpatient physical therapy to address the deficits listed in the problem list box on initial evaluation, provide pt/family education and to maximize pt's level of independence in the home and community environment.     Pt's spiritual, cultural and educational needs considered and pt agreeable to plan of care and goals.     Anticipated barriers to physical therapy: currently reliant on transportation    Goals: Short Term Goals:  6 weeks  (Progressing, not met)  Increase passive range of motion 25%  Increase postural awareness to normal  Wean from sling  Be  able to perform HEP with minimal cueing required     Long Term Goals: 12 weeks (Progressing, not met)  Increase range of motion to 75%to 100% of full range  Increase strength to 3/5 to 4-/5  Improve scapular stabilization to normal  Restore ability to drive without increased pain  Restore ability to reach fully overhead without increased pain  Restore ability to reach in all planes without increased pain or difficulty  Restore ability to perform ADL's and household activities independently and without increased pain  Pt to be independent with HEP to improve ROM and independence with ADL's    PLAN     Continue with updated POC toward established therapy goals.    Yasmine Lala, PTA         There are no Wet Read(s) to document.

## 2023-06-06 DIAGNOSIS — R10.84 GENERALIZED ABDOMINAL PAIN: ICD-10-CM

## 2023-06-06 DIAGNOSIS — R12 HEARTBURN: ICD-10-CM

## 2023-06-06 RX ORDER — OMEPRAZOLE 40 MG/1
CAPSULE, DELAYED RELEASE ORAL
Qty: 90 CAPSULE | Refills: 0 | Status: SHIPPED | OUTPATIENT
Start: 2023-06-06 | End: 2023-08-09 | Stop reason: SDUPTHER

## 2023-06-09 ENCOUNTER — TELEPHONE (OUTPATIENT)
Dept: PSYCHIATRY | Facility: CLINIC | Age: 38
End: 2023-06-09
Payer: COMMERCIAL

## 2023-06-09 NOTE — TELEPHONE ENCOUNTER
SALOV in attempt to confirm pt in person appointment for 6/13/23 at 8:30 am with Jessica. SAL for pt to call clinic back whenever she gets a chance.

## 2023-06-12 ENCOUNTER — PATIENT MESSAGE (OUTPATIENT)
Dept: PSYCHIATRY | Facility: CLINIC | Age: 38
End: 2023-06-12
Payer: COMMERCIAL

## 2023-06-13 ENCOUNTER — OFFICE VISIT (OUTPATIENT)
Dept: PSYCHIATRY | Facility: CLINIC | Age: 38
End: 2023-06-13
Payer: COMMERCIAL

## 2023-06-13 VITALS
BODY MASS INDEX: 31.87 KG/M2 | SYSTOLIC BLOOD PRESSURE: 120 MMHG | DIASTOLIC BLOOD PRESSURE: 82 MMHG | HEIGHT: 69 IN | WEIGHT: 215.19 LBS | HEART RATE: 86 BPM

## 2023-06-13 DIAGNOSIS — F41.1 GAD (GENERALIZED ANXIETY DISORDER): ICD-10-CM

## 2023-06-13 DIAGNOSIS — F43.10 PTSD (POST-TRAUMATIC STRESS DISORDER): ICD-10-CM

## 2023-06-13 DIAGNOSIS — G47.00 INSOMNIA, UNSPECIFIED TYPE: ICD-10-CM

## 2023-06-13 DIAGNOSIS — F33.42 RECURRENT MAJOR DEPRESSIVE DISORDER, IN FULL REMISSION: Primary | ICD-10-CM

## 2023-06-13 PROCEDURE — 3074F PR MOST RECENT SYSTOLIC BLOOD PRESSURE < 130 MM HG: ICD-10-PCS | Mod: CPTII,S$GLB,,

## 2023-06-13 PROCEDURE — 1160F RVW MEDS BY RX/DR IN RCRD: CPT | Mod: CPTII,S$GLB,,

## 2023-06-13 PROCEDURE — 99999 PR PBB SHADOW E&M-EST. PATIENT-LVL IV: ICD-10-PCS | Mod: PBBFAC,,,

## 2023-06-13 PROCEDURE — 1159F PR MEDICATION LIST DOCUMENTED IN MEDICAL RECORD: ICD-10-PCS | Mod: CPTII,S$GLB,,

## 2023-06-13 PROCEDURE — 3008F BODY MASS INDEX DOCD: CPT | Mod: CPTII,S$GLB,,

## 2023-06-13 PROCEDURE — 99214 PR OFFICE/OUTPT VISIT, EST, LEVL IV, 30-39 MIN: ICD-10-PCS | Mod: S$GLB,,,

## 2023-06-13 PROCEDURE — 1160F PR REVIEW ALL MEDS BY PRESCRIBER/CLIN PHARMACIST DOCUMENTED: ICD-10-PCS | Mod: CPTII,S$GLB,,

## 2023-06-13 PROCEDURE — 3074F SYST BP LT 130 MM HG: CPT | Mod: CPTII,S$GLB,,

## 2023-06-13 PROCEDURE — 3079F DIAST BP 80-89 MM HG: CPT | Mod: CPTII,S$GLB,,

## 2023-06-13 PROCEDURE — 99214 OFFICE O/P EST MOD 30 MIN: CPT | Mod: S$GLB,,,

## 2023-06-13 PROCEDURE — 99999 PR PBB SHADOW E&M-EST. PATIENT-LVL IV: CPT | Mod: PBBFAC,,,

## 2023-06-13 PROCEDURE — 1159F MED LIST DOCD IN RCRD: CPT | Mod: CPTII,S$GLB,,

## 2023-06-13 PROCEDURE — 3008F PR BODY MASS INDEX (BMI) DOCUMENTED: ICD-10-PCS | Mod: CPTII,S$GLB,,

## 2023-06-13 PROCEDURE — 3079F PR MOST RECENT DIASTOLIC BLOOD PRESSURE 80-89 MM HG: ICD-10-PCS | Mod: CPTII,S$GLB,,
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
## 2023-06-23 DIAGNOSIS — F43.10 PTSD (POST-TRAUMATIC STRESS DISORDER): ICD-10-CM

## 2023-06-23 RX ORDER — PRAZOSIN HYDROCHLORIDE 1 MG/1
1 CAPSULE ORAL NIGHTLY
Qty: 90 CAPSULE | Refills: 1 | Status: SHIPPED | OUTPATIENT
Start: 2023-06-23 | End: 2024-01-02

## 2023-06-23 RX ORDER — METFORMIN HYDROCHLORIDE 500 MG/1
TABLET, EXTENDED RELEASE ORAL
Qty: 90 TABLET | Refills: 3 | Status: SHIPPED | OUTPATIENT
Start: 2023-06-23

## 2023-06-26 ENCOUNTER — PATIENT MESSAGE (OUTPATIENT)
Dept: OBSTETRICS AND GYNECOLOGY | Facility: CLINIC | Age: 38
End: 2023-06-26
Payer: COMMERCIAL

## 2023-06-29 ENCOUNTER — OFFICE VISIT (OUTPATIENT)
Dept: OBSTETRICS AND GYNECOLOGY | Facility: CLINIC | Age: 38
End: 2023-06-29
Payer: COMMERCIAL

## 2023-06-29 ENCOUNTER — HOSPITAL ENCOUNTER (OUTPATIENT)
Dept: RADIOLOGY | Facility: HOSPITAL | Age: 38
Discharge: HOME | End: 2023-06-29
Attending: SPECIALIST
Payer: COMMERCIAL

## 2023-06-29 VITALS
BODY MASS INDEX: 32.13 KG/M2 | WEIGHT: 217.63 LBS | SYSTOLIC BLOOD PRESSURE: 110 MMHG | DIASTOLIC BLOOD PRESSURE: 80 MMHG

## 2023-06-29 DIAGNOSIS — Z12.31 ENCOUNTER FOR SCREENING MAMMOGRAM FOR BREAST CANCER: ICD-10-CM

## 2023-06-29 DIAGNOSIS — Z01.419 GYNECOLOGIC EXAM NORMAL: ICD-10-CM

## 2023-06-29 DIAGNOSIS — Z12.39 SCREENING BREAST EXAMINATION: ICD-10-CM

## 2023-06-29 DIAGNOSIS — Z12.4 PAP SMEAR FOR CERVICAL CANCER SCREENING: ICD-10-CM

## 2023-06-29 DIAGNOSIS — Z12.39 SCREENING BREAST EXAMINATION: Primary | ICD-10-CM

## 2023-06-29 PROCEDURE — 1159F MED LIST DOCD IN RCRD: CPT | Mod: CPTII,S$GLB,, | Performed by: SPECIALIST

## 2023-06-29 PROCEDURE — 77067 SCR MAMMO BI INCL CAD: CPT | Mod: 26,,, | Performed by: RADIOLOGY

## 2023-06-29 PROCEDURE — 1159F PR MEDICATION LIST DOCUMENTED IN MEDICAL RECORD: ICD-10-PCS | Mod: CPTII,S$GLB,, | Performed by: SPECIALIST

## 2023-06-29 PROCEDURE — 88175 CYTOPATH C/V AUTO FLUID REDO: CPT | Performed by: PATHOLOGY

## 2023-06-29 PROCEDURE — 88141 CYTOPATH C/V INTERPRET: CPT | Mod: ,,, | Performed by: PATHOLOGY

## 2023-06-29 PROCEDURE — 77063 BREAST TOMOSYNTHESIS BI: CPT | Mod: 26,,, | Performed by: RADIOLOGY

## 2023-06-29 PROCEDURE — 3074F PR MOST RECENT SYSTOLIC BLOOD PRESSURE < 130 MM HG: ICD-10-PCS | Mod: CPTII,S$GLB,, | Performed by: SPECIALIST

## 2023-06-29 PROCEDURE — 99999 PR PBB SHADOW E&M-EST. PATIENT-LVL IV: ICD-10-PCS | Mod: PBBFAC,,, | Performed by: SPECIALIST

## 2023-06-29 PROCEDURE — 99999 PR PBB SHADOW E&M-EST. PATIENT-LVL IV: CPT | Mod: PBBFAC,,, | Performed by: SPECIALIST

## 2023-06-29 PROCEDURE — 99396 PR PREVENTIVE VISIT,EST,40-64: ICD-10-PCS | Mod: S$GLB,,, | Performed by: SPECIALIST

## 2023-06-29 PROCEDURE — 99396 PREV VISIT EST AGE 40-64: CPT | Mod: S$GLB,,, | Performed by: SPECIALIST

## 2023-06-29 PROCEDURE — 3008F BODY MASS INDEX DOCD: CPT | Mod: CPTII,S$GLB,, | Performed by: SPECIALIST

## 2023-06-29 PROCEDURE — 3079F PR MOST RECENT DIASTOLIC BLOOD PRESSURE 80-89 MM HG: ICD-10-PCS | Mod: CPTII,S$GLB,, | Performed by: SPECIALIST

## 2023-06-29 PROCEDURE — 77063 MAMMO DIGITAL SCREENING BILAT WITH TOMO: ICD-10-PCS | Mod: 26,,, | Performed by: RADIOLOGY

## 2023-06-29 PROCEDURE — 77067 MAMMO DIGITAL SCREENING BILAT WITH TOMO: ICD-10-PCS | Mod: 26,,, | Performed by: RADIOLOGY

## 2023-06-29 PROCEDURE — 88141 PR  CYTOPATH CERV/VAG INTERPRET: ICD-10-PCS | Mod: ,,, | Performed by: PATHOLOGY

## 2023-06-29 PROCEDURE — 3074F SYST BP LT 130 MM HG: CPT | Mod: CPTII,S$GLB,, | Performed by: SPECIALIST

## 2023-06-29 PROCEDURE — 3079F DIAST BP 80-89 MM HG: CPT | Mod: CPTII,S$GLB,, | Performed by: SPECIALIST

## 2023-06-29 PROCEDURE — 3008F PR BODY MASS INDEX (BMI) DOCUMENTED: ICD-10-PCS | Mod: CPTII,S$GLB,, | Performed by: SPECIALIST

## 2023-06-29 PROCEDURE — 77067 SCR MAMMO BI INCL CAD: CPT | Mod: TC,PN

## 2023-06-29 RX ORDER — LEVONORGESTREL AND ETHINYL ESTRADIOL 0.15-0.03
1 KIT ORAL DAILY
Qty: 84 TABLET | Refills: 3 | Status: SHIPPED | OUTPATIENT
Start: 2023-06-29

## 2023-06-29 NOTE — PROGRESS NOTES
37 yo BF presents for annual gyn eval  And screening mammogram  Past Medical History:   Diagnosis Date    Anxiety and depression     Constipation     Encounter for blood transfusion     Endometriosis     GERD (gastroesophageal reflux disease)     IBS (irritable bowel syndrome)     Neuromuscular disorder     nerve damage of right foot after hammertoe surgery    PCOS (polycystic ovarian syndrome)     Sleep disturbance     Thyroid disease     Vaginal prolapse        Past Surgical History:   Procedure Laterality Date    ARTHROSCOPIC ACROMIOPLASTY OF SHOULDER Left 11/17/2022    Procedure: ACROMIOPLASTY, ARTHROSCOPIC;  Surgeon: Steven Zarate MD;  Location: [facility] OR;  Service: Orthopedics;  Laterality: Left;    ARTHROSCOPIC REPAIR OF ROTATOR CUFF OF SHOULDER Left 11/17/2022    Procedure: REPAIR, ROTATOR CUFF, ARTHROSCOPIC;  Surgeon: Steven Zarate MD;  Location: [facility] OR;  Service: Orthopedics;  Laterality: Left;    ARTHROSCOPIC TENOTOMY OF BICEPS TENDON Left 11/17/2022    Procedure: TENOTOMY, BICEPS, ARTHROSCOPIC;  Surgeon: Steven Zarate MD;  Location: [facility] OR;  Service: Orthopedics;  Laterality: Left;    ARTHROSCOPY OF SHOULDER WITH DECOMPRESSION OF SUBACROMIAL SPACE Left 11/17/2022    Procedure: ARTHROSCOPY, SHOULDER, WITH SUBACROMIAL SPACE DECOMPRESSION;  Surgeon: Steven Zarate MD;  Location: [facility] OR;  Service: Orthopedics;  Laterality: Left;    bladder suspension      COLONOSCOPY  ~24 years old    Dr. Neri, colon polyps per patient report    COLONOSCOPY N/A 8/21/2020    Procedure: COLONOSCOPY;  Surgeon: Yessi Pal MD;  Location: [facility];  Service: Endoscopy;  Laterality: N/A;    DIAGNOSTIC LAPAROSCOPY N/A 1/6/2022    Procedure: LAPAROSCOPY, DIAGNOSTIC;  Surgeon: Jack Sargent MD;  Location: [facility] OR;  Service: OB/GYN;  Laterality: N/A;    dx lap      ESOPHAGOGASTRODUODENOSCOPY N/A 1/22/2020    Procedure: EGD (ESOPHAGOGASTRODUODENOSCOPY);  Surgeon: Yessi Pal MD;  Location:  [facility];  Service: Endoscopy;  Laterality: N/A;    hammer toe Right 11/19/2015    heavenly toe repeair with callus filing    LAPAROSCOPIC SUSPENSION OF UTEROSACRAL LIGAMENT Bilateral 1/6/2022    Procedure: SUSPENSION, LIGAMENT, UTEROSACRAL, LAPAROSCOPIC;  Surgeon: Jack Sargent MD;  Location: [facility];  Service: OB/GYN;  Laterality: Bilateral;    LAPAROSCOPY  ~2012    done for infertility concern    TONSILLECTOMY         Family History   Problem Relation Age of Onset    Thyroid disease Mother         hyperthyroidism    Hypertension Mother     Hypertension Sister     Heart disease Maternal Grandmother     Ovarian cancer Neg Hx     Breast cancer Neg Hx     Eczema Neg Hx     Lupus Neg Hx     Psoriasis Neg Hx     Melanoma Neg Hx     Cancer Neg Hx     Colon cancer Neg Hx     Crohn's disease Neg Hx     Ulcerative colitis Neg Hx     Stomach cancer Neg Hx     Esophageal cancer Neg Hx        Social History     Socioeconomic History    Marital status: Single   Tobacco Use    Smoking status: Never    Smokeless tobacco: Never   Substance and Sexual Activity    Alcohol use: No    Drug use: No    Sexual activity: Yes     Partners: Male     Birth control/protection: OCP     Comment: OCP helps regulate cycle       Current Outpatient Medications   Medication Sig Dispense Refill    (Magic mouthwash) 1:1:1 diphenhydrAMINE(Benadryl) 12.5mg/5ml liq, aluminum & magnesium hydroxide-simethicone (Maalox), LIDOcaine viscous 2% Swish and spit 5 mLs every 4 (four) hours as needed (throat pain). for mouth sores 90 mL 0    cholecalciferol, vitamin D3, (VITAMIN D3) 50 mcg (2,000 unit) Tab Take 2,000 Units by mouth once daily.      CITRANATAL HARMONY, IRON FUM, 27 mg iron-1 mg -50 mg-260 mg Cap TAKE 1 CAPSULE BY MOUTH ONCE DAILY. 100 capsule 0    fluocinonide (LIDEX) 0.05 % external solution APPLY THIN FILM TO SCALP EVERY NIGHT AT BEDTIME AS NEEDED FOR ITCHING OR SCALING 60 mL 0    hydrOXYzine HCL (ATARAX) 25 MG tablet TAKE 1 TABLET BY MOUTH  THREE TIMES A DAY AS NEEDED FOR ANXIETY 270 tablet 1    ipratropium (ATROVENT) 42 mcg (0.06 %) nasal spray 2 sprays by Nasal route 3 (three) times daily. 15 mL 6    ketoconazole (NIZORAL) 2 % shampoo LATHER SCALP, LET SIT 5 MIN, RINSE WELL. USE 2X WEEKLY 120 mL 2    levothyroxine (SYNTHROID) 112 MCG tablet Take 1 tablet (112 mcg total) by mouth before breakfast. 30 tablet 11    LINZESS 145 mcg Cap capsule TAKE 1 CAPSULE (145 MCG TOTAL) BY MOUTH BEFORE BREAKFAST. 90 capsule 2    MARLISSA, 28, 0.15-0.03 mg per tablet TAKE 1 TABLET BY MOUTH EVERY DAY 84 tablet 0    metFORMIN (GLUCOPHAGE-XR) 500 MG ER 24hr tablet TAKE 1 TABLET BY MOUTH EVERY DAY WITH BREAKFAST 90 tablet 3    naproxen sodium (ANAPROX) 550 MG tablet Take 1 tablet (550 mg total) by mouth 2 (two) times daily with meals. 20 tablet 0    omeprazole (PRILOSEC) 40 MG capsule TAKE 1 CAPSULE BY MOUTH BEFORE BREAKFAST 90 capsule 0    ondansetron (ZOFRAN-ODT) 4 MG TbDL Take 1 tablet (4 mg total) by mouth every 8 (eight) hours as needed (as needed for nuasea). 28 tablet 0    oxyCODONE-acetaminophen (PERCOCET) 5-325 mg per tablet Take 1 tablet by mouth every 6 (six) hours as needed for Pain. 28 tablet 0    prazosin (MINIPRESS) 1 MG Cap TAKE 1 CAPSULE (1 MG TOTAL) BY MOUTH EVERY EVENING 90 capsule 1    prenatal,calc.40-iron-folate 1 27-1 mg Tab Take 1 tablet by mouth once daily. 90 each 2    sertraline (ZOLOFT) 50 MG tablet TAKE 1 TABLET BY MOUTH EVERY DAY 90 tablet 1    tirzepatide 2.5 mg/0.5 mL PnIj Inject 2.5 mg (one pen) into the skin every 7 days. 4 pen 5    tiZANidine (ZANAFLEX) 2 MG tablet TAKE 1 TABLET BY MOUTH EVERY DAY IN THE EVENING 90 tablet 1    albuterol (PROVENTIL/VENTOLIN HFA) 90 mcg/actuation inhaler Inhale 1-2 puffs into the lungs every 6 (six) hours as needed. Rescue 18 g 1    ibuprofen (ADVIL,MOTRIN) 600 MG tablet Take 1 tablet (600 mg total) by mouth 3 (three) times daily. (Patient not taking: Reported on 6/29/2023) 90 tablet 0     No current  facility-administered medications for this visit.       Review of patient's allergies indicates:  No Known Allergies    Review of System:   General: no chills, fever, night sweats, weight gain or weight loss  Psychological: no depression or suicidal ideation  Breasts: no new or changing breast lumps, nipple discharge or masses.  Respiratory: no cough, shortness of breath, or wheezing  Cardiovascular: no chest pain or dyspnea on exertion  Gastrointestinal: no abdominal pain, change in bowel habits, or black or bloody stools  Genito-Urinary: no incontinence, urinary frequency/urgency or vulvar/vaginal symptoms, pelvic pain or abnormal vaginal bleeding.  Musculoskeletal: no gait disturbance or muscular weakness                                               General Appearance    A and O x 4, Cooperative, no distress   Breasts    Abdomen   Symmetrical, no masses, no discharge, skin changes , erythema or retraction. No adenopathy  Soft, non-tender, bowel sounds active all four quadrants,  no masses, no organomegaly    Genitourinary:   External rectal exam shows no thrombosed external hemorrhoids.   Pelvic exam was performed with patient supine.  No labial fusion.  There is no rash, lesion or injury on the right labia.  There is no rash, lesion or injury on the left labia.  No bleeding and no signs of injury around the vaginal introitus, urethra is without lesions and well supported. The cervix is visualized with no discharge, lesions or friability.  No vaginal discharge found.  No significant Cystocele, Enterocele or rectocele, and uterus well supported.  Bimanual exam:  The urethra is normal to palpation and there are no palpable vaginal wall masses.  Uterus is not deviated, not enlarged, not fixed, normal shape and not tender.  Cervix exhibits no motion tenderness.   Right adnexum displays no mass and no tenderness.  Left adnexum displays no mass and no tenderness.   Extremities: Extremities normal, atraumatic, no  cyanosis or edema                     NOTE  NURSING PERSONAL PRESENT FOR ENTIRE PHYSICAL EXAM     PAP submitted    Plan  BSE monthly  Screening mammogram  Repeat yearly  RTO 1 year/prn    I spent a total of 30 minutes on the day of the visit. This includes face to face time and non-face to face time preparing to see the patient (eg, review of tests), obtaining and/or reviewing separately obtained history, documenting clinical information in the electronic or other health record, independently interpreting results and communicating results to the patient/family/caregiver, or care coordinator.

## 2023-07-07 ENCOUNTER — HOSPITAL ENCOUNTER (OUTPATIENT)
Dept: RADIOLOGY | Facility: HOSPITAL | Age: 38
Discharge: HOME | End: 2023-07-07
Attending: SURGERY
Payer: COMMERCIAL

## 2023-07-07 DIAGNOSIS — F43.10 PTSD (POST-TRAUMATIC STRESS DISORDER): ICD-10-CM

## 2023-07-07 DIAGNOSIS — F33.1 MODERATE EPISODE OF RECURRENT MAJOR DEPRESSIVE DISORDER: ICD-10-CM

## 2023-07-07 DIAGNOSIS — Q83.9 CONGENITAL MALFORMATION OF BREAST, UNSPECIFIED: ICD-10-CM

## 2023-07-07 DIAGNOSIS — F41.1 GAD (GENERALIZED ANXIETY DISORDER): ICD-10-CM

## 2023-07-07 PROCEDURE — 71250 CT THORAX DX C-: CPT | Mod: TC,PO

## 2023-07-07 PROCEDURE — 71250 CT THORAX DX C-: CPT | Mod: 26,,, | Performed by: RADIOLOGY

## 2023-07-07 PROCEDURE — 71250 CT CHEST WITHOUT CONTRAST: ICD-10-PCS | Mod: 26,,, | Performed by: RADIOLOGY

## 2023-07-07 RX ORDER — SERTRALINE HYDROCHLORIDE 50 MG/1
TABLET, FILM COATED ORAL
Qty: 90 TABLET | Refills: 1 | Status: SHIPPED | OUTPATIENT
Start: 2023-07-07 | End: 2024-01-02 | Stop reason: SDUPTHER

## 2023-07-11 LAB
FINAL PATHOLOGIC DIAGNOSIS: NORMAL
Lab: NORMAL

## 2023-08-09 ENCOUNTER — PATIENT MESSAGE (OUTPATIENT)
Dept: OBSTETRICS AND GYNECOLOGY | Facility: CLINIC | Age: 38
End: 2023-08-09
Payer: COMMERCIAL

## 2023-08-09 ENCOUNTER — PATIENT MESSAGE (OUTPATIENT)
Dept: GASTROENTEROLOGY | Facility: CLINIC | Age: 38
End: 2023-08-09
Payer: COMMERCIAL

## 2023-08-09 DIAGNOSIS — K58.1 IRRITABLE BOWEL SYNDROME WITH CONSTIPATION: ICD-10-CM

## 2023-08-09 DIAGNOSIS — R12 HEARTBURN: ICD-10-CM

## 2023-08-09 DIAGNOSIS — R10.84 GENERALIZED ABDOMINAL PAIN: ICD-10-CM

## 2023-08-09 RX ORDER — OMEPRAZOLE 40 MG/1
40 CAPSULE, DELAYED RELEASE ORAL
Qty: 90 CAPSULE | Refills: 0 | Status: SHIPPED | OUTPATIENT
Start: 2023-08-09 | End: 2023-11-28

## 2023-08-10 RX ORDER — CRANBERRY FRUIT EXTRACT 650 MG
1 CAPSULE ORAL DAILY
Qty: 100 EACH | Refills: 3 | Status: SHIPPED | OUTPATIENT
Start: 2023-08-10

## 2023-08-10 NOTE — TELEPHONE ENCOUNTER
----- Message from Jessica Cadena sent at 8/10/2023  8:11 AM CDT -----  Contact: patient  Type: Needs Medical Advice  Who Called:  patient  Pharmacy name and phone #:    [id number] - [place] - [address]  [address]  [place] [address]  Phone: [phone] Fax: [phone]  Best Call Back Number: [phone] (home)   Additional Information: patient said she sent a [employer] message yesterday requesting a prescription and she never heard anything. She sent a pic in the message. Please advise.

## 2023-09-07 ENCOUNTER — LAB VISIT (OUTPATIENT)
Dept: LAB | Facility: HOSPITAL | Age: 38
End: 2023-09-07
Attending: PHYSICIAN ASSISTANT
Payer: COMMERCIAL

## 2023-09-07 DIAGNOSIS — E03.8 HYPOTHYROIDISM DUE TO HASHIMOTO'S THYROIDITIS: ICD-10-CM

## 2023-09-07 DIAGNOSIS — E06.3 HYPOTHYROIDISM DUE TO HASHIMOTO'S THYROIDITIS: ICD-10-CM

## 2023-09-07 LAB
T4 FREE SERPL-MCNC: 1.22 NG/DL (ref 0.71–1.51)
TSH SERPL DL<=0.005 MIU/L-ACNC: 1.04 UIU/ML (ref 0.4–4)

## 2023-09-07 PROCEDURE — 84443 ASSAY THYROID STIM HORMONE: CPT | Performed by: PHYSICIAN ASSISTANT

## 2023-09-07 PROCEDURE — 84439 ASSAY OF FREE THYROXINE: CPT | Performed by: PHYSICIAN ASSISTANT

## 2023-09-07 PROCEDURE — 36415 COLL VENOUS BLD VENIPUNCTURE: CPT | Mod: PN | Performed by: PHYSICIAN ASSISTANT

## 2023-09-08 ENCOUNTER — OFFICE VISIT (OUTPATIENT)
Dept: ENDOCRINOLOGY | Facility: CLINIC | Age: 38
End: 2023-09-08
Payer: COMMERCIAL

## 2023-09-08 ENCOUNTER — LAB VISIT (OUTPATIENT)
Dept: LAB | Facility: HOSPITAL | Age: 38
End: 2023-09-08
Attending: PHYSICIAN ASSISTANT
Payer: COMMERCIAL

## 2023-09-08 VITALS
WEIGHT: 219.81 LBS | SYSTOLIC BLOOD PRESSURE: 118 MMHG | OXYGEN SATURATION: 98 % | BODY MASS INDEX: 32.56 KG/M2 | TEMPERATURE: 98 F | HEIGHT: 69 IN | HEART RATE: 93 BPM | DIASTOLIC BLOOD PRESSURE: 68 MMHG

## 2023-09-08 DIAGNOSIS — E61.1 IRON DEFICIENCY: ICD-10-CM

## 2023-09-08 DIAGNOSIS — E03.8 HYPOTHYROIDISM DUE TO HASHIMOTO'S THYROIDITIS: Primary | ICD-10-CM

## 2023-09-08 DIAGNOSIS — E66.9 OBESITY (BMI 30.0-34.9): ICD-10-CM

## 2023-09-08 DIAGNOSIS — E06.3 HYPOTHYROIDISM DUE TO HASHIMOTO'S THYROIDITIS: Primary | ICD-10-CM

## 2023-09-08 DIAGNOSIS — L65.9 HAIR LOSS: ICD-10-CM

## 2023-09-08 LAB
ESTIMATED AVG GLUCOSE: 82 MG/DL (ref 68–131)
HBA1C MFR BLD: 4.5 % (ref 4–5.6)

## 2023-09-08 PROCEDURE — 3074F SYST BP LT 130 MM HG: CPT | Mod: CPTII,S$GLB,, | Performed by: PHYSICIAN ASSISTANT

## 2023-09-08 PROCEDURE — 99999 PR PBB SHADOW E&M-EST. PATIENT-LVL V: CPT | Mod: PBBFAC,,, | Performed by: PHYSICIAN ASSISTANT

## 2023-09-08 PROCEDURE — 99999 PR PBB SHADOW E&M-EST. PATIENT-LVL V: ICD-10-PCS | Mod: PBBFAC,,, | Performed by: PHYSICIAN ASSISTANT

## 2023-09-08 PROCEDURE — 36415 COLL VENOUS BLD VENIPUNCTURE: CPT | Mod: PO | Performed by: PHYSICIAN ASSISTANT

## 2023-09-08 PROCEDURE — 1159F MED LIST DOCD IN RCRD: CPT | Mod: CPTII,S$GLB,, | Performed by: PHYSICIAN ASSISTANT

## 2023-09-08 PROCEDURE — 3008F PR BODY MASS INDEX (BMI) DOCUMENTED: ICD-10-PCS | Mod: CPTII,S$GLB,, | Performed by: PHYSICIAN ASSISTANT

## 2023-09-08 PROCEDURE — 99213 PR OFFICE/OUTPT VISIT, EST, LEVL III, 20-29 MIN: ICD-10-PCS | Mod: S$GLB,,, | Performed by: PHYSICIAN ASSISTANT

## 2023-09-08 PROCEDURE — 3074F PR MOST RECENT SYSTOLIC BLOOD PRESSURE < 130 MM HG: ICD-10-PCS | Mod: CPTII,S$GLB,, | Performed by: PHYSICIAN ASSISTANT

## 2023-09-08 PROCEDURE — 1160F PR REVIEW ALL MEDS BY PRESCRIBER/CLIN PHARMACIST DOCUMENTED: ICD-10-PCS | Mod: CPTII,S$GLB,, | Performed by: PHYSICIAN ASSISTANT

## 2023-09-08 PROCEDURE — 3008F BODY MASS INDEX DOCD: CPT | Mod: CPTII,S$GLB,, | Performed by: PHYSICIAN ASSISTANT

## 2023-09-08 PROCEDURE — 99213 OFFICE O/P EST LOW 20 MIN: CPT | Mod: S$GLB,,, | Performed by: PHYSICIAN ASSISTANT

## 2023-09-08 PROCEDURE — 83036 HEMOGLOBIN GLYCOSYLATED A1C: CPT | Performed by: PHYSICIAN ASSISTANT

## 2023-09-08 PROCEDURE — 3078F DIAST BP <80 MM HG: CPT | Mod: CPTII,S$GLB,, | Performed by: PHYSICIAN ASSISTANT

## 2023-09-08 PROCEDURE — 1159F PR MEDICATION LIST DOCUMENTED IN MEDICAL RECORD: ICD-10-PCS | Mod: CPTII,S$GLB,, | Performed by: PHYSICIAN ASSISTANT

## 2023-09-08 PROCEDURE — 3078F PR MOST RECENT DIASTOLIC BLOOD PRESSURE < 80 MM HG: ICD-10-PCS | Mod: CPTII,S$GLB,, | Performed by: PHYSICIAN ASSISTANT

## 2023-09-08 PROCEDURE — 1160F RVW MEDS BY RX/DR IN RCRD: CPT | Mod: CPTII,S$GLB,, | Performed by: PHYSICIAN ASSISTANT

## 2023-09-08 RX ORDER — PROGESTERONE 200 MG/1
200 CAPSULE ORAL 2 TIMES DAILY
COMMUNITY

## 2023-09-08 NOTE — PROGRESS NOTES
CC: Hypothyroidism    HPI: Emily Pelayo is a 38 y.o. female here for hypothyroidism along with pending conditions listed in the Visit Diagnosis. Diagnosed in 2011.     +FHx of thyroid disease in her mother.   No hx of neck radiation.      NM uptake and scan in 2010 was consistent w/ Graves.'     Pt has never had to take Methimazole. +TPO. -TSI    Pt is trying to get pregnant by IVF. Referred to derm last visit but apt was cancelled.    She had should surgery in 11/22.    Taking 112 mcg qd.  + fatigue, cold intolerance, hair loss (alopecia)  No diarrhea, sweating, palpitations.   Cycles are regular.  On ocps    No easy bruising, muscle weakness. No facial hair or deeping of voice.    Thyroid u/s 6/22 wnl.    Pt previously took Metformin for wt loss.      Works at [id number].     Dex suppression was elevated. Urine cortisol was normal.  No easy bruising or muscle weakness.     PMHx, PSHx: reviewed in epic.  Social Hx: no ETOH/tobacco use. Works nights/days. She has one daughter born ten years ago by IUI. No complications.     Wt Readings from Last 15 Encounters:   09/08/23 99.7 kg (219 lb 12.8 oz)   06/29/23 98.7 kg (217 lb 9.5 oz)   04/04/23 95.3 kg (210 lb)   02/27/23 95.3 kg (210 lb)   02/23/23 95.3 kg (210 lb 1.6 oz)   02/10/23 94.3 kg (208 lb)   12/30/22 94.3 kg (208 lb)   12/02/22 90.7 kg (200 lb)   11/17/22 90.7 kg (200 lb)   11/14/22 90.7 kg (200 lb)   10/11/22 97.5 kg (215 lb)   10/07/22 99.3 kg (219 lb)   09/23/22 99.3 kg (219 lb 0.4 oz)   08/22/22 97 kg (213 lb 13.5 oz)   06/21/22 97.2 kg (214 lb 4.6 oz)      ROS:   Constitutional: + wt loss 9 lbs  Eyes: No recent visual changes  Cardiovascular: Denies current anginal symptoms  Respiratory: Denies current respiratory difficulty  Gastrointestinal: Denies recent bowel disturbances  GenitoUrinary - No dysuria  Skin: No new skin rash  Neurologic: No focal neurologic complaints  Musculoskeletal: no joint pain  Endocrine: no polyphagia, polydipsia or  "polyuria  Remainder ROS negative     /68 (BP Location: Left arm, Patient Position: Sitting, BP Method: Medium (Manual))   Pulse 93   Temp 98.3 °F (36.8 °C) (Oral)   Ht 5' 9" (1.753 m)   Wt 99.7 kg (219 lb 12.8 oz)   SpO2 98%   BMI 32.46 kg/m²      Personally reviewed labs below:    Lab Results   Component Value Date    TSH 1.037 09/07/2023    P3XEBIG 92 03/23/2016    T9SKMCJ 11.2 09/25/2017    FREET4 1.22 09/07/2023          Chemistry        Component Value Date/Time     11/14/2022 0845    K 3.9 11/14/2022 0845     11/14/2022 0845    CO2 27 11/14/2022 0845    BUN 13 11/14/2022 0845    CREATININE 0.9 11/14/2022 0845    GLU 79 11/14/2022 0845        Component Value Date/Time    CALCIUM 9.2 11/14/2022 0845    ALKPHOS 54 (L) 06/11/2022 0752    AST 14 06/11/2022 0752    ALT 14 06/11/2022 0752    BILITOT 0.6 06/11/2022 0752    ESTGFRAFRICA >60 06/11/2022 0752    EGFRNONAA >60 06/11/2022 0752           Lab Results   Component Value Date    HGBA1C 4.6 06/11/2022      PE:  GENERAL: middle aged female, well developed, well nourished  NEURO: steady gait, CN ll-Xll grossly intact  PSYCH: normal mood and affect    Assessment/Plan:   1. Hypothyroidism due to Hashimoto's thyroiditis  TSH    T4, Free      2. Obesity (BMI 30.0-34.9)  Hemoglobin A1C    Hemoglobin A1C      3. Iron deficiency        4. Hair loss          Hypothyroidism-TSH is wnl. Continue Levothyroxine to 112 mcg daily. Notify clinic when pregnant.   Iron deficiency-iron has been stable.  Obesity-Body mass index is 32.46 kg/m². Increase exercise to 30 min daily. Will check glucose.  Hair loss-pt trev f/u w/ derm. Her last apt was cancelled.    A1c  F/u in 6 mths -tsh, t4, a1c, iron      "

## 2023-09-13 ENCOUNTER — OFFICE VISIT (OUTPATIENT)
Dept: PSYCHIATRY | Facility: CLINIC | Age: 38
End: 2023-09-13
Payer: COMMERCIAL

## 2023-09-13 VITALS
HEART RATE: 80 BPM | HEIGHT: 69 IN | DIASTOLIC BLOOD PRESSURE: 92 MMHG | WEIGHT: 219.13 LBS | BODY MASS INDEX: 32.46 KG/M2 | SYSTOLIC BLOOD PRESSURE: 130 MMHG

## 2023-09-13 DIAGNOSIS — F33.2 SEVERE EPISODE OF RECURRENT MAJOR DEPRESSIVE DISORDER, WITHOUT PSYCHOTIC FEATURES: ICD-10-CM

## 2023-09-13 DIAGNOSIS — F41.1 GAD (GENERALIZED ANXIETY DISORDER): ICD-10-CM

## 2023-09-13 DIAGNOSIS — F43.10 PTSD (POST-TRAUMATIC STRESS DISORDER): Primary | ICD-10-CM

## 2023-09-13 PROCEDURE — 99999 PR PBB SHADOW E&M-EST. PATIENT-LVL IV: ICD-10-PCS | Mod: PBBFAC,,,

## 2023-09-13 PROCEDURE — 3075F SYST BP GE 130 - 139MM HG: CPT | Mod: CPTII,S$GLB,,

## 2023-09-13 PROCEDURE — 1160F RVW MEDS BY RX/DR IN RCRD: CPT | Mod: CPTII,S$GLB,,

## 2023-09-13 PROCEDURE — 3008F PR BODY MASS INDEX (BMI) DOCUMENTED: ICD-10-PCS | Mod: CPTII,S$GLB,,

## 2023-09-13 PROCEDURE — 1159F PR MEDICATION LIST DOCUMENTED IN MEDICAL RECORD: ICD-10-PCS | Mod: CPTII,S$GLB,,

## 2023-09-13 PROCEDURE — 99215 OFFICE O/P EST HI 40 MIN: CPT | Mod: S$GLB,,,

## 2023-09-13 PROCEDURE — 3044F HG A1C LEVEL LT 7.0%: CPT | Mod: CPTII,S$GLB,,

## 2023-09-13 PROCEDURE — 3080F DIAST BP >= 90 MM HG: CPT | Mod: CPTII,S$GLB,,

## 2023-09-13 PROCEDURE — 3075F PR MOST RECENT SYSTOLIC BLOOD PRESS GE 130-139MM HG: ICD-10-PCS | Mod: CPTII,S$GLB,,

## 2023-09-13 PROCEDURE — 3008F BODY MASS INDEX DOCD: CPT | Mod: CPTII,S$GLB,,

## 2023-09-13 PROCEDURE — 3044F PR MOST RECENT HEMOGLOBIN A1C LEVEL <7.0%: ICD-10-PCS | Mod: CPTII,S$GLB,,

## 2023-09-13 PROCEDURE — 1159F MED LIST DOCD IN RCRD: CPT | Mod: CPTII,S$GLB,,

## 2023-09-13 PROCEDURE — 99215 PR OFFICE/OUTPT VISIT, EST, LEVL V, 40-54 MIN: ICD-10-PCS | Mod: S$GLB,,,

## 2023-09-13 PROCEDURE — 99999 PR PBB SHADOW E&M-EST. PATIENT-LVL IV: CPT | Mod: PBBFAC,,,

## 2023-09-13 PROCEDURE — 1160F PR REVIEW ALL MEDS BY PRESCRIBER/CLIN PHARMACIST DOCUMENTED: ICD-10-PCS | Mod: CPTII,S$GLB,,

## 2023-09-13 PROCEDURE — 3080F PR MOST RECENT DIASTOLIC BLOOD PRESSURE >= 90 MM HG: ICD-10-PCS | Mod: CPTII,S$GLB,,
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
## 2023-09-18 ENCOUNTER — OFFICE VISIT (OUTPATIENT)
Dept: URGENT CARE | Facility: CLINIC | Age: 38
End: 2023-09-18
Payer: COMMERCIAL

## 2023-09-18 VITALS
WEIGHT: 219 LBS | BODY MASS INDEX: 32.44 KG/M2 | HEART RATE: 80 BPM | RESPIRATION RATE: 18 BRPM | SYSTOLIC BLOOD PRESSURE: 130 MMHG | OXYGEN SATURATION: 97 % | HEIGHT: 69 IN | TEMPERATURE: 98 F | DIASTOLIC BLOOD PRESSURE: 94 MMHG

## 2023-09-18 DIAGNOSIS — R09.81 NASAL CONGESTION: ICD-10-CM

## 2023-09-18 DIAGNOSIS — R43.0 NO SENSE OF SMELL: Primary | ICD-10-CM

## 2023-09-18 LAB
CTP QC/QA: YES
SARS-COV-2 AG RESP QL IA.RAPID: NEGATIVE

## 2023-09-18 PROCEDURE — 87811 SARS-COV-2 COVID19 W/OPTIC: CPT | Mod: QW,S$GLB,, | Performed by: PHYSICIAN ASSISTANT

## 2023-09-18 PROCEDURE — 99213 OFFICE O/P EST LOW 20 MIN: CPT | Mod: S$GLB,,, | Performed by: PHYSICIAN ASSISTANT

## 2023-09-18 PROCEDURE — 87811 SARS CORONAVIRUS 2 ANTIGEN POCT, MANUAL READ: ICD-10-PCS | Mod: QW,S$GLB,, | Performed by: PHYSICIAN ASSISTANT

## 2023-09-18 PROCEDURE — 99213 PR OFFICE/OUTPT VISIT, EST, LEVL III, 20-29 MIN: ICD-10-PCS | Mod: S$GLB,,, | Performed by: PHYSICIAN ASSISTANT

## 2023-09-18 NOTE — PROGRESS NOTES
"Subjective:      Patient ID: Emily Pelayo is a 38 y.o. female.    Vitals:  height is 5' 9" (1.753 m) and weight is 99.3 kg (219 lb). Her oral temperature is 97.5 °F (36.4 °C). Her blood pressure is 130/94 (abnormal) and her pulse is 80. Her respiration is 18 and oxygen saturation is 97%.     Chief Complaint: COVID-19 Concerns (No smell )    Pt states she has not had smell for 24 hrs. Has tried everything to see if smell will regain but has not come back. Pt states she experienced this in the past when had covid and never got sick just lost smell and wanted to confirm because she is working on getting pregnant and going through ivf.     Sinusitis  This is a new problem. The current episode started today. The problem has been gradually worsening since onset. There has been no fever. She is experiencing no pain. Associated symptoms include congestion and sinus pressure. Pertinent negatives include no chills, coughing, diaphoresis, ear pain, headaches, hoarse voice, neck pain, shortness of breath, sneezing, sore throat or swollen glands. Past treatments include nothing.       Constitution: Negative for chills, sweating, fatigue and fever.        Loss of smell   HENT:  Positive for congestion, postnasal drip and sinus pressure. Negative for ear pain, drooling, nosebleeds, foreign body in nose, sinus pain, sore throat, trouble swallowing and voice change.    Neck: Negative for neck pain, neck stiffness, painful lymph nodes and neck swelling.   Cardiovascular:  Negative for chest pain, leg swelling, palpitations, sob on exertion and passing out.   Eyes:  Negative for eye pain, eye redness, photophobia, double vision, blurred vision and eyelid swelling.   Respiratory:  Negative for chest tightness, cough, sputum production, bloody sputum, shortness of breath, stridor and wheezing.    Gastrointestinal:  Negative for abdominal pain, abdominal bloating, nausea, vomiting, constipation, diarrhea and heartburn. "   Musculoskeletal:  Negative for joint pain, joint swelling, abnormal ROM of joint, back pain, muscle cramps and muscle ache.   Skin:  Negative for rash and hives.   Allergic/Immunologic: Negative for seasonal allergies, food allergies, hives, itching and sneezing.   Neurological:  Negative for dizziness, light-headedness, passing out, loss of balance, headaches, altered mental status, loss of consciousness and seizures.   Hematologic/Lymphatic: Negative for swollen lymph nodes.   Psychiatric/Behavioral:  Negative for altered mental status and nervous/anxious. The patient is not nervous/anxious.       Objective:     Physical Exam   Constitutional: She is oriented to person, place, and time. She appears well-developed. She is cooperative.  Non-toxic appearance. She does not appear ill. No distress.   HENT:   Head: Normocephalic and atraumatic.   Ears:   Right Ear: Hearing, tympanic membrane, external ear and ear canal normal.   Left Ear: Hearing, tympanic membrane, external ear and ear canal normal.   Nose: Mucosal edema and rhinorrhea present. No nasal deformity. No epistaxis. Right sinus exhibits no maxillary sinus tenderness and no frontal sinus tenderness. Left sinus exhibits no maxillary sinus tenderness and no frontal sinus tenderness.   Mouth/Throat: Uvula is midline and mucous membranes are normal. No trismus in the jaw. Normal dentition. No uvula swelling. Posterior oropharyngeal erythema and cobblestoning present. No oropharyngeal exudate or posterior oropharyngeal edema.   Eyes: Conjunctivae and lids are normal. No scleral icterus.   Neck: Trachea normal and phonation normal. Neck supple. No edema present. No erythema present. No neck rigidity present.   Cardiovascular: Normal rate, regular rhythm, normal heart sounds and normal pulses.   Pulmonary/Chest: Effort normal and breath sounds normal. No accessory muscle usage or stridor. No respiratory distress. She has no decreased breath sounds. She has no  wheezes. She has no rhonchi. She has no rales.   Abdominal: Normal appearance.   Musculoskeletal: Normal range of motion.         General: No deformity or edema. Normal range of motion.   Lymphadenopathy:     She has no cervical adenopathy.   Neurological: She is alert and oriented to person, place, and time. She exhibits normal muscle tone. Coordination normal.   Skin: Skin is warm, dry, intact, not diaphoretic, not pale and no rash. Capillary refill takes less than 2 seconds.   Psychiatric: Her speech is normal and behavior is normal. Judgment and thought content normal.   Nursing note and vitals reviewed.    Results for orders placed or performed in visit on 09/18/23   SARS Coronavirus 2 Antigen, POCT Manual Read   Result Value Ref Range    SARS Coronavirus 2 Antigen Negative Negative     Acceptable Yes      Assessment:     1. No sense of smell    2. Nasal congestion        Plan:   Discussed COVID-19 results with patient. Advised close follow-up with PCP and/or Specialist for further evaluation as needed. ER precautions given to patient as well. Patient aware, verbalized understanding and agreed with plan of care.    No sense of smell  -     SARS Coronavirus 2 Antigen, POCT Manual Read    Nasal congestion  -     SARS Coronavirus 2 Antigen, POCT Manual Read      There are no Patient Instructions on file for this visit.

## 2023-09-25 DIAGNOSIS — F41.1 GAD (GENERALIZED ANXIETY DISORDER): ICD-10-CM

## 2023-09-26 RX ORDER — HYDROXYZINE HYDROCHLORIDE 25 MG/1
TABLET, FILM COATED ORAL
Qty: 270 TABLET | Refills: 1 | Status: SHIPPED | OUTPATIENT
Start: 2023-09-26

## 2023-10-18 ENCOUNTER — OFFICE VISIT (OUTPATIENT)
Dept: GASTROENTEROLOGY | Facility: CLINIC | Age: 38
End: 2023-10-18
Payer: COMMERCIAL

## 2023-10-18 VITALS
RESPIRATION RATE: 18 BRPM | DIASTOLIC BLOOD PRESSURE: 89 MMHG | BODY MASS INDEX: 32.58 KG/M2 | SYSTOLIC BLOOD PRESSURE: 129 MMHG | HEART RATE: 89 BPM | WEIGHT: 220 LBS | HEIGHT: 69 IN

## 2023-10-18 DIAGNOSIS — K58.1 IRRITABLE BOWEL SYNDROME WITH CONSTIPATION: ICD-10-CM

## 2023-10-18 DIAGNOSIS — K21.9 GASTROESOPHAGEAL REFLUX DISEASE WITHOUT ESOPHAGITIS: Primary | ICD-10-CM

## 2023-10-18 PROCEDURE — 99214 OFFICE O/P EST MOD 30 MIN: CPT | Mod: S$GLB,,, | Performed by: INTERNAL MEDICINE

## 2023-10-18 PROCEDURE — 99999 PR PBB SHADOW E&M-EST. PATIENT-LVL V: CPT | Mod: PBBFAC,,, | Performed by: INTERNAL MEDICINE

## 2023-10-18 PROCEDURE — 1159F PR MEDICATION LIST DOCUMENTED IN MEDICAL RECORD: ICD-10-PCS | Mod: CPTII,S$GLB,, | Performed by: INTERNAL MEDICINE

## 2023-10-18 PROCEDURE — 3074F PR MOST RECENT SYSTOLIC BLOOD PRESSURE < 130 MM HG: ICD-10-PCS | Mod: CPTII,S$GLB,, | Performed by: INTERNAL MEDICINE

## 2023-10-18 PROCEDURE — 3044F HG A1C LEVEL LT 7.0%: CPT | Mod: CPTII,S$GLB,, | Performed by: INTERNAL MEDICINE

## 2023-10-18 PROCEDURE — 3079F PR MOST RECENT DIASTOLIC BLOOD PRESSURE 80-89 MM HG: ICD-10-PCS | Mod: CPTII,S$GLB,, | Performed by: INTERNAL MEDICINE

## 2023-10-18 PROCEDURE — 3079F DIAST BP 80-89 MM HG: CPT | Mod: CPTII,S$GLB,, | Performed by: INTERNAL MEDICINE

## 2023-10-18 PROCEDURE — 99214 PR OFFICE/OUTPT VISIT, EST, LEVL IV, 30-39 MIN: ICD-10-PCS | Mod: S$GLB,,, | Performed by: INTERNAL MEDICINE

## 2023-10-18 PROCEDURE — 1160F PR REVIEW ALL MEDS BY PRESCRIBER/CLIN PHARMACIST DOCUMENTED: ICD-10-PCS | Mod: CPTII,S$GLB,, | Performed by: INTERNAL MEDICINE

## 2023-10-18 PROCEDURE — 1159F MED LIST DOCD IN RCRD: CPT | Mod: CPTII,S$GLB,, | Performed by: INTERNAL MEDICINE

## 2023-10-18 PROCEDURE — 3074F SYST BP LT 130 MM HG: CPT | Mod: CPTII,S$GLB,, | Performed by: INTERNAL MEDICINE

## 2023-10-18 PROCEDURE — 3044F PR MOST RECENT HEMOGLOBIN A1C LEVEL <7.0%: ICD-10-PCS | Mod: CPTII,S$GLB,, | Performed by: INTERNAL MEDICINE

## 2023-10-18 PROCEDURE — 1160F RVW MEDS BY RX/DR IN RCRD: CPT | Mod: CPTII,S$GLB,, | Performed by: INTERNAL MEDICINE

## 2023-10-18 PROCEDURE — 3008F PR BODY MASS INDEX (BMI) DOCUMENTED: ICD-10-PCS | Mod: CPTII,S$GLB,, | Performed by: INTERNAL MEDICINE

## 2023-10-18 PROCEDURE — 99999 PR PBB SHADOW E&M-EST. PATIENT-LVL V: ICD-10-PCS | Mod: PBBFAC,,, | Performed by: INTERNAL MEDICINE

## 2023-10-18 PROCEDURE — 3008F BODY MASS INDEX DOCD: CPT | Mod: CPTII,S$GLB,, | Performed by: INTERNAL MEDICINE

## 2023-10-18 NOTE — PROGRESS NOTES
"[facility]    CC: GERD , Constipation     HPI 38 y.o. female with IBS-C presents to follow up chronic, moderate, currently well controlled GERD. If she takes Omeprazole 40 mg daily then she will not have GERD symptoms. She currently takes Linzess 145 mcg daily for IBS-C. She is undergoing IVF and recently started prenatal vitamins and notes that this has made her Linzess a little less effective, but not enough so to alter the dosage.     Her last EGD was in January 2020 and was notable for mild gastritis (biopsies negative for celiac and H.pylori). Colonoscopy in August 2020 was normal.     Past Medical History:   Diagnosis Date    Anxiety and depression     Constipation     Encounter for blood transfusion     Endometriosis     GERD (gastroesophageal reflux disease)     IBS (irritable bowel syndrome)     Neuromuscular disorder     nerve damage of right foot after hammertoe surgery    PCOS (polycystic ovarian syndrome)     Sleep disturbance     Thyroid disease     Vaginal prolapse        Allergies and Medications reviewed     Review of Systems  General ROS: negative for - chills, fever or weight loss  Cardiovascular ROS: no chest pain or dyspnea on exertion  Gastrointestinal ROS: controlled GERD, controlled constipation, no significant abdominal pain    Physical Examination  /89 (BP Location: Left arm, Patient Position: Sitting)   Pulse 89   Resp 18   Ht 5' 9" (1.753 m)   Wt 99.8 kg (220 lb 0.3 oz)   BMI 32.49 kg/m²   General appearance: alert, cooperative, no distress  HENT: Normocephalic, atraumatic, neck symmetrical, no nasal discharge, sclera anicteric   Lungs: clear to auscultation bilaterally, symmetric chest wall expansion bilaterally  Heart: regular rate and rhythm without rub; no displacement of the PMI   Abdomen: soft, nontender, nondistended BS active ,no masses appreciated   Extremities: extremities symmetric; no clubbing, cyanosis, or edema    Labs:  Lab Results   Component " Value Date    WBC 4.57 11/14/2022    HGB 12.0 11/14/2022    HCT 38.6 11/14/2022    MCV 92 11/14/2022     11/14/2022         Assessment:   38 y.o. female presents to follow up chronic GERD and IBS-C. She is doing well on daily Omeprazole 40 mg and Linzess 145 mcg. She is undergoing IVF.     Plan:  -We discussed ok to continue Omeprazole as it is a pregnancy class B  -We  discussed Linzess is a pregnancy class C and it would be beter to try other agents such as Magnesium Oxide nightly   -She will let me know if she becomes pregnant    Yessi Pal MD  [facility]  [address], [address]  [place] [address]  Office: [phone]  Fax: [phone]

## 2023-10-27 ENCOUNTER — PATIENT MESSAGE (OUTPATIENT)
Dept: GASTROENTEROLOGY | Facility: CLINIC | Age: 38
End: 2023-10-27
Payer: COMMERCIAL

## 2023-10-27 DIAGNOSIS — K58.1 IRRITABLE BOWEL SYNDROME WITH CONSTIPATION: ICD-10-CM

## 2023-10-31 RX ORDER — LINACLOTIDE 145 UG/1
145 CAPSULE, GELATIN COATED ORAL
Qty: 90 CAPSULE | Refills: 0 | Status: SHIPPED | OUTPATIENT
Start: 2023-10-31 | End: 2024-02-28 | Stop reason: SDUPTHER

## 2023-11-26 DIAGNOSIS — R12 HEARTBURN: ICD-10-CM

## 2023-11-26 DIAGNOSIS — R10.84 GENERALIZED ABDOMINAL PAIN: ICD-10-CM

## 2023-11-28 RX ORDER — OMEPRAZOLE 40 MG/1
40 CAPSULE, DELAYED RELEASE ORAL
Qty: 90 CAPSULE | Refills: 0 | Status: SHIPPED | OUTPATIENT
Start: 2023-11-28 | End: 2024-02-23

## 2023-12-13 ENCOUNTER — OFFICE VISIT (OUTPATIENT)
Dept: PSYCHIATRY | Facility: CLINIC | Age: 38
End: 2023-12-13
Payer: COMMERCIAL

## 2023-12-13 VITALS
WEIGHT: 221.56 LBS | DIASTOLIC BLOOD PRESSURE: 83 MMHG | SYSTOLIC BLOOD PRESSURE: 115 MMHG | HEART RATE: 94 BPM | HEIGHT: 69 IN | BODY MASS INDEX: 32.82 KG/M2

## 2023-12-13 DIAGNOSIS — F41.1 GAD (GENERALIZED ANXIETY DISORDER): ICD-10-CM

## 2023-12-13 DIAGNOSIS — G47.00 INSOMNIA, UNSPECIFIED TYPE: ICD-10-CM

## 2023-12-13 DIAGNOSIS — F43.10 PTSD (POST-TRAUMATIC STRESS DISORDER): ICD-10-CM

## 2023-12-13 DIAGNOSIS — F33.0 MILD EPISODE OF RECURRENT MAJOR DEPRESSIVE DISORDER: Primary | ICD-10-CM

## 2023-12-13 PROCEDURE — 3079F PR MOST RECENT DIASTOLIC BLOOD PRESSURE 80-89 MM HG: ICD-10-PCS | Mod: CPTII,S$GLB,,

## 2023-12-13 PROCEDURE — 3044F HG A1C LEVEL LT 7.0%: CPT | Mod: CPTII,S$GLB,,

## 2023-12-13 PROCEDURE — 1159F MED LIST DOCD IN RCRD: CPT | Mod: CPTII,S$GLB,,

## 2023-12-13 PROCEDURE — 99999 PR PBB SHADOW E&M-EST. PATIENT-LVL IV: ICD-10-PCS | Mod: PBBFAC,,,

## 2023-12-13 PROCEDURE — 99215 OFFICE O/P EST HI 40 MIN: CPT | Mod: S$GLB,,,

## 2023-12-13 PROCEDURE — 1159F PR MEDICATION LIST DOCUMENTED IN MEDICAL RECORD: ICD-10-PCS | Mod: CPTII,S$GLB,,

## 2023-12-13 PROCEDURE — 99999 PR PBB SHADOW E&M-EST. PATIENT-LVL IV: CPT | Mod: PBBFAC,,,

## 2023-12-13 PROCEDURE — 3008F BODY MASS INDEX DOCD: CPT | Mod: CPTII,S$GLB,,

## 2023-12-13 PROCEDURE — 99215 PR OFFICE/OUTPT VISIT, EST, LEVL V, 40-54 MIN: ICD-10-PCS | Mod: S$GLB,,,

## 2023-12-13 PROCEDURE — 3074F PR MOST RECENT SYSTOLIC BLOOD PRESSURE < 130 MM HG: ICD-10-PCS | Mod: CPTII,S$GLB,,

## 2023-12-13 PROCEDURE — 3079F DIAST BP 80-89 MM HG: CPT | Mod: CPTII,S$GLB,,

## 2023-12-13 PROCEDURE — 1160F PR REVIEW ALL MEDS BY PRESCRIBER/CLIN PHARMACIST DOCUMENTED: ICD-10-PCS | Mod: CPTII,S$GLB,,

## 2023-12-13 PROCEDURE — 3008F PR BODY MASS INDEX (BMI) DOCUMENTED: ICD-10-PCS | Mod: CPTII,S$GLB,,

## 2023-12-13 PROCEDURE — 3044F PR MOST RECENT HEMOGLOBIN A1C LEVEL <7.0%: ICD-10-PCS | Mod: CPTII,S$GLB,,

## 2023-12-13 PROCEDURE — 3074F SYST BP LT 130 MM HG: CPT | Mod: CPTII,S$GLB,,

## 2023-12-13 PROCEDURE — 1160F RVW MEDS BY RX/DR IN RCRD: CPT | Mod: CPTII,S$GLB,,
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
## 2023-12-30 DIAGNOSIS — F43.10 PTSD (POST-TRAUMATIC STRESS DISORDER): ICD-10-CM

## 2023-12-30 DIAGNOSIS — F33.1 MODERATE EPISODE OF RECURRENT MAJOR DEPRESSIVE DISORDER: ICD-10-CM

## 2023-12-30 DIAGNOSIS — F41.1 GAD (GENERALIZED ANXIETY DISORDER): ICD-10-CM

## 2024-01-01 DIAGNOSIS — F43.10 PTSD (POST-TRAUMATIC STRESS DISORDER): ICD-10-CM

## 2024-01-01 DIAGNOSIS — F33.1 MODERATE EPISODE OF RECURRENT MAJOR DEPRESSIVE DISORDER: ICD-10-CM

## 2024-01-01 DIAGNOSIS — F41.1 GAD (GENERALIZED ANXIETY DISORDER): ICD-10-CM

## 2024-01-02 RX ORDER — SERTRALINE HYDROCHLORIDE 50 MG/1
TABLET, FILM COATED ORAL
Qty: 90 TABLET | Refills: 1 | OUTPATIENT
Start: 2024-01-02

## 2024-01-02 RX ORDER — SERTRALINE HYDROCHLORIDE 50 MG/1
50 TABLET, FILM COATED ORAL DAILY
Qty: 90 TABLET | Refills: 1 | Status: SHIPPED | OUTPATIENT
Start: 2024-01-02

## 2024-01-02 RX ORDER — PRAZOSIN HYDROCHLORIDE 1 MG/1
1 CAPSULE ORAL NIGHTLY
Qty: 90 CAPSULE | Refills: 1 | Status: SHIPPED | OUTPATIENT
Start: 2024-01-02 | End: 2025-01-01

## 2024-02-21 DIAGNOSIS — M25.571 RIGHT ANKLE PAIN, UNSPECIFIED CHRONICITY: Primary | ICD-10-CM

## 2024-02-23 ENCOUNTER — HOSPITAL ENCOUNTER (OUTPATIENT)
Dept: RADIOLOGY | Facility: HOSPITAL | Age: 39
Discharge: HOME | End: 2024-02-23
Attending: ORTHOPAEDIC SURGERY
Payer: COMMERCIAL

## 2024-02-23 ENCOUNTER — OFFICE VISIT (OUTPATIENT)
Dept: ORTHOPEDICS | Facility: CLINIC | Age: 39
End: 2024-02-23
Payer: COMMERCIAL

## 2024-02-23 DIAGNOSIS — R12 HEARTBURN: ICD-10-CM

## 2024-02-23 DIAGNOSIS — S86.312A STRAIN OF MUSCLE(S) AND TENDON(S) OF PERONEAL MUSCLE GROUP AT LOWER LEG LEVEL, LEFT LEG, INITIAL ENCOUNTER: Primary | ICD-10-CM

## 2024-02-23 DIAGNOSIS — M25.571 RIGHT ANKLE PAIN, UNSPECIFIED CHRONICITY: ICD-10-CM

## 2024-02-23 DIAGNOSIS — M76.72 PERONEAL TENDONITIS OF LEFT LOWER EXTREMITY: ICD-10-CM

## 2024-02-23 DIAGNOSIS — R10.84 GENERALIZED ABDOMINAL PAIN: ICD-10-CM

## 2024-02-23 PROCEDURE — 73610 X-RAY EXAM OF ANKLE: CPT | Mod: TC,PO,LT

## 2024-02-23 PROCEDURE — 73630 X-RAY EXAM OF FOOT: CPT | Mod: 26,LT,, | Performed by: RADIOLOGY

## 2024-02-23 PROCEDURE — 99999 PR PBB SHADOW E&M-EST. PATIENT-LVL II: CPT | Mod: PBBFAC,,, | Performed by: ORTHOPAEDIC SURGERY

## 2024-02-23 PROCEDURE — 1159F MED LIST DOCD IN RCRD: CPT | Mod: CPTII,S$GLB,, | Performed by: ORTHOPAEDIC SURGERY

## 2024-02-23 PROCEDURE — 1160F RVW MEDS BY RX/DR IN RCRD: CPT | Mod: CPTII,S$GLB,, | Performed by: ORTHOPAEDIC SURGERY

## 2024-02-23 PROCEDURE — 73630 X-RAY EXAM OF FOOT: CPT | Mod: TC,PO,LT

## 2024-02-23 PROCEDURE — 73610 X-RAY EXAM OF ANKLE: CPT | Mod: 26,LT,, | Performed by: RADIOLOGY

## 2024-02-23 PROCEDURE — 99214 OFFICE O/P EST MOD 30 MIN: CPT | Mod: S$GLB,,, | Performed by: ORTHOPAEDIC SURGERY

## 2024-02-23 RX ORDER — OMEPRAZOLE 40 MG/1
40 CAPSULE, DELAYED RELEASE ORAL
Qty: 90 CAPSULE | Refills: 3 | Status: SHIPPED | OUTPATIENT
Start: 2024-02-23

## 2024-02-28 DIAGNOSIS — K58.1 IRRITABLE BOWEL SYNDROME WITH CONSTIPATION: ICD-10-CM

## 2024-02-28 PROBLEM — R26.2 DIFFICULTY WALKING: Status: ACTIVE | Noted: 2024-02-28

## 2024-02-28 PROBLEM — M76.72 PERONEAL TENDONITIS, LEFT: Status: ACTIVE | Noted: 2024-02-28

## 2024-02-28 PROBLEM — S86.312D: Status: ACTIVE | Noted: 2024-02-28

## 2024-02-28 NOTE — PROGRESS NOTES
[facility]   Physical Therapy Initial Evaluation      Name: Emily Pelayo  Clinic Number: [id number]    Therapy Diagnosis:   Encounter Diagnoses   Name Primary?    Peroneal tendonitis, left Yes    Strain of muscle(s) and tendon(s) of peroneal muscle group at lower leg level, left leg, subsequent encounter     Difficulty walking      Physician: Ta Godinez MD    Physician Orders: PT Eval and Treat   Medical Diagnosis from Referral: S86.312A (ICD-10-CM) - Strain of muscle(s) and tendon(s) of peroneal muscle group at lower leg level, left leg, initial encounter M76.72 (ICD-10-CM) - Peroneal tendonitis of left lower extremity  Evaluation Date: 2/29/2024  Authorization Period Expiration: 12/31/2024  Plan of Care Expiration: 4/28/9/2024  Visit # / Visits authorized: 1/ 1    Foto  Date  Score    #1/3 2/29/2024 26.0   #2/3     #3/3       Time In: 10:43am  Time Out: 11:36am  Total Appointment Time (timed & untimed codes): 53 minutes    Precautions: Standard    Subjective     Date of injury: July 30, 2023  History of current condition - patient was walking on grass and her L ankle rolled outward when she stepped with resulting pain ever since the misstep. Her pain flared up whenever she went to MD and had an assessment completed.      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 Impression: No acute osseous abnormality.       Social History: unremarkable     Pain:  Current 5/10, worst 8/10, best 3/10   Location: L lateral ankle   Description: throbbing   Aggravating Factors: sitting with LE/foot in ER, negotiating  stairs   Easing Factors: wrapping L ankle     Pt's goals: Return to previous level of function    Medical History:   Past Medical History:   Diagnosis Date    Anxiety and depression     Constipation     Encounter for blood transfusion     Endometriosis     GERD (gastroesophageal reflux disease)     IBS (irritable bowel syndrome)     Neuromuscular disorder     nerve damage of right foot after hammertoe surgery    PCOS (polycystic ovarian syndrome)     Sleep disturbance     Thyroid disease     Vaginal prolapse      Surgical History:   Emily Pelayo  has a past surgical history that includes bladder suspension; hammer toe (Right, 11/19/2015); Tonsillectomy; dx lap; Laparoscopy (~2012); Colonoscopy (~24 years old); Esophagogastroduodenoscopy (N/A, 1/22/2020); Colonoscopy (N/A, 8/21/2020); Diagnostic laparoscopy (N/A, 1/6/2022); laparoscopic suspension of uterosacral ligament (Bilateral, 1/6/2022); Arthroscopy of shoulder with decompression of subacromial space (Left, 11/17/2022); Arthroscopic acromioplasty of shoulder (Left, 11/17/2022); Arthroscopic tenotomy of biceps tendon (Left, 11/17/2022); and Arthroscopic repair of rotator cuff of shoulder (Left, 11/17/2022).    Medications:   Emily has a current medication list which includes the following prescription(s): diphenhydramine hcl, albuterol, cholecalciferol (vitamin d3), citranatal harmony (iron fum), fluocinonide, hydroxyzine hcl, ibuprofen, ipratropium, ketoconazole, levonorgestrel-ethinyl estradiol, levothyroxine, linaclotide, metformin, naproxen sodium, omeprazole, ondansetron, oxycodone-acetaminophen, theranatal ovavite, prazosin, prenatal,calc.40-iron-folate 1, progesterone, sertraline, tirzepatide, and tizanidine.    Allergies:   Review of patient's allergies indicates:  No Known Allergies     Objective    2/29/2024:    Observation/posture: R>L inversion at foot       Gait/assistive device: none       Range of Motion: AROM (PROM):  Ankle Right  Left   "  Dorsiflexion 8 6   Plantarflexion 51 57   Inversion 17 34   Eversion 14 10     Strength:  Ankle Right  Left    Dorsiflexion 4+/5 4+/5   Plantarflexion 4/5 4/5   Inversion 4+/5 4/5   Eversion 4+/5 4/5     Special Tests:  Ankle Right  Left    Anterior Drawer Test - -   Squeeze test - -   Rasmussen test - -     Joint Mobility:   L TCJ, STJ, METs: impaired     Palpation/sensation:   LE light touch sensation: equal and intact bilaterally     Function/balance:   Sit - stand: 9 X in 30:     R SLS: 4", 2", 2"  L SLS: 1", 4", 7"    Edema:   R ankle circumference: 51.0 cm  L ankle circumference: 50.0 cm    R ankle joint line: 24.5 cm  L ankle joint line: 25.0 cm    R mets: 21.0 cm  L mets: 21.0 cm     Intake Outcome Measure for FOTO Ankle Survey    Therapist reviewed FOTO scores   FOTO documents entered into [employer] - see Media section.    Score: 26.0           Treatment   Total Treatment time (time-based codes) separate from Evaluation: 33 minutes    + indicates new exercise   Bold indicates completed exercise    therapeutic exercises to develop strength, endurance, ROM, flexibility, posture, and core stabilization for 20 minutes:  Patient education on nature of current condition and PHYSICAL THERAPY POC  Written HOME EXERCISE PROGRAM provided, reviewed, patient demo understanding   L gastroc stretch with strap, 30"x3  L soleus stretch with strap, 30"x3    manual therapy techniques: Joint mobilizations, Manual traction, Myofacial release, Soft tissue Mobilization, and Friction Massage for 8 minutes:  L TCJ, STJ, METs Gr III/IV - M     neuromuscular re-education activities to improve: Balance, Coordination, Kinesthetic, Sense, Proprioception, and Posture for 5 minutes:  L SLS 10"x6     therapeutic activities to improve functional performance for 00 minutes:      gait training to improve functional mobility and safety for 00 minutes:      Home Exercises and Patient Education Provided  Education provided:   - yes    Home " Exercises Provided: yes.  Exercises were reviewed and Poppy was able to demonstrate them prior to the end of the session.  Poppy demonstrated good  understanding of the education provided.     Assessment     Emily is a 38 y.o. referred to outpatient Physical Therapy with a medical diagnosis of S86.312A (ICD-10-CM) - Strain of muscle(s) and tendon(s) of peroneal muscle group at lower leg level, left leg, initial encounter M76.72 (ICD-10-CM) - Peroneal tendonitis of left lower extremity.     Pt presents with decreased ROM, muscle strength, flexibility, joint mobility. Increased pain, stiffness, soft tissue restriction. Impaired posture, joint mechanics, balance, gait pattern.     The patient's current task deficits include the following: limitation in ADL, self care, social/recreational tasks.     Patient prognosis: good  Rehab potential: good    Patient will benefit from skilled outpatient Physical Therapy to address the deficits stated above and in the chart below, provide patient /family education, to maximize patient's level of independence, and to address functional deficits.    Plan of care discussed with patient: Yes  Patient's spiritual, cultural and educational needs considered and patient is agreeable to the plan of care and goals as stated below:     Anticipated Barriers for therapy:  co morbidities     Medical Necessity is demonstrated by the following  History  Co-morbidities and personal factors that may impact the plan of care [] LOW: no personal factors / co-morbidities  [x] MODERATE: 1-2 personal factors / co-morbidities  [] HIGH: 3+ personal factors / co-morbidities    Moderate / High Support Documentation:   Co-morbidities affecting plan of care:    Anxiety and depression    Constipation    Encounter for blood transfusion    Endometriosis    GERD (gastroesophageal reflux disease)    IBS (irritable bowel syndrome)    Neuromuscular disorder    nerve damage of right foot after hammertoe surgery    PCOS  "(polycystic ovarian syndrome)    Sleep disturbance    Thyroid disease    Vaginal prolapse     Personal Factors:   coping style  social background  lifestyle     Examination  Body Structures and Functions, activity limitations and participation restrictions that may impact the plan of care [] LOW: addressing 1-2 elements  [x] MODERATE: 3+ elements  [] HIGH: 4+ elements (please support below)    Moderate / High Support Documentation: Moderate      Clinical Presentation [] LOW: stable  [x] MODERATE: Evolving  [] HIGH: Unstable     Decision Making/ Complexity Score: moderate       Goals:   Short Terms Goals: 3 weeks    Goal  Progress  Date    (1)  Patient will be I with HOME EXERCISE PROGRAM  Initial, Not Met 2/29/2024   (2)  Patient will obtain 8 deg L ankle DF ACTIVE RANGE OF MOTION to indicate improved ability to negotiate flight of stairs using alternating gait pattern   Initial, Not Met  2/29/2024    (3)   Patient will obtain 14 deg L ankle eversion ACTIVE RANGE OF MOTION to indicate improved ability to make changes in direction while moving quickly   Initial, Not Met  2/29/2024      Long Term Goals: 6 weeks    Goal  Progress  Date    (1)  Patient will obtain 4+/5 L ankle eversion MMT to indicate improved ability to negotiate uneven terrain  Initial, Not Met  2/29/2024   (2)   Patient will complete > 14 sit - stand transfers within 30" to indicate improved transitional tolerance  Initial, Not Met 2/29/2024    (3)   Patient will complete L SLS average of 30" to indicate decreased risk of fall   Initial, Not Met   2/29/2024     Plan     Plan of care Certification: 2/29/2024 to 4/29/2024.    Outpatient Physical Therapy 2 times weekly for 6 weeks to include the following interventions: Cervical/Lumbar Traction, Electrical Stimulation re-eval, dry needling, Gait Training, Iontophoresis (with ), Manual Therapy, Moist Heat/ Ice, Neuromuscular Re-ed, Patient Education, Self Care, Therapeutic Activities, and Therapeutic " Exercise.     Physical therapist and physical therapy assistant(s) will met face to face to discuss patient's treatment plan and progress towards established goals. Pt will be seen by a physical therapist minimally every 6th visit or every 30 days.    Jennifer Guzmán, PT  2/28/2024       I CERTIFY THE NEED FOR THESE SERVICES FURNISHED UNDER THIS PLAN OF TREATMENT AND WHILE UNDER MY CARE     Physician's comments:           Physician's Signature: ___________________________________________________

## 2024-02-29 ENCOUNTER — CLINICAL SUPPORT (OUTPATIENT)
Dept: REHABILITATION | Facility: HOSPITAL | Age: 39
End: 2024-02-29
Attending: ORTHOPAEDIC SURGERY
Payer: COMMERCIAL

## 2024-02-29 DIAGNOSIS — S86.312D STRAIN OF MUSCLE(S) AND TENDON(S) OF PERONEAL MUSCLE GROUP AT LOWER LEG LEVEL, LEFT LEG, SUBSEQUENT ENCOUNTER: ICD-10-CM

## 2024-02-29 DIAGNOSIS — R26.2 DIFFICULTY WALKING: ICD-10-CM

## 2024-02-29 DIAGNOSIS — M76.72 PERONEAL TENDONITIS, LEFT: Primary | ICD-10-CM

## 2024-02-29 PROCEDURE — 97110 THERAPEUTIC EXERCISES: CPT | Mod: PN

## 2024-02-29 PROCEDURE — 97140 MANUAL THERAPY 1/> REGIONS: CPT | Mod: PN

## 2024-02-29 PROCEDURE — 97162 PT EVAL MOD COMPLEX 30 MIN: CPT | Mod: PN

## 2024-02-29 NOTE — PLAN OF CARE
[facility]   Physical Therapy Initial Evaluation      Name: Emily Pelayo  Clinic Number: [id number]    Therapy Diagnosis:   Encounter Diagnoses   Name Primary?    Peroneal tendonitis, left Yes    Strain of muscle(s) and tendon(s) of peroneal muscle group at lower leg level, left leg, subsequent encounter     Difficulty walking      Physician: Ta Godinez MD    Physician Orders: PT Eval and Treat   Medical Diagnosis from Referral: S86.312A (ICD-10-CM) - Strain of muscle(s) and tendon(s) of peroneal muscle group at lower leg level, left leg, initial encounter M76.72 (ICD-10-CM) - Peroneal tendonitis of left lower extremity  Evaluation Date: 2/29/2024  Authorization Period Expiration: 12/31/2024  Plan of Care Expiration: 4/28/9/2024  Visit # / Visits authorized: 1/ 1    Foto  Date  Score    #1/3 2/29/2024 26.0   #2/3     #3/3       Time In: 10:43am  Time Out: 11:36am  Total Appointment Time (timed & untimed codes): 53 minutes    Precautions: Standard    Subjective     Date of injury: July 30, 2023  History of current condition - patient was walking on grass and her L ankle rolled outward when she stepped with resulting pain ever since the misstep. Her pain flared up whenever she went to MD and had an assessment completed.      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 Impression: No acute osseous abnormality.       Social History: unremarkable     Pain:  Current 5/10, worst 8/10, best 3/10   Location: L lateral ankle   Description: throbbing   Aggravating Factors: sitting with LE/foot in ER, negotiating  stairs   Easing Factors: wrapping L ankle     Pt's goals: Return to previous level of function    Medical History:   Past Medical History:   Diagnosis Date    Anxiety and depression     Constipation     Encounter for blood transfusion     Endometriosis     GERD (gastroesophageal reflux disease)     IBS (irritable bowel syndrome)     Neuromuscular disorder     nerve damage of right foot after hammertoe surgery    PCOS (polycystic ovarian syndrome)     Sleep disturbance     Thyroid disease     Vaginal prolapse      Surgical History:   Emily Pelayo  has a past surgical history that includes bladder suspension; hammer toe (Right, 11/19/2015); Tonsillectomy; dx lap; Laparoscopy (~2012); Colonoscopy (~24 years old); Esophagogastroduodenoscopy (N/A, 1/22/2020); Colonoscopy (N/A, 8/21/2020); Diagnostic laparoscopy (N/A, 1/6/2022); laparoscopic suspension of uterosacral ligament (Bilateral, 1/6/2022); Arthroscopy of shoulder with decompression of subacromial space (Left, 11/17/2022); Arthroscopic acromioplasty of shoulder (Left, 11/17/2022); Arthroscopic tenotomy of biceps tendon (Left, 11/17/2022); and Arthroscopic repair of rotator cuff of shoulder (Left, 11/17/2022).    Medications:   Emily has a current medication list which includes the following prescription(s): diphenhydramine hcl, albuterol, cholecalciferol (vitamin d3), citranatal harmony (iron fum), fluocinonide, hydroxyzine hcl, ibuprofen, ipratropium, ketoconazole, levonorgestrel-ethinyl estradiol, levothyroxine, linaclotide, metformin, naproxen sodium, omeprazole, ondansetron, oxycodone-acetaminophen, theranatal ovavite, prazosin, prenatal,calc.40-iron-folate 1, progesterone, sertraline, tirzepatide, and tizanidine.    Allergies:   Review of patient's allergies indicates:  No Known Allergies     Objective    2/29/2024:    Observation/posture: R>L inversion at foot       Gait/assistive device: none       Range of Motion: AROM (PROM):  Ankle Right  Left   "  Dorsiflexion 8 6   Plantarflexion 51 57   Inversion 17 34   Eversion 14 10     Strength:  Ankle Right  Left    Dorsiflexion 4+/5 4+/5   Plantarflexion 4/5 4/5   Inversion 4+/5 4/5   Eversion 4+/5 4/5     Special Tests:  Ankle Right  Left    Anterior Drawer Test - -   Squeeze test - -   Rasmussen test - -     Joint Mobility:   L TCJ, STJ, METs: impaired     Palpation/sensation:   LE light touch sensation: equal and intact bilaterally     Function/balance:   Sit - stand: 9 X in 30:     R SLS: 4", 2", 2"  L SLS: 1", 4", 7"    Edema:   R ankle circumference: 51.0 cm  L ankle circumference: 50.0 cm    R ankle joint line: 24.5 cm  L ankle joint line: 25.0 cm    R mets: 21.0 cm  L mets: 21.0 cm     Intake Outcome Measure for FOTO Ankle Survey    Therapist reviewed FOTO scores   FOTO documents entered into [employer] - see Media section.    Score: 26.0           Treatment   Total Treatment time (time-based codes) separate from Evaluation: 33 minutes    + indicates new exercise   Bold indicates completed exercise    therapeutic exercises to develop strength, endurance, ROM, flexibility, posture, and core stabilization for 20 minutes:  Patient education on nature of current condition and PHYSICAL THERAPY POC  Written HOME EXERCISE PROGRAM provided, reviewed, patient demo understanding   L gastroc stretch with strap, 30"x3  L soleus stretch with strap, 30"x3    manual therapy techniques: Joint mobilizations, Manual traction, Myofacial release, Soft tissue Mobilization, and Friction Massage for 8 minutes:  L TCJ, STJ, METs Gr III/IV - M     neuromuscular re-education activities to improve: Balance, Coordination, Kinesthetic, Sense, Proprioception, and Posture for 5 minutes:  L SLS 10"x6     therapeutic activities to improve functional performance for 00 minutes:      gait training to improve functional mobility and safety for 00 minutes:      Home Exercises and Patient Education Provided  Education provided:   - yes    Home " Exercises Provided: yes.  Exercises were reviewed and Poppy was able to demonstrate them prior to the end of the session.  Poppy demonstrated good  understanding of the education provided.     Assessment     Emily is a 38 y.o. referred to outpatient Physical Therapy with a medical diagnosis of S86.312A (ICD-10-CM) - Strain of muscle(s) and tendon(s) of peroneal muscle group at lower leg level, left leg, initial encounter M76.72 (ICD-10-CM) - Peroneal tendonitis of left lower extremity.     Pt presents with decreased ROM, muscle strength, flexibility, joint mobility. Increased pain, stiffness, soft tissue restriction. Impaired posture, joint mechanics, balance, gait pattern.     The patient's current task deficits include the following: limitation in ADL, self care, social/recreational tasks.     Patient prognosis: good  Rehab potential: good    Patient will benefit from skilled outpatient Physical Therapy to address the deficits stated above and in the chart below, provide patient /family education, to maximize patient's level of independence, and to address functional deficits.    Plan of care discussed with patient: Yes  Patient's spiritual, cultural and educational needs considered and patient is agreeable to the plan of care and goals as stated below:     Anticipated Barriers for therapy: co morbidities     Medical Necessity is demonstrated by the following  History  Co-morbidities and personal factors that may impact the plan of care [] LOW: no personal factors / co-morbidities  [x] MODERATE: 1-2 personal factors / co-morbidities  [] HIGH: 3+ personal factors / co-morbidities    Moderate / High Support Documentation:   Co-morbidities affecting plan of care:    Anxiety and depression    Constipation    Encounter for blood transfusion    Endometriosis    GERD (gastroesophageal reflux disease)    IBS (irritable bowel syndrome)    Neuromuscular disorder    nerve damage of right foot after hammertoe surgery    PCOS  "(polycystic ovarian syndrome)    Sleep disturbance    Thyroid disease    Vaginal prolapse     Personal Factors:   coping style  social background  lifestyle     Examination  Body Structures and Functions, activity limitations and participation restrictions that may impact the plan of care [] LOW: addressing 1-2 elements  [x] MODERATE: 3+ elements  [] HIGH: 4+ elements (please support below)    Moderate / High Support Documentation: Moderate      Clinical Presentation [] LOW: stable  [x] MODERATE: Evolving  [] HIGH: Unstable     Decision Making/ Complexity Score: moderate       Goals:   Short Terms Goals: 3 weeks    Goal  Progress  Date    (1)  Patient will be I with HOME EXERCISE PROGRAM  Initial, Not Met 2/29/2024   (2)  Patient will obtain 8 deg L ankle DF ACTIVE RANGE OF MOTION to indicate improved ability to negotiate flight of stairs using alternating gait pattern   Initial, Not Met  2/29/2024    (3)   Patient will obtain 14 deg L ankle eversion ACTIVE RANGE OF MOTION to indicate improved ability to make changes in direction while moving quickly   Initial, Not Met  2/29/2024      Long Term Goals: 6 weeks    Goal  Progress  Date    (1)  Patient will obtain 4+/5 L ankle eversion MMT to indicate improved ability to negotiate uneven terrain  Initial, Not Met  2/29/2024   (2)   Patient will complete > 14 sit - stand transfers within 30" to indicate improved transitional tolerance  Initial, Not Met 2/29/2024    (3)   Patient will complete L SLS average of 30" to indicate decreased risk of fall   Initial, Not Met   2/29/2024     Plan     Plan of care Certification: 2/29/2024 to 4/29/2024.    Outpatient Physical Therapy 2 times weekly for 6 weeks to include the following interventions: Cervical/Lumbar Traction, Electrical Stimulation re-eval, dry needling, Gait Training, Iontophoresis (with ), Manual Therapy, Moist Heat/ Ice, Neuromuscular Re-ed, Patient Education, Self Care, Therapeutic Activities, and Therapeutic " Exercise.     Physical therapist and physical therapy assistant(s) will met face to face to discuss patient's treatment plan and progress towards established goals. Pt will be seen by a physical therapist minimally every 6th visit or every 30 days.    Jennifer Guzmán, PT  2/28/2024       I CERTIFY THE NEED FOR THESE SERVICES FURNISHED UNDER THIS PLAN OF TREATMENT AND WHILE UNDER MY CARE     Physician's comments:           Physician's Signature: ___________________________________________________

## 2024-03-04 ENCOUNTER — CLINICAL SUPPORT (OUTPATIENT)
Dept: REHABILITATION | Facility: HOSPITAL | Age: 39
End: 2024-03-04
Payer: COMMERCIAL

## 2024-03-04 DIAGNOSIS — M76.72 PERONEAL TENDONITIS, LEFT: Primary | ICD-10-CM

## 2024-03-04 DIAGNOSIS — R26.2 DIFFICULTY WALKING: ICD-10-CM

## 2024-03-04 DIAGNOSIS — S86.312D STRAIN OF MUSCLE(S) AND TENDON(S) OF PERONEAL MUSCLE GROUP AT LOWER LEG LEVEL, LEFT LEG, SUBSEQUENT ENCOUNTER: ICD-10-CM

## 2024-03-04 PROCEDURE — 97112 NEUROMUSCULAR REEDUCATION: CPT | Mod: PN,CQ

## 2024-03-04 PROCEDURE — 97530 THERAPEUTIC ACTIVITIES: CPT | Mod: PN,CQ

## 2024-03-04 PROCEDURE — 97110 THERAPEUTIC EXERCISES: CPT | Mod: PN,CQ

## 2024-03-04 NOTE — PROGRESS NOTES
"[facility]   Physical Therapy Treatment Note      Name: Emily Pelayo  Clinic Number: [id number]    Therapy Diagnosis:   Encounter Diagnoses   Name Primary?    Peroneal tendonitis, left Yes    Strain of muscle(s) and tendon(s) of peroneal muscle group at lower leg level, left leg, subsequent encounter     Difficulty walking      Physician: Ta Godinez MD    Visit Date: 3/4/2024    Physician Orders: PT Eval and Treat   Medical Diagnosis from Referral: S86.312A (ICD-10-CM) - Strain of muscle(s) and tendon(s) of peroneal muscle group at lower leg level, left leg, initial encounter M76.72 (ICD-10-CM) - Peroneal tendonitis of left lower extremity  Evaluation Date: 2/29/2024  Authorization Period Expiration: 12/31/2024  Plan of Care Expiration: 4/28/9/2024  Visit # / Visits authorized: 1/ 20 + eval     Foto  Date  Score    #1/3 2/29/2024 26.0   #2/3       #3/3          Time In: 0822   Time Out: 0904   Total Time: 42 minutes  Total Billable Time: 42 minutes    Precautions: Standard    PTA Visit #: 1/5       Subjective     Patient reports: lateral left ankle pain 2/10 at rest, 5-6/10 with walking.   .  She was compliant with home exercise program.  Response to previous treatment: no complaints  Functional change: none stated    Pain: 2/10  Location: left ankle    Objective      Objective Measures updated at progress report unless specified.     Treatment     Poppy received the treatments listed below:      + indicates new exercise   Bold indicates completed exercise     therapeutic exercises to develop strength, endurance, ROM, flexibility, posture, and core stabilization for 12 minutes:  Patient education on nature of current condition and PHYSICAL THERAPY POC  Reviewed- HOME EXERCISE PROGRAM provided, reviewed, patient demo understanding   L gastroc stretch with strap, 30"x3  L soleus stretch with strap, 30"x3  Inversion/eversion AROM/AAROM x 15  DF/VT AROM/AAROM x 15       manual therapy " "techniques: Joint mobilizations, Manual traction, Myofacial release, Soft tissue Mobilization, and Friction Massage for 08 minutes:  L TCJ, STJ, METs Gr III/IV - M      neuromuscular re-education activities to improve: Balance, Coordination, Kinesthetic, Sense, Proprioception, and Posture for 10 minutes:  L SLS 10"x6-cues for tripod WB  Seated HR/TR 2 x 10-manual cues for tripod engagement  Seated HR with tennis ball between ankles 2 x 10      therapeutic activities to improve functional performance for 12 minutes:  Stair negotiation without rails and cues for full foot contact instead of walking on toes x 8 steps   Sit<>stand from standard chair without UE's x 10-cues to prevent knee hyperextension and ankle supination     gait training to improve functional mobility and safety for 00 minutes:       Patient Education and Home Exercises       Education provided:   - Educated pt that he/she may feel soreness after session.      Written Home Exercises Provided: yes. Exercises were reviewed and Poppy was able to demonstrate them prior to the end of the session.  Poppy demonstrated good  understanding of the education provided. See Electronic Medical Record under Patient Instructions for exercises provided during therapy sessions    Assessment     Continued discomfort left lateral ankle. Session emphasis today on ROM and stabilization with correction of deviations for ankle and knee protection. Manual cues to prevent supination and inversion left LE with seated HR exercises. Worked on full foot contact on stair steps with negotiation to prevent excessive strain on calves. Good response to progressions today.  Will benefit from continued physical therapy intervention to progress toward goals set forth in plan of care to improve functional mobility and quality of life.     Poppy Is progressing well towards her goals.   Patient prognosis is Good.     Patient will continue to benefit from skilled outpatient physical therapy to " "address the deficits listed in the problem list box on initial evaluation, provide pt/family education and to maximize pt's level of independence in the home and community environment.     Patient's spiritual, cultural and educational needs considered and pt agreeable to plan of care and goals.     Anticipated barriers to physical therapy: co morbidities     Goals:   Short Terms Goals: 3 weeks     Goal  Progress  Date    (1)  Patient will be I with HOME EXERCISE PROGRAM  Initial, Not Met 3/4/2024   (2)  Patient will obtain 8 deg L ankle DF ACTIVE RANGE OF MOTION to indicate improved ability to negotiate flight of stairs using alternating gait pattern   Initial, Not Met  3/4/2024    (3)   Patient will obtain 14 deg L ankle eversion ACTIVE RANGE OF MOTION to indicate improved ability to make changes in direction while moving quickly   Initial, Not Met  3/4/2024      Long Term Goals: 6 weeks     Goal  Progress  Date    (1)  Patient will obtain 4+/5 L ankle eversion MMT to indicate improved ability to negotiate uneven terrain  Initial, Not Met  3/4/2024   (2)   Patient will complete > 14 sit - stand transfers within 30" to indicate improved transitional tolerance  Initial, Not Met 3/4/2024    (3)   Patient will complete L SLS average of 30" to indicate decreased risk of fall   Initial, Not Met   3/4/2024        Plan     Continue per POC, progressing as appropriate to achieve stated goals.    Continue with: Plan of care Certification: 2/29/2024 to 4/29/2024.     Outpatient Physical Therapy 2 times weekly for 6 weeks to include the following interventions: Cervical/Lumbar Traction, Electrical Stimulation re-eval, dry needling, Gait Training, Iontophoresis (with ), Manual Therapy, Moist Heat/ Ice, Neuromuscular Re-ed, Patient Education, Self Care, Therapeutic Activities, and Therapeutic Exercise.      Physical therapist and physical therapy assistant(s) will met face to face to discuss patient's treatment plan and progress " towards established goals. Pt will be seen by a physical therapist minimally every 6th visit or every 30 days.      Tamiko Chanel, SHEEBA    3/4/2024

## 2024-03-06 ENCOUNTER — CLINICAL SUPPORT (OUTPATIENT)
Dept: REHABILITATION | Facility: HOSPITAL | Age: 39
End: 2024-03-06
Payer: COMMERCIAL

## 2024-03-06 DIAGNOSIS — M76.72 PERONEAL TENDONITIS, LEFT: Primary | ICD-10-CM

## 2024-03-06 DIAGNOSIS — R26.2 DIFFICULTY WALKING: ICD-10-CM

## 2024-03-06 DIAGNOSIS — S86.312D STRAIN OF MUSCLE(S) AND TENDON(S) OF PERONEAL MUSCLE GROUP AT LOWER LEG LEVEL, LEFT LEG, SUBSEQUENT ENCOUNTER: ICD-10-CM

## 2024-03-06 PROCEDURE — 97112 NEUROMUSCULAR REEDUCATION: CPT | Mod: PN,CQ

## 2024-03-06 PROCEDURE — 97140 MANUAL THERAPY 1/> REGIONS: CPT | Mod: PN,CQ

## 2024-03-06 PROCEDURE — 97530 THERAPEUTIC ACTIVITIES: CPT | Mod: PN,CQ

## 2024-03-06 PROCEDURE — 97110 THERAPEUTIC EXERCISES: CPT | Mod: PN,CQ

## 2024-03-06 NOTE — PROGRESS NOTES
Nursing Follow Up Assessment  Patient:  Danya Granados   MR#:  [id number]   :  1957   Date of Service:  10/3/2018         1      Diagnosis: C54.1 - Malignant neoplasm of endometrium, Diagnosed 2017 (Active) ,        Radiation Prescription & Treatment: Course: C1    Treatment Site Energy Dose/Fx (cGy) #Fx Dose Correction (cGy) Total Dose (cGy) Start Date End Date Elapsed Days   1.1 Pelvis 10X 180 25 /  0 4,500 2018 34   Total:     4,500 2018 34        Allergies: PCN resulting in  -       Medications:    Reviewed and updated in [employer].    Treatment Toxicities: The following toxicities were assessed as a 1:   Fatigue - - Mild fatigue over baseline  Hot Flashes/flushes - - Mild  Incontinence, urinary - - Occasional (e.g., with coughing, sneezing, etc) pads are not indicated  Seen by Dr Black partner 18 and pelvic exam completed  Slight geeta color discharge today- once  Dr Smallwood 18  CT Scan 2018  Mammogram 18        Interval Toxicities:           Vitals:Performed on 10/3/2018 2:06 PM  Performed on 10/3/2018, 14:06   Weight 49.44 kg   Temperature 98.1 F     Pulse 63 /min   Respiration 16 /min     /70 mm(hg)   O2 Sat 98 %            Patient Education:        [employer] Updates:    Checked [employer] for any documentation updates      Numeric Pain Scale  SCORE = 4 REQUIRES INTERVENTION AND DOCUMENTATION                                                                                                         Félix Faces Pain Rating Scale  SCORE = 4 REQUIRES INTERVENTION AND SEPARATE DOCUMENTATION     “Does someone you know make you feel unsafe at home?”  If yes describe:             Time spent with patient:15    Electronically Signed by: Fredy Lopez MD   Date: 10/4/2018 2:11:30 PM        [facility]   Physical Therapy Treatment Note      Name: Emily Pelayo  Clinic Number: [id number]    Therapy Diagnosis:   Encounter Diagnoses   Name Primary?    Peroneal tendonitis, left Yes    Strain of muscle(s) and tendon(s) of peroneal muscle group at lower leg level, left leg, subsequent encounter     Difficulty walking        Physician: Ta Godinez MD    Visit Date: 3/6/2024    Physician Orders: PT Eval and Treat   Medical Diagnosis from Referral: S86.312A (ICD-10-CM) - Strain of muscle(s) and tendon(s) of peroneal muscle group at lower leg level, left leg, initial encounter M76.72 (ICD-10-CM) - Peroneal tendonitis of left lower extremity  Evaluation Date: 2/29/2024  Authorization Period Expiration: 12/31/2024  Plan of Care Expiration: 4/28/9/2024  Visit # / Visits authorized: 2/ 20 + eval     Foto  Date  Score    #1/3 2/29/2024 26.0   #2/3       #3/3          Time In: 0734   Time Out: 0826   Total Time: 52 minutes  Total Billable Time: 50 minutes    Precautions: Standard    PTA Visit #: 2/5       Subjective     Patient reports: just got off work and is tired. Left alteral ankle pain 3/10 2* up and down stairs a lot during this past work shift 2* an issue. Only rest was at 2am for approx 2 hours. Worked on full foot contact for stairs at work.   .  She was somewhat compliant with home exercise program once daily.  Response to previous treatment: no complaints  Functional change: none stated    Pain: 3/10  Location: left ankle    Objective      Objective Measures updated at progress report unless specified.     Treatment     Poppy received the treatments listed below:      + indicates new exercise   Bold indicates completed exercise     therapeutic exercises to develop strength, endurance, ROM, flexibility, posture, and core stabilization for 18 minutes:  Patient education on nature of current condition and PHYSICAL THERAPY POC  Reviewed- HOME EXERCISE PROGRAM provided,  "reviewed, patient demo understanding   L gastroc stretch with strap, 30"x3-cues to prevent knee hyperextension  L soleus stretch with strap, 30"x3-cues to prevent knee hyperextension  Inversion/eversion AROM/AAROM x 15  DF/IA AROM/AAROM x 15  [place] pickup DF with eversion x 1 bucket       manual therapy techniques: Joint mobilizations, Manual traction, Myofacial release, Soft tissue Mobilization, and Friction Massage for 14 minutes:  L TCJ, STJ, METs Gr III/IV - M   STM lateral calf     neuromuscular re-education activities to improve: Balance, Coordination, Kinesthetic, Sense, Proprioception, and Posture for 10 minutes:  L SLS 3 x 30s-cues for grounding toes  Seated HR/TR 20-manual cues for tripod engagement  Seated HR with tennis ball between ankles 2 x 10      therapeutic activities to improve functional performance for 08 minutes:  NP-Stair negotiation without rails and cues for full foot contact instead of walking on toes x 8 steps   Sit<>stand from standard chair without UE's 2 x 10-cues to prevent knee hyperextension and ankle supination     gait training to improve functional mobility and safety for 00 minutes:       Patient Education and Home Exercises       Education provided:   - Educated pt that he/she may feel soreness after session.      Written Home Exercises Provided:  Instructed patient to continue current home exercise program.    Exercises were reviewed and Poppy was able to demonstrate them prior to the end of the session.  Poppy demonstrated good  understanding of the education provided. See Electronic Medical Record under Patient Instructions for exercises provided during therapy sessions    Assessment     Slight increase in pain 2* just off work, and pt states this is her norm. No soreness after last session. Muscle fatigue with marble pickup. Session emphasis today on ROM and stabilization with correction of deviations for ankle and knee protection. Manual cues to prevent supination and " "inversion left LE with seated HR exercises.. Good response to progressions today with improved pain after session.  Will benefit from continued physical therapy intervention to progress toward goals set forth in plan of care to improve functional mobility and quality of life.     Poppy Is progressing well towards her goals.   Patient prognosis is Good.     Patient will continue to benefit from skilled outpatient physical therapy to address the deficits listed in the problem list box on initial evaluation, provide pt/family education and to maximize pt's level of independence in the home and community environment.     Patient's spiritual, cultural and educational needs considered and pt agreeable to plan of care and goals.     Anticipated barriers to physical therapy: co morbidities     Goals:   Short Terms Goals: 3 weeks     Goal  Progress  Date    (1)  Patient will be I with HOME EXERCISE PROGRAM  Initial, Not Met 3/6/2024   (2)  Patient will obtain 8 deg L ankle DF ACTIVE RANGE OF MOTION to indicate improved ability to negotiate flight of stairs using alternating gait pattern   Initial, Not Met  3/6/2024    (3)   Patient will obtain 14 deg L ankle eversion ACTIVE RANGE OF MOTION to indicate improved ability to make changes in direction while moving quickly   Initial, Not Met  3/6/2024      Long Term Goals: 6 weeks     Goal  Progress  Date    (1)  Patient will obtain 4+/5 L ankle eversion MMT to indicate improved ability to negotiate uneven terrain  Initial, Not Met  3/6/2024   (2)   Patient will complete > 14 sit - stand transfers within 30" to indicate improved transitional tolerance  Initial, Not Met 3/6/2024    (3)   Patient will complete L SLS average of 30" to indicate decreased risk of fall   Initial, Not Met   3/6/2024        Plan     Continue per POC, progressing as appropriate to achieve stated goals.    Continue with: Plan of care Certification: 2/29/2024 to 4/29/2024.     Outpatient Physical Therapy 2 " times weekly for 6 weeks to include the following interventions: Cervical/Lumbar Traction, Electrical Stimulation re-eval, dry needling, Gait Training, Iontophoresis (with ), Manual Therapy, Moist Heat/ Ice, Neuromuscular Re-ed, Patient Education, Self Care, Therapeutic Activities, and Therapeutic Exercise.      Physical therapist and physical therapy assistant(s) will met face to face to discuss patient's treatment plan and progress towards established goals. Pt will be seen by a physical therapist minimally every 6th visit or every 30 days.      Tamiko Chanel, PTA    3/6/2024

## 2024-03-14 NOTE — PROGRESS NOTES
"[facility]   Physical Therapy Treatment Note      Name: Emily Pelayo  Clinic Number: [id number]    Therapy Diagnosis:   Encounter Diagnoses   Name Primary?    Peroneal tendonitis, left Yes    Strain of muscle(s) and tendon(s) of peroneal muscle group at lower leg level, left leg, subsequent encounter     Difficulty walking        Physician: Ta Godinez MD    Visit Date: 3/15/2024    Physician Orders: PT Eval and Treat   Medical Diagnosis from Referral: S86.312A (ICD-10-CM) - Strain of muscle(s) and tendon(s) of peroneal muscle group at lower leg level, left leg, initial encounter M76.72 (ICD-10-CM) - Peroneal tendonitis of left lower extremity  Evaluation Date: 2/29/2024  Authorization Period Expiration: 12/31/2024  Plan of Care Expiration: 4/28/9/2024  Visit # / Visits authorized: 3/ 20 + eval     Foto  Date  Score    #1/3 2/29/2024 26.0   #2/3       #3/3          Time In: 0657   Time Out: 0745  Total Time: 48 minutes  Total Billable Time: 42 minutes    Precautions: Standard    PTA Visit #: 3/5       Subjective     Patient reports: 2/10 lateral left ankle pain. Still having trouble preventing ER left LE. Up and down the stairs all week, and worst pain was 5/10. "I think this is helping". Doesn't have to rub and press on her ankle after a lot of stairs like she used to. Ensuring making full foot contact on the stairs. Late arrival 2* early morning appt.   .  She was somewhat compliant with home exercise program once daily.  Response to previous treatment: no complaints  Functional change: improving pain after climbing stairs at school.    Pain: 2/10  Location: left ankle    Objective      Objective Measures updated at progress report unless specified.     Treatment     Poppy received the treatments listed below:      + indicates new exercise   Bold indicates completed exercise     therapeutic exercises to develop strength, endurance, ROM, flexibility, posture, and core " "stabilization for 18 minutes 1:1 supervision:  Patient education on nature of current condition and PHYSICAL THERAPY POC  Reviewed- HOME EXERCISE PROGRAM provided, reviewed, patient demo understanding   L gastroc stretch with strap, 30"x3-cues to prevent knee hyperextension  L soleus stretch with strap, 30"x3-cues to prevent knee hyperextension  Inversion/eversion AROM/AAROM x 15  DF/PF AROM/AAROM x 15  Ankle 4 way green resistance band 2 x 10  [place] pickup DF with eversion x 1 bucket       manual therapy techniques: Joint mobilizations, Manual traction, Myofacial release, Soft tissue Mobilization, and Friction Massage for 10 minutes:  L TCJ, STJ, METs Gr III/IV - M   STM/MFR lateral calf with TP gun     neuromuscular re-education activities to improve: Balance, Coordination, Kinesthetic, Sense, Proprioception, and Posture for 10 minutes:  L SLS, airex 3 x 30s-cues for grounding toes  Seated HR/TR 20-manual cues for tripod engagement  Standing HR with tennis ball between ankles 2 x 10   Standing arch curls, airex x 10     therapeutic activities to improve functional performance for 04 minutes:  NP-Stair negotiation without rails and cues for full foot contact instead of walking on toes x 8 steps   Sit<>stand from standard chair without UE's 2 x 10-cues to prevent knee hyperextension and ankle supination     gait training to improve functional mobility and safety for 00 minutes:       Patient Education and Home Exercises       Education provided:   - Educated pt that he/she may feel soreness after session.      Written Home Exercises Provided:  Instructed patient to continue current home exercise program.    Exercises were reviewed and Poppy was able to demonstrate them prior to the end of the session.  Poppy demonstrated good  understanding of the education provided. See Electronic Medical Record under Patient Instructions for exercises provided during therapy sessions    Assessment     Late arrival. Improving left " "ankle pain with stairs and overall decreased pain. Progressed strengthening and stabilization with good response and cues for proper muscle activation and avoiding compensations. Improved pain after session. Will benefit from continued physical therapy intervention to progress toward goals set forth in plan of care to improve functional mobility and quality of life.     Poppy Is progressing well towards her goals.   Patient prognosis is Good.     Patient will continue to benefit from skilled outpatient physical therapy to address the deficits listed in the problem list box on initial evaluation, provide pt/family education and to maximize pt's level of independence in the home and community environment.     Patient's spiritual, cultural and educational needs considered and pt agreeable to plan of care and goals.     Anticipated barriers to physical therapy: co morbidities     Goals:   Short Terms Goals: 3 weeks     Goal  Progress  Date    (1)  Patient will be I with HOME EXERCISE PROGRAM  Initial, Not Met 3/15/2024   (2)  Patient will obtain 8 deg L ankle DF ACTIVE RANGE OF MOTION to indicate improved ability to negotiate flight of stairs using alternating gait pattern   Initial, Not Met  3/15/2024    (3)   Patient will obtain 14 deg L ankle eversion ACTIVE RANGE OF MOTION to indicate improved ability to make changes in direction while moving quickly   Initial, Not Met  3/15/2024      Long Term Goals: 6 weeks     Goal  Progress  Date    (1)  Patient will obtain 4+/5 L ankle eversion MMT to indicate improved ability to negotiate uneven terrain  Initial, Not Met  3/15/2024   (2)   Patient will complete > 14 sit - stand transfers within 30" to indicate improved transitional tolerance  Initial, Not Met 3/15/2024    (3)   Patient will complete L SLS average of 30" to indicate decreased risk of fall   Initial, Not Met   3/15/2024        Plan     Continue per POC, progressing as appropriate to achieve stated " goals.    Continue with: Plan of care Certification: 2/29/2024 to 4/29/2024.     Outpatient Physical Therapy 2 times weekly for 6 weeks to include the following interventions: Cervical/Lumbar Traction, Electrical Stimulation re-eval, dry needling, Gait Training, Iontophoresis (with ), Manual Therapy, Moist Heat/ Ice, Neuromuscular Re-ed, Patient Education, Self Care, Therapeutic Activities, and Therapeutic Exercise.      Physical therapist and physical therapy assistant(s) will met face to face to discuss patient's treatment plan and progress towards established goals. Pt will be seen by a physical therapist minimally every 6th visit or every 30 days.      Tamiko Chanel, PTA    3/15/2024

## 2024-03-15 ENCOUNTER — OFFICE VISIT (OUTPATIENT)
Dept: PSYCHIATRY | Facility: CLINIC | Age: 39
End: 2024-03-15
Payer: COMMERCIAL

## 2024-03-15 ENCOUNTER — CLINICAL SUPPORT (OUTPATIENT)
Dept: REHABILITATION | Facility: HOSPITAL | Age: 39
End: 2024-03-15
Payer: COMMERCIAL

## 2024-03-15 VITALS
DIASTOLIC BLOOD PRESSURE: 74 MMHG | SYSTOLIC BLOOD PRESSURE: 117 MMHG | HEIGHT: 69 IN | BODY MASS INDEX: 32.2 KG/M2 | WEIGHT: 217.38 LBS | HEART RATE: 76 BPM

## 2024-03-15 DIAGNOSIS — S86.312D STRAIN OF MUSCLE(S) AND TENDON(S) OF PERONEAL MUSCLE GROUP AT LOWER LEG LEVEL, LEFT LEG, SUBSEQUENT ENCOUNTER: ICD-10-CM

## 2024-03-15 DIAGNOSIS — G47.00 INSOMNIA, UNSPECIFIED TYPE: ICD-10-CM

## 2024-03-15 DIAGNOSIS — F41.1 GAD (GENERALIZED ANXIETY DISORDER): ICD-10-CM

## 2024-03-15 DIAGNOSIS — R26.2 DIFFICULTY WALKING: ICD-10-CM

## 2024-03-15 DIAGNOSIS — M76.72 PERONEAL TENDONITIS, LEFT: Primary | ICD-10-CM

## 2024-03-15 DIAGNOSIS — F33.42 RECURRENT MAJOR DEPRESSIVE DISORDER, IN FULL REMISSION: Primary | ICD-10-CM

## 2024-03-15 DIAGNOSIS — F43.10 PTSD (POST-TRAUMATIC STRESS DISORDER): ICD-10-CM

## 2024-03-15 PROCEDURE — 97110 THERAPEUTIC EXERCISES: CPT | Mod: PN,CQ

## 2024-03-15 PROCEDURE — 3074F SYST BP LT 130 MM HG: CPT | Mod: CPTII,S$GLB,,

## 2024-03-15 PROCEDURE — 3008F BODY MASS INDEX DOCD: CPT | Mod: CPTII,S$GLB,,

## 2024-03-15 PROCEDURE — 97140 MANUAL THERAPY 1/> REGIONS: CPT | Mod: PN,CQ

## 2024-03-15 PROCEDURE — 99215 OFFICE O/P EST HI 40 MIN: CPT | Mod: S$GLB,,,

## 2024-03-15 PROCEDURE — G2211 COMPLEX E/M VISIT ADD ON: HCPCS | Mod: S$GLB,,,

## 2024-03-15 PROCEDURE — 1160F RVW MEDS BY RX/DR IN RCRD: CPT | Mod: CPTII,S$GLB,,

## 2024-03-15 PROCEDURE — 97112 NEUROMUSCULAR REEDUCATION: CPT | Mod: PN,CQ

## 2024-03-15 PROCEDURE — 99999 PR PBB SHADOW E&M-EST. PATIENT-LVL IV: CPT | Mod: PBBFAC,,,

## 2024-03-15 PROCEDURE — 3078F DIAST BP <80 MM HG: CPT | Mod: CPTII,S$GLB,,

## 2024-03-15 PROCEDURE — 1159F MED LIST DOCD IN RCRD: CPT | Mod: CPTII,S$GLB,,
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
## 2024-03-18 ENCOUNTER — CLINICAL SUPPORT (OUTPATIENT)
Dept: REHABILITATION | Facility: HOSPITAL | Age: 39
End: 2024-03-18
Payer: COMMERCIAL

## 2024-03-18 DIAGNOSIS — S86.312D STRAIN OF MUSCLE(S) AND TENDON(S) OF PERONEAL MUSCLE GROUP AT LOWER LEG LEVEL, LEFT LEG, SUBSEQUENT ENCOUNTER: ICD-10-CM

## 2024-03-18 DIAGNOSIS — M76.72 PERONEAL TENDONITIS, LEFT: Primary | ICD-10-CM

## 2024-03-18 DIAGNOSIS — R26.2 DIFFICULTY WALKING: ICD-10-CM

## 2024-03-18 PROCEDURE — 97140 MANUAL THERAPY 1/> REGIONS: CPT | Mod: PN

## 2024-03-18 PROCEDURE — 97530 THERAPEUTIC ACTIVITIES: CPT | Mod: PN

## 2024-03-18 PROCEDURE — 97110 THERAPEUTIC EXERCISES: CPT | Mod: PN

## 2024-03-18 NOTE — PROGRESS NOTES
"[facility]   Physical Therapy Progress Note      Name: Emily Pelayo  Clinic Number: [id number]    Therapy Diagnosis:   Encounter Diagnoses   Name Primary?    Peroneal tendonitis, left Yes    Strain of muscle(s) and tendon(s) of peroneal muscle group at lower leg level, left leg, subsequent encounter     Difficulty walking      Physician: Ta Godinez MD    Visit Date: 3/18/2024    Physician Orders: PT Eval and Treat   Medical Diagnosis from Referral: S86.312A (ICD-10-CM) - Strain of muscle(s) and tendon(s) of peroneal muscle group at lower leg level, left leg, initial encounter M76.72 (ICD-10-CM) - Peroneal tendonitis of left lower extremity  Evaluation Date: 2/29/2024  Authorization Period Expiration: 12/31/2024  Plan of Care Expiration: 5/18/2024  Visit # / Visits authorized: 5/ 20     Foto  Date  Score    #1/3 2/29/2024 26.0   #2/3 3/18/2024 30.0   #3/3          Time In: 8:33am  Time Out: 9:25am  Total Time: 52 minutes    Precautions: Standard    Subjective   HOME EXERCISE PROGRAM: no comment   Response to previous treatment: L lateral ankle felt a little pain after 2 hours of standing with report of increased soreness the following day  Function: able to stand for about 2 hour intervals, takes stairs with 1 step at a time     Pain: 3/10  Location: left ankle, with movement     Objective    3/18/2024:  Observation/posture: R>L inversion at foot      Range of Motion: AROM:  Ankle Right  Left    Dorsiflexion 8 11   Plantarflexion 51 51   Inversion 17 41   Eversion 14 11      Strength:  Ankle Right  Left    Dorsiflexion 4+/5 4+/5   Plantarflexion 4/5 4+/5   Inversion 4+/5 4/5   Eversion 4+/5 4/5      Joint Mobility:   L TCJ, STJ, METs: impaired      Function/balance:   Sit - stand: 9 X in 30:      R SLS: 4", 2", 2"  L SLS: 1", 4", 7"     Edema:   R ankle circumference: 51.0 cm  L ankle circumference: 49.0 cm     R ankle joint line: 24.5 cm  L ankle joint line: 25.0 cm     R mets: " "21.0 cm  L mets: 21.0 cm     Intake Outcome Measure for FOTO Ankle Survey     Therapist reviewed FOTO scores   FOTO documents entered into [facility] - see Media section.     Score: 30.0             Treatment   + indicates new exercise   Bold indicates completed exercise     therapeutic exercises to develop strength, endurance, ROM, flexibility, posture, and core stabilization for 20 minutes  L gastroc stretch with strap, 30"x3  L soleus stretch with strap, 30"x3  Inversion/eversion on board, 3x10  DF/PF on board, 3x10  Updated PHYSICAL THERAPY POC  Ankle 4 way green resistance band 2 x 10  [place] pickup DF with eversion x 1 bucket    manual therapy techniques: Joint mobilizations, Manual traction, Myofacial release, Soft tissue Mobilization, and Friction Massage for 10 minutes:  L TCJ, STJ, METs Gr III/IV JM   STM/MFR lateral calf with TP gun     neuromuscular re-education activities to improve: Balance, Coordination, Kinesthetic, Sense, Proprioception, and Posture for 5 minutes:  L SLS, airex mat, 30"x5   Seated HR/TR 20-manual cues for tripod engagement  Standing HR with tennis ball between ankles 2 x 10   Standing arch curls, airex x 10     therapeutic activities to improve functional performance for 17 minutes:  Stair negotiation without rails and cues for full foot contact instead of walking on toes x 8 steps   Sit<>stand from standard chair without UE's 2 x 10-cues to prevent knee hyperextension and ankle supination  +Step up, L LE leading, 3x10   +Step down, L LE leading, 3x10  +Step lateral, L LE leading, 3x10     Patient Education and Home Exercises       Education provided:   - Educated pt that he/she may feel soreness after session.      Home Exercises Provided:  Instructed patient to continue current home exercise program.    Exercises were reviewed and Poppy was able to demonstrate them prior to the end of the session.  Poppy demonstrated good  understanding of the education provided. See Electronic Medical " "Record under Patient Instructions for exercises provided during therapy sessions    Assessment   Patient presents with improving L ankle/foot ACTIVE RANGE OF MOTION, MMT measures and decreased swelling since onset of PHYSICAL THERAPY POC. Her WB tolerance is gradually improving and she requires continued emphasis on stair training to optimize return to function.     Poppy is making good progress towards meeting her goals.     Patient prognosis is good.  Rehab potential: good     Patient will continue to benefit from skilled outpatient physical therapy to address the deficits listed in the problem list box on initial evaluation, provide pt/family education and to maximize pt's level of independence in the home and community environment.      Anticipated barriers to physical therapy: co morbidities     Goals:   Short Terms Goals: 3 weeks     Goal  Progress  Date    (1)  Patient will be I with HOME EXERCISE PROGRAM  Progressing, not met 3/18/2024   (2)  Patient will obtain 8 deg L ankle DF ACTIVE RANGE OF MOTION to indicate improved ability to negotiate flight of stairs using alternating gait pattern  Progressing, not met 3/18/2024    (3)   Patient will obtain 14 deg L ankle eversion ACTIVE RANGE OF MOTION to indicate improved ability to make changes in direction while moving quickly  Progressing, not met 3/18/2024      Long Term Goals: 6 weeks     Goal  Progress  Date    (1)  Patient will obtain 4+/5 L ankle eversion MMT to indicate improved ability to negotiate uneven terrain  Progressing, not met 3/18/2024   (2)   Patient will complete > 14 sit - stand transfers within 30" to indicate improved transitional tolerance Progressing, not met 3/18/2024    (3)   Patient will complete L SLS average of 30" to indicate decreased risk of fall  Progressing, not met  3/18/2024        Plan   Continue with: Plan of care Certification: 3/18/2024 to 5/18/2024.     Outpatient Physical Therapy 2 times weekly for 3 weeks to include the " following interventions: Cervical/Lumbar Traction, Electrical Stimulation re-eval, dry needling, Gait Training, Iontophoresis (with ), Manual Therapy, Moist Heat/ Ice, Neuromuscular Re-ed, Patient Education, Self Care, Therapeutic Activities, and Therapeutic Exercise.      Physical therapist and physical therapy assistant(s) will met face to face to discuss patient's treatment plan and progress towards established goals. Pt will be seen by a physical therapist minimally every 6th visit or every 30 days.      Jennifer Guzmán, PT    3/18/2024    I CERTIFY THE NEED FOR THESE SERVICES FURNISHED UNDER THIS PLAN OF TREATMENT AND WHILE UNDER MY CARE     Physician's comments:           Physician's Signature: ___________________________________________________

## 2024-03-19 ENCOUNTER — DOCUMENTATION ONLY (OUTPATIENT)
Dept: REHABILITATION | Facility: HOSPITAL | Age: 39
End: 2024-03-19
Payer: COMMERCIAL

## 2024-03-19 NOTE — PROGRESS NOTES
"[facility]   Physical Therapy Treatment Note      Name: Emily Pelayo  Clinic Number: [id number]    Therapy Diagnosis:   Encounter Diagnoses   Name Primary?    Peroneal tendonitis, left Yes    Strain of muscle(s) and tendon(s) of peroneal muscle group at lower leg level, left leg, subsequent encounter     Difficulty walking        Physician: Ta Godinez MD    Visit Date: 3/20/2024    Physician Orders: PT Eval and Treat   Medical Diagnosis from Referral: S86.312A (ICD-10-CM) - Strain of muscle(s) and tendon(s) of peroneal muscle group at lower leg level, left leg, initial encounter M76.72 (ICD-10-CM) - Peroneal tendonitis of left lower extremity  Evaluation Date: 2/29/2024  Authorization Period Expiration: 12/31/2024  Plan of Care Expiration: 5/18/2024  Visit # / Visits authorized: 6/ 20     Foto  Date  Score    #1/3 2/29/2024 26.0   #2/3 3/18/2024 30.0   #3/3          Time In: 0828   Time Out: 0906   Total Time: 38 minutes  Total Billable Time: 38 minutes    Precautions: Standard    Subjective   HOME EXERCISE PROGRAM: daily.    Response to previous treatment: L lateral ankle felt a little pain after 2 hours of standing with report of increased soreness the following day  Function: able to stand for about 2 hour intervals, takes stairs with 1 step at a time     Poppy reports: No pain with movement today. Goes back to work tonight. Soreness after last session.     Pain: 0/10  Location: left ankle, with movement     Objective    3/18/2024:  Observation/posture: R>L inversion at foot      Range of Motion: AROM:  Ankle Right  Left    Dorsiflexion 8 11   Plantarflexion 51 51   Inversion 17 41   Eversion 14 11      Strength:  Ankle Right  Left    Dorsiflexion 4+/5 4+/5   Plantarflexion 4/5 4+/5   Inversion 4+/5 4/5   Eversion 4+/5 4/5      Joint Mobility:   L TCJ, STJ, METs: impaired      Function/balance:   Sit - stand: 9 X in 30:      R SLS: 4", 2", 2"  L SLS: 1", 4", 7"     Edema:   R " "ankle circumference: 51.0 cm  L ankle circumference: 49.0 cm     R ankle joint line: 24.5 cm  L ankle joint line: 25.0 cm     R mets: 21.0 cm  L mets: 21.0 cm     Intake Outcome Measure for FOTO Ankle Survey     Therapist reviewed FOTO scores   FOTO documents entered into [facility] - see Media section.     Score: 30.0             Treatment   + indicates new exercise   Bold indicates completed exercise     therapeutic exercises to develop strength, endurance, ROM, flexibility, posture, and core stabilization for 10 minutes  L gastroc stretch with strap, 30"x3  L soleus stretch with strap, 30"x3  Inversion/eversion on nalanda board, 3x10  DF/PF on nalanda board, 310  Updated PHYSICAL THERAPY POC  Ankle 4 way green resistance band 2 x 10  [place] pickup DF with eversion x 1 bucket    manual therapy techniques: Joint mobilizations, Manual traction, Myofacial release, Soft tissue Mobilization, and Friction Massage for 04 minutes:  L TCJ, STJ, METs Gr III/IV JM   STM/MFR lateral calf with TP gun     neuromuscular re-education activities to improve: Balance, Coordination, Kinesthetic, Sense, Proprioception, and Posture for 09 minutes:  L SLS, airex mat, 30"x5   Seated HR/TR 20-manual cues for tripod engagement  Standing HR with tennis ball between ankles 2 x 10   Standing arch curls, airex x 10  +Y balance with slider x 5-occasional UE support on mat     therapeutic activities to improve functional performance for 15 minutes:  Stair negotiation without rails and cues for full foot contact instead of walking on toes x 8 steps   Sit<>stand from standard chair without UE's 2 x 10-cues to prevent knee hyperextension and ankle supination  Step up, L LE leading, 3x10   Step down, L LE leading, 3x10-CKC left today with UE support for return  Step lateral, L LE leading, 3x10    Patient Education and Home Exercises       Education provided:   - Educated pt that he/she may feel soreness after session.      Home Exercises Provided:  " Instructed patient to continue current home exercise program.    Exercises were reviewed and Poppy was able to demonstrate them prior to the end of the session.  Poppy demonstrated good  understanding of the education provided. See Electronic Medical Record under Patient Instructions for exercises provided during therapy sessions    Assessment     Late arrival. No pain today. Progressed left LE strengthening and stabilization with manual cues for preventing valgus with steps. Good tolerance for progression of Y balance with slider but fatigued bilateral quads quickly.  Will benefit from continued physical therapy intervention to progress toward goals set forth in plan of care to improve functional mobility and quality of life.     Poppy is making good progress towards meeting her goals.     Patient prognosis is good.  Rehab potential: good     Patient will continue to benefit from skilled outpatient physical therapy to address the deficits listed in the problem list box on initial evaluation, provide pt/family education and to maximize pt's level of independence in the home and community environment.      Anticipated barriers to physical therapy: co morbidities     Goals:   Short Terms Goals: 3 weeks     Goal  Progress  Date    (1)  Patient will be I with HOME EXERCISE PROGRAM  Progressing, not met 3/20/2024   (2)  Patient will obtain 8 deg L ankle DF ACTIVE RANGE OF MOTION to indicate improved ability to negotiate flight of stairs using alternating gait pattern  Progressing, not met 3/20/2024    (3)   Patient will obtain 14 deg L ankle eversion ACTIVE RANGE OF MOTION to indicate improved ability to make changes in direction while moving quickly  Progressing, not met 3/20/2024      Long Term Goals: 6 weeks     Goal  Progress  Date    (1)  Patient will obtain 4+/5 L ankle eversion MMT to indicate improved ability to negotiate uneven terrain  Progressing, not met 3/20/2024   (2)   Patient will complete > 14 sit - stand  "transfers within 30" to indicate improved transitional tolerance Progressing, not met 3/20/2024    (3)   Patient will complete L SLS average of 30" to indicate decreased risk of fall  Progressing, not met  3/20/2024        Plan     Continue to progress as appropriate.     Continue with: Plan of care Certification: 3/18/2024 to 5/18/2024.     Outpatient Physical Therapy 2 times weekly for 3 weeks to include the following interventions: Cervical/Lumbar Traction, Electrical Stimulation re-eval, dry needling, Gait Training, Iontophoresis (with ), Manual Therapy, Moist Heat/ Ice, Neuromuscular Re-ed, Patient Education, Self Care, Therapeutic Activities, and Therapeutic Exercise.      Physical therapist and physical therapy assistant(s) will met face to face to discuss patient's treatment plan and progress towards established goals. Pt will be seen by a physical therapist minimally every 6th visit or every 30 days.      Tamiko Chanel, PTA    3/20/2024  "

## 2024-03-19 NOTE — PLAN OF CARE
"[facility]   Physical Therapy Progress Note      Name: Emily Pelayo  Clinic Number: [id number]    Therapy Diagnosis:   Encounter Diagnoses   Name Primary?    Peroneal tendonitis, left Yes    Strain of muscle(s) and tendon(s) of peroneal muscle group at lower leg level, left leg, subsequent encounter     Difficulty walking      Physician: Ta Godinez MD    Visit Date: 3/18/2024    Physician Orders: PT Eval and Treat   Medical Diagnosis from Referral: S86.312A (ICD-10-CM) - Strain of muscle(s) and tendon(s) of peroneal muscle group at lower leg level, left leg, initial encounter M76.72 (ICD-10-CM) - Peroneal tendonitis of left lower extremity  Evaluation Date: 2/29/2024  Authorization Period Expiration: 12/31/2024  Plan of Care Expiration: 5/18/2024  Visit # / Visits authorized: 5/ 20     Foto  Date  Score    #1/3 2/29/2024 26.0   #2/3 3/18/2024 30.0   #3/3          Time In: 8:33am  Time Out: 9:25am  Total Time: 52 minutes    Precautions: Standard    Subjective   HOME EXERCISE PROGRAM: no comment   Response to previous treatment: L lateral ankle felt a little pain after 2 hours of standing with report of increased soreness the following day  Function: able to stand for about 2 hour intervals, takes stairs with 1 step at a time     Pain: 3/10  Location: left ankle, with movement     Objective    3/18/2024:  Observation/posture: R>L inversion at foot      Range of Motion: AROM:  Ankle Right  Left    Dorsiflexion 8 11   Plantarflexion 51 51   Inversion 17 41   Eversion 14 11      Strength:  Ankle Right  Left    Dorsiflexion 4+/5 4+/5   Plantarflexion 4/5 4+/5   Inversion 4+/5 4/5   Eversion 4+/5 4/5      Joint Mobility:   L TCJ, STJ, METs: impaired      Function/balance:   Sit - stand: 9 X in 30:      R SLS: 4", 2", 2"  L SLS: 1", 4", 7"     Edema:   R ankle circumference: 51.0 cm  L ankle circumference: 49.0 cm     R ankle joint line: 24.5 cm  L ankle joint line: 25.0 cm     R mets: " "21.0 cm  L mets: 21.0 cm     Intake Outcome Measure for FOTO Ankle Survey     Therapist reviewed FOTO scores   FOTO documents entered into [facility] - see Media section.     Score: 30.0             Treatment   + indicates new exercise   Bold indicates completed exercise     therapeutic exercises to develop strength, endurance, ROM, flexibility, posture, and core stabilization for 20 minutes  L gastroc stretch with strap, 30"x3  L soleus stretch with strap, 30"x3  Inversion/eversion on board, 3x10  DF/PF on board, 3x10  Updated PHYSICAL THERAPY POC  Ankle 4 way green resistance band 2 x 10  [place] pickup DF with eversion x 1 bucket    manual therapy techniques: Joint mobilizations, Manual traction, Myofacial release, Soft tissue Mobilization, and Friction Massage for 10 minutes:  L TCJ, STJ, METs Gr III/IV JM   STM/MFR lateral calf with TP gun     neuromuscular re-education activities to improve: Balance, Coordination, Kinesthetic, Sense, Proprioception, and Posture for 5 minutes:  L SLS, airex mat, 30"x5   Seated HR/TR 20-manual cues for tripod engagement  Standing HR with tennis ball between ankles 2 x 10   Standing arch curls, airex x 10     therapeutic activities to improve functional performance for 17 minutes:  Stair negotiation without rails and cues for full foot contact instead of walking on toes x 8 steps   Sit<>stand from standard chair without UE's 2 x 10-cues to prevent knee hyperextension and ankle supination  +Step up, L LE leading, 3x10   +Step down, L LE leading, 3x10  +Step lateral, L LE leading, 3x10     Patient Education and Home Exercises       Education provided:   - Educated pt that he/she may feel soreness after session.      Home Exercises Provided:  Instructed patient to continue current home exercise program.    Exercises were reviewed and Poppy was able to demonstrate them prior to the end of the session.  Poppy demonstrated good  understanding of the education provided. See Electronic Medical " "Record under Patient Instructions for exercises provided during therapy sessions    Assessment   Patient presents with improving L ankle/foot ACTIVE RANGE OF MOTION, MMT measures and decreased swelling since onset of PHYSICAL THERAPY POC. Her WB tolerance is gradually improving and she requires continued emphasis on stair training to optimize return to function.     Poppy is making good progress towards meeting her goals.     Patient prognosis is good.  Rehab potential: good     Patient will continue to benefit from skilled outpatient physical therapy to address the deficits listed in the problem list box on initial evaluation, provide pt/family education and to maximize pt's level of independence in the home and community environment.      Anticipated barriers to physical therapy: co morbidities     Goals:   Short Terms Goals: 3 weeks     Goal  Progress  Date    (1)  Patient will be I with HOME EXERCISE PROGRAM  Progressing, not met 3/18/2024   (2)  Patient will obtain 8 deg L ankle DF ACTIVE RANGE OF MOTION to indicate improved ability to negotiate flight of stairs using alternating gait pattern  Progressing, not met 3/18/2024    (3)   Patient will obtain 14 deg L ankle eversion ACTIVE RANGE OF MOTION to indicate improved ability to make changes in direction while moving quickly  Progressing, not met 3/18/2024      Long Term Goals: 6 weeks     Goal  Progress  Date    (1)  Patient will obtain 4+/5 L ankle eversion MMT to indicate improved ability to negotiate uneven terrain  Progressing, not met 3/18/2024   (2)   Patient will complete > 14 sit - stand transfers within 30" to indicate improved transitional tolerance Progressing, not met 3/18/2024    (3)   Patient will complete L SLS average of 30" to indicate decreased risk of fall  Progressing, not met  3/18/2024        Plan   Continue with: Plan of care Certification: 3/18/2024 to 5/18/2024.     Outpatient Physical Therapy 2 times weekly for 3 weeks to include the " following interventions: Cervical/Lumbar Traction, Electrical Stimulation re-eval, dry needling, Gait Training, Iontophoresis (with ), Manual Therapy, Moist Heat/ Ice, Neuromuscular Re-ed, Patient Education, Self Care, Therapeutic Activities, and Therapeutic Exercise.      Physical therapist and physical therapy assistant(s) will met face to face to discuss patient's treatment plan and progress towards established goals. Pt will be seen by a physical therapist minimally every 6th visit or every 30 days.      Jennifer Guzmán, PT    3/18/2024    I CERTIFY THE NEED FOR THESE SERVICES FURNISHED UNDER THIS PLAN OF TREATMENT AND WHILE UNDER MY CARE     Physician's comments:           Physician's Signature: ___________________________________________________

## 2024-03-19 NOTE — PROGRESS NOTES
PT/PTA met face to face to discuss pt's treatment plan and progress towards established goals. Pt will be seen by a physical therapist minimally every 6th visit or every 30 days.    Please see Updated Plan of Care dated 3/18/2024 for changes and updated goals.    Tamiko Chanel PTA

## 2024-03-20 ENCOUNTER — CLINICAL SUPPORT (OUTPATIENT)
Dept: REHABILITATION | Facility: HOSPITAL | Age: 39
End: 2024-03-20
Payer: COMMERCIAL

## 2024-03-20 DIAGNOSIS — R26.2 DIFFICULTY WALKING: ICD-10-CM

## 2024-03-20 DIAGNOSIS — S86.312D STRAIN OF MUSCLE(S) AND TENDON(S) OF PERONEAL MUSCLE GROUP AT LOWER LEG LEVEL, LEFT LEG, SUBSEQUENT ENCOUNTER: ICD-10-CM

## 2024-03-20 DIAGNOSIS — M76.72 PERONEAL TENDONITIS, LEFT: Primary | ICD-10-CM

## 2024-03-20 PROCEDURE — 97110 THERAPEUTIC EXERCISES: CPT | Mod: PN,CQ

## 2024-03-20 PROCEDURE — 97530 THERAPEUTIC ACTIVITIES: CPT | Mod: PN,CQ

## 2024-03-20 PROCEDURE — 97112 NEUROMUSCULAR REEDUCATION: CPT | Mod: PN,CQ

## 2024-03-24 NOTE — PROGRESS NOTES
"[facility]   Physical Therapy Treatment Note      Name: Emily Pelayo  Clinic Number: [id number]    Therapy Diagnosis:   Encounter Diagnoses   Name Primary?    Peroneal tendonitis, left Yes    Strain of muscle(s) and tendon(s) of peroneal muscle group at lower leg level, left leg, subsequent encounter     Difficulty walking      Physician: Ta Godinez MD    Visit Date: 3/25/2024    Physician Orders: PT Eval and Treat   Medical Diagnosis from Referral: S86.312A (ICD-10-CM) - Strain of muscle(s) and tendon(s) of peroneal muscle group at lower leg level, left leg, initial encounter M76.72 (ICD-10-CM) - Peroneal tendonitis of left lower extremity  Evaluation Date: 2/29/2024  Authorization Period Expiration: 12/31/2024  Plan of Care Expiration: 5/18/2024  Visit # / Visits authorized: 7/ 20     Foto  Date  Score    #1/3 2/29/2024 26.0   #2/3 3/18/2024 30.0   #3/3          Time In: 8:24am   Time Out: 9:14am  Total Time: 50 minutes    Precautions: Standard    Subjective   HOME EXERCISE PROGRAM: reviewed, reports compliance   Response to previous treatment: L lateral ankle felt a little pain after 2 hours of standing with report of increased soreness the following day  Function: able to stand for about 2 hour intervals, takes stairs with 1 step at a time     Pain: 0/10  Location: left ankle, with movement     Objective    3/18/2024:  Observation/posture: R>L inversion at foot      Range of Motion: AROM:  Ankle Right  Left    Dorsiflexion 8 11   Plantarflexion 51 51   Inversion 17 41   Eversion 14 11      Strength:  Ankle Right  Left    Dorsiflexion 4+/5 4+/5   Plantarflexion 4/5 4+/5   Inversion 4+/5 4/5   Eversion 4+/5 4/5      Joint Mobility:   L TCJ, STJ, METs: impaired      Function/balance:   Sit - stand: 9 X in 30:      R SLS: 4", 2", 2"  L SLS: 1", 4", 7"     Edema:   R ankle circumference: 51.0 cm  L ankle circumference: 49.0 cm     R ankle joint line: 24.5 cm  L ankle joint line: " "25.0 cm     R mets: 21.0 cm  L mets: 21.0 cm     Intake Outcome Measure for FOTO Ankle Survey     Therapist reviewed FOTO scores   FOTO documents entered into [email or web address] - see Media section.     Score: 30.0             Treatment   + indicates new exercise   Bold indicates completed exercise     therapeutic exercises to develop strength, endurance, ROM, flexibility, posture, and core stabilization for 20 minutes  L gastroc stretch, incline board, 30"x3  L soleus stretch, incline board, 30"x3  Inversion/eversion on nalanda board, B HRA, 3x10  DF/PF on nalanda board, B HRA, 3x10  +Written HOME EXERCISE PROGRAM updated, reviewed, patient demo understanding. Blue band provided for use between visits and while on vacation.     manual therapy techniques: Joint mobilizations, Manual traction, Myofacial release, Soft tissue Mobilization, and Friction Massage for 6 minutes:  L TCJ, STJ, METs Gr III/IV JM   STM/MFR lateral calf with TP gun     neuromuscular re-education activities to improve: Balance, Coordination, Kinesthetic, Sense, Proprioception, and Posture for 12 minutes:  L SLS, airex mat, 30"x5   Seated HR/TR 20-manual cues for tripod engagement  Standing HR with ball between ankles 3 x 10   Standing arch curls, airex x 10  Y balance with slider x 20 - occasional UE support      therapeutic activities to improve functional performance for 12 minutes:  Stair negotiation without rails and cues for full foot contact instead of walking on toes x 8 steps   Sit<>stand from standard chair without UE's 2 x 10-cues to prevent knee hyperextension and ankle supination  Step up, L LE leading, 3x10   Step down, L LE leading, 3x10-CKC left today with UE support for return  Step lateral, L LE leading, 3x10    Patient Education and Home Exercises       Education provided:   - Educated pt that he/she may feel soreness after session.      Home Exercises Provided:  Instructed patient to continue current home exercise program.    Exercises were " "reviewed and Poppy was able to demonstrate them prior to the end of the session.  Poppy demonstrated good  understanding of the education provided. See Electronic Medical Record under Patient Instructions for exercises provided during therapy sessions    Assessment     Emphasis on closed chain balance and strengthening today. Increased joint play noted during TCJ AP + distraction JM today. Written HOME EXERCISE PROGRAM updated to include ankle 4 way strengthening and blue thera band provided for use between PHYSICAL THERAPY visits. Will benefit from continued physical therapy intervention to progress toward goals set forth in plan of care to improve functional mobility and quality of life.     Poppy is making good progress towards meeting her goals.   Rehab potential: good   Patient prognosis is good.    Patient will continue to benefit from skilled outpatient physical therapy to address the deficits listed in the problem list box on initial evaluation, provide pt/family education and to maximize pt's level of independence in the home and community environment.      Anticipated barriers to physical therapy: co morbidities     Goals:   Short Terms Goals: 3 weeks     Goal  Progress  Date    (1)  Patient will be I with HOME EXERCISE PROGRAM  Progressing, not met 3/25/2024   (2)  Patient will obtain 8 deg L ankle DF ACTIVE RANGE OF MOTION to indicate improved ability to negotiate flight of stairs using alternating gait pattern  Progressing, not met 3/25/2024    (3)   Patient will obtain 14 deg L ankle eversion ACTIVE RANGE OF MOTION to indicate improved ability to make changes in direction while moving quickly  Progressing, not met 3/25/2024      Long Term Goals: 6 weeks     Goal  Progress  Date    (1)  Patient will obtain 4+/5 L ankle eversion MMT to indicate improved ability to negotiate uneven terrain  Progressing, not met 3/25/2024   (2)   Patient will complete > 14 sit - stand transfers within 30" to indicate improved " "transitional tolerance Progressing, not met 3/25/2024    (3)   Patient will complete L SLS average of 30" to indicate decreased risk of fall  Progressing, not met  3/25/2024        Plan     Continue to progress as appropriate.     Continue with: Plan of care Certification: 3/18/2024 to 5/18/2024.     Outpatient Physical Therapy 2 times weekly for 3 weeks to include the following interventions: Cervical/Lumbar Traction, Electrical Stimulation re-eval, dry needling, Gait Training, Iontophoresis (with ), Manual Therapy, Moist Heat/ Ice, Neuromuscular Re-ed, Patient Education, Self Care, Therapeutic Activities, and Therapeutic Exercise.      Physical therapist and physical therapy assistant(s) will met face to face to discuss patient's treatment plan and progress towards established goals. Pt will be seen by a physical therapist minimally every 6th visit or every 30 days.      Jennifer Guzmán, PT    3/25/2024    "

## 2024-03-25 ENCOUNTER — CLINICAL SUPPORT (OUTPATIENT)
Dept: REHABILITATION | Facility: HOSPITAL | Age: 39
End: 2024-03-25
Payer: COMMERCIAL

## 2024-03-25 DIAGNOSIS — M76.72 PERONEAL TENDONITIS, LEFT: Primary | ICD-10-CM

## 2024-03-25 DIAGNOSIS — S86.312D STRAIN OF MUSCLE(S) AND TENDON(S) OF PERONEAL MUSCLE GROUP AT LOWER LEG LEVEL, LEFT LEG, SUBSEQUENT ENCOUNTER: ICD-10-CM

## 2024-03-25 DIAGNOSIS — R26.2 DIFFICULTY WALKING: ICD-10-CM

## 2024-03-25 PROCEDURE — 97110 THERAPEUTIC EXERCISES: CPT | Mod: PN

## 2024-03-25 PROCEDURE — 97112 NEUROMUSCULAR REEDUCATION: CPT | Mod: PN

## 2024-03-25 PROCEDURE — 97530 THERAPEUTIC ACTIVITIES: CPT | Mod: PN

## 2024-03-26 NOTE — PROGRESS NOTES
"[facility]   Physical Therapy Treatment Note      Name: Emily Pelayo  Clinic Number: [id number]    Therapy Diagnosis:   Encounter Diagnoses   Name Primary?    Peroneal tendonitis, left Yes    Strain of muscle(s) and tendon(s) of peroneal muscle group at lower leg level, left leg, subsequent encounter     Difficulty walking      Physician: Ta Godinez MD    Visit Date: 3/25/2024    Physician Orders: PT Eval and Treat   Medical Diagnosis from Referral: S86.312A (ICD-10-CM) - Strain of muscle(s) and tendon(s) of peroneal muscle group at lower leg level, left leg, initial encounter M76.72 (ICD-10-CM) - Peroneal tendonitis of left lower extremity  Evaluation Date: 2/29/2024  Authorization Period Expiration: 12/31/2024  Plan of Care Expiration: 5/18/2024  Visit # / Visits authorized: 8/ 20     Foto  Date  Score    #1/3 2/29/2024 26.0   #2/3 3/18/2024 30.0   #3/3          Time In: 0821   Time Out: 0909   Total Time: 48 minutes  Total Billable Time: 48 minutes    Precautions: Standard    Subjective   HOME EXERCISE PROGRAM: limited: reviewed, reports compliance once daily  Response to previous treatment: felt fine other than her leg was "painful, but not painful" , like it was a release.   Function: able to stand for about 2 hour intervals, takes stairs with 1 step at a time     Pain yesterday was 4/10 at work with walking on stairs and 2/10 while on standby. When she got home she was 4/10 which resolved. Did not work last night.     Pain: 0/10  Location: left ankle, with movement     Objective    3/18/2024:  Observation/posture: R>L inversion at foot      Range of Motion: AROM:  Ankle Right  Left    Dorsiflexion 8 11   Plantarflexion 51 51   Inversion 17 41   Eversion 14 11      Strength:  Ankle Right  Left    Dorsiflexion 4+/5 4+/5   Plantarflexion 4/5 4+/5   Inversion 4+/5 4/5   Eversion 4+/5 4/5      Joint Mobility:   L TCJ, STJ, METs: impaired      Function/balance:   Sit - stand: 9 X " "in 30:      R SLS: 4", 2", 2"  L SLS: 1", 4", 7"     Edema:   R ankle circumference: 51.0 cm  L ankle circumference: 49.0 cm     R ankle joint line: 24.5 cm  L ankle joint line: 25.0 cm     R mets: 21.0 cm  L mets: 21.0 cm     Intake Outcome Measure for FOTO Ankle Survey     Therapist reviewed FOTO scores   FOTO documents entered into [facility] - see Media section.     Score: 30.0             Treatment   + indicates new exercise   Bold indicates completed exercise     therapeutic exercises to develop strength, endurance, ROM, flexibility, posture, and core stabilization for 11 minutes  L gastroc stretch, incline board, 30"x3  L soleus stretch, incline board, 30"x3  Inversion/eversion on nalanda board, B HRA, 3x10  DF/PF on nalanda board, B HRA, 3x10  Written HOME EXERCISE PROGRAM updated, reviewed, patient demo understanding. Blue band provided for use between visits and while on vacation.     manual therapy techniques: Joint mobilizations, Manual traction, Myofacial release, Soft tissue Mobilization, and Friction Massage for 8 minutes:  L TCJ, STJ, METs Gr III/IV JM   STM/MFR lateral calf with TP gun     neuromuscular re-education activities to improve: Balance, Coordination, Kinesthetic, Sense, Proprioception, and Posture for 12 minutes:  L SLS, airex mat, 30"x5   Seated HR/TR 20-manual cues for tripod engagement  Standing HR with ball between ankles 3 x 10   Standing arch curls, airex x 10  Y balance with slider x 20 - occasional UE support      therapeutic activities to improve functional performance for 17 minutes:  Stair negotiation without rails and cues for full foot contact instead of walking on toes x 8 steps   Sit<>stand from standard chair without UE's 2 x 10-cues to prevent knee hyperextension and ankle supination  Step up, L LE leading, 8" 3x10   Step down, L LE leading, 3x10-CKC left today with UE support for return  Step lateral, L LE leading, 3x10    Patient Education and Home Exercises       Education " provided:   - Educated pt that he/she may feel soreness after session.      Home Exercises Provided:  Instructed patient to continue current home exercise program.    Exercises were reviewed and Poppy was able to demonstrate them prior to the end of the session.  Poppy demonstrated good  understanding of the education provided. See Electronic Medical Record under Patient Instructions for exercises provided during therapy sessions    Assessment     Limited home exercise program compliance to once daily. Good response to prior session. Continued discomfort with prolonged standing and stairs at work but pain intensity is decreasing overall. Increased joint play noted during TCJ AP + distraction JM today.  Decreased UE support required today with Y balance with slider. Increased height of forward step up with good response. Limited height with HR with tennis ball 2* apprehension. Will benefit from continued physical therapy intervention to progress toward goals set forth in plan of care to improve functional mobility and quality of life.     Poppy is making good progress towards meeting her goals.   Rehab potential: good   Patient prognosis is good.    Patient will continue to benefit from skilled outpatient physical therapy to address the deficits listed in the problem list box on initial evaluation, provide pt/family education and to maximize pt's level of independence in the home and community environment.      Anticipated barriers to physical therapy: co morbidities     Goals:   Short Terms Goals: 3 weeks     Goal  Progress  Date    (1)  Patient will be I with HOME EXERCISE PROGRAM  Progressing, not met 3/25/2024   (2)  Patient will obtain 8 deg L ankle DF ACTIVE RANGE OF MOTION to indicate improved ability to negotiate flight of stairs using alternating gait pattern  Progressing, not met 3/25/2024    (3)   Patient will obtain 14 deg L ankle eversion ACTIVE RANGE OF MOTION to indicate improved ability to make changes  "in direction while moving quickly  Progressing, not met 3/25/2024      Long Term Goals: 6 weeks     Goal  Progress  Date    (1)  Patient will obtain 4+/5 L ankle eversion MMT to indicate improved ability to negotiate uneven terrain  Progressing, not met 3/25/2024   (2)   Patient will complete > 14 sit - stand transfers within 30" to indicate improved transitional tolerance Progressing, not met 3/25/2024    (3)   Patient will complete L SLS average of 30" to indicate decreased risk of fall  Progressing, not met  3/25/2024        Plan     Continue to progress as appropriate.     Continue with: Plan of care Certification: 3/18/2024 to 5/18/2024.     Outpatient Physical Therapy 2 times weekly for 3 weeks to include the following interventions: Cervical/Lumbar Traction, Electrical Stimulation re-eval, dry needling, Gait Training, Iontophoresis (with ), Manual Therapy, Moist Heat/ Ice, Neuromuscular Re-ed, Patient Education, Self Care, Therapeutic Activities, and Therapeutic Exercise.      Physical therapist and physical therapy assistant(s) will met face to face to discuss patient's treatment plan and progress towards established goals. Pt will be seen by a physical therapist minimally every 6th visit or every 30 days.    Tamiko Chanel PTA      "

## 2024-03-27 ENCOUNTER — CLINICAL SUPPORT (OUTPATIENT)
Dept: REHABILITATION | Facility: HOSPITAL | Age: 39
End: 2024-03-27
Payer: COMMERCIAL

## 2024-03-27 DIAGNOSIS — M76.72 PERONEAL TENDONITIS, LEFT: Primary | ICD-10-CM

## 2024-03-27 DIAGNOSIS — S86.312D STRAIN OF MUSCLE(S) AND TENDON(S) OF PERONEAL MUSCLE GROUP AT LOWER LEG LEVEL, LEFT LEG, SUBSEQUENT ENCOUNTER: ICD-10-CM

## 2024-03-27 DIAGNOSIS — R26.2 DIFFICULTY WALKING: ICD-10-CM

## 2024-03-27 PROCEDURE — 97140 MANUAL THERAPY 1/> REGIONS: CPT | Mod: PN,CQ

## 2024-03-27 PROCEDURE — 97110 THERAPEUTIC EXERCISES: CPT | Mod: PN,CQ

## 2024-03-27 PROCEDURE — 97112 NEUROMUSCULAR REEDUCATION: CPT | Mod: PN,CQ

## 2024-03-27 PROCEDURE — 97530 THERAPEUTIC ACTIVITIES: CPT | Mod: PN,CQ

## 2024-03-28 NOTE — ANESTHESIA PREPROCEDURE EVALUATION
08/[birth year]/2020  Emily Pelayo is a 35 y.o., female.    Pre-op Assessment    I have reviewed the Patient Summary Reports.     I have reviewed the Nursing Notes. I have reviewed the NPO Status.   I have reviewed the Medications.     Review of Systems  Anesthesia Hx:  Denies Family Hx of Anesthesia complications.   Denies Personal Hx of Anesthesia complications.   Endocrine:   Hypothyroidism        Physical Exam  General:  Well nourished    Airway/Jaw/Neck:  Airway Findings: Mouth Opening: Normal Tongue: Normal  General Airway Assessment: Adult  Mallampati: II  Improves to I with phonation.  TM Distance: Normal, at least 6 cm         Dental:  DENTAL FINDINGS: Normal   Chest/Lungs:  Chest/Lungs Clear    Heart/Vascular:  Heart Findings: Normal            Anesthesia Plan  Type of Anesthesia, risks & benefits discussed:  Anesthesia Type:  general  Patient's Preference:   Intra-op Monitoring Plan: standard ASA monitors  Intra-op Monitoring Plan Comments:   Post Op Pain Control Plan:   Post Op Pain Control Plan Comments:   Induction:   IV  Beta Blocker:  Patient is not currently on a Beta-Blocker (No further documentation required).       Informed Consent: Patient understands risks and agrees with Anesthesia plan.  Questions answered. Anesthesia consent signed with patient.  ASA Score: 2     Day of Surgery Review of History & Physical:    H&P update referred to the provider.         Ready For Surgery From Anesthesia Perspective.       
denies

## 2024-04-03 NOTE — PROGRESS NOTES
"[facility]   Physical Therapy Treatment Note      Name: Emily Pelayo  Clinic Number: [id number]    Therapy Diagnosis:   Encounter Diagnoses   Name Primary?    Peroneal tendonitis, left Yes    Strain of muscle(s) and tendon(s) of peroneal muscle group at lower leg level, left leg, subsequent encounter     Difficulty walking      Physician: Ta Godinez MD    Visit Date: 4/4/2024    Physician Orders: PT Eval and Treat   Medical Diagnosis from Referral: S86.312A (ICD-10-CM) - Strain of muscle(s) and tendon(s) of peroneal muscle group at lower leg level, left leg, initial encounter M76.72 (ICD-10-CM) - Peroneal tendonitis of left lower extremity  Evaluation Date: 2/29/2024  Authorization Period Expiration: 12/31/2024  Plan of Care Expiration: 5/18/2024  Visit # / Visits authorized: 9/ 20     Foto  Date  Score    #1/3 2/29/2024 26.0   #2/3 3/18/2024 30.0   #3/3         Time In: 4:03pm  Time Out: 4:55pm  Total Time: 52 minutes    Precautions: Standard    Subjective   HOME EXERCISE PROGRAM: reviewed, reports compliance   Response to previous treatment: Twisted both ankles when standing in water on beach while on her vacation.   Function: felt pain at L ankle with prolonged standing and walking, but hurt the most with 3 hours of sitting on plane.     Pain: 0/10  Location: left ankle    Objective    3/18/2024:  Observation/posture: R>L inversion at foot      Range of Motion: AROM:  Ankle Right  Left    Dorsiflexion 8 11   Plantarflexion 51 51   Inversion 17 41   Eversion 14 11      Strength:  Ankle Right  Left    Dorsiflexion 4+/5 4+/5   Plantarflexion 4/5 4+/5   Inversion 4+/5 4/5   Eversion 4+/5 4/5      Joint Mobility:   L TCJ, STJ, METs: impaired      Function/balance:   Sit - stand: 9 X in 30:      R SLS: 4", 2", 2"  L SLS: 1", 4", 7"     Edema:   R ankle circumference: 51.0 cm  L ankle circumference: 49.0 cm     R ankle joint line: 24.5 cm  L ankle joint line: 25.0 cm     R mets: 21.0 " "cm  L mets: 21.0 cm     Intake Outcome Measure for FOTO Ankle Survey     Therapist reviewed FOTO scores   FOTO documents entered into [facility] - see Media section.     Score: 30.0             Treatment   + indicates new exercise   Bold indicates completed exercise     therapeutic exercises to develop strength, endurance, ROM, flexibility, posture, and core stabilization for 12 minutes  L gastroc stretch, incline board, 30"x3  L soleus stretch, incline board, 30"x3  Inversion/eversion on nalanda board, B HRA, 3x10  DF/PF on nalanda board, B HRA, 3x10  +L ankle 4 way strengthening, blue band, 3x10      manual therapy techniques: Joint mobilizations, Manual traction, Myofacial release, Soft tissue Mobilization, and Friction Massage for 10 minutes:  L TCJ, STJ, METs Gr III/IV JM   STM/TPR lateral, distal calf      neuromuscular re-education activities to improve: Balance, Coordination, Kinesthetic, Sense, Proprioception, and Posture for 12 minutes:  L SLS, airex mat, 30"x5   Seated HR/TR 20-manual cues for tripod engagement  Standing HR with ball between ankles 3 x 10   Standing arch curls, airex x 10  Y balance with slider x 20 - occasional UE support      therapeutic activities to improve functional performance for 17 minutes:  Step up, L/R LE leading, 8" step, 3x10   Step down, L/R LE leading, 8" step, 3x10  Step lateral, L/R LE leading, 8" step, 3x10    Patient Education and Home Exercises       Education provided:   - Educated pt that he/she may feel soreness after session.      Home Exercises Provided:  Instructed patient to continue current home exercise program.    Exercises were reviewed and Poppy was able to demonstrate them prior to the end of the session.  Poppy demonstrated good  understanding of the education provided. See Electronic Medical Record under Patient Instructions for exercises provided during therapy sessions    Assessment   Increased height of step exercise with good response. Will benefit from " "continued physical therapy intervention to progress toward goals set forth in plan of care to improve functional mobility and quality of life.     Poppy is making good progress towards meeting her goals.   Rehab potential: good   Patient prognosis is good.    Patient will continue to benefit from skilled outpatient physical therapy to address the deficits listed in the problem list box on initial evaluation, provide pt/family education and to maximize pt's level of independence in the home and community environment.      Anticipated barriers to physical therapy: co morbidities     Goals:   Short Terms Goals: 3 weeks     Goal  Progress  Date    (1)  Patient will be I with HOME EXERCISE PROGRAM  Progressing, not met 4/4/2024   (2)  Patient will obtain 8 deg L ankle DF ACTIVE RANGE OF MOTION to indicate improved ability to negotiate flight of stairs using alternating gait pattern  Progressing, not met 4/4/2024    (3)   Patient will obtain 14 deg L ankle eversion ACTIVE RANGE OF MOTION to indicate improved ability to make changes in direction while moving quickly  Progressing, not met 4/4/2024      Long Term Goals: 6 weeks     Goal  Progress  Date    (1)  Patient will obtain 4+/5 L ankle eversion MMT to indicate improved ability to negotiate uneven terrain  Progressing, not met 4/4/2024   (2)   Patient will complete > 14 sit - stand transfers within 30" to indicate improved transitional tolerance Progressing, not met 4/4/2024    (3)   Patient will complete L SLS average of 30" to indicate decreased risk of fall  Progressing, not met  4/4/2024        Plan     Continue to progress as appropriate.     Continue with: Plan of care Certification: 3/18/2024 to 5/18/2024.     Outpatient Physical Therapy 2 times weekly for 3 weeks to include the following interventions: Cervical/Lumbar Traction, Electrical Stimulation re-eval, dry needling, Gait Training, Iontophoresis (with ), Manual Therapy, Moist Heat/ Ice, Neuromuscular " Re-ed, Patient Education, Self Care, Therapeutic Activities, and Therapeutic Exercise.      Physical therapist and physical therapy assistant(s) will met face to face to discuss patient's treatment plan and progress towards established goals. Pt will be seen by a physical therapist minimally every 6th visit or every 30 days.    Jennifer Guzmán, PT

## 2024-04-04 ENCOUNTER — CLINICAL SUPPORT (OUTPATIENT)
Dept: REHABILITATION | Facility: HOSPITAL | Age: 39
End: 2024-04-04
Payer: COMMERCIAL

## 2024-04-04 DIAGNOSIS — R26.2 DIFFICULTY WALKING: ICD-10-CM

## 2024-04-04 DIAGNOSIS — S86.312D STRAIN OF MUSCLE(S) AND TENDON(S) OF PERONEAL MUSCLE GROUP AT LOWER LEG LEVEL, LEFT LEG, SUBSEQUENT ENCOUNTER: ICD-10-CM

## 2024-04-04 DIAGNOSIS — M76.72 PERONEAL TENDONITIS, LEFT: Primary | ICD-10-CM

## 2024-04-04 PROCEDURE — 97112 NEUROMUSCULAR REEDUCATION: CPT | Mod: PN

## 2024-04-04 PROCEDURE — 97110 THERAPEUTIC EXERCISES: CPT | Mod: PN

## 2024-04-04 PROCEDURE — 97140 MANUAL THERAPY 1/> REGIONS: CPT | Mod: PN

## 2024-04-04 PROCEDURE — 97530 THERAPEUTIC ACTIVITIES: CPT | Mod: PN

## 2024-04-08 DIAGNOSIS — F41.1 GAD (GENERALIZED ANXIETY DISORDER): ICD-10-CM

## 2024-04-08 RX ORDER — HYDROXYZINE HYDROCHLORIDE 25 MG/1
TABLET, FILM COATED ORAL
Qty: 270 TABLET | Refills: 1 | Status: SHIPPED | OUTPATIENT
Start: 2024-04-08 | End: 2024-06-18

## 2024-04-12 ENCOUNTER — LAB VISIT (OUTPATIENT)
Dept: LAB | Facility: HOSPITAL | Age: 39
End: 2024-04-12
Attending: PHYSICIAN ASSISTANT
Payer: COMMERCIAL

## 2024-04-12 ENCOUNTER — CLINICAL SUPPORT (OUTPATIENT)
Dept: REHABILITATION | Facility: HOSPITAL | Age: 39
End: 2024-04-12
Payer: COMMERCIAL

## 2024-04-12 DIAGNOSIS — R26.2 DIFFICULTY WALKING: ICD-10-CM

## 2024-04-12 DIAGNOSIS — E03.8 HYPOTHYROIDISM DUE TO HASHIMOTO'S THYROIDITIS: ICD-10-CM

## 2024-04-12 DIAGNOSIS — E61.1 IRON DEFICIENCY: ICD-10-CM

## 2024-04-12 DIAGNOSIS — S86.312D STRAIN OF MUSCLE(S) AND TENDON(S) OF PERONEAL MUSCLE GROUP AT LOWER LEG LEVEL, LEFT LEG, SUBSEQUENT ENCOUNTER: ICD-10-CM

## 2024-04-12 DIAGNOSIS — E06.3 HYPOTHYROIDISM DUE TO HASHIMOTO'S THYROIDITIS: ICD-10-CM

## 2024-04-12 DIAGNOSIS — E66.9 OBESITY (BMI 30.0-34.9): ICD-10-CM

## 2024-04-12 DIAGNOSIS — M76.72 PERONEAL TENDONITIS, LEFT: Primary | ICD-10-CM

## 2024-04-12 LAB
ESTIMATED AVG GLUCOSE: 85 MG/DL (ref 68–131)
HBA1C MFR BLD: 4.6 % (ref 4–5.6)
IRON SERPL-MCNC: 102 UG/DL (ref 30–160)
SATURATED IRON: 27 % (ref 20–50)
T4 FREE SERPL-MCNC: 1.06 NG/DL (ref 0.71–1.51)
TOTAL IRON BINDING CAPACITY: 379 UG/DL (ref 250–450)
TRANSFERRIN SERPL-MCNC: 256 MG/DL (ref 200–375)
TSH SERPL DL<=0.005 MIU/L-ACNC: 2.2 UIU/ML (ref 0.4–4)

## 2024-04-12 PROCEDURE — 83540 ASSAY OF IRON: CPT | Performed by: PHYSICIAN ASSISTANT

## 2024-04-12 PROCEDURE — 84443 ASSAY THYROID STIM HORMONE: CPT | Performed by: PHYSICIAN ASSISTANT

## 2024-04-12 PROCEDURE — 36415 COLL VENOUS BLD VENIPUNCTURE: CPT | Mod: PN | Performed by: PHYSICIAN ASSISTANT

## 2024-04-12 PROCEDURE — 97110 THERAPEUTIC EXERCISES: CPT | Mod: PN

## 2024-04-12 PROCEDURE — 83036 HEMOGLOBIN GLYCOSYLATED A1C: CPT | Performed by: PHYSICIAN ASSISTANT

## 2024-04-12 PROCEDURE — 97112 NEUROMUSCULAR REEDUCATION: CPT | Mod: PN

## 2024-04-12 PROCEDURE — 84439 ASSAY OF FREE THYROXINE: CPT | Performed by: PHYSICIAN ASSISTANT

## 2024-04-12 PROCEDURE — 97530 THERAPEUTIC ACTIVITIES: CPT | Mod: PN

## 2024-04-12 NOTE — PROGRESS NOTES
"[facility]   Physical Therapy Treatment Note      Name: Emily Pelayo  Clinic Number: [id number]    Therapy Diagnosis:   Encounter Diagnoses   Name Primary?    Peroneal tendonitis, left Yes    Strain of muscle(s) and tendon(s) of peroneal muscle group at lower leg level, left leg, subsequent encounter     Difficulty walking      Physician: Ta Godinez MD    Visit Date: 4/12/2024    Physician Orders: PT Eval and Treat   Medical Diagnosis from Referral: S86.312A (ICD-10-CM) - Strain of muscle(s) and tendon(s) of peroneal muscle group at lower leg level, left leg, initial encounter M76.72 (ICD-10-CM) - Peroneal tendonitis of left lower extremity  Evaluation Date: 2/29/2024  Authorization Period Expiration: 12/31/2024  Plan of Care Expiration: 5/18/2024  Visit # / Visits authorized: 10/ 20     Foto  Date  Score    #1/3 2/29/2024 26.0   #2/3 3/18/2024 30.0   #3/3         Time In: 8:22am  Time Out: 9:15am  Total Time: 53 minutes    Precautions: Standard    Subjective   HOME EXERCISE PROGRAM: reviewed, reports compliance   Response to previous treatment: pain remains but notices improvement in ADL tasks  Function: increased ankle pain with prolonged positioning, standing and walking     Pain: 0/10  Location: left ankle    Objective    3/18/2024:  Observation/posture: R>L inversion at foot      Range of Motion: AROM:  Ankle Right  Left    Dorsiflexion 8 11   Plantarflexion 51 51   Inversion 17 41   Eversion 14 11      Strength:  Ankle Right  Left    Dorsiflexion 4+/5 4+/5   Plantarflexion 4/5 4+/5   Inversion 4+/5 4/5   Eversion 4+/5 4/5      Joint Mobility:   L TCJ, STJ, METs: impaired      Function/balance:   Sit - stand: 9 X in 30:      R SLS: 4", 2", 2"  L SLS: 1", 4", 7"     Edema:   R ankle circumference: 51.0 cm  L ankle circumference: 49.0 cm     R ankle joint line: 24.5 cm  L ankle joint line: 25.0 cm     R mets: 21.0 cm  L mets: 21.0 cm     Intake Outcome Measure for FOTO Ankle " "Survey     Therapist reviewed FOTO scores   FOTO documents entered into [facility] - see Media section.     Score: 30.0             Treatment   + indicates new exercise   Bold indicates completed exercise     therapeutic exercises to develop strength, endurance, ROM, flexibility, posture, and core stabilization for 16 minutes  L gastroc stretch, incline board, 30"x3  L soleus stretch, incline board, 30"x3  Inversion/eversion on nalanda board, B HRA, 3x10  DF/PF on nalanda board, B HRA, 3x10  L ankle 4 way strengthening, blue band, 3x10    +Standing B LE arch lifts, 5" hold, 3x10     manual therapy techniques: Joint mobilizations, Manual traction, Myofacial release, Soft tissue Mobilization, and Friction Massage for 00 minutes:  L TCJ, STJ, METs Gr III/IV JM   STM/TPR lateral, distal calf      neuromuscular re-education activities to improve: Balance, Coordination, Kinesthetic, Sense, Proprioception, and Posture for 12 minutes:  L/R SLS, airex mat, 30"x5   Seated HR/TR 20-manual cues for tripod engagement  Standing HR with ball between ankles 3 x 10   Standing arch curls, airex x 10  Y balance with slider x 20 - occasional UE support      therapeutic activities to improve functional performance for 25 minutes:  Step up, L/R LE leading, 8" step, 3x10   Step down, L/R LE leading, 8" step, 3x10  Step lateral, L/R LE leading, 8" step, 3x10    Patient Education and Home Exercises       Education provided:   - Educated pt that he/she may feel soreness after session.      Home Exercises Provided:  Instructed patient to continue current home exercise program.    Exercises were reviewed and Poppy was able to demonstrate them prior to the end of the session.  Poppy demonstrated good  understanding of the education provided. See Electronic Medical Record under Patient Instructions for exercises provided during therapy sessions    Assessment   Patient experiences shortness of breath with step exercises, but completes with improved " "mechanics. Patient exhibits increased steadiness in L SLS on unsteady surface today. Will benefit from continued physical therapy intervention to progress toward goals set forth in plan of care to improve functional mobility and quality of life.     Poppy is making good progress towards meeting her goals.   Rehab potential: good   Patient prognosis is good.    Patient will continue to benefit from skilled outpatient physical therapy to address the deficits listed in the problem list box on initial evaluation, provide pt/family education and to maximize pt's level of independence in the home and community environment.      Anticipated barriers to physical therapy: co morbidities     Goals:   Short Terms Goals: 3 weeks     Goal  Progress  Date    (1)  Patient will be I with HOME EXERCISE PROGRAM  Progressing, not met 4/12/2024   (2)  Patient will obtain 8 deg L ankle DF ACTIVE RANGE OF MOTION to indicate improved ability to negotiate flight of stairs using alternating gait pattern  Progressing, not met 4/12/2024    (3)   Patient will obtain 14 deg L ankle eversion ACTIVE RANGE OF MOTION to indicate improved ability to make changes in direction while moving quickly  Progressing, not met 4/12/2024      Long Term Goals: 6 weeks     Goal  Progress  Date    (1)  Patient will obtain 4+/5 L ankle eversion MMT to indicate improved ability to negotiate uneven terrain  Progressing, not met 4/12/2024   (2)   Patient will complete > 14 sit - stand transfers within 30" to indicate improved transitional tolerance Progressing, not met 4/12/2024    (3)   Patient will complete L SLS average of 30" to indicate decreased risk of fall  Progressing, not met  4/12/2024        Plan     Continue to progress as appropriate.     Continue with: Plan of care Certification: 3/18/2024 to 5/18/2024.     Outpatient Physical Therapy 2 times weekly for 3 weeks to include the following interventions: Cervical/Lumbar Traction, Electrical Stimulation " re-eval, dry needling, Gait Training, Iontophoresis (with ), Manual Therapy, Moist Heat/ Ice, Neuromuscular Re-ed, Patient Education, Self Care, Therapeutic Activities, and Therapeutic Exercise.      Physical therapist and physical therapy assistant(s) will met face to face to discuss patient's treatment plan and progress towards established goals. Pt will be seen by a physical therapist minimally every 6th visit or every 30 days.    Jennifer Guzmán, PT

## 2024-04-15 ENCOUNTER — CLINICAL SUPPORT (OUTPATIENT)
Dept: REHABILITATION | Facility: HOSPITAL | Age: 39
End: 2024-04-15
Payer: COMMERCIAL

## 2024-04-15 ENCOUNTER — OFFICE VISIT (OUTPATIENT)
Dept: ENDOCRINOLOGY | Facility: CLINIC | Age: 39
End: 2024-04-15
Payer: COMMERCIAL

## 2024-04-15 VITALS
BODY MASS INDEX: 32.82 KG/M2 | SYSTOLIC BLOOD PRESSURE: 110 MMHG | DIASTOLIC BLOOD PRESSURE: 80 MMHG | OXYGEN SATURATION: 98 % | HEART RATE: 81 BPM | HEIGHT: 69 IN | WEIGHT: 221.56 LBS | TEMPERATURE: 99 F

## 2024-04-15 DIAGNOSIS — E06.3 HYPOTHYROIDISM DUE TO HASHIMOTO'S THYROIDITIS: Primary | ICD-10-CM

## 2024-04-15 DIAGNOSIS — E03.8 HYPOTHYROIDISM DUE TO HASHIMOTO'S THYROIDITIS: Primary | ICD-10-CM

## 2024-04-15 DIAGNOSIS — E66.9 OBESITY (BMI 30.0-34.9): ICD-10-CM

## 2024-04-15 DIAGNOSIS — E61.1 IRON DEFICIENCY: ICD-10-CM

## 2024-04-15 DIAGNOSIS — S86.312D STRAIN OF MUSCLE(S) AND TENDON(S) OF PERONEAL MUSCLE GROUP AT LOWER LEG LEVEL, LEFT LEG, SUBSEQUENT ENCOUNTER: ICD-10-CM

## 2024-04-15 DIAGNOSIS — M76.72 PERONEAL TENDONITIS, LEFT: Primary | ICD-10-CM

## 2024-04-15 DIAGNOSIS — R26.2 DIFFICULTY WALKING: ICD-10-CM

## 2024-04-15 PROCEDURE — 1160F RVW MEDS BY RX/DR IN RCRD: CPT | Mod: CPTII,S$GLB,, | Performed by: PHYSICIAN ASSISTANT

## 2024-04-15 PROCEDURE — 1159F MED LIST DOCD IN RCRD: CPT | Mod: CPTII,S$GLB,, | Performed by: PHYSICIAN ASSISTANT

## 2024-04-15 PROCEDURE — 3044F HG A1C LEVEL LT 7.0%: CPT | Mod: CPTII,S$GLB,, | Performed by: PHYSICIAN ASSISTANT

## 2024-04-15 PROCEDURE — 97110 THERAPEUTIC EXERCISES: CPT | Mod: PN

## 2024-04-15 PROCEDURE — 97530 THERAPEUTIC ACTIVITIES: CPT | Mod: PN

## 2024-04-15 PROCEDURE — 97112 NEUROMUSCULAR REEDUCATION: CPT | Mod: PN

## 2024-04-15 PROCEDURE — 99999 PR PBB SHADOW E&M-EST. PATIENT-LVL IV: CPT | Mod: PBBFAC,,, | Performed by: PHYSICIAN ASSISTANT

## 2024-04-15 PROCEDURE — 99213 OFFICE O/P EST LOW 20 MIN: CPT | Mod: S$GLB,,, | Performed by: PHYSICIAN ASSISTANT

## 2024-04-15 PROCEDURE — 3079F DIAST BP 80-89 MM HG: CPT | Mod: CPTII,S$GLB,, | Performed by: PHYSICIAN ASSISTANT

## 2024-04-15 PROCEDURE — 3074F SYST BP LT 130 MM HG: CPT | Mod: CPTII,S$GLB,, | Performed by: PHYSICIAN ASSISTANT

## 2024-04-15 PROCEDURE — 3008F BODY MASS INDEX DOCD: CPT | Mod: CPTII,S$GLB,, | Performed by: PHYSICIAN ASSISTANT

## 2024-04-15 RX ORDER — LEVOTHYROXINE SODIUM 112 UG/1
112 TABLET ORAL
Qty: 90 TABLET | Refills: 3 | Status: SHIPPED | OUTPATIENT
Start: 2024-04-15 | End: 2025-04-15

## 2024-04-15 NOTE — PROGRESS NOTES
"[facility]   Physical Therapy Treatment Note      Name: Emily Pelayo  Clinic Number: [id number]    Therapy Diagnosis:   Encounter Diagnoses   Name Primary?    Peroneal tendonitis, left Yes    Strain of muscle(s) and tendon(s) of peroneal muscle group at lower leg level, left leg, subsequent encounter     Difficulty walking      Physician: Ta Godinez MD    Visit Date: 4/15/2024    Physician Orders: PT Eval and Treat   Medical Diagnosis from Referral: S86.312A (ICD-10-CM) - Strain of muscle(s) and tendon(s) of peroneal muscle group at lower leg level, left leg, initial encounter M76.72 (ICD-10-CM) - Peroneal tendonitis of left lower extremity  Evaluation Date: 2/29/2024  Authorization Period Expiration: 12/31/2024  Plan of Care Expiration: 5/18/2024  Visit # / Visits authorized: 11/ 20     Foto  Date  Score    #1/3 2/29/2024 26.0   #2/3 3/18/2024 30.0   #3/3 4/15/2024 40.0      Time In: 8:18am  Time Out: 9:20am  Total Time: 58 minutes    Precautions: Standard    Subjective   HOME EXERCISE PROGRAM: reviewed, reports compliance   Response to previous treatment: has noticed that she is more sturdy in single limb stance on unsteady surface   Function: increased L lateral ankle pain with standing > 20 minutes and negotiating stairs at work     Pain: 0/10  Location: left ankle    Objective    3/18/2024:  Observation/posture: R>L inversion at foot      Range of Motion: AROM:  Ankle Right  Left    Dorsiflexion 8 11   Plantarflexion 51 51   Inversion 17 41   Eversion 14 11      Strength:  Ankle Right  Left    Dorsiflexion 4+/5 4+/5   Plantarflexion 4/5 4+/5   Inversion 4+/5 4/5   Eversion 4+/5 4/5      Joint Mobility:   L TCJ, STJ, METs: impaired      Function/balance:   Sit - stand: 9 X in 30:      R SLS: 4", 2", 2"  L SLS: 1", 4", 7"     Edema:   R ankle circumference: 51.0 cm  L ankle circumference: 49.0 cm     R ankle joint line: 24.5 cm  L ankle joint line: 25.0 cm     R mets: 21.0 " "cm  L mets: 21.0 cm     Intake Outcome Measure for FOTO Ankle Survey     Therapist reviewed FOTO scores   FOTO documents entered into [employer] - see Media section.     Score: 40.0             Treatment   + indicates new exercise   Bold indicates completed exercise     therapeutic exercises to develop strength, endurance, ROM, flexibility, posture, and core stabilization for 19 minutes  L gastroc stretch, incline board, 30"x3  L soleus stretch, incline board, 30"x3  Inversion/eversion on nalanda board, B HRA, 3x10  DF/PF on nalanda board, B HRA, 3x10  L ankle 4 way strengthening, blue band, 3x10    Standing B LE arch lifts, 5" hold, 3x10   +Dynamic heel lifts, x 1 lap     manual therapy techniques: Joint mobilizations, Manual traction, Myofacial release, Soft tissue Mobilization, and Friction Massage for 4 minutes:  L TCJ, STJ, METs Gr III/IV JM   STM/TPR lateral, distal calf      neuromuscular re-education activities to improve: Balance, Coordination, Kinesthetic, Sense, Proprioception, and Posture for 12 minutes:  L/R SLS, airex mat, 30"x5   Seated HR/TR 20-manual cues for tripod engagement  Standing HR with ball between ankles 3 x 10   Standing arch curls, airex x 10  Y balance with slider x 20 - occasional UE support      therapeutic activities to improve functional performance for 23 minutes:  Step up, L/R LE leading, 8" step, 3x10   Step down, L/R LE leading, 8" step, unilateral HRA, 3x8  Step lateral, L/R LE leading, 8" step, 3x10    Patient Education and Home Exercises       Education provided:   - Educated pt that he/she may feel soreness after session.      Home Exercises Provided:  Instructed patient to continue current home exercise program.    Exercises were reviewed and Poppy was able to demonstrate them prior to the end of the session.  Poppy demonstrated good  understanding of the education provided. See Electronic Medical Record under Patient Instructions for exercises provided during therapy " "sessions    Assessment   Patient experiences shortness of breath with step exercises, but completes with improved mechanics. Patient exhibits increased steadiness in L SLS on unsteady surface. Will benefit from continued physical therapy intervention to progress toward goals set forth in plan of care to improve functional mobility and quality of life.     Poppy is making good progress towards meeting her goals.   Rehab potential: good   Patient prognosis is good.    Patient will continue to benefit from skilled outpatient physical therapy to address the deficits listed in the problem list box on initial evaluation, provide pt/family education and to maximize pt's level of independence in the home and community environment.      Anticipated barriers to physical therapy: co morbidities     Goals:   Short Terms Goals: 3 weeks     Goal  Progress  Date    (1)  Patient will be I with HOME EXERCISE PROGRAM  Progressing, not met 4/15/2024   (2)  Patient will obtain 8 deg L ankle DF ACTIVE RANGE OF MOTION to indicate improved ability to negotiate flight of stairs using alternating gait pattern  Progressing, not met 4/15/2024    (3)   Patient will obtain 14 deg L ankle eversion ACTIVE RANGE OF MOTION to indicate improved ability to make changes in direction while moving quickly  Progressing, not met 4/15/2024      Long Term Goals: 6 weeks     Goal  Progress  Date    (1)  Patient will obtain 4+/5 L ankle eversion MMT to indicate improved ability to negotiate uneven terrain  Progressing, not met 4/15/2024   (2)   Patient will complete > 14 sit - stand transfers within 30" to indicate improved transitional tolerance Progressing, not met 4/15/2024    (3)   Patient will complete L SLS average of 30" to indicate decreased risk of fall  Progressing, not met  4/15/2024        Plan     Continue to progress as appropriate.     Continue with: Plan of care Certification: 3/18/2024 to 5/18/2024.     Outpatient Physical Therapy 2 times " weekly for 3 weeks to include the following interventions: Cervical/Lumbar Traction, Electrical Stimulation re-eval, dry needling, Gait Training, Iontophoresis (with ), Manual Therapy, Moist Heat/ Ice, Neuromuscular Re-ed, Patient Education, Self Care, Therapeutic Activities, and Therapeutic Exercise.      Physical therapist and physical therapy assistant(s) will met face to face to discuss patient's treatment plan and progress towards established goals. Pt will be seen by a physical therapist minimally every 6th visit or every 30 days.    Jennifer Guzmán, PT

## 2024-04-15 NOTE — PROGRESS NOTES
CC: Hypothyroidism    HPI: Emily Pelayo is a 38 y.o. female here for hypothyroidism along with pending conditions listed in the Visit Diagnosis. Diagnosed in 2011.     +FHx of thyroid disease in her mother.   No hx of neck radiation.      NM uptake and scan in 2010 was consistent w/ Graves.    Pt has never had to take Methimazole. +TPO. -TSI    Pt is trying to get pregnant by IUI. Referred to derm last visit but apt was cancelled.    She had should surgery in 11/22.    Taking 112 mcg qd.  + fatigue, cold intolerance, hair loss (alopecia).   No diarrhea, sweating, palpitations.   Cycles are regular.  On ocps    No easy bruising, muscle weakness. No facial hair or deeping of voice.    Thyroid u/s 6/22 wnl.    Pt previously took Metformin for wt loss.      Works at [employer].     Dex suppression was elevated. Urine cortisol was normal.  No easy bruising or muscle weakness.     PMHx, PSHx: reviewed in epic.  Social Hx: no ETOH/tobacco use. Works nights/days. She has one daughter born ten years ago by IUI. No complications.     Wt Readings from Last 15 Encounters:   04/15/24 100.5 kg (221 lb 9 oz)   10/18/23 99.8 kg (220 lb 0.3 oz)   09/18/23 99.3 kg (219 lb)   09/08/23 99.7 kg (219 lb 12.8 oz)   06/29/23 98.7 kg (217 lb 9.5 oz)   04/04/23 95.3 kg (210 lb)   02/27/23 95.3 kg (210 lb)   02/23/23 95.3 kg (210 lb 1.6 oz)   02/10/23 94.3 kg (208 lb)   12/30/22 94.3 kg (208 lb)   12/02/22 90.7 kg (200 lb)   11/17/22 90.7 kg (200 lb)   11/14/22 90.7 kg (200 lb)   10/11/22 97.5 kg (215 lb)   10/07/22 99.3 kg (219 lb)      ROS:   Constitutional: + wt gain 3 lbs  Eyes: No recent visual changes  Cardiovascular: Denies current anginal symptoms  Respiratory: Denies current respiratory difficulty  Gastrointestinal: Denies recent bowel disturbances  GenitoUrinary - No dysuria  Skin: No new skin rash  Neurologic: No focal neurologic complaints  Musculoskeletal: no joint pain  Endocrine: no polyphagia, polydipsia or polyuria  Remainder  "ROS negative     /80 (BP Location: Right arm, Patient Position: Sitting)   Pulse 81   Temp 98.8 °F (37.1 °C) (Oral)   Ht 5' 9" (1.753 m)   Wt 100.5 kg (221 lb 9 oz)   SpO2 98%   BMI 32.72 kg/m²      Personally reviewed labs below:    Lab Results   Component Value Date    TSH 2.203 04/12/2024    P2LMLHV 92 03/23/2016    L7TJFKW 11.2 09/25/2017    FREET4 1.06 04/12/2024        Chemistry        Component Value Date/Time     11/14/2022 0845    K 3.9 11/14/2022 0845     11/14/2022 0845    CO2 27 11/14/2022 0845    BUN 13 11/14/2022 0845    CREATININE 0.9 11/14/2022 0845    GLU 79 11/14/2022 0845        Component Value Date/Time    CALCIUM 9.2 11/14/2022 0845    ALKPHOS 54 (L) 06/11/2022 0752    AST 14 06/11/2022 0752    ALT 14 06/11/2022 0752    BILITOT 0.6 06/11/2022 0752    ESTGFRAFRICA >60 06/11/2022 0752    EGFRNONAA >60 06/11/2022 0752         Lab Results   Component Value Date    HGBA1C 4.6 04/12/2024    HGBA1C 4.5 09/08/2023    HGBA1C 4.6 06/11/2022      PE:  GENERAL: middle aged female, well developed, well nourished  NEURO: steady gait, CN ll-Xll grossly intact  PSYCH: normal mood and affect    Assessment/Plan:   1. Hypothyroidism due to Hashimoto's thyroiditis  T4, Free    TSH    levothyroxine (SYNTHROID) 112 MCG tablet      2. Iron deficiency        3. Obesity (BMI 30.0-34.9)          Hypothyroidism-TSH is wnl. Continue Levothyroxine to 112 mcg daily. Notify clinic when pregnant.   Iron deficiency-iron has been stable.  Obesity-Body mass index is 32.72 kg/m². Increase exercise to 30 min daily. Will check glucose.  Hair loss-pt will f/u w/ derm.     F/u in 6 mths -tsh, t4        "

## 2024-04-17 NOTE — PROGRESS NOTES
"[facility]   Physical Therapy Discharge Note      Name: Emily Pelayo  Clinic Number: [id number]    Therapy Diagnosis:   Encounter Diagnoses   Name Primary?    Peroneal tendonitis, left Yes    Strain of muscle(s) and tendon(s) of peroneal muscle group at lower leg level, left leg, subsequent encounter     Difficulty walking      Physician: Ta Godinez MD    Visit Date: 4/18/2024    Physician Orders: PT Eval and Treat   Medical Diagnosis from Referral: S86.312A (ICD-10-CM) - Strain of muscle(s) and tendon(s) of peroneal muscle group at lower leg level, left leg, initial encounter M76.72 (ICD-10-CM) - Peroneal tendonitis of left lower extremity  Evaluation Date: 2/29/2024  Authorization Period Expiration: 12/31/2024  Plan of Care Expiration: 5/18/2024  Visit # / Visits authorized: 12/ 20     Foto  Date  Score    #1/3 2/29/2024 26.0   #2/3 3/18/2024 30.0   #3/3 4/15/2024 40.0, closed      Time In: 3:39pm  Time Out: 2:56pm  Total Time: 17 minutes    Precautions: Standard    Subjective   HOME EXERCISE PROGRAM: reviewed, reports compliance   Response to previous treatment: has noticed that she is more sturdy in single limb stance on unsteady surface   Function: increased L lateral ankle pain with standing > 20 minutes and negotiating stairs at work     Pain: 0/10 at current  Location: left ankle    Objective    4/18/2024:  Observation/posture: R>L inversion at foot      Range of Motion: AROM:  Ankle Right  Left    Dorsiflexion 8 11   Plantarflexion 51 51   Inversion 17 41   Eversion 14 11      Strength:  Ankle Right  Left    Dorsiflexion 4+/5 4+/5   Plantarflexion 4/5 4+/5   Inversion 4+/5 4/5   Eversion 4+/5 4/5      R SLS: 6", 5", 11"  L SLS: 14", 13", 15"     Edema:   R ankle circumference: 51.0 cm  L ankle circumference: 51.0 cm     R ankle joint line: 24.5 cm  L ankle joint line: 25.0 cm     R mets: 21.0 cm  L mets: 21.5 cm    Treatment   + indicates new exercise   Bold indicates " "completed exercise     therapeutic exercises to develop strength, endurance, ROM, flexibility, posture, and core stabilization for 17 minutes  L gastroc stretch, incline board, 30"x3  L soleus stretch, incline board, 30"x3  Inversion/eversion on nalanda board, B HRA, 3x10  DF/PF on nalanda board, B HRA, 3x10  L ankle 4 way strengthening, blue band, 3x10    Standing B LE arch lifts, 5" hold, 3x10   +Dynamic heel lifts, x 1 lap   Updated PHYSICAL THERAPY POC    manual therapy techniques: Joint mobilizations, Manual traction, Myofacial release, Soft tissue Mobilization, and Friction Massage for 00 minutes:  L TCJ, STJ, METs Gr III/IV JM   STM/TPR lateral, distal calf      neuromuscular re-education activities to improve: Balance, Coordination, Kinesthetic, Sense, Proprioception, and Posture for 00 minutes:  L/R SLS, airex mat, 30"x5   Seated HR/TR 20-manual cues for tripod engagement  Standing HR with ball between ankles 3 x 10   Standing arch curls, airex x 10  Y balance with slider x 20 - occasional UE support      therapeutic activities to improve functional performance for 00 minutes:  Step up, L/R LE leading, 8" step, 3x10   Step down, L/R LE leading, 8" step, unilateral HRA, 3x8  Step lateral, L/R LE leading, 8" step, 3x10    Patient Education and Home Exercises       Education provided:   - Educated pt that he/she may feel soreness after session.      Home Exercises Provided:  Instructed patient to continue current home exercise program.    Exercises were reviewed and Poppy was able to demonstrate them prior to the end of the session.  Poppy demonstrated good  understanding of the education provided. See Electronic Medical Record under Patient Instructions for exercises provided during therapy sessions    Assessment   Patient experiences shortness of breath with step exercises, but completes with improved mechanics. Patient exhibits increased steadiness in L SLS on unsteady surface. She will continue to work on " "her ankle/foot strength and balance as part of her HOME EXERCISE PROGRAM.     Poppy has made good progress towards meeting her goals.   Rehab potential: good   Patient prognosis is good.     Anticipated barriers to physical therapy: co morbidities     Goals:   Short Terms Goals: 3 weeks     Goal  Progress  Date    (1)  Patient will be I with HOME EXERCISE PROGRAM   met 4/18/2024   (2)  Patient will obtain 8 deg L ankle DF ACTIVE RANGE OF MOTION to indicate improved ability to negotiate flight of stairs using alternating gait pattern   met 4/18/2024    (3)   Patient will obtain 14 deg L ankle eversion ACTIVE RANGE OF MOTION to indicate improved ability to make changes in direction while moving quickly  Nearly met 4/18/2024      Long Term Goals: 6 weeks     Goal  Progress  Date    (1)  Patient will obtain 4+/5 L ankle eversion MMT to indicate improved ability to negotiate uneven terrain  Partially met 4/18/2024   (2)   Patient will complete L SLS average of 30" to indicate decreased risk of fall  Not met  4/18/2024        Plan   Plan of care Certification: 3/18/2024 to 5/18/2024.     Outpatient Physical Therapy included the following interventions: Cervical/Lumbar Traction, Electrical Stimulation re-eval, dry needling, Gait Training, Iontophoresis (with ), Manual Therapy, Moist Heat/ Ice, Neuromuscular Re-ed, Patient Education, Self Care, Therapeutic Activities, and Therapeutic Exercise.      Physical therapist and physical therapy assistant(s) will met face to face to discuss patient's treatment plan and progress towards established goals. Pt will be seen by a physical therapist minimally every 6th visit or every 30 days.    Patient is being discharged to HOME EXERCISE PROGRAM.     Jennifer Guzmán, PT          "

## 2024-04-18 ENCOUNTER — CLINICAL SUPPORT (OUTPATIENT)
Dept: REHABILITATION | Facility: HOSPITAL | Age: 39
End: 2024-04-18
Payer: COMMERCIAL

## 2024-04-18 DIAGNOSIS — S86.312D STRAIN OF MUSCLE(S) AND TENDON(S) OF PERONEAL MUSCLE GROUP AT LOWER LEG LEVEL, LEFT LEG, SUBSEQUENT ENCOUNTER: ICD-10-CM

## 2024-04-18 DIAGNOSIS — R26.2 DIFFICULTY WALKING: ICD-10-CM

## 2024-04-18 DIAGNOSIS — M76.72 PERONEAL TENDONITIS, LEFT: Primary | ICD-10-CM

## 2024-04-18 PROCEDURE — 97112 NEUROMUSCULAR REEDUCATION: CPT | Mod: PN,CQ

## 2024-04-18 PROCEDURE — 97140 MANUAL THERAPY 1/> REGIONS: CPT | Mod: PN,CQ

## 2024-04-18 PROCEDURE — 97110 THERAPEUTIC EXERCISES: CPT | Mod: PN

## 2024-04-18 PROCEDURE — 97530 THERAPEUTIC ACTIVITIES: CPT | Mod: PN,CQ

## 2024-04-18 NOTE — PROGRESS NOTES
"[facility]   Physical Therapy Treatment Note      Name: Emily Pelayo  Clinic Number: [id number]    Therapy Diagnosis:   Encounter Diagnoses   Name Primary?    Peroneal tendonitis, left Yes    Strain of muscle(s) and tendon(s) of peroneal muscle group at lower leg level, left leg, subsequent encounter     Difficulty walking      Physician: Ta Godinez MD    Visit Date: 4/18/2024    Physician Orders: PT Eval and Treat   Medical Diagnosis from Referral: S86.312A (ICD-10-CM) - Strain of muscle(s) and tendon(s) of peroneal muscle group at lower leg level, left leg, initial encounter M76.72 (ICD-10-CM) - Peroneal tendonitis of left lower extremity  Evaluation Date: 2/29/2024  Authorization Period Expiration: 12/31/2024  Plan of Care Expiration: 5/18/2024  Visit # / Visits authorized: 12/ 20     Foto  Date  Score    #1/3 2/29/2024 26.0   #2/3 3/18/2024 30.0   #3/3 4/15/2024 40.0, closed      Time In: 1352  Time Out: 1438  Total Time: 46 minutes    Precautions: Standard    Subjective   HOME EXERCISE PROGRAM: reviewed, reports compliance   Response to previous treatment: has noticed that she is more sturdy in single limb stance on unsteady surface   Function: increased L lateral ankle pain with standing > 20 minutes and negotiating stairs at work     Noticed her left ankle doesn't fall into inversion with prolonged sitting at [employer] anymore. Soreness like a 1/10.    Pain: 1/10  Location: left ankle    Objective    3/18/2024:  Observation/posture: R>L inversion at foot      Range of Motion: AROM:  Ankle Right  Left    Dorsiflexion 8 11   Plantarflexion 51 51   Inversion 17 41   Eversion 14 11      Strength:  Ankle Right  Left    Dorsiflexion 4+/5 4+/5   Plantarflexion 4/5 4+/5   Inversion 4+/5 4/5   Eversion 4+/5 4/5      Joint Mobility:   L TCJ, STJ, METs: impaired      Function/balance:   Sit - stand: 9 X in 30:      R SLS: 4", 2", 2"  L SLS: 1", 4", 7"     Edema:   R ankle circumference: " "51.0 cm  L ankle circumference: 49.0 cm     R ankle joint line: 24.5 cm  L ankle joint line: 25.0 cm     R mets: 21.0 cm  L mets: 21.0 cm     Intake Outcome Measure for FOTO Ankle Survey     Therapist reviewed FOTO scores   FOTO documents entered into [facility] - see Media section.     Score: 40.0             Treatment   + indicates new exercise   Bold indicates completed exercise     therapeutic exercises to develop strength, endurance, ROM, flexibility, posture, and core stabilization for 09 minutes  L gastroc stretch, incline board, 30"x3  L soleus stretch, incline board, 30"x3  Inversion/eversion on nalanda board, B HRA, 3x10  DF/PF on nalanda board, B HRA, 3x10  L ankle 4 way strengthening, blue band, 3x10  (black band for plantar flexion)  Standing B LE arch lifts, 5" hold, 3x10   Dynamic heel lifts, x 1 lap     manual therapy techniques: Joint mobilizations, Manual traction, Myofacial release, Soft tissue Mobilization, and Friction Massage for 10 minutes:  L TCJ, STJ, METs Gr III/IV JM   STM/TPR lateral, distal calf      neuromuscular re-education activities to improve: Balance, Coordination, Kinesthetic, Sense, Proprioception, and Posture for 10 minutes:  L/R SLS, airex mat, 30"x5   Seated HR/TR 20-manual cues for tripod engagement  Standing HR with ball between ankles 3 x 10   Standing arch curls, airex x 20  Y balance with slider x 20 - occasional UE support      therapeutic activities to improve functional performance for 17 minutes:  Step up, LLE leading, 8" step, 3x10   Step down, R LE leading, 8" step, unilateral HRA, 3x8  Step lateral, L/R LE leading, 8" step, 3x10    Patient Education and Home Exercises       Education provided:   - Educated pt that he/she may feel soreness after session.      Home Exercises Provided:  Instructed patient to continue current home exercise program.    Exercises were reviewed and Poppy was able to demonstrate them prior to the end of the session.  Poppy demonstrated good  " "understanding of the education provided. See Electronic Medical Record under Patient Instructions for exercises provided during therapy sessions    Assessment     Increasing tolerance for activity as pt reports improvement in ankle in prolonged positioning such as in [employer]. Good post exercise tolerance reported after prior session.  Progressed strengthening with good response, and fatigued after session.     Poppy is making good progress towards meeting her goals.   Rehab potential: good   Patient prognosis is good.    Patient will continue to benefit from skilled outpatient physical therapy to address the deficits listed in the problem list box on initial evaluation, provide pt/family education and to maximize pt's level of independence in the home and community environment.      Anticipated barriers to physical therapy: co morbidities     Goals:   Short Terms Goals: 3 weeks     Goal  Progress  Date    (1)  Patient will be I with HOME EXERCISE PROGRAM  Progressing, not met 4/18/2024   (2)  Patient will obtain 8 deg L ankle DF ACTIVE RANGE OF MOTION to indicate improved ability to negotiate flight of stairs using alternating gait pattern  Progressing, not met 4/18/2024    (3)   Patient will obtain 14 deg L ankle eversion ACTIVE RANGE OF MOTION to indicate improved ability to make changes in direction while moving quickly  Progressing, not met 4/18/2024      Long Term Goals: 6 weeks     Goal  Progress  Date    (1)  Patient will obtain 4+/5 L ankle eversion MMT to indicate improved ability to negotiate uneven terrain  Progressing, not met 4/18/2024   (2)   Patient will complete > 14 sit - stand transfers within 30" to indicate improved transitional tolerance Progressing, not met 4/18/2024    (3)   Patient will complete L SLS average of 30" to indicate decreased risk of fall  Progressing, not met  4/18/2024        Plan     Continue to progress as appropriate.     Continue with: Plan of care Certification: 3/18/2024 to " 5/18/2024.     Outpatient Physical Therapy 2 times weekly for 3 weeks to include the following interventions: Cervical/Lumbar Traction, Electrical Stimulation re-eval, dry needling, Gait Training, Iontophoresis (with ), Manual Therapy, Moist Heat/ Ice, Neuromuscular Re-ed, Patient Education, Self Care, Therapeutic Activities, and Therapeutic Exercise.      Physical therapist and physical therapy assistant(s) will met face to face to discuss patient's treatment plan and progress towards established goals. Pt will be seen by a physical therapist minimally every 6th visit or every 30 days.    Tamiko Chanel, PTA           none

## 2024-04-21 PROBLEM — R26.2 DIFFICULTY WALKING: Status: RESOLVED | Noted: 2024-02-28 | Resolved: 2024-04-21

## 2024-04-21 PROBLEM — S86.312D: Status: RESOLVED | Noted: 2024-02-28 | Resolved: 2024-04-21

## 2024-04-21 PROBLEM — M76.72 PERONEAL TENDONITIS, LEFT: Status: RESOLVED | Noted: 2024-02-28 | Resolved: 2024-04-21

## 2024-04-22 NOTE — PLAN OF CARE
"[facility]   Physical Therapy Discharge Note      Name: Emily Pelayo  Clinic Number: [id number]    Therapy Diagnosis:   Encounter Diagnoses   Name Primary?    Peroneal tendonitis, left Yes    Strain of muscle(s) and tendon(s) of peroneal muscle group at lower leg level, left leg, subsequent encounter     Difficulty walking      Physician: Ta Godinez MD    Visit Date: 4/18/2024    Physician Orders: PT Eval and Treat   Medical Diagnosis from Referral: S86.312A (ICD-10-CM) - Strain of muscle(s) and tendon(s) of peroneal muscle group at lower leg level, left leg, initial encounter M76.72 (ICD-10-CM) - Peroneal tendonitis of left lower extremity  Evaluation Date: 2/29/2024  Authorization Period Expiration: 12/31/2024  Plan of Care Expiration: 5/18/2024  Visit # / Visits authorized: 12/ 20     Foto  Date  Score    #1/3 2/29/2024 26.0   #2/3 3/18/2024 30.0   #3/3 4/15/2024 40.0, closed      Time In: 3:39pm  Time Out: 2:56pm  Total Time: 17 minutes    Precautions: Standard    Subjective   HOME EXERCISE PROGRAM: reviewed, reports compliance   Response to previous treatment: has noticed that she is more sturdy in single limb stance on unsteady surface   Function: increased L lateral ankle pain with standing > 20 minutes and negotiating stairs at work     Pain: 0/10 at current  Location: left ankle    Objective    4/18/2024:  Observation/posture: R>L inversion at foot      Range of Motion: AROM:  Ankle Right  Left    Dorsiflexion 8 11   Plantarflexion 51 51   Inversion 17 41   Eversion 14 11      Strength:  Ankle Right  Left    Dorsiflexion 4+/5 4+/5   Plantarflexion 4/5 4+/5   Inversion 4+/5 4/5   Eversion 4+/5 4/5      R SLS: 6", 5", 11"  L SLS: 14", 13", 15"     Edema:   R ankle circumference: 51.0 cm  L ankle circumference: 51.0 cm     R ankle joint line: 24.5 cm  L ankle joint line: 25.0 cm     R mets: 21.0 cm  L mets: 21.5 cm    Treatment   + indicates new exercise   Bold indicates " "completed exercise     therapeutic exercises to develop strength, endurance, ROM, flexibility, posture, and core stabilization for 17 minutes  L gastroc stretch, incline board, 30"x3  L soleus stretch, incline board, 30"x3  Inversion/eversion on nalanda board, B HRA, 3x10  DF/PF on nalanda board, B HRA, 3x10  L ankle 4 way strengthening, blue band, 3x10    Standing B LE arch lifts, 5" hold, 3x10   +Dynamic heel lifts, x 1 lap   Updated PHYSICAL THERAPY POC    manual therapy techniques: Joint mobilizations, Manual traction, Myofacial release, Soft tissue Mobilization, and Friction Massage for 00 minutes:  L TCJ, STJ, METs Gr III/IV JM   STM/TPR lateral, distal calf      neuromuscular re-education activities to improve: Balance, Coordination, Kinesthetic, Sense, Proprioception, and Posture for 00 minutes:  L/R SLS, airex mat, 30"x5   Seated HR/TR 20-manual cues for tripod engagement  Standing HR with ball between ankles 3 x 10   Standing arch curls, airex x 10  Y balance with slider x 20 - occasional UE support      therapeutic activities to improve functional performance for 00 minutes:  Step up, L/R LE leading, 8" step, 3x10   Step down, L/R LE leading, 8" step, unilateral HRA, 3x8  Step lateral, L/R LE leading, 8" step, 3x10    Patient Education and Home Exercises       Education provided:   - Educated pt that he/she may feel soreness after session.      Home Exercises Provided:  Instructed patient to continue current home exercise program.    Exercises were reviewed and Poppy was able to demonstrate them prior to the end of the session.  Poppy demonstrated good  understanding of the education provided. See Electronic Medical Record under Patient Instructions for exercises provided during therapy sessions    Assessment   Patient experiences shortness of breath with step exercises, but completes with improved mechanics. Patient exhibits increased steadiness in L SLS on unsteady surface. She will continue to work on " "her ankle/foot strength and balance as part of her HOME EXERCISE PROGRAM.     Poppy has made good progress towards meeting her goals.   Rehab potential: good   Patient prognosis is good.     Anticipated barriers to physical therapy: co morbidities     Goals:   Short Terms Goals: 3 weeks     Goal  Progress  Date    (1)  Patient will be I with HOME EXERCISE PROGRAM   met 4/18/2024   (2)  Patient will obtain 8 deg L ankle DF ACTIVE RANGE OF MOTION to indicate improved ability to negotiate flight of stairs using alternating gait pattern   met 4/18/2024    (3)   Patient will obtain 14 deg L ankle eversion ACTIVE RANGE OF MOTION to indicate improved ability to make changes in direction while moving quickly  Nearly met 4/18/2024      Long Term Goals: 6 weeks     Goal  Progress  Date    (1)  Patient will obtain 4+/5 L ankle eversion MMT to indicate improved ability to negotiate uneven terrain  Partially met 4/18/2024   (2)   Patient will complete L SLS average of 30" to indicate decreased risk of fall  Not met  4/18/2024        Plan   Plan of care Certification: 3/18/2024 to 5/18/2024.     Outpatient Physical Therapy included the following interventions: Cervical/Lumbar Traction, Electrical Stimulation re-eval, dry needling, Gait Training, Iontophoresis (with ), Manual Therapy, Moist Heat/ Ice, Neuromuscular Re-ed, Patient Education, Self Care, Therapeutic Activities, and Therapeutic Exercise.      Physical therapist and physical therapy assistant(s) will met face to face to discuss patient's treatment plan and progress towards established goals. Pt will be seen by a physical therapist minimally every 6th visit or every 30 days.    Patient is being discharged to HOME EXERCISE PROGRAM.     Jennifer Guzmán, PT          "

## 2024-06-18 ENCOUNTER — OFFICE VISIT (OUTPATIENT)
Dept: PSYCHIATRY | Facility: CLINIC | Age: 39
End: 2024-06-18
Payer: COMMERCIAL

## 2024-06-18 VITALS
SYSTOLIC BLOOD PRESSURE: 116 MMHG | HEART RATE: 89 BPM | WEIGHT: 218.81 LBS | DIASTOLIC BLOOD PRESSURE: 78 MMHG | BODY MASS INDEX: 32.41 KG/M2 | HEIGHT: 69 IN

## 2024-06-18 DIAGNOSIS — G47.00 INSOMNIA, UNSPECIFIED TYPE: ICD-10-CM

## 2024-06-18 DIAGNOSIS — F33.1 MODERATE EPISODE OF RECURRENT MAJOR DEPRESSIVE DISORDER: ICD-10-CM

## 2024-06-18 DIAGNOSIS — F41.1 GAD (GENERALIZED ANXIETY DISORDER): ICD-10-CM

## 2024-06-18 DIAGNOSIS — F43.10 PTSD (POST-TRAUMATIC STRESS DISORDER): ICD-10-CM

## 2024-06-18 DIAGNOSIS — F33.0 MILD EPISODE OF RECURRENT MAJOR DEPRESSIVE DISORDER: Primary | ICD-10-CM

## 2024-06-18 PROCEDURE — G2211 COMPLEX E/M VISIT ADD ON: HCPCS | Mod: S$GLB,,,

## 2024-06-18 PROCEDURE — 3078F DIAST BP <80 MM HG: CPT | Mod: CPTII,S$GLB,,

## 2024-06-18 PROCEDURE — 1160F RVW MEDS BY RX/DR IN RCRD: CPT | Mod: CPTII,S$GLB,,

## 2024-06-18 PROCEDURE — 3044F HG A1C LEVEL LT 7.0%: CPT | Mod: CPTII,S$GLB,,

## 2024-06-18 PROCEDURE — 3008F BODY MASS INDEX DOCD: CPT | Mod: CPTII,S$GLB,,

## 2024-06-18 PROCEDURE — 99999 PR PBB SHADOW E&M-EST. PATIENT-LVL III: CPT | Mod: PBBFAC,,,

## 2024-06-18 PROCEDURE — 3074F SYST BP LT 130 MM HG: CPT | Mod: CPTII,S$GLB,,

## 2024-06-18 PROCEDURE — 99215 OFFICE O/P EST HI 40 MIN: CPT | Mod: S$GLB,,,

## 2024-06-18 PROCEDURE — 1159F MED LIST DOCD IN RCRD: CPT | Mod: CPTII,S$GLB,,

## 2024-06-18 RX ORDER — SERTRALINE HYDROCHLORIDE 50 MG/1
50 TABLET, FILM COATED ORAL DAILY
Start: 2024-06-18
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
## 2024-07-08 ENCOUNTER — OFFICE VISIT (OUTPATIENT)
Dept: OBSTETRICS AND GYNECOLOGY | Facility: CLINIC | Age: 39
End: 2024-07-08
Payer: COMMERCIAL

## 2024-07-08 VITALS — BODY MASS INDEX: 32.26 KG/M2 | DIASTOLIC BLOOD PRESSURE: 82 MMHG | SYSTOLIC BLOOD PRESSURE: 110 MMHG | WEIGHT: 218.5 LBS

## 2024-07-08 DIAGNOSIS — N97.9 FEMALE FERTILITY PROBLEM: Primary | ICD-10-CM

## 2024-07-08 PROCEDURE — 3008F BODY MASS INDEX DOCD: CPT | Mod: CPTII,S$GLB,, | Performed by: SPECIALIST

## 2024-07-08 PROCEDURE — 99213 OFFICE O/P EST LOW 20 MIN: CPT | Mod: S$GLB,,, | Performed by: SPECIALIST

## 2024-07-08 PROCEDURE — 3074F SYST BP LT 130 MM HG: CPT | Mod: CPTII,S$GLB,, | Performed by: SPECIALIST

## 2024-07-08 PROCEDURE — 99999 PR PBB SHADOW E&M-EST. PATIENT-LVL IV: CPT | Mod: PBBFAC,,, | Performed by: SPECIALIST

## 2024-07-08 PROCEDURE — 3044F HG A1C LEVEL LT 7.0%: CPT | Mod: CPTII,S$GLB,, | Performed by: SPECIALIST

## 2024-07-08 PROCEDURE — 3079F DIAST BP 80-89 MM HG: CPT | Mod: CPTII,S$GLB,, | Performed by: SPECIALIST

## 2024-07-08 PROCEDURE — 1159F MED LIST DOCD IN RCRD: CPT | Mod: CPTII,S$GLB,, | Performed by: SPECIALIST

## 2024-07-08 RX ORDER — LEUPROLIDE ACETATE 1 MG/0.2ML
KIT SUBCUTANEOUS
COMMUNITY
Start: 2024-06-28

## 2024-07-08 RX ORDER — NEEDLES, DISPOSABLE 25GX5/8"
NEEDLE, DISPOSABLE MISCELLANEOUS
COMMUNITY
Start: 2024-06-28

## 2024-07-08 RX ORDER — SYRINGE AND NEEDLE,INSULIN,1ML 30 G X1/2"
SYRINGE, EMPTY DISPOSABLE MISCELLANEOUS
COMMUNITY
Start: 2024-06-28

## 2024-07-08 RX ORDER — ESTRADIOL 2 MG/1
TABLET ORAL
COMMUNITY
Start: 2024-06-28

## 2024-07-08 RX ORDER — GONADOTROPHIN, CHORIONIC 5000 UNIT
KIT INTRAMUSCULAR
COMMUNITY
Start: 2024-06-28

## 2024-07-08 RX ORDER — SYRINGE, DISPOSABLE, 3 ML
SYRINGE, EMPTY DISPOSABLE MISCELLANEOUS
COMMUNITY
Start: 2024-06-28

## 2024-07-08 RX ORDER — FOLLITROPIN ALFA 1050 UNIT
KIT SUBCUTANEOUS
COMMUNITY
Start: 2024-06-28

## 2024-07-08 RX ORDER — NEEDLES, DISPOSABLE 25GX5/8"
NEEDLE, DISPOSABLE MISCELLANEOUS
COMMUNITY
Start: 2024-06-24

## 2024-07-08 RX ORDER — PROGESTERONE 50 MG/ML
50 INJECTION, SOLUTION INTRAMUSCULAR
COMMUNITY
Start: 2024-06-28

## 2024-07-08 RX ORDER — CONTAINER,EMPTY
EACH MISCELLANEOUS
COMMUNITY
Start: 2024-06-28

## 2024-07-08 RX ORDER — SYRINGE WITH NEEDLE, 1 ML 25GX5/8"
SYRINGE, EMPTY DISPOSABLE MISCELLANEOUS
COMMUNITY
Start: 2024-06-28

## 2024-07-08 NOTE — PROGRESS NOTES
40 yo BF  followed for gyn care. Recently underwent superovulation with IUI at fertility [place], Dr Rico CHAVEZ on second IUI attempt  Pt returns to discussed possible care plan . Still desiring conception  Past Medical History:   Diagnosis Date    Anxiety and depression     Constipation     Encounter for blood transfusion     Endometriosis     GERD (gastroesophageal reflux disease)     IBS (irritable bowel syndrome)     Neuromuscular disorder     nerve damage of right foot after hammertoe surgery    PCOS (polycystic ovarian syndrome)     Sleep disturbance     Thyroid disease     Vaginal prolapse        Past Surgical History:   Procedure Laterality Date    ARTHROSCOPIC ACROMIOPLASTY OF SHOULDER Left 2022    Procedure: ACROMIOPLASTY, ARTHROSCOPIC;  Surgeon: Steven Zarate MD;  Location: [facility] OR;  Service: Orthopedics;  Laterality: Left;    ARTHROSCOPIC REPAIR OF ROTATOR CUFF OF SHOULDER Left 2022    Procedure: REPAIR, ROTATOR CUFF, ARTHROSCOPIC;  Surgeon: Steven Zarate MD;  Location: [facility] OR;  Service: Orthopedics;  Laterality: Left;    ARTHROSCOPIC TENOTOMY OF BICEPS TENDON Left 2022    Procedure: TENOTOMY, BICEPS, ARTHROSCOPIC;  Surgeon: Steven Zarate MD;  Location: [facility] OR;  Service: Orthopedics;  Laterality: Left;    ARTHROSCOPY OF SHOULDER WITH DECOMPRESSION OF SUBACROMIAL SPACE Left 2022    Procedure: ARTHROSCOPY, SHOULDER, WITH SUBACROMIAL SPACE DECOMPRESSION;  Surgeon: Steven Zarate MD;  Location: [facility] OR;  Service: Orthopedics;  Laterality: Left;    bladder suspension      COLONOSCOPY  ~24 years old    Dr. Neri, colon polyps per patient report    COLONOSCOPY N/A 2020    Procedure: COLONOSCOPY;  Surgeon: Yessi Pal MD;  Location: [facility];  Service: Endoscopy;  Laterality: N/A;    DIAGNOSTIC LAPAROSCOPY N/A 2022    Procedure: LAPAROSCOPY, DIAGNOSTIC;  Surgeon: Jack Sargent MD;  Location: [facility] OR;  Service: OB/GYN;  Laterality:  N/A;    dx lap      ESOPHAGOGASTRODUODENOSCOPY N/A 1/22/2020    Procedure: EGD (ESOPHAGOGASTRODUODENOSCOPY);  Surgeon: Yessi Pla MD;  Location: [facility];  Service: Endoscopy;  Laterality: N/A;    hammer toe Right 11/19/2015    heavenly toe repeair with callus filing    LAPAROSCOPIC SUSPENSION OF UTEROSACRAL LIGAMENT Bilateral 1/6/2022    Procedure: SUSPENSION, LIGAMENT, UTEROSACRAL, LAPAROSCOPIC;  Surgeon: Jack Sargent MD;  Location: [facility] OR;  Service: OB/GYN;  Laterality: Bilateral;    LAPAROSCOPY  ~2012    done for infertility concern    TONSILLECTOMY         Family History   Problem Relation Name Age of Onset    Thyroid disease Mother          hyperthyroidism    Hypertension Mother      Schizophrenia Father      Hypertension Sister      No Known Problems Sister      Heart disease Maternal Grandmother      No Known Problems Maternal Grandfather      No Known Problems Paternal Grandmother      No Known Problems Paternal Grandfather      Breast cancer Cousin          maternal    Ovarian cancer Neg Hx      Eczema Neg Hx      Lupus Neg Hx      Psoriasis Neg Hx      Melanoma Neg Hx      Cancer Neg Hx      Colon cancer Neg Hx      Crohn's disease Neg Hx      Ulcerative colitis Neg Hx      Stomach cancer Neg Hx      Esophageal cancer Neg Hx         Social History     Socioeconomic History    Marital status: Single   Tobacco Use    Smoking status: Never    Smokeless tobacco: Never   Substance and Sexual Activity    Alcohol use: No    Drug use: No    Sexual activity: Yes     Partners: Male     Comment: OCP helps regulate cycle     Social Determinants of Health     Financial Resource Strain: Low Risk  (9/29/2020)    Overall Financial Resource Strain (CARDIA)     Difficulty of Paying Living Expenses: Not hard at all   Food Insecurity: No Food Insecurity (7/23/2019)    Hunger Vital Sign     Worried About Running Out of Food in the Last Year: Never true     Ran Out of Food in the Last Year: Never true  "  Transportation Needs: No Transportation Needs (2019)    PRAPARE - Transportation     Lack of Transportation (Medical): No     Lack of Transportation (Non-Medical): No   Physical Activity: Unknown (2020)    Exercise Vital Sign     Days of Exercise per Week: 2 days       Current Outpatient Medications   Medication Sig Dispense Refill    cholecalciferol, vitamin D3, (VITAMIN D3) 50 mcg (2,000 unit) Tab Take 2,000 Units by mouth once daily.      CITRANATAL HARMONY, IRON FUM, 27 mg iron-1 mg -50 mg-260 mg Cap TAKE 1 CAPSULE BY MOUTH ONCE DAILY. 100 capsule 0    levothyroxine (SYNTHROID) 112 MCG tablet Take 1 tablet (112 mcg total) by mouth before breakfast. 90 tablet 3    linaCLOtide (LINZESS) 145 mcg Cap capsule Take 1 capsule (145 mcg total) by mouth before breakfast. 90 capsule 2    omeprazole (PRILOSEC) 40 MG capsule TAKE 1 CAPSULE BY MOUTH BEFORE BREAKFAST 90 capsule 3    progesterone (PROGESTERONE IN OIL) 50 mg/mL injection Inject 50 mg into the muscle.      sertraline (ZOLOFT) 50 MG tablet Take 1 tablet (50 mg total) by mouth once daily.      albuterol (PROVENTIL/VENTOLIN HFA) 90 mcg/actuation inhaler Inhale 1-2 puffs into the lungs every 6 (six) hours as needed. Rescue (Patient not taking: Reported on 2023) 18 g 1    BD INSULIN SYRINGE MICRO-FINE 1 mL 28 gauge x 1/2" Syrg USE TO DRAW AND INJECT LEUPROLIDE (Patient not taking: Reported on 2024)      BD LUER-BHAVESH SYRINGE 3 mL 23 gauge x 1 1/2" Syrg USE TO MIX AND DRAW NOVAREL (Patient not taking: Reported on 2024)      BD REGULAR BEVEL NEEDLES 27 gauge x 1/2" Ndle SMARTSIG:Injection (Patient not taking: Reported on 2024)      BD SPECIALTY USE NEEDLES 30 gauge x 1/2" Ndle Inject into the skin. (Patient not taking: Reported on 2024)      estradioL (ESTRACE) 2 MG tablet SMARTSI Tablet(s) Vaginal Twice Daily (Patient not taking: Reported on 2024)      GONAL-F 1,050 unit SolR Inject into the skin. (Patient not taking: Reported on " 2024)      ipratropium (ATROVENT) 42 mcg (0.06 %) nasal spray 2 sprays by Nasal route 3 (three) times daily. (Patient not taking: Reported on 2024) 15 mL 6    leuprolide 1 mg/0.2 mL Kit SMARTSI Milligram(s) SUB-Q As Directed (Patient not taking: Reported on 2024)      NOVAREL 5,000 unit SolR SMARTSI SUB-Q As Directed (Patient not taking: Reported on 2024)      PNV no.74-iron fum-FA-coQ10 (THERANATAL OVAVITE) 18-1-125 mg-mg-unit Cmpk Take 1 tablet by mouth Daily. (Patient not taking: Reported on 2024) 100 each 3    prenatal vit 87-iron-folic-dha (PRENATE MINI, FERR ASP GLYCIN,) 18-1-350 mg Cap Take 1 tablet by mouth once daily. (Patient not taking: Reported on 2024) 100 capsule 3    SHARPS CONTAINER USE AS DIRECTED TO DISPOSE OF NEEDLES AND SYRINGES (Patient not taking: Reported on 2024)      syringe, disposable, 3 mL Syrg USE WITH Q-CAP TO MIX AND DRAW MENOPUR (Patient not taking: Reported on 2024)       No current facility-administered medications for this visit.       Review of patient's allergies indicates:  No Known Allergies    Review of System:   General: no chills, fever, night sweats, weight gain or weight loss  Psychological: no depression or suicidal ideation  Breasts: no new or changing breast lumps, nipple discharge or masses.  Respiratory: no cough, shortness of breath, or wheezing  Cardiovascular: no chest pain or dyspnea on exertion  Gastrointestinal: no abdominal pain, change in bowel habits, or black or bloody stools  Genito-Urinary: no incontinence, urinary frequency/urgency or vulvar/vaginal symptoms, pelvic pain or abnormal vaginal bleeding.  Musculoskeletal: no gait disturbance or muscular weakness     I had a 20 min discussion with pt and discussed age and potential egg quality issue  I discussed and [facility] discuss the feasibility of donor IVF vs IUI  Answered all questions and pt is agreeable    I spent a total of 30  minutes on the day of the visit. This includes face to face time and non-face to face time preparing to see the patient (eg, review of tests), obtaining and/or reviewing separately obtained history, documenting clinical information in the electronic or other health record, independently interpreting results and communicating results to the patient/family/caregiver, or care coordinator.

## 2024-08-02 ENCOUNTER — OFFICE VISIT (OUTPATIENT)
Dept: PSYCHIATRY | Facility: CLINIC | Age: 39
End: 2024-08-02
Payer: COMMERCIAL

## 2024-08-02 VITALS
BODY MASS INDEX: 32.26 KG/M2 | SYSTOLIC BLOOD PRESSURE: 116 MMHG | HEART RATE: 71 BPM | HEIGHT: 69 IN | DIASTOLIC BLOOD PRESSURE: 78 MMHG

## 2024-08-02 DIAGNOSIS — F43.10 PTSD (POST-TRAUMATIC STRESS DISORDER): ICD-10-CM

## 2024-08-02 DIAGNOSIS — F33.40 RECURRENT MAJOR DEPRESSIVE DISORDER, IN REMISSION: Primary | ICD-10-CM

## 2024-08-02 DIAGNOSIS — F41.1 GAD (GENERALIZED ANXIETY DISORDER): ICD-10-CM

## 2024-08-02 PROCEDURE — 99999 PR PBB SHADOW E&M-EST. PATIENT-LVL IV: CPT | Mod: PBBFAC,,,
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
## 2024-08-29 ENCOUNTER — TELEPHONE (OUTPATIENT)
Dept: ENDOCRINOLOGY | Facility: CLINIC | Age: 39
End: 2024-08-29
Payer: COMMERCIAL

## 2024-10-07 ENCOUNTER — PATIENT MESSAGE (OUTPATIENT)
Dept: OBSTETRICS AND GYNECOLOGY | Facility: CLINIC | Age: 39
End: 2024-10-07

## 2024-10-07 ENCOUNTER — E-VISIT (OUTPATIENT)
Dept: OBSTETRICS AND GYNECOLOGY | Facility: CLINIC | Age: 39
End: 2024-10-07
Payer: COMMERCIAL

## 2024-10-07 ENCOUNTER — TELEPHONE (OUTPATIENT)
Dept: OBSTETRICS AND GYNECOLOGY | Facility: CLINIC | Age: 39
End: 2024-10-07

## 2024-10-07 DIAGNOSIS — O00.90 ECTOPIC PREGNANCY WITHOUT INTRAUTERINE PREGNANCY, UNSPECIFIED LOCATION: Primary | ICD-10-CM

## 2024-10-07 DIAGNOSIS — O00.90 ECTOPIC PREGNANCY, UNSPECIFIED LOCATION, UNSPECIFIED WHETHER INTRAUTERINE PREGNANCY PRESENT: Primary | ICD-10-CM

## 2024-10-07 NOTE — TELEPHONE ENCOUNTER
----- Message from Marce sent at 10/7/2024  9:21 AM CDT -----  Contact: Dr Gómez  Type: Needs Medical Advice  Who Called:  Dr Diann faustin the [facility]   Symptoms (please be specific):  She was looking an urgent matter with the Dr. Related to an ectopic pregnancy.  Best Call Back Number: her call at [phone]  Additional Information: Please call her back. Thanks!

## 2024-10-07 NOTE — TELEPHONE ENCOUNTER
----- Message from Jack Sargent MD sent at 10/7/2024 11:18 AM CDT -----  Message sent to pt and spoke with Dr Gómez  Needs Dx lap and removal ectopic pregnancy this week [facility]

## 2024-10-08 ENCOUNTER — OFFICE VISIT (OUTPATIENT)
Dept: OBSTETRICS AND GYNECOLOGY | Facility: CLINIC | Age: 39
End: 2024-10-08
Payer: COMMERCIAL

## 2024-10-08 VITALS
SYSTOLIC BLOOD PRESSURE: 116 MMHG | DIASTOLIC BLOOD PRESSURE: 80 MMHG | BODY MASS INDEX: 32.25 KG/M2 | WEIGHT: 218.38 LBS

## 2024-10-08 DIAGNOSIS — Z01.818 PREOP EXAMINATION: Primary | ICD-10-CM

## 2024-10-08 DIAGNOSIS — O00.90 ECTOPIC PREGNANCY: ICD-10-CM

## 2024-10-08 PROCEDURE — 3008F BODY MASS INDEX DOCD: CPT | Mod: CPTII,S$GLB,, | Performed by: SPECIALIST

## 2024-10-08 PROCEDURE — 99213 OFFICE O/P EST LOW 20 MIN: CPT | Mod: S$GLB,,, | Performed by: SPECIALIST

## 2024-10-08 PROCEDURE — 3079F DIAST BP 80-89 MM HG: CPT | Mod: CPTII,S$GLB,, | Performed by: SPECIALIST

## 2024-10-08 PROCEDURE — 3074F SYST BP LT 130 MM HG: CPT | Mod: CPTII,S$GLB,, | Performed by: SPECIALIST

## 2024-10-08 PROCEDURE — 3044F HG A1C LEVEL LT 7.0%: CPT | Mod: CPTII,S$GLB,, | Performed by: SPECIALIST

## 2024-10-08 PROCEDURE — 99999 PR PBB SHADOW E&M-EST. PATIENT-LVL IV: CPT | Mod: PBBFAC,,, | Performed by: SPECIALIST

## 2024-10-08 RX ORDER — MUPIROCIN 20 MG/G
OINTMENT TOPICAL
Status: CANCELLED | OUTPATIENT
Start: 2024-10-08

## 2024-10-08 RX ORDER — FAMOTIDINE 20 MG/1
20 TABLET, FILM COATED ORAL
Status: CANCELLED | OUTPATIENT
Start: 2024-10-08

## 2024-10-08 RX ORDER — SODIUM CHLORIDE 9 MG/ML
INJECTION, SOLUTION INTRAVENOUS CONTINUOUS
Status: CANCELLED | OUTPATIENT
Start: 2024-10-08

## 2024-10-08 RX ORDER — CEFAZOLIN SODIUM 2 G/50ML
2 SOLUTION INTRAVENOUS
Status: CANCELLED | OUTPATIENT
Start: 2024-10-08

## 2024-10-08 NOTE — PROGRESS NOTES
38 yo Bf  ab followed by Dr Gómez , [facility]. Pt s/p IVF with two embryos transferred. I was contacted by Dr Gómez yesterday after pos BHCG . Pt underwent confirmatory u/s by Dr Gómez and dxd with left tubal ectopic pregnancy. No FHM, small amount free fluid noted and referred to me for surgical approach. Due to planned December transfer it was advised  by [facility] to approach surgically vs MTX.  See attached photos and report from [facility]    Past Medical History:   Diagnosis Date    Anxiety and depression     Constipation     Encounter for blood transfusion     Endometriosis     GERD (gastroesophageal reflux disease)     IBS (irritable bowel syndrome)     Neuromuscular disorder     nerve damage of right foot after hammertoe surgery    PCOS (polycystic ovarian syndrome)     Sleep disturbance     Thyroid disease     Vaginal prolapse        Past Surgical History:   Procedure Laterality Date    ARTHROSCOPIC ACROMIOPLASTY OF SHOULDER Left 2022    Procedure: ACROMIOPLASTY, ARTHROSCOPIC;  Surgeon: Steven Zarate MD;  Location: [facility] OR;  Service: Orthopedics;  Laterality: Left;    ARTHROSCOPIC REPAIR OF ROTATOR CUFF OF SHOULDER Left 2022    Procedure: REPAIR, ROTATOR CUFF, ARTHROSCOPIC;  Surgeon: Steven Zarate MD;  Location: [facility] OR;  Service: Orthopedics;  Laterality: Left;    ARTHROSCOPIC TENOTOMY OF BICEPS TENDON Left 2022    Procedure: TENOTOMY, BICEPS, ARTHROSCOPIC;  Surgeon: Steven Zarate MD;  Location: [facility] OR;  Service: Orthopedics;  Laterality: Left;    ARTHROSCOPY OF SHOULDER WITH DECOMPRESSION OF SUBACROMIAL SPACE Left 2022    Procedure: ARTHROSCOPY, SHOULDER, WITH SUBACROMIAL SPACE DECOMPRESSION;  Surgeon: Steven Zarate MD;  Location: [facility] OR;  Service: Orthopedics;  Laterality: Left;    bladder suspension      COLONOSCOPY  ~24 years old    Dr. Neri, colon polyps per patient report    COLONOSCOPY N/A 2020    Procedure:  COLONOSCOPY;  Surgeon: Yessi Pal MD;  Location: [facility] ENDO;  Service: Endoscopy;  Laterality: N/A;    DIAGNOSTIC LAPAROSCOPY N/A 1/6/2022    Procedure: LAPAROSCOPY, DIAGNOSTIC;  Surgeon: Jack Sargent MD;  Location: [facility] OR;  Service: OB/GYN;  Laterality: N/A;    dx lap      ESOPHAGOGASTRODUODENOSCOPY N/A 1/22/2020    Procedure: EGD (ESOPHAGOGASTRODUODENOSCOPY);  Surgeon: Yessi Pal MD;  Location: [facility] ENDO;  Service: Endoscopy;  Laterality: N/A;    hammer toe Right 11/19/2015    heavenly toe repeair with callus filing    LAPAROSCOPIC SUSPENSION OF UTEROSACRAL LIGAMENT Bilateral 1/6/2022    Procedure: SUSPENSION, LIGAMENT, UTEROSACRAL, LAPAROSCOPIC;  Surgeon: Jack Sargent MD;  Location: [facility] OR;  Service: OB/GYN;  Laterality: Bilateral;    LAPAROSCOPY  ~2012    done for infertility concern    TONSILLECTOMY         Family History   Problem Relation Name Age of Onset    Thyroid disease Mother          hyperthyroidism    Hypertension Mother      Schizophrenia Father      Hypertension Sister      No Known Problems Sister      Heart disease Maternal Grandmother      No Known Problems Maternal Grandfather      No Known Problems Paternal Grandmother      No Known Problems Paternal Grandfather      Breast cancer Cousin          maternal    Ovarian cancer Neg Hx      Eczema Neg Hx      Lupus Neg Hx      Psoriasis Neg Hx      Melanoma Neg Hx      Cancer Neg Hx      Colon cancer Neg Hx      Crohn's disease Neg Hx      Ulcerative colitis Neg Hx      Stomach cancer Neg Hx      Esophageal cancer Neg Hx         Social History     Socioeconomic History    Marital status: Single   Tobacco Use    Smoking status: Never    Smokeless tobacco: Never   Substance and Sexual Activity    Alcohol use: No    Drug use: No    Sexual activity: Yes     Partners: Male     Comment: OCP helps regulate cycle     Social Drivers of Health     Financial Resource Strain: Low Risk  (9/29/2020)    Overall Financial Resource Strain  "(CARDIA)     Difficulty of Paying Living Expenses: Not hard at all   Food Insecurity: No Food Insecurity (7/23/2019)    Hunger Vital Sign     Worried About Running Out of Food in the Last Year: Never true     Ran Out of Food in the Last Year: Never true   Transportation Needs: No Transportation Needs (7/23/2019)    PRAPARE - Transportation     Lack of Transportation (Medical): No     Lack of Transportation (Non-Medical): No   Physical Activity: Unknown (9/29/2020)    Exercise Vital Sign     Days of Exercise per Week: 2 days       Current Outpatient Medications   Medication Sig Dispense Refill    BD INSULIN SYRINGE MICRO-FINE 1 mL 28 gauge x 1/2" Syrg       BD LUER-BHAVESH SYRINGE 3 mL 23 gauge x 1 1/2" Syrg       BD REGULAR BEVEL NEEDLES 27 gauge x 1/2" Ndle       BD SPECIALTY USE NEEDLES 30 gauge x 1/2" Ndle Inject into the skin.      cholecalciferol, vitamin D3, (VITAMIN D3) 50 mcg (2,000 unit) Tab Take 2,000 Units by mouth once daily.      GONAL-F 1,050 unit SolR Inject into the skin.      leuprolide 1 mg/0.2 mL Kit       levothyroxine (SYNTHROID) 112 MCG tablet Take 1 tablet (112 mcg total) by mouth before breakfast. 90 tablet 3    linaCLOtide (LINZESS) 145 mcg Cap capsule Take 1 capsule (145 mcg total) by mouth before breakfast. 90 capsule 2    NOVAREL 5,000 unit SolR       omeprazole (PRILOSEC) 40 MG capsule TAKE 1 CAPSULE BY MOUTH BEFORE BREAKFAST 90 capsule 3    sertraline (ZOLOFT) 50 MG tablet Take 1 tablet (50 mg total) by mouth once daily.      SHARPS CONTAINER       syringe, disposable, 3 mL Syrg       albuterol (PROVENTIL/VENTOLIN HFA) 90 mcg/actuation inhaler Inhale 1-2 puffs into the lungs every 6 (six) hours as needed. Rescue (Patient not taking: Reported on 9/18/2023) 18 g 1    CITRANATAL HARMONY, IRON FUM, 27 mg iron-1 mg -50 mg-260 mg Cap TAKE 1 CAPSULE BY MOUTH ONCE DAILY. (Patient not taking: Reported on 8/2/2024) 100 capsule 0    estradioL (ESTRACE) 2 MG tablet  (Patient not taking: Reported on " 10/8/2024)      ipratropium (ATROVENT) 42 mcg (0.06 %) nasal spray 2 sprays by Nasal route 3 (three) times daily. (Patient not taking: Reported on 6/18/2024) 15 mL 6    PNV no.74-iron fum-FA-coQ10 (THERANATAL OVAVITE) 18-1-125 mg-mg-unit Cmpk Take 1 tablet by mouth Daily. (Patient not taking: Reported on 10/8/2024) 100 each 3    prenatal vit 87-iron-folic-dha (PRENATE MINI, FERR ASP GLYCIN,) 18-1-350 mg Cap Take 1 tablet by mouth once daily. (Patient not taking: Reported on 10/8/2024) 100 capsule 3    progesterone (PROGESTERONE IN OIL) 50 mg/mL injection Inject 50 mg into the muscle. (Patient not taking: Reported on 10/8/2024)       No current facility-administered medications for this visit.       Review of patient's allergies indicates:  No Known Allergies    Review of System:   General: no chills, fever, night sweats, weight gain or weight loss  Psychological: no depression or suicidal ideation  Breasts: no new or changing breast lumps, nipple discharge or masses.  Respiratory: no cough, shortness of breath, or wheezing  Cardiovascular: no chest pain or dyspnea on exertion  Gastrointestinal: no abdominal pain, change in bowel habits, or black or bloody stools  Genito-Urinary: no incontinence, urinary frequency/urgency or vulvar/vaginal symptoms, pelvic pain or abnormal vaginal bleeding.  Musculoskeletal: no gait disturbance or muscular weakness   VSS  Chest CTA  Abd  soft NT  No guarding or rebound noted  GYN pelvic deferred    I discussed proposed Dx lap with possible salpingectomy Discussed risks and benefits and offered nonsurgical management via MTX although explained potential protracted course of tx. Pt desires to proceed with surgical management  Consents and orders obtained and [facility]    I spent a total of 30 minutes on the day of the visit. This includes face to face time and non-face to face time preparing to see the patient (eg, review of tests), obtaining and/or reviewing  separately obtained history, documenting clinical information in the electronic or other health record, independently interpreting results and communicating results to the patient/family/caregiver, or care coordinator.

## 2024-10-09 ENCOUNTER — TELEPHONE (OUTPATIENT)
Dept: OBSTETRICS AND GYNECOLOGY | Facility: CLINIC | Age: 39
End: 2024-10-09
Payer: COMMERCIAL

## 2024-10-09 NOTE — TELEPHONE ENCOUNTER
----- Message from Katie sent at 10/9/2024  9:55 AM CDT -----  Type: Needs Medical Advice  Who Called:  pt  Best Call Back Number: [phone]  Additional Information: pt is calling the office needing to speak with the nurse urgently in regards to her ectopic pregnancy. Pt has been experiencing bad abdominal pain and saw some pinkish blood when using the RR this morning.  Please call back to advise. Thanks!

## 2024-10-09 NOTE — TELEPHONE ENCOUNTER
Called pt she was having abdominal pain all night last night. She took ibrophen to help the pain subsided last night. She woke up this morning in more pain and she has not eaten. This morning she had blood when she wiped. Pt told me doctor Rosetta recommended going to the ER. She is on her way to [facility] right now.      ----- Message from [employer] sent at 10/9/2024  1:26 PM CDT -----  Type: Needs Medical Advice  Who Called:  pt  Best Call Back Number: [phone]  Additional Information: Symptom: Abdominal Pain During Pregnancy - Under 20 Weeks  Outcome: Instruct patient to Call 911 NOW!  Reason: Severe abdominal pain    The caller rejected this outcome.

## 2024-10-09 NOTE — TELEPHONE ENCOUNTER
----- Message from Terry sent at 10/9/2024 12:24 PM CDT -----  Contact: Self  Type: Needs Medical Advice  Who Called:  Patient    Best Call Back Number: [phone]   Additional Information: Called to speak with office regarding 10/11 Sx. States fertility Dr wants pt to have Sx sooner. Please call.

## 2024-10-11 PROBLEM — O00.90 ECTOPIC PREGNANCY WITHOUT INTRAUTERINE PREGNANCY: Status: ACTIVE | Noted: 2024-10-11

## 2024-10-17 ENCOUNTER — OFFICE VISIT (OUTPATIENT)
Dept: OBSTETRICS AND GYNECOLOGY | Facility: CLINIC | Age: 39
End: 2024-10-17
Payer: COMMERCIAL

## 2024-10-17 ENCOUNTER — LAB VISIT (OUTPATIENT)
Dept: LAB | Facility: HOSPITAL | Age: 39
End: 2024-10-17
Attending: SPECIALIST
Payer: COMMERCIAL

## 2024-10-17 VITALS
WEIGHT: 224.19 LBS | BODY MASS INDEX: 33.11 KG/M2 | DIASTOLIC BLOOD PRESSURE: 84 MMHG | SYSTOLIC BLOOD PRESSURE: 104 MMHG

## 2024-10-17 DIAGNOSIS — O00.90 ECTOPIC PREGNANCY, UNSPECIFIED LOCATION, UNSPECIFIED WHETHER INTRAUTERINE PREGNANCY PRESENT: ICD-10-CM

## 2024-10-17 DIAGNOSIS — O00.90 ECTOPIC PREGNANCY, UNSPECIFIED LOCATION, UNSPECIFIED WHETHER INTRAUTERINE PREGNANCY PRESENT: Primary | ICD-10-CM

## 2024-10-17 LAB — HCG INTACT+B SERPL-ACNC: 82 MIU/ML

## 2024-10-17 PROCEDURE — 3044F HG A1C LEVEL LT 7.0%: CPT | Mod: CPTII,S$GLB,, | Performed by: SPECIALIST

## 2024-10-17 PROCEDURE — 84702 CHORIONIC GONADOTROPIN TEST: CPT | Performed by: SPECIALIST

## 2024-10-17 PROCEDURE — 99999 PR PBB SHADOW E&M-EST. PATIENT-LVL IV: CPT | Mod: PBBFAC,,, | Performed by: SPECIALIST

## 2024-10-17 PROCEDURE — 1159F MED LIST DOCD IN RCRD: CPT | Mod: CPTII,S$GLB,, | Performed by: SPECIALIST

## 2024-10-17 PROCEDURE — 3074F SYST BP LT 130 MM HG: CPT | Mod: CPTII,S$GLB,, | Performed by: SPECIALIST

## 2024-10-17 PROCEDURE — 36415 COLL VENOUS BLD VENIPUNCTURE: CPT | Mod: PN | Performed by: SPECIALIST

## 2024-10-17 PROCEDURE — 99024 POSTOP FOLLOW-UP VISIT: CPT | Mod: S$GLB,,, | Performed by: SPECIALIST

## 2024-10-17 PROCEDURE — 3079F DIAST BP 80-89 MM HG: CPT | Mod: CPTII,S$GLB,, | Performed by: SPECIALIST

## 2024-10-17 NOTE — PROGRESS NOTES
38 yo BF s/p Dx lap with lap left salpingectomy for ectopic pregnancy  pathology has not been recieved and have contacted [facility] awaiting results  Pt doing well and denies pain, f/c, n/v, vaginal bleeding, dysuria, hematuria  Past Medical History:   Diagnosis Date    Anxiety and depression     Constipation     Encounter for blood transfusion     Endometriosis     GERD (gastroesophageal reflux disease)     History of pneumonia     IBS (irritable bowel syndrome)     Neuromuscular disorder     nerve damage of right foot after hammertoe surgery    PCOS (polycystic ovarian syndrome)     Sleep disturbance     Thyroid disease     Vaginal prolapse        Past Surgical History:   Procedure Laterality Date    ARTHROSCOPIC ACROMIOPLASTY OF SHOULDER Left 11/17/2022    Procedure: ACROMIOPLASTY, ARTHROSCOPIC;  Surgeon: Steven Zarate MD;  Location: [facility] OR;  Service: Orthopedics;  Laterality: Left;    ARTHROSCOPIC REPAIR OF ROTATOR CUFF OF SHOULDER Left 11/17/2022    Procedure: REPAIR, ROTATOR CUFF, ARTHROSCOPIC;  Surgeon: Steven Zarate MD;  Location: [facility] OR;  Service: Orthopedics;  Laterality: Left;    ARTHROSCOPIC TENOTOMY OF BICEPS TENDON Left 11/17/2022    Procedure: TENOTOMY, BICEPS, ARTHROSCOPIC;  Surgeon: Steven Zarate MD;  Location: [facility] OR;  Service: Orthopedics;  Laterality: Left;    ARTHROSCOPY OF SHOULDER WITH DECOMPRESSION OF SUBACROMIAL SPACE Left 11/17/2022    Procedure: ARTHROSCOPY, SHOULDER, WITH SUBACROMIAL SPACE DECOMPRESSION;  Surgeon: Steven Zarate MD;  Location: [facility] OR;  Service: Orthopedics;  Laterality: Left;    bladder suspension      COLONOSCOPY  ~24 years old    Dr. Neri, colon polyps per patient report    COLONOSCOPY N/A 08/21/2020    Procedure: COLONOSCOPY;  Surgeon: Yessi Pal MD;  Location: [facility];  Service: Endoscopy;  Laterality: N/A;    DIAGNOSTIC LAPAROSCOPY N/A 01/06/2022    Procedure: LAPAROSCOPY, DIAGNOSTIC;  Surgeon: Jack Sargent MD;  Location: [facility] OR;   Service: OB/GYN;  Laterality: N/A;    DIAGNOSTIC LAPAROSCOPY N/A 10/11/2024    Procedure: LAPAROSCOPY, DIAGNOSTIC;  Surgeon: Jack Sargent MD;  [facility];  Service: OB/GYN;  Laterality: N/A;    dx lap      ESOPHAGOGASTRODUODENOSCOPY N/A 01/22/2020    Procedure: EGD (ESOPHAGOGASTRODUODENOSCOPY);  Surgeon: Yessi Pal MD;  Location: [facility];  Service: Endoscopy;  Laterality: N/A;    hammer toe Right 11/19/2015    heavenly toe repeair with callus filing    LAPAROSCOPIC SALPINGECTOMY Left 10/11/2024    Procedure: SALPINGECTOMY, LAPAROSCOPIC;  Surgeon: Jack Sargent MD;  Location: [facility];  Service: OB/GYN;  Laterality: Left;    LAPAROSCOPIC SUSPENSION OF UTEROSACRAL LIGAMENT Bilateral 01/06/2022    Procedure: SUSPENSION, LIGAMENT, UTEROSACRAL, LAPAROSCOPIC;  Surgeon: Jack Sargent MD;  Location: [facility];  Service: OB/GYN;  Laterality: Bilateral;    LAPAROSCOPIC TREATMENT OF ECTOPIC PREGNANCY Left 10/11/2024    Procedure: REMOVAL, ECTOPIC PREGNANCY, LAPAROSCOPIC;  Surgeon: Jack Sargent MD;  Location: [facility];  Service: OB/GYN;  Laterality: Left;    LAPAROSCOPY  ~2012    done for infertility concern    TONSILLECTOMY         Family History   Problem Relation Name Age of Onset    Thyroid disease Mother          hyperthyroidism    Hypertension Mother      Schizophrenia Father      Hypertension Sister      No Known Problems Sister      Heart disease Maternal Grandmother      No Known Problems Maternal Grandfather      No Known Problems Paternal Grandmother      No Known Problems Paternal Grandfather      Breast cancer Cousin          maternal    Ovarian cancer Neg Hx      Eczema Neg Hx      Lupus Neg Hx      Psoriasis Neg Hx      Melanoma Neg Hx      Cancer Neg Hx      Colon cancer Neg Hx      Crohn's disease Neg Hx      Ulcerative colitis Neg Hx      Stomach cancer Neg Hx      Esophageal cancer Neg Hx         Social History     Socioeconomic History    Marital status: Single   Tobacco Use  "   Smoking status: Never    Smokeless tobacco: Never   Substance and Sexual Activity    Alcohol use: No    Drug use: No    Sexual activity: Yes     Partners: Male     Comment: OCP helps regulate cycle     Social Drivers of Health     Financial Resource Strain: Low Risk  (9/29/2020)    Overall Financial Resource Strain (CARDIA)     Difficulty of Paying Living Expenses: Not hard at all   Food Insecurity: No Food Insecurity (7/23/2019)    Hunger Vital Sign     Worried About Running Out of Food in the Last Year: Never true     Ran Out of Food in the Last Year: Never true   Transportation Needs: No Transportation Needs (7/23/2019)    PRAPARE - Transportation     Lack of Transportation (Medical): No     Lack of Transportation (Non-Medical): No   Physical Activity: Unknown (9/29/2020)    Exercise Vital Sign     Days of Exercise per Week: 2 days       Current Outpatient Medications   Medication Sig Dispense Refill    BD INSULIN SYRINGE MICRO-FINE 1 mL 28 gauge x 1/2" Syrg       BD LUER-BHAVESH SYRINGE 3 mL 23 gauge x 1 1/2" Syrg       BD REGULAR BEVEL NEEDLES 27 gauge x 1/2" Ndle       BD SPECIALTY USE NEEDLES 30 gauge x 1/2" Ndle Inject into the skin.      cholecalciferol, vitamin D3, (VITAMIN D3) 50 mcg (2,000 unit) Tab Take 2,000 Units by mouth once daily.      ferrous sulfate (FEOSOL) Tab tablet Take 1 tablet by mouth every evening.      levothyroxine (SYNTHROID) 112 MCG tablet Take 1 tablet (112 mcg total) by mouth before breakfast. 90 tablet 3    linaCLOtide (LINZESS) 145 mcg Cap capsule Take 1 capsule (145 mcg total) by mouth before breakfast. 90 capsule 2    omeprazole (PRILOSEC) 40 MG capsule TAKE 1 CAPSULE BY MOUTH BEFORE BREAKFAST 90 capsule 3    SHARPS CONTAINER       syringe, disposable, 3 mL Syrg       UNABLE TO FIND Take 2 tablets by mouth every evening. [employer] Made Prenatal Vitamin      GONAL-F 1,050 unit SolR Inject into the skin. (Patient not taking: Reported on 10/17/2024)      leuprolide 1 mg/0.2 mL Kit  " (Patient not taking: Reported on 10/17/2024)      NOVAREL 5,000 unit SolR  (Patient not taking: Reported on 10/17/2024)      oxyCODONE-acetaminophen (PERCOCET) 5-325 mg per tablet Take 1 tablet by mouth every 4 (four) hours as needed for Pain. (Patient not taking: Reported on 10/17/2024) 20 tablet 0    progesterone (PROGESTERONE IN OIL) 50 mg/mL injection Inject 50 mg into the muscle. (Patient not taking: Reported on 10/17/2024)      sertraline (ZOLOFT) 50 MG tablet Take 1 tablet (50 mg total) by mouth once daily. (Patient not taking: Reported on 10/17/2024)       No current facility-administered medications for this visit.       Review of patient's allergies indicates:  No Known Allergies    Review of System:   General: no chills, fever, night sweats, weight gain or weight loss  Psychological: no depression or suicidal ideation  Breasts: no new or changing breast lumps, nipple discharge or masses.  Respiratory: no cough, shortness of breath, or wheezing  Cardiovascular: no chest pain or dyspnea on exertion  Gastrointestinal: no abdominal pain, change in bowel habits, or black or bloody stools  Genito-Urinary: no incontinence, urinary frequency/urgency or vulvar/vaginal symptoms, pelvic pain or abnormal vaginal bleeding.  Musculoskeletal: no gait disturbance or muscular weakness     VSS  Appears well  Abd  soft Incisions well approx healing well no d/c or crepitance    Discussed awaiting pathology and will obtain BHCG and follow closley to completion  Answered all questions    I spent a total of 30 minutes on the day of the visit. This includes face to face time and non-face to face time preparing to see the patient (eg, review of tests), obtaining and/or reviewing separately obtained history, documenting clinical information in the electronic or other health record, independently interpreting results and communicating results to the patient/family/caregiver, or care coordinator.

## 2024-10-31 ENCOUNTER — TELEPHONE (OUTPATIENT)
Dept: PSYCHIATRY | Facility: CLINIC | Age: 39
End: 2024-10-31
Payer: COMMERCIAL

## 2024-11-01 ENCOUNTER — OFFICE VISIT (OUTPATIENT)
Dept: PSYCHIATRY | Facility: CLINIC | Age: 39
End: 2024-11-01
Payer: COMMERCIAL

## 2024-11-01 VITALS
WEIGHT: 221.88 LBS | HEIGHT: 69 IN | HEART RATE: 74 BPM | SYSTOLIC BLOOD PRESSURE: 111 MMHG | DIASTOLIC BLOOD PRESSURE: 77 MMHG | BODY MASS INDEX: 32.86 KG/M2

## 2024-11-01 DIAGNOSIS — F43.10 PTSD (POST-TRAUMATIC STRESS DISORDER): ICD-10-CM

## 2024-11-01 DIAGNOSIS — F41.1 GAD (GENERALIZED ANXIETY DISORDER): ICD-10-CM

## 2024-11-01 DIAGNOSIS — F33.40 RECURRENT MAJOR DEPRESSIVE DISORDER, IN REMISSION: Primary | ICD-10-CM

## 2024-11-01 PROCEDURE — 1160F RVW MEDS BY RX/DR IN RCRD: CPT | Mod: CPTII,S$GLB,,

## 2024-11-01 PROCEDURE — 3078F DIAST BP <80 MM HG: CPT | Mod: CPTII,S$GLB,,

## 2024-11-01 PROCEDURE — 1159F MED LIST DOCD IN RCRD: CPT | Mod: CPTII,S$GLB,,

## 2024-11-01 PROCEDURE — 99999 PR PBB SHADOW E&M-EST. PATIENT-LVL IV: CPT | Mod: PBBFAC,,,

## 2024-11-01 PROCEDURE — G2211 COMPLEX E/M VISIT ADD ON: HCPCS | Mod: S$GLB,,,

## 2024-11-01 PROCEDURE — 99215 OFFICE O/P EST HI 40 MIN: CPT | Mod: S$GLB,,,

## 2024-11-01 PROCEDURE — 3044F HG A1C LEVEL LT 7.0%: CPT | Mod: CPTII,S$GLB,,

## 2024-11-01 PROCEDURE — 3008F BODY MASS INDEX DOCD: CPT | Mod: CPTII,S$GLB,,

## 2024-11-01 PROCEDURE — 3074F SYST BP LT 130 MM HG: CPT | Mod: CPTII,S$GLB,,

## 2024-11-01 RX ORDER — SERTRALINE HYDROCHLORIDE 50 MG/1
50 TABLET, FILM COATED ORAL DAILY
Qty: 90 TABLET | Refills: 1 | Status: SHIPPED | OUTPATIENT
Start: 2024-11-01
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
## 2024-12-02 ENCOUNTER — OFFICE VISIT (OUTPATIENT)
Dept: ENDOCRINOLOGY | Facility: CLINIC | Age: 39
End: 2024-12-02
Payer: COMMERCIAL

## 2024-12-02 ENCOUNTER — LAB VISIT (OUTPATIENT)
Dept: LAB | Facility: HOSPITAL | Age: 39
End: 2024-12-02
Attending: PHYSICIAN ASSISTANT
Payer: COMMERCIAL

## 2024-12-02 VITALS
BODY MASS INDEX: 32.69 KG/M2 | OXYGEN SATURATION: 96 % | SYSTOLIC BLOOD PRESSURE: 110 MMHG | HEIGHT: 69 IN | DIASTOLIC BLOOD PRESSURE: 80 MMHG | WEIGHT: 220.69 LBS | TEMPERATURE: 99 F | HEART RATE: 92 BPM

## 2024-12-02 DIAGNOSIS — E66.811 OBESITY (BMI 30.0-34.9): ICD-10-CM

## 2024-12-02 DIAGNOSIS — E61.1 IRON DEFICIENCY: ICD-10-CM

## 2024-12-02 DIAGNOSIS — E06.3 HYPOTHYROIDISM DUE TO HASHIMOTO'S THYROIDITIS: Primary | ICD-10-CM

## 2024-12-02 DIAGNOSIS — E06.3 HYPOTHYROIDISM DUE TO HASHIMOTO'S THYROIDITIS: ICD-10-CM

## 2024-12-02 DIAGNOSIS — L65.9 HAIR LOSS: ICD-10-CM

## 2024-12-02 LAB
T4 FREE SERPL-MCNC: 0.96 NG/DL (ref 0.71–1.51)
TSH SERPL DL<=0.005 MIU/L-ACNC: 1.51 UIU/ML (ref 0.4–4)

## 2024-12-02 PROCEDURE — 3079F DIAST BP 80-89 MM HG: CPT | Mod: CPTII,S$GLB,, | Performed by: PHYSICIAN ASSISTANT

## 2024-12-02 PROCEDURE — 84439 ASSAY OF FREE THYROXINE: CPT | Performed by: PHYSICIAN ASSISTANT

## 2024-12-02 PROCEDURE — 3044F HG A1C LEVEL LT 7.0%: CPT | Mod: CPTII,S$GLB,, | Performed by: PHYSICIAN ASSISTANT

## 2024-12-02 PROCEDURE — 84443 ASSAY THYROID STIM HORMONE: CPT | Performed by: PHYSICIAN ASSISTANT

## 2024-12-02 PROCEDURE — 99213 OFFICE O/P EST LOW 20 MIN: CPT | Mod: S$GLB,,, | Performed by: PHYSICIAN ASSISTANT

## 2024-12-02 PROCEDURE — 99999 PR PBB SHADOW E&M-EST. PATIENT-LVL IV: CPT | Mod: PBBFAC,,, | Performed by: PHYSICIAN ASSISTANT

## 2024-12-02 PROCEDURE — 36415 COLL VENOUS BLD VENIPUNCTURE: CPT | Mod: PO | Performed by: PHYSICIAN ASSISTANT

## 2024-12-02 PROCEDURE — 1159F MED LIST DOCD IN RCRD: CPT | Mod: CPTII,S$GLB,, | Performed by: PHYSICIAN ASSISTANT

## 2024-12-02 PROCEDURE — 3008F BODY MASS INDEX DOCD: CPT | Mod: CPTII,S$GLB,, | Performed by: PHYSICIAN ASSISTANT

## 2024-12-02 PROCEDURE — 1160F RVW MEDS BY RX/DR IN RCRD: CPT | Mod: CPTII,S$GLB,, | Performed by: PHYSICIAN ASSISTANT

## 2024-12-02 PROCEDURE — 3074F SYST BP LT 130 MM HG: CPT | Mod: CPTII,S$GLB,, | Performed by: PHYSICIAN ASSISTANT

## 2024-12-02 NOTE — PROGRESS NOTES
CC: Hypothyroidism    HPI: Emily Pelayo is a 39 y.o. female here for hypothyroidism along with pending conditions listed in the Visit Diagnosis. Diagnosed in 2011. Pt had two miscarriages since last visit. She had an ectopic pregnancy. She is doing IVF.     +FHx of thyroid disease in her mother.   No hx of neck radiation.      NM uptake and scan in 2010 was consistent w/ Graves.    Pt has never had to take Methimazole. +TPO. -TSI    Pt is trying to get pregnant by IUI. Referred to derm last visit but apt was cancelled.    She had should surgery in 11/22.    Taking 112 mcg qd.  + fatigue, cold intolerance, hair loss (alopecia), wt loss.   No diarrhea, sweating, palpitations.   Cycles are regular.    No easy bruising, muscle weakness. No facial hair or deeping of voice.    Thyroid u/s 6/22 wnl.    Pt previously took Metformin for wt loss.      Works at [employer].     Dex suppression was elevated. Urine cortisol was normal.  No easy bruising or muscle weakness.     PMHx, PSHx: reviewed in epic.  Social Hx: no ETOH/tobacco use. Works nights/days. She has one daughter born ten years ago by IUI. No complications.     Wt Readings from Last 15 Encounters:   12/02/24 100.1 kg (220 lb 10.9 oz)   10/17/24 101.7 kg (224 lb 3.3 oz)   10/11/24 99.2 kg (218 lb 11.1 oz)   10/09/24 99 kg (218 lb 4.1 oz)   10/08/24 99 kg (218 lb 5.9 oz)   07/08/24 99.1 kg (218 lb 7.6 oz)   04/15/24 100.5 kg (221 lb 9 oz)   10/18/23 99.8 kg (220 lb 0.3 oz)   09/18/23 99.3 kg (219 lb)   09/08/23 99.7 kg (219 lb 12.8 oz)   06/29/23 98.7 kg (217 lb 9.5 oz)   04/04/23 95.3 kg (210 lb)   02/27/23 95.3 kg (210 lb)   02/23/23 95.3 kg (210 lb 1.6 oz)   02/10/23 94.3 kg (208 lb)      ROS:   Constitutional: + wt gain 3 lbs  Eyes: No recent visual changes  Cardiovascular: Denies current anginal symptoms  Respiratory: Denies current respiratory difficulty  Gastrointestinal: Denies recent bowel disturbances  GenitoUrinary - No dysuria  Skin: No new skin  "rash  Neurologic: No focal neurologic complaints  Musculoskeletal: no joint pain  Endocrine: no polyphagia, polydipsia or polyuria  Remainder ROS negative     /80 (BP Location: Right arm, Patient Position: Sitting)   Pulse 92   Temp 98.6 °F (37 °C) (Oral)   Ht 5' 9" (1.753 m)   Wt 100.1 kg (220 lb 10.9 oz)   SpO2 96%   BMI 32.59 kg/m²      Personally reviewed labs below:    Lab Results   Component Value Date    TSH 2.203 04/12/2024    E2EHVHR 92 03/23/2016    U5IAJLJ 11.2 09/25/2017    FREET4 1.06 04/12/2024        Chemistry        Component Value Date/Time     10/09/2024 1436    K 3.1 (L) 10/09/2024 1436     10/09/2024 1436    CO2 24 10/09/2024 1436    BUN 6 (L) 10/09/2024 1436    CREATININE 0.70 10/09/2024 1436    GLU 84 10/09/2024 1436        Component Value Date/Time    CALCIUM 8.5 10/09/2024 1436    ALKPHOS 52 10/09/2024 1436    AST 25 10/09/2024 1436    ALT 17 10/09/2024 1436    BILITOT 0.7 10/09/2024 1436    ESTGFRAFRICA >60 06/11/2022 0752    EGFRNONAA >60 06/11/2022 0752         Lab Results   Component Value Date    HGBA1C 4.6 04/12/2024    HGBA1C 4.5 09/08/2023    HGBA1C 4.6 06/11/2022      PE:  GENERAL: middle aged female, well developed, well nourished  NEURO: steady gait, CN ll-Xll grossly intact  PSYCH: normal mood and affect    Assessment/Plan:   1. Hypothyroidism due to Hashimoto's thyroiditis  TSH    T4, Free    TSH    T4, Free      2. Iron deficiency  Iron and TIBC      3. Obesity (BMI 30.0-34.9)        4. Hair loss          Hypothyroidism-TSH is wnl. Continue Levothyroxine to 112 mcg daily. Notify clinic when pregnant.   Iron deficiency-iron has been stable.  Obesity-Body mass index is 32.59 kg/m². Increase exercise to 30 min daily. Will check glucose.  Hair loss-pt will f/u w/ derm.     FOLLOWUP  TSH, T4  F/u in 6 mths -tsh, t4, iron          "

## 2024-12-04 DIAGNOSIS — K58.1 IRRITABLE BOWEL SYNDROME WITH CONSTIPATION: ICD-10-CM

## 2024-12-10 ENCOUNTER — E-VISIT (OUTPATIENT)
Dept: OBSTETRICS AND GYNECOLOGY | Facility: CLINIC | Age: 39
End: 2024-12-10
Payer: COMMERCIAL

## 2024-12-10 DIAGNOSIS — R10.2 PELVIC PAIN: Primary | ICD-10-CM

## 2024-12-12 ENCOUNTER — OFFICE VISIT (OUTPATIENT)
Dept: OBSTETRICS AND GYNECOLOGY | Facility: CLINIC | Age: 39
End: 2024-12-12
Payer: COMMERCIAL

## 2024-12-12 VITALS — BODY MASS INDEX: 32.78 KG/M2 | WEIGHT: 222 LBS | DIASTOLIC BLOOD PRESSURE: 90 MMHG | SYSTOLIC BLOOD PRESSURE: 128 MMHG

## 2024-12-12 DIAGNOSIS — N94.10 DYSPAREUNIA, FEMALE: ICD-10-CM

## 2024-12-12 DIAGNOSIS — N81.4 UTERINE PROLAPSE: ICD-10-CM

## 2024-12-12 DIAGNOSIS — Z12.4 ENCOUNTER FOR PAP SMEAR OF CERVIX WITH HPV DNA COTESTING: ICD-10-CM

## 2024-12-12 DIAGNOSIS — R10.2 PELVIC PAIN: Primary | ICD-10-CM

## 2024-12-12 PROCEDURE — 99999 PR PBB SHADOW E&M-EST. PATIENT-LVL III: CPT | Mod: PBBFAC,,, | Performed by: SPECIALIST

## 2024-12-12 NOTE — PROGRESS NOTES
40 yo BF , longstanding history secondary infertility as well as POP pt recently completed ART ending in ectopic pregnancy and underwent surgical; excision pt continues to suffer from cervicouterine prolapse, dysmenorhea and dyspareunia and recently had failed ART cycle pt no longer wishes to pursue conception and desires postponed surgery  Pt denies ZULEIKA, dysuria, hematuria, c/d, weight loss/gain  Past Medical History:   Diagnosis Date    Anxiety and depression     Constipation     Encounter for blood transfusion     Endometriosis     GERD (gastroesophageal reflux disease)     History of pneumonia     IBS (irritable bowel syndrome)     Neuromuscular disorder     nerve damage of right foot after hammertoe surgery    PCOS (polycystic ovarian syndrome)     Sleep disturbance     Thyroid disease     Vaginal prolapse        Past Surgical History:   Procedure Laterality Date    ARTHROSCOPIC ACROMIOPLASTY OF SHOULDER Left 11/17/2022    Procedure: ACROMIOPLASTY, ARTHROSCOPIC;  Surgeon: Steven Zarate MD;  Location: [facility] OR;  Service: Orthopedics;  Laterality: Left;    ARTHROSCOPIC REPAIR OF ROTATOR CUFF OF SHOULDER Left 11/17/2022    Procedure: REPAIR, ROTATOR CUFF, ARTHROSCOPIC;  Surgeon: Steven Zarate MD;  Location: [facility] OR;  Service: Orthopedics;  Laterality: Left;    ARTHROSCOPIC TENOTOMY OF BICEPS TENDON Left 11/17/2022    Procedure: TENOTOMY, BICEPS, ARTHROSCOPIC;  Surgeon: Steven Zarate MD;  Location: [facility] OR;  Service: Orthopedics;  Laterality: Left;    ARTHROSCOPY OF SHOULDER WITH DECOMPRESSION OF SUBACROMIAL SPACE Left 11/17/2022    Procedure: ARTHROSCOPY, SHOULDER, WITH SUBACROMIAL SPACE DECOMPRESSION;  Surgeon: Steven Zarate MD;  Location: [facility] OR;  Service: Orthopedics;  Laterality: Left;    bladder suspension      COLONOSCOPY  ~24 years old    Dr. Neri, colon polyps per patient report    COLONOSCOPY N/A 08/21/2020    Procedure: COLONOSCOPY;  Surgeon: Yessi Pal MD;  Location: [facility]  ENDO;  Service: Endoscopy;  Laterality: N/A;    DIAGNOSTIC LAPAROSCOPY N/A 01/06/2022    Procedure: LAPAROSCOPY, DIAGNOSTIC;  Surgeon: Jack Sargent MD;  Location: [facility];  Service: OB/GYN;  Laterality: N/A;    DIAGNOSTIC LAPAROSCOPY N/A 10/11/2024    Procedure: LAPAROSCOPY, DIAGNOSTIC;  Surgeon: Jack Sargent MD;  Location: [facility];  Service: OB/GYN;  Laterality: N/A;    dx lap      ESOPHAGOGASTRODUODENOSCOPY N/A 01/22/2020    Procedure: EGD (ESOPHAGOGASTRODUODENOSCOPY);  Surgeon: Yessi Pal MD;  Location: [facility] ENDO;  Service: Endoscopy;  Laterality: N/A;    hammer toe Right 11/19/2015    heavenly toe repeair with callus filing    LAPAROSCOPIC SALPINGECTOMY Left 10/11/2024    Procedure: SALPINGECTOMY, LAPAROSCOPIC;  Surgeon: Jack Sargent MD;  Location: [facility];  Service: OB/GYN;  Laterality: Left;    LAPAROSCOPIC SUSPENSION OF UTEROSACRAL LIGAMENT Bilateral 01/06/2022    Procedure: SUSPENSION, LIGAMENT, UTEROSACRAL, LAPAROSCOPIC;  Surgeon: Jack Sargent MD;  Location: [facility];  Service: OB/GYN;  Laterality: Bilateral;    LAPAROSCOPIC TREATMENT OF ECTOPIC PREGNANCY Left 10/11/2024    Procedure: REMOVAL, ECTOPIC PREGNANCY, LAPAROSCOPIC;  Surgeon: Jack Sargent MD;  Location: [facility];  Service: OB/GYN;  Laterality: Left;    LAPAROSCOPY  ~2012    done for infertility concern    TONSILLECTOMY         Family History   Problem Relation Name Age of Onset    Thyroid disease Mother          hyperthyroidism    Hypertension Mother      Schizophrenia Father      Hypertension Sister      No Known Problems Sister      Heart disease Maternal Grandmother      No Known Problems Maternal Grandfather      No Known Problems Paternal Grandmother      No Known Problems Paternal Grandfather      Breast cancer Cousin          maternal    Ovarian cancer Neg Hx      Eczema Neg Hx      Lupus Neg Hx      Psoriasis Neg Hx      Melanoma Neg Hx      Cancer Neg Hx      Colon cancer Neg Hx      Crohn's disease Neg  "Hx      Ulcerative colitis Neg Hx      Stomach cancer Neg Hx      Esophageal cancer Neg Hx         Social History     Socioeconomic History    Marital status: Single   Tobacco Use    Smoking status: Never    Smokeless tobacco: Never   Substance and Sexual Activity    Alcohol use: No    Drug use: No    Sexual activity: Yes     Partners: Male     Comment: OCP helps regulate cycle     Social Drivers of Health     Financial Resource Strain: Low Risk  (9/29/2020)    Overall Financial Resource Strain (CARDIA)     Difficulty of Paying Living Expenses: Not hard at all   Food Insecurity: No Food Insecurity (7/23/2019)    Hunger Vital Sign     Worried About Running Out of Food in the Last Year: Never true     Ran Out of Food in the Last Year: Never true   Transportation Needs: No Transportation Needs (7/23/2019)    PRAPARE - Transportation     Lack of Transportation (Medical): No     Lack of Transportation (Non-Medical): No   Physical Activity: Unknown (9/29/2020)    Exercise Vital Sign     Days of Exercise per Week: 2 days       Current Outpatient Medications   Medication Sig Dispense Refill    BD INSULIN SYRINGE MICRO-FINE 1 mL 28 gauge x 1/2" Syrg       BD LUER-BHAVESH SYRINGE 3 mL 23 gauge x 1 1/2" Syrg       BD REGULAR BEVEL NEEDLES 27 gauge x 1/2" Ndle       BD SPECIALTY USE NEEDLES 30 gauge x 1/2" Ndle Inject into the skin.      cholecalciferol, vitamin D3, (VITAMIN D3) 50 mcg (2,000 unit) Tab Take 2,000 Units by mouth once daily.      ferrous sulfate (FEOSOL) Tab tablet Take 1 tablet by mouth every evening.      GONAL-F 1,050 unit SolR Inject into the skin.      leuprolide 1 mg/0.2 mL Kit       levothyroxine (SYNTHROID) 112 MCG tablet Take 1 tablet (112 mcg total) by mouth before breakfast. 90 tablet 3    linaCLOtide (LINZESS) 145 mcg Cap capsule Take 1 capsule (145 mcg total) by mouth before breakfast. 90 capsule 2    NOVAREL 5,000 unit SolR       omeprazole (PRILOSEC) 40 MG capsule TAKE 1 CAPSULE BY MOUTH BEFORE " BREAKFAST 90 capsule 3    oxyCODONE-acetaminophen (PERCOCET) 5-325 mg per tablet Take 1 tablet by mouth every 4 (four) hours as needed for Pain. 20 tablet 0    progesterone (PROGESTERONE IN OIL) 50 mg/mL injection Inject 50 mg into the muscle.      sertraline (ZOLOFT) 50 MG tablet Take 1 tablet (50 mg total) by mouth once daily. 90 tablet 1    SHARPS CONTAINER       syringe, disposable, 3 mL Syrg       UNABLE TO FIND Take 2 tablets by mouth every evening. Nature Made Prenatal Vitamin       No current facility-administered medications for this visit.       Review of patient's allergies indicates:  No Known Allergies    Review of System:   General: no chills, fever, night sweats, weight gain or weight loss  Psychological: no depression or suicidal ideation  Breasts: no new or changing breast lumps, nipple discharge or masses.  Respiratory: no cough, shortness of breath, or wheezing  Cardiovascular: no chest pain or dyspnea on exertion  Gastrointestinal: no abdominal pain, change in bowel habits, or black or bloody stools  Genito-Urinary:as above  Musculoskeletal: no gait disturbance or muscular weakness                                               General Appearance    A and O x 4, Cooperative, no distress   Breasts    Abdomen   deferred  Soft, non-tender, bowel sounds active all four quadrants,  no masses, no organomegaly    Genitourinary:   External rectal exam shows no thrombosed external hemorrhoids.   Pelvic exam was performed with patient supine.  No labial fusion.  There is no rash, lesion or injury on the right labia.  There is no rash, lesion or injury on the left labia.  No bleeding and no signs of injury around the vaginal introitus, urethra is without lesions and well supported. The cervix is visualized with no discharge, lesions or friability.  No vaginal discharge found.  No significant Cystocele, Enterocele or rectocele, and NOTED IS GRADE 2 CERVICAO UTERINE PROLAPSE WITH +3 DESCENT ON  VALSALVA  Bimanual exam:  The urethra is normal to palpation and there are no palpable vaginal wall masses.  Uterus is not deviated, not enlarged, not fixed, normal shape and not tender.  Cervix exhibits no motion tenderness.   Right adnexum displays no mass and no tenderness.  Left adnexum displays no mass and no tenderness.   Extremities: Extremities normal, atraumatic, no cyanosis or edema                     NOTE  NURSING PERSONAL PRESENT FOR ENTIRE PHYSICAL EXAM     I discussed POP and discussed TLH BS  I explained and do not feel ant/post repair nor sling is indicated  I discussed and rec expectanet management and revisit in jan and if pt still desires to proceed, Will schedule TLH    I spent a total of 30 minutes on the day of the visit. This includes face to face time and non-face to face time preparing to see the patient (eg, review of tests), obtaining and/or reviewing separately obtained history, documenting clinical information in the electronic or other health record, independently interpreting results and communicating results to the patient/family/caregiver, or care coordinator.

## 2024-12-22 DIAGNOSIS — R10.84 GENERALIZED ABDOMINAL PAIN: ICD-10-CM

## 2024-12-22 DIAGNOSIS — R12 HEARTBURN: ICD-10-CM

## 2024-12-23 RX ORDER — OMEPRAZOLE 40 MG/1
40 CAPSULE, DELAYED RELEASE ORAL
Qty: 90 CAPSULE | Refills: 3 | Status: SHIPPED | OUTPATIENT
Start: 2024-12-23

## 2025-02-04 ENCOUNTER — OFFICE VISIT (OUTPATIENT)
Dept: PSYCHIATRY | Facility: CLINIC | Age: 40
End: 2025-02-04
Payer: COMMERCIAL

## 2025-02-04 VITALS
WEIGHT: 227.19 LBS | HEIGHT: 69 IN | HEART RATE: 85 BPM | BODY MASS INDEX: 33.65 KG/M2 | DIASTOLIC BLOOD PRESSURE: 86 MMHG | SYSTOLIC BLOOD PRESSURE: 122 MMHG

## 2025-02-04 DIAGNOSIS — F33.40 RECURRENT MAJOR DEPRESSIVE DISORDER, IN REMISSION: Primary | ICD-10-CM

## 2025-02-04 DIAGNOSIS — F41.1 GAD (GENERALIZED ANXIETY DISORDER): ICD-10-CM

## 2025-02-04 DIAGNOSIS — F43.10 PTSD (POST-TRAUMATIC STRESS DISORDER): ICD-10-CM

## 2025-02-04 PROCEDURE — 99999 PR PBB SHADOW E&M-EST. PATIENT-LVL IV: CPT | Mod: PBBFAC,,,

## 2025-02-04 PROCEDURE — 3008F BODY MASS INDEX DOCD: CPT | Mod: CPTII,S$GLB,,

## 2025-02-04 PROCEDURE — G2211 COMPLEX E/M VISIT ADD ON: HCPCS | Mod: S$GLB,,,

## 2025-02-04 PROCEDURE — 1159F MED LIST DOCD IN RCRD: CPT | Mod: CPTII,S$GLB,,

## 2025-02-04 PROCEDURE — 3079F DIAST BP 80-89 MM HG: CPT | Mod: CPTII,S$GLB,,

## 2025-02-04 PROCEDURE — 1160F RVW MEDS BY RX/DR IN RCRD: CPT | Mod: CPTII,S$GLB,,

## 2025-02-04 PROCEDURE — 3074F SYST BP LT 130 MM HG: CPT | Mod: CPTII,S$GLB,,

## 2025-02-04 PROCEDURE — 99215 OFFICE O/P EST HI 40 MIN: CPT | Mod: S$GLB,,,
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
## 2025-04-15 DIAGNOSIS — E06.3 HYPOTHYROIDISM DUE TO HASHIMOTO'S THYROIDITIS: ICD-10-CM

## 2025-04-15 RX ORDER — LEVOTHYROXINE SODIUM 112 UG/1
112 TABLET ORAL
Qty: 90 TABLET | Refills: 3 | Status: SHIPPED | OUTPATIENT
Start: 2025-04-15

## 2025-04-28 ENCOUNTER — OFFICE VISIT (OUTPATIENT)
Dept: OBSTETRICS AND GYNECOLOGY | Facility: CLINIC | Age: 40
End: 2025-04-28
Payer: COMMERCIAL

## 2025-04-28 VITALS
DIASTOLIC BLOOD PRESSURE: 84 MMHG | BODY MASS INDEX: 34.09 KG/M2 | WEIGHT: 230.81 LBS | SYSTOLIC BLOOD PRESSURE: 128 MMHG

## 2025-04-28 DIAGNOSIS — N91.5 OLIGOMENORRHEA, UNSPECIFIED TYPE: Primary | ICD-10-CM

## 2025-04-28 DIAGNOSIS — R10.2 PELVIC PAIN: ICD-10-CM

## 2025-04-28 DIAGNOSIS — N81.4 UTERINE PROLAPSE: ICD-10-CM

## 2025-04-28 DIAGNOSIS — N81.6 RECTOCELE: ICD-10-CM

## 2025-04-28 PROCEDURE — 99999 PR PBB SHADOW E&M-EST. PATIENT-LVL IV: CPT | Mod: PBBFAC,,, | Performed by: SPECIALIST

## 2025-04-28 NOTE — PROGRESS NOTES
38 yo BF  history POP and rectocele presents with complaint worsening vaginal and pelvic fullnes discomfort  Describes vaginal bulge and difficulty with tampon insertion. In addition, complain irreg menses with skipped period 1-2 monthsDenies ZULEIKA, dyuria, hematuria, d/c, weight loss /gain  Pos abd pelvic distension  Past Medical History:   Diagnosis Date    Anxiety and depression     Constipation     Encounter for blood transfusion     Endometriosis     GERD (gastroesophageal reflux disease)     History of pneumonia     IBS (irritable bowel syndrome)     Neuromuscular disorder     nerve damage of right foot after hammertoe surgery    PCOS (polycystic ovarian syndrome)     Sleep disturbance     Thyroid disease     Vaginal prolapse        Past Surgical History:   Procedure Laterality Date    ARTHROSCOPIC ACROMIOPLASTY OF SHOULDER Left 2022    Procedure: ACROMIOPLASTY, ARTHROSCOPIC;  Surgeon: Steven Zarate MD;  Location: [facility] OR;  Service: Orthopedics;  Laterality: Left;    ARTHROSCOPIC REPAIR OF ROTATOR CUFF OF SHOULDER Left 2022    Procedure: REPAIR, ROTATOR CUFF, ARTHROSCOPIC;  Surgeon: Steven Zarate MD;  Location: [facility] OR;  Service: Orthopedics;  Laterality: Left;    ARTHROSCOPIC TENOTOMY OF BICEPS TENDON Left 2022    Procedure: TENOTOMY, BICEPS, ARTHROSCOPIC;  Surgeon: Steven Zarate MD;  Location: [facility] OR;  Service: Orthopedics;  Laterality: Left;    ARTHROSCOPY OF SHOULDER WITH DECOMPRESSION OF SUBACROMIAL SPACE Left 2022    Procedure: ARTHROSCOPY, SHOULDER, WITH SUBACROMIAL SPACE DECOMPRESSION;  Surgeon: Steven Zarate MD;  Location: [facility] OR;  Service: Orthopedics;  Laterality: Left;    bladder suspension      COLONOSCOPY  ~24 years old    Dr. Neri, colon polyps per patient report    COLONOSCOPY N/A 2020    Procedure: COLONOSCOPY;  Surgeon: Yessi Pal MD;  Location: [facility];  Service: Endoscopy;  Laterality: N/A;    DIAGNOSTIC LAPAROSCOPY N/A  01/06/2022    Procedure: LAPAROSCOPY, DIAGNOSTIC;  Surgeon: Jack Sargent MD;  Location: [facility];  Service: OB/GYN;  Laterality: N/A;    DIAGNOSTIC LAPAROSCOPY N/A 10/11/2024    Procedure: LAPAROSCOPY, DIAGNOSTIC;  Surgeon: Jack Sargent MD;  Location: [facility];  Service: OB/GYN;  Laterality: N/A;    dx lap      ESOPHAGOGASTRODUODENOSCOPY N/A 01/22/2020    Procedure: EGD (ESOPHAGOGASTRODUODENOSCOPY);  Surgeon: Yessi Pal MD;  Location: [facility];  Service: Endoscopy;  Laterality: N/A;    hammer toe Right 11/19/2015    heavenly toe repeair with callus filing    LAPAROSCOPIC SALPINGECTOMY Left 10/11/2024    Procedure: SALPINGECTOMY, LAPAROSCOPIC;  Surgeon: Jack Sargent MD;  Location: [facility];  Service: OB/GYN;  Laterality: Left;    LAPAROSCOPIC SUSPENSION OF UTEROSACRAL LIGAMENT Bilateral 01/06/2022    Procedure: SUSPENSION, LIGAMENT, UTEROSACRAL, LAPAROSCOPIC;  Surgeon: Jack Sargent MD;  Location: [facility];  Service: OB/GYN;  Laterality: Bilateral;    LAPAROSCOPIC TREATMENT OF ECTOPIC PREGNANCY Left 10/11/2024    Procedure: REMOVAL, ECTOPIC PREGNANCY, LAPAROSCOPIC;  Surgeon: Jack Sargent MD;  Location: [facility];  Service: OB/GYN;  Laterality: Left;    LAPAROSCOPY  ~2012    done for infertility concern    TONSILLECTOMY         Family History   Problem Relation Name Age of Onset    No Known Problems Paternal Grandfather      No Known Problems Paternal Grandmother      Heart disease Maternal Grandmother      No Known Problems Maternal Grandfather      Schizophrenia Father      Thyroid disease Mother          hyperthyroidism    Hypertension Mother      Hypertension Sister      No Known Problems Sister      Breast cancer Cousin          maternal    Ovarian cancer Neg Hx      Eczema Neg Hx      Lupus Neg Hx      Psoriasis Neg Hx      Melanoma Neg Hx      Cancer Neg Hx      Colon cancer Neg Hx      Crohn's disease Neg Hx      Ulcerative colitis Neg Hx      Stomach cancer Neg Hx      Esophageal  cancer Neg Hx      Uterine cancer Neg Hx         Social History     Socioeconomic History    Marital status: Single   Tobacco Use    Smoking status: Never    Smokeless tobacco: Never   Substance and Sexual Activity    Alcohol use: No    Drug use: No    Sexual activity: Not Currently     Partners: Male     Comment: OCP helps regulate cycle     Social Drivers of Health     Financial Resource Strain: Low Risk  (9/29/2020)    Overall Financial Resource Strain (CARDIA)     Difficulty of Paying Living Expenses: Not hard at all   Food Insecurity: No Food Insecurity (7/23/2019)    Hunger Vital Sign     Worried About Running Out of Food in the Last Year: Never true     Ran Out of Food in the Last Year: Never true   Transportation Needs: No Transportation Needs (7/23/2019)    PRAPARE - Transportation     Lack of Transportation (Medical): No     Lack of Transportation (Non-Medical): No   Physical Activity: Unknown (9/29/2020)    Exercise Vital Sign     Days of Exercise per Week: 2 days       Current Medications[1]    Review of patient's allergies indicates:  No Known Allergies    Review of System:   General: no chills, fever, night sweats, weight gain or weight loss  Psychological: no depression or suicidal ideation  Breasts: no new or changing breast lumps, nipple discharge or masses.  Respiratory: no cough, shortness of breath, or wheezing  Cardiovascular: no chest pain or dyspnea on exertion  Gastrointestinal: no abdominal pain, change in bowel habits, or black or bloody stools  Genito-Urinary: as above  Musculoskeletal: no gait disturbance or muscular weakness                                               General Appearance    A and O x 4, Cooperative, no distress   Breasts    Abdomen   deferred  Soft, non-tender, bowel sounds active all four quadrants,  no masses, no organomegaly    Genitourinary:   External rectal exam shows no thrombosed external hemorrhoids.   Pelvic exam was performed with patient supine.  No labial  fusion.  There is no rash, lesion or injury on the right labia.  There is no rash, lesion or injury on the left labia.  No bleeding and no signs of injury around the vaginal introitus, urethra is without lesions and well supported. The cervix is visualized with no discharge, lesions or friability.  No vaginal discharge found.  No significant Cystocele, GRADE 1 rectocele, and uterus well GRADE 1-2 CERVICO UTERINE PROLAPSE  Bimanual exam:  The urethra is normal to palpation and there are no palpable vaginal wall masses.  Uterus is not deviated, not enlarged, not fixed, normal shape and not tender.  Cervix exhibits no motion tenderness.   Right adnexum displays no mass and no tenderness.  Left adnexum displays no mass and no tenderness.   Extremities: Extremities normal, atraumatic, no cyanosis or edema                     NOTE  NURSING PERSONAL PRESENT FOR ENTIRE PHYSICAL EXAM     I DISCUSSED APPARENT OLIGO MENORRHEA AND LIKELY INTERMITTENT ANOVULATION DISCUSSED POSSIBLE ETIOLOGIES AND WILL FOLLOW THIS  DISCUSED DM PCOS, THYROID DYSFUNCTION, ETC  iN ADDITION, DISCUSSED pop AND SURGICAL TREATMENT OPTIONS if PT IS SUBJECTIVELY AFFECTED BY THE ABOVE  I WILL OBTAIN PCOS PANEL AND PELVIC U/S AND PENDING THE ABOVE, WILL OFFER TX OPTIONS    I spent a total of 50 minutes on the day of the visit. This includes face to face time and non-face to face time preparing to see the patient (eg, review of tests), obtaining and/or reviewing separately obtained history, documenting clinical information in the electronic or other health record, independently interpreting results and communicating results to the patient/family/caregiver, or care coordinator.            [1]   Current Outpatient Medications   Medication Sig Dispense Refill    cholecalciferol, vitamin D3, (VITAMIN D3) 50 mcg (2,000 unit) Tab Take 2,000 Units by mouth once daily.      ferrous sulfate (FEOSOL) Tab tablet Take 1 tablet by mouth every evening.      levothyroxine  "(SYNTHROID) 112 MCG tablet TAKE 1 TABLET BY MOUTH BEFORE BREAKFAST. 90 tablet 3    linaCLOtide (LINZESS) 145 mcg Cap capsule Take 1 capsule (145 mcg total) by mouth before breakfast. 90 capsule 2    omeprazole (PRILOSEC) 40 MG capsule TAKE 1 CAPSULE BY MOUTH BEFORE BREAKFAST 90 capsule 3    sertraline (ZOLOFT) 50 MG tablet Take 1 tablet (50 mg total) by mouth once daily. 90 tablet 1    UNABLE TO FIND Take 2 tablets by mouth every evening. Nature Made Prenatal Vitamin      BD INSULIN SYRINGE MICRO-FINE 1 mL 28 gauge x 1/2" Syrg  (Patient not taking: Reported on 4/28/2025)      BD LUER-BHAVESH SYRINGE 3 mL 23 gauge x 1 1/2" Syrg  (Patient not taking: Reported on 4/28/2025)      BD REGULAR BEVEL NEEDLES 27 gauge x 1/2" Ndle  (Patient not taking: Reported on 4/28/2025)      BD SPECIALTY USE NEEDLES 30 gauge x 1/2" Ndle Inject into the skin. (Patient not taking: Reported on 4/28/2025)      GONAL-F 1,050 unit SolR Inject into the skin. (Patient not taking: Reported on 4/28/2025)      leuprolide 1 mg/0.2 mL Kit  (Patient not taking: Reported on 4/28/2025)      NOVAREL 5,000 unit SolR  (Patient not taking: Reported on 4/28/2025)      oxyCODONE-acetaminophen (PERCOCET) 5-325 mg per tablet Take 1 tablet by mouth every 4 (four) hours as needed for Pain. (Patient not taking: Reported on 4/28/2025) 20 tablet 0    progesterone (PROGESTERONE IN OIL) 50 mg/mL injection Inject 50 mg into the muscle. (Patient not taking: Reported on 4/28/2025)      SHARPS CONTAINER  (Patient not taking: Reported on 4/28/2025)      syringe, disposable, 3 mL Syrg  (Patient not taking: Reported on 4/28/2025)       No current facility-administered medications for this visit.     "

## 2025-04-28 NOTE — PROGRESS NOTES
Subjective:       Patient ID: Emily Pelayo is a 39 y.o. female Body mass index is 33.79 kg/m².    Chief Complaint: Follow-up    This patient is new to me.  Referring Provider: No ref. provider found for constipation on linzess.  Established patient of Dr. Pal.     2 x miscarriage last year     + abdominal bloating  + constipation  + uterine prolapse and endometriosis       Review of Systems   Constitutional:  Positive for activity change and appetite change. Negative for fatigue, fever and unexpected weight change.   HENT:  Negative for sore throat and trouble swallowing.    Respiratory:  Negative for cough and shortness of breath.    Cardiovascular:  Negative for chest pain.   Gastrointestinal:  Positive for abdominal distention, abdominal pain, constipation and nausea. Negative for anal bleeding, blood in stool, diarrhea, rectal pain and vomiting.       Patient's last menstrual period was 02/25/2025 (approximate).  Past Medical History:   Diagnosis Date    Anxiety and depression     Constipation     Encounter for blood transfusion     Endometriosis     GERD (gastroesophageal reflux disease)     History of pneumonia     IBS (irritable bowel syndrome)     Neuromuscular disorder     nerve damage of right foot after hammertoe surgery    PCOS (polycystic ovarian syndrome)     Sleep disturbance     Thyroid disease     Vaginal prolapse      Past Surgical History:   Procedure Laterality Date    ARTHROSCOPIC ACROMIOPLASTY OF SHOULDER Left 11/17/2022    Procedure: ACROMIOPLASTY, ARTHROSCOPIC;  Surgeon: Steven Zarate MD;  Location: [facility] OR;  Service: Orthopedics;  Laterality: Left;    ARTHROSCOPIC REPAIR OF ROTATOR CUFF OF SHOULDER Left 11/17/2022    Procedure: REPAIR, ROTATOR CUFF, ARTHROSCOPIC;  Surgeon: Steven Zarate MD;  Location: [facility] OR;  Service: Orthopedics;  Laterality: Left;    ARTHROSCOPIC TENOTOMY OF BICEPS TENDON Left 11/17/2022    Procedure: TENOTOMY, BICEPS, ARTHROSCOPIC;  Surgeon: Steven  MD Tessa;  Location: [facility];  Service: Orthopedics;  Laterality: Left;    ARTHROSCOPY OF SHOULDER WITH DECOMPRESSION OF SUBACROMIAL SPACE Left 11/17/2022    Procedure: ARTHROSCOPY, SHOULDER, WITH SUBACROMIAL SPACE DECOMPRESSION;  Surgeon: Steven Zarate MD;  Location: [facility];  Service: Orthopedics;  Laterality: Left;    bladder suspension      COLONOSCOPY  ~24 years old    Dr. Neri, colon polyps per patient report    COLONOSCOPY N/A 08/21/2020    Procedure: COLONOSCOPY;  Surgeon: Yessi Pal MD;  Location: [facility];  Service: Endoscopy;  Laterality: N/A;    DIAGNOSTIC LAPAROSCOPY N/A 01/06/2022    Procedure: LAPAROSCOPY, DIAGNOSTIC;  Surgeon: Jack Sargent MD;  Location: [facility];  Service: OB/GYN;  Laterality: N/A;    DIAGNOSTIC LAPAROSCOPY N/A 10/11/2024    Procedure: LAPAROSCOPY, DIAGNOSTIC;  Surgeon: Jack Sargent MD;  Location: [facility];  Service: OB/GYN;  Laterality: N/A;    dx lap      ESOPHAGOGASTRODUODENOSCOPY N/A 01/22/2020    Procedure: EGD (ESOPHAGOGASTRODUODENOSCOPY);  Surgeon: Yessi Pal MD;  Location: [facility];  Service: Endoscopy;  Laterality: N/A;    hammer toe Right 11/19/2015    heavenly toe repeair with callus filing    LAPAROSCOPIC SALPINGECTOMY Left 10/11/2024    Procedure: SALPINGECTOMY, LAPAROSCOPIC;  Surgeon: Jack Sargent MD;  Location: [facility];  Service: OB/GYN;  Laterality: Left;    LAPAROSCOPIC SUSPENSION OF UTEROSACRAL LIGAMENT Bilateral 01/06/2022    Procedure: SUSPENSION, LIGAMENT, UTEROSACRAL, LAPAROSCOPIC;  Surgeon: Jack Sargent MD;  Location: [facility];  Service: OB/GYN;  Laterality: Bilateral;    LAPAROSCOPIC TREATMENT OF ECTOPIC PREGNANCY Left 10/11/2024    Procedure: REMOVAL, ECTOPIC PREGNANCY, LAPAROSCOPIC;  Surgeon: Jack Sargent MD;  Location: [facility];  Service: OB/GYN;  Laterality: Left;    LAPAROSCOPY  ~2012    done for infertility concern    TONSILLECTOMY       Family History   Problem Relation Name Age of Onset    No Known  Problems Paternal Grandfather      No Known Problems Paternal Grandmother      Heart disease Maternal Grandmother      No Known Problems Maternal Grandfather      Schizophrenia Father      Thyroid disease Mother          hyperthyroidism    Hypertension Mother      Hypertension Sister      No Known Problems Sister      Breast cancer Cousin          maternal    Ovarian cancer Neg Hx      Eczema Neg Hx      Lupus Neg Hx      Psoriasis Neg Hx      Melanoma Neg Hx      Cancer Neg Hx      Colon cancer Neg Hx      Crohn's disease Neg Hx      Ulcerative colitis Neg Hx      Stomach cancer Neg Hx      Esophageal cancer Neg Hx      Uterine cancer Neg Hx       Social History[1]  Wt Readings from Last 10 Encounters:   04/30/25 103.8 kg (228 lb 13.4 oz)   04/28/25 104.7 kg (230 lb 13.2 oz)   12/12/24 100.7 kg (222 lb 0.1 oz)   12/02/24 100.1 kg (220 lb 10.9 oz)   10/17/24 101.7 kg (224 lb 3.3 oz)   10/11/24 99.2 kg (218 lb 11.1 oz)   10/09/24 99 kg (218 lb 4.1 oz)   10/08/24 99 kg (218 lb 5.9 oz)   07/08/24 99.1 kg (218 lb 7.6 oz)   04/15/24 100.5 kg (221 lb 9 oz)     Lab Results   Component Value Date    WBC 6.05 10/09/2024    HGB 11.9 (L) 10/09/2024    HCT 36.8 (L) 10/09/2024    MCV 92 10/09/2024     10/09/2024     CMP  Sodium   Date Value Ref Range Status   04/30/2025 140 136 - 145 mmol/L Final     Potassium   Date Value Ref Range Status   04/30/2025 4.2 3.5 - 5.1 mmol/L Final     Chloride   Date Value Ref Range Status   04/30/2025 106 95 - 110 mmol/L Final     CO2   Date Value Ref Range Status   04/30/2025 25 23 - 29 mmol/L Final     Glucose   Date Value Ref Range Status   04/30/2025 83 70 - 110 mg/dL Final     BUN   Date Value Ref Range Status   04/30/2025 14 6 - 20 mg/dL Final     Creatinine   Date Value Ref Range Status   04/30/2025 0.8 0.5 - 1.4 mg/dL Final     Calcium   Date Value Ref Range Status   04/30/2025 8.8 8.7 - 10.5 mg/dL Final     Protein Total   Date Value Ref Range Status   04/30/2025 7.2 6.0 - 8.4  "gm/dL Final     Albumin   Date Value Ref Range Status   04/30/2025 3.7 3.5 - 5.2 g/dL Final     Bilirubin Total   Date Value Ref Range Status   04/30/2025 0.5 0.1 - 1.0 mg/dL Final     Comment:     For infants and newborns, interpretation of results should be based   on gestational age, weight and in agreement with clinical   observations.    Premature Infant recommended reference ranges:   0-24 hours:  <8.0 mg/dL   24-48 hours: <12.0 mg/dL   3-5 days:    <15.0 mg/dL   6-29 days:   <15.0 mg/dL     ALP   Date Value Ref Range Status   04/30/2025 66 40 - 150 unit/L Final     AST   Date Value Ref Range Status   04/30/2025 18 11 - 45 unit/L Final     ALT   Date Value Ref Range Status   04/30/2025 14 10 - 44 unit/L Final     Anion Gap   Date Value Ref Range Status   04/30/2025 9 8 - 16 mmol/L Final     eGFR if    Date Value Ref Range Status   06/11/2022 >60 >60 mL/min/1.73 m^2 Final     eGFR if non    Date Value Ref Range Status   06/11/2022 >60 >60 mL/min/1.73 m^2 Final     Comment:     Calculation used to obtain the estimated glomerular filtration  rate (eGFR) is the CKD-EPI equation.        No results found for: "AMYLASE"  No results found for: "LIPASE"  No results found for: "LIPASERES"  Lab Results   Component Value Date    TSH 1.140 04/29/2025       Reviewed prior medical records including radiology report of OV with Dr Pal 10/2023 & endoscopy history (see surgical history).    Objective:      Physical Exam  Vitals and nursing note reviewed.   Constitutional:       General: She is not in acute distress.     Appearance: She is not ill-appearing.   HENT:      Head: Normocephalic and atraumatic.      Mouth/Throat:      Mouth: Mucous membranes are moist.      Pharynx: Oropharynx is clear.   Eyes:      Conjunctiva/sclera: Conjunctivae normal.   Cardiovascular:      Rate and Rhythm: Normal rate and regular rhythm.      Pulses: Normal pulses.   Pulmonary:      Effort: Pulmonary effort is " normal. No respiratory distress.   Abdominal:      General: Abdomen is flat. There is distension.      Palpations: Abdomen is soft.      Tenderness: There is no abdominal tenderness.   Skin:     General: Skin is warm and dry.      Capillary Refill: Capillary refill takes 2 to 3 seconds.   Neurological:      Mental Status: She is alert and oriented to person, place, and time.         Assessment:       1. Irritable bowel syndrome with constipation        Plan:       Irritable bowel syndrome with constipation  Abdominal distension noted. R/O pregnancy with beta HCG.    CBC/CMP to rule out abnormal electrolytes/WBCs  X-ray to assess stool burden in colon    Start ibsrela BID once received from pharmacy.  Miralax daily and trial of   - Take 1 capful of Miralax every hour for 4 hours. Repeat the next day if constipation not resolved.  - Increase hydration - aim for 1-2 oz of water for every lb of body weight    -     tenapanor (IBSRELA) 50 mg Tab; Take 50 mg by mouth 2 (two) times daily.  Dispense: 60 tablet; Refill: 11  -     HCG, Quantitative; Future; Expected date: 04/30/2025  -     CBC Auto Differential; Future; Expected date: 04/30/2025  -     Comprehensive Metabolic Panel; Future; Expected date: 04/30/2025  -     X-Ray Abdomen AP 1 View; Future; Expected date: 04/30/2025      Follow up if symptoms worsen or fail to improve.      If no improvement in symptoms or symptoms worsen, call/follow-up at clinic or go to ER.       RAD Berry, ANTONIOPGracielaC    Encounter includes face to face time and non-face to face time preparing to see the patient (eg, review of tests), obtaining and/or reviewing separately obtained history, documenting clinical information in the electronic or other health record, independently interpreting results (not separately reported) and communicating results to the patient/family/caregiver, or care coordination (not separately reported).     A dictation software program was used for this note.  Please expect some simple typographical errors in this note.         [1]   Social History  Tobacco Use    Smoking status: Never    Smokeless tobacco: Never   Substance Use Topics    Alcohol use: No    Drug use: No

## 2025-04-29 ENCOUNTER — LAB VISIT (OUTPATIENT)
Dept: LAB | Facility: HOSPITAL | Age: 40
End: 2025-04-29
Attending: SPECIALIST
Payer: COMMERCIAL

## 2025-04-29 DIAGNOSIS — N91.5 OLIGOMENORRHEA, UNSPECIFIED TYPE: ICD-10-CM

## 2025-04-29 LAB
DHEA-S SERPL-MCNC: 159.7 UG/DL (ref 74.8–410.2)
FSH SERPL-ACNC: 3.36 MIU/ML
GLUCOSE SERPL-MCNC: 70 MG/DL (ref 70–110)
INSULIN SERPL-ACNC: 9 UU/ML
LH SERPL-ACNC: 0.9 MIU/ML
PROLACTIN SERPL IA-MCNC: 11.4 NG/ML (ref 5.2–26.5)
TESTOST SERPL-MCNC: 24 NG/DL (ref 5–73)
TSH SERPL-ACNC: 1.14 UIU/ML (ref 0.4–4)

## 2025-04-29 PROCEDURE — 84146 ASSAY OF PROLACTIN: CPT

## 2025-04-29 PROCEDURE — 84402 ASSAY OF FREE TESTOSTERONE: CPT

## 2025-04-29 PROCEDURE — 83001 ASSAY OF GONADOTROPIN (FSH): CPT

## 2025-04-29 PROCEDURE — 83516 IMMUNOASSAY NONANTIBODY: CPT

## 2025-04-29 PROCEDURE — 36415 COLL VENOUS BLD VENIPUNCTURE: CPT | Mod: PN

## 2025-04-29 PROCEDURE — 83002 ASSAY OF GONADOTROPIN (LH): CPT

## 2025-04-29 PROCEDURE — 82627 DEHYDROEPIANDROSTERONE: CPT

## 2025-04-29 PROCEDURE — 82947 ASSAY GLUCOSE BLOOD QUANT: CPT

## 2025-04-29 PROCEDURE — 84403 ASSAY OF TOTAL TESTOSTERONE: CPT

## 2025-04-29 PROCEDURE — 83525 ASSAY OF INSULIN: CPT

## 2025-04-29 PROCEDURE — 84443 ASSAY THYROID STIM HORMONE: CPT

## 2025-04-30 ENCOUNTER — HOSPITAL ENCOUNTER (OUTPATIENT)
Dept: RADIOLOGY | Facility: HOSPITAL | Age: 40
Discharge: HOME | End: 2025-04-30
Payer: COMMERCIAL

## 2025-04-30 ENCOUNTER — RESULTS FOLLOW-UP (OUTPATIENT)
Dept: GASTROENTEROLOGY | Facility: CLINIC | Age: 40
End: 2025-04-30

## 2025-04-30 ENCOUNTER — OFFICE VISIT (OUTPATIENT)
Dept: GASTROENTEROLOGY | Facility: CLINIC | Age: 40
End: 2025-04-30
Payer: COMMERCIAL

## 2025-04-30 VITALS
HEART RATE: 78 BPM | SYSTOLIC BLOOD PRESSURE: 130 MMHG | BODY MASS INDEX: 33.89 KG/M2 | HEIGHT: 69 IN | DIASTOLIC BLOOD PRESSURE: 84 MMHG | WEIGHT: 228.81 LBS

## 2025-04-30 DIAGNOSIS — K58.1 IRRITABLE BOWEL SYNDROME WITH CONSTIPATION: ICD-10-CM

## 2025-04-30 DIAGNOSIS — K58.1 IRRITABLE BOWEL SYNDROME WITH CONSTIPATION: Primary | ICD-10-CM

## 2025-04-30 DIAGNOSIS — Z87.59 HISTORY OF MISCARRIAGE: ICD-10-CM

## 2025-04-30 PROCEDURE — 3075F SYST BP GE 130 - 139MM HG: CPT | Mod: CPTII,S$GLB,,

## 2025-04-30 PROCEDURE — 3079F DIAST BP 80-89 MM HG: CPT | Mod: CPTII,S$GLB,,

## 2025-04-30 PROCEDURE — 99999 PR PBB SHADOW E&M-EST. PATIENT-LVL IV: CPT | Mod: PBBFAC,,,

## 2025-04-30 PROCEDURE — 74018 RADEX ABDOMEN 1 VIEW: CPT | Mod: TC

## 2025-04-30 PROCEDURE — 99214 OFFICE O/P EST MOD 30 MIN: CPT | Mod: S$GLB,,,

## 2025-04-30 PROCEDURE — 1159F MED LIST DOCD IN RCRD: CPT | Mod: CPTII,S$GLB,,

## 2025-04-30 PROCEDURE — 74018 RADEX ABDOMEN 1 VIEW: CPT | Mod: 26,,, | Performed by: RADIOLOGY

## 2025-04-30 PROCEDURE — 3008F BODY MASS INDEX DOCD: CPT | Mod: CPTII,S$GLB,,

## 2025-04-30 RX ORDER — TENAPANOR HYDROCHLORIDE 53.2 MG/1
50 TABLET ORAL 2 TIMES DAILY
Qty: 60 TABLET | Refills: 11 | Status: SHIPPED | OUTPATIENT
Start: 2025-04-30

## 2025-04-30 NOTE — PATIENT INSTRUCTIONS
- Take 1 capful of Miralax every hour for 4 hours. Repeat the next day if constipation not resolved.  - Increase hydration - aim for 1-2 oz of water for every lb of body weight

## 2025-05-02 ENCOUNTER — HOSPITAL ENCOUNTER (OUTPATIENT)
Dept: RADIOLOGY | Facility: HOSPITAL | Age: 40
Discharge: HOME | End: 2025-05-02
Attending: SPECIALIST
Payer: COMMERCIAL

## 2025-05-02 DIAGNOSIS — N91.5 OLIGOMENORRHEA, UNSPECIFIED TYPE: ICD-10-CM

## 2025-05-02 LAB — W FREE TESTOSTERONE: <0.4 PG/ML

## 2025-05-02 PROCEDURE — 76856 US EXAM PELVIC COMPLETE: CPT | Mod: TC,PN

## 2025-05-02 PROCEDURE — 76830 TRANSVAGINAL US NON-OB: CPT | Mod: 26,,, | Performed by: RADIOLOGY

## 2025-05-02 PROCEDURE — 76856 US EXAM PELVIC COMPLETE: CPT | Mod: 26,,, | Performed by: RADIOLOGY

## 2025-05-06 ENCOUNTER — RESULTS FOLLOW-UP (OUTPATIENT)
Dept: OBSTETRICS AND GYNECOLOGY | Facility: CLINIC | Age: 40
End: 2025-05-06

## 2025-05-08 LAB — 21HYDROXYLASE AB SER QL: NEGATIVE

## 2025-05-09 ENCOUNTER — OFFICE VISIT (OUTPATIENT)
Dept: PSYCHIATRY | Facility: CLINIC | Age: 40
End: 2025-05-09
Payer: COMMERCIAL

## 2025-05-09 DIAGNOSIS — F43.10 PTSD (POST-TRAUMATIC STRESS DISORDER): ICD-10-CM

## 2025-05-09 DIAGNOSIS — F41.1 GAD (GENERALIZED ANXIETY DISORDER): ICD-10-CM

## 2025-05-09 DIAGNOSIS — F33.1 MODERATE EPISODE OF RECURRENT MAJOR DEPRESSIVE DISORDER: Primary | ICD-10-CM

## 2025-05-09 PROCEDURE — 99999 PR PBB SHADOW E&M-EST. PATIENT-LVL II: CPT | Mod: PBBFAC,,,

## 2025-05-09 RX ORDER — SERTRALINE HYDROCHLORIDE 100 MG/1
100 TABLET, FILM COATED ORAL DAILY
Qty: 90 TABLET | Refills: 0 | Status: SHIPPED | OUTPATIENT
Start: 2025-05-09
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
## 2025-06-10 ENCOUNTER — OFFICE VISIT (OUTPATIENT)
Dept: PSYCHIATRY | Facility: CLINIC | Age: 40
End: 2025-06-10
Payer: COMMERCIAL

## 2025-06-10 VITALS
HEART RATE: 74 BPM | SYSTOLIC BLOOD PRESSURE: 122 MMHG | BODY MASS INDEX: 33.69 KG/M2 | DIASTOLIC BLOOD PRESSURE: 84 MMHG | HEIGHT: 69 IN | WEIGHT: 227.5 LBS

## 2025-06-10 DIAGNOSIS — G47.00 INSOMNIA, UNSPECIFIED TYPE: ICD-10-CM

## 2025-06-10 DIAGNOSIS — F33.41 RECURRENT MAJOR DEPRESSIVE DISORDER, IN PARTIAL REMISSION: Primary | ICD-10-CM

## 2025-06-10 DIAGNOSIS — F41.1 GAD (GENERALIZED ANXIETY DISORDER): ICD-10-CM

## 2025-06-10 DIAGNOSIS — F43.10 PTSD (POST-TRAUMATIC STRESS DISORDER): ICD-10-CM

## 2025-06-10 PROCEDURE — 1159F MED LIST DOCD IN RCRD: CPT | Mod: CPTII,S$GLB,,

## 2025-06-10 PROCEDURE — 99999 PR PBB SHADOW E&M-EST. PATIENT-LVL III: CPT | Mod: PBBFAC,,,

## 2025-06-10 PROCEDURE — 3074F SYST BP LT 130 MM HG: CPT | Mod: CPTII,S$GLB,,

## 2025-06-10 PROCEDURE — 3079F DIAST BP 80-89 MM HG: CPT | Mod: CPTII,S$GLB,,

## 2025-06-10 PROCEDURE — 1160F RVW MEDS BY RX/DR IN RCRD: CPT | Mod: CPTII,S$GLB,,

## 2025-06-10 PROCEDURE — 99215 OFFICE O/P EST HI 40 MIN: CPT | Mod: S$GLB,,,

## 2025-06-10 PROCEDURE — G2211 COMPLEX E/M VISIT ADD ON: HCPCS | Mod: S$GLB,,,

## 2025-06-10 PROCEDURE — 3008F BODY MASS INDEX DOCD: CPT | Mod: CPTII,S$GLB,,
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
## 2025-07-10 ENCOUNTER — OFFICE VISIT (OUTPATIENT)
Dept: PSYCHIATRY | Facility: CLINIC | Age: 40
End: 2025-07-10
Payer: COMMERCIAL

## 2025-07-10 VITALS
WEIGHT: 226.06 LBS | HEIGHT: 69 IN | SYSTOLIC BLOOD PRESSURE: 121 MMHG | DIASTOLIC BLOOD PRESSURE: 79 MMHG | HEART RATE: 93 BPM | BODY MASS INDEX: 33.48 KG/M2

## 2025-07-10 DIAGNOSIS — F33.40 RECURRENT MAJOR DEPRESSIVE DISORDER, IN REMISSION: Primary | ICD-10-CM

## 2025-07-10 DIAGNOSIS — F43.10 PTSD (POST-TRAUMATIC STRESS DISORDER): ICD-10-CM

## 2025-07-10 DIAGNOSIS — F41.1 GAD (GENERALIZED ANXIETY DISORDER): ICD-10-CM

## 2025-07-10 PROCEDURE — 99999 PR PBB SHADOW E&M-EST. PATIENT-LVL III: CPT | Mod: PBBFAC,,,

## 2025-07-10 RX ORDER — SERTRALINE HYDROCHLORIDE 100 MG/1
100 TABLET, FILM COATED ORAL DAILY
Qty: 90 TABLET | Refills: 0 | Status: SHIPPED | OUTPATIENT
Start: 2025-07-10
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
## 2025-07-11 ENCOUNTER — PATIENT MESSAGE (OUTPATIENT)
Dept: GASTROENTEROLOGY | Facility: CLINIC | Age: 40
End: 2025-07-11
Payer: COMMERCIAL

## 2025-07-23 ENCOUNTER — OFFICE VISIT (OUTPATIENT)
Dept: GASTROENTEROLOGY | Facility: CLINIC | Age: 40
End: 2025-07-23
Payer: COMMERCIAL

## 2025-07-23 DIAGNOSIS — K58.1 IRRITABLE BOWEL SYNDROME WITH CONSTIPATION: Primary | ICD-10-CM

## 2025-07-23 PROCEDURE — 1159F MED LIST DOCD IN RCRD: CPT | Mod: CPTII,S$GLB,, | Performed by: INTERNAL MEDICINE

## 2025-07-23 PROCEDURE — 99999 PR PBB SHADOW E&M-EST. PATIENT-LVL III: CPT | Mod: PBBFAC,,, | Performed by: INTERNAL MEDICINE

## 2025-07-23 PROCEDURE — 99214 OFFICE O/P EST MOD 30 MIN: CPT | Mod: S$GLB,,, | Performed by: INTERNAL MEDICINE

## 2025-07-23 NOTE — PROGRESS NOTES
[facility] Note    CC: Constipation    HPI 40 y.o. female presents to follow up IBS-C. She had been maintained on Linzess for many years however has lost response to this medication. She was given Ibsrela 50 mg BID   3 months ago and did well on this medication, however has now had to decrease dosage as her insurance denied a refill on this medication and PA was also denied. She reports increased stress due to 2 miscarriages which occurred in the last year      Her last EGD was in January 2020 and was notable for mild gastritis (biopsies negative for celiac and H.pylori). Colonoscopy in August 2020 was normal.      Past Medical History:   Diagnosis Date    Anxiety and depression     Constipation     Encounter for blood transfusion     Endometriosis     GERD (gastroesophageal reflux disease)     History of pneumonia     IBS (irritable bowel syndrome)     Neuromuscular disorder     nerve damage of right foot after hammertoe surgery    PCOS (polycystic ovarian syndrome)     Sleep disturbance     Thyroid disease     Vaginal prolapse        Allergies and Medications reviewed     Review of Systems  General ROS: negative for - chills, fever or weight loss  Cardiovascular ROS: no chest pain or dyspnea on exertion  Gastrointestinal ROS: + consitpation, no vomiting, no blood in stool     Physical Examination  There were no vitals taken for this visit.  General appearance: alert, cooperative, no distress  HENT: Normocephalic, atraumatic, neck symmetrical, no nasal discharge, sclera anicteric   Lungs: clear to auscultation bilaterally, symmetric chest wall expansion bilaterally  Heart: regular rate and rhythm without rub; no displacement of the PMI   Abdomen: soft, nontender,nondistended, BS active, no masses appreciated  Extremities: extremities symmetric; no clubbing, cyanosis, or edema        Labs:  Lab Results   Component Value Date    WBC 6.60 04/30/2025    HGB 11.6 (L) 04/30/2025    HCT 38.3 04/30/2025     MCV 95 04/30/2025     04/30/2025       CMP  Sodium   Date Value Ref Range Status   04/30/2025 140 136 - 145 mmol/L Final     Potassium   Date Value Ref Range Status   04/30/2025 4.2 3.5 - 5.1 mmol/L Final     Chloride   Date Value Ref Range Status   04/30/2025 106 95 - 110 mmol/L Final     CO2   Date Value Ref Range Status   04/30/2025 25 23 - 29 mmol/L Final     Glucose   Date Value Ref Range Status   04/30/2025 83 70 - 110 mg/dL Final     BUN   Date Value Ref Range Status   04/30/2025 14 6 - 20 mg/dL Final     Creatinine   Date Value Ref Range Status   04/30/2025 0.8 0.5 - 1.4 mg/dL Final     Calcium   Date Value Ref Range Status   04/30/2025 8.8 8.7 - 10.5 mg/dL Final     Protein Total   Date Value Ref Range Status   04/30/2025 7.2 6.0 - 8.4 gm/dL Final     Albumin   Date Value Ref Range Status   04/30/2025 3.7 3.5 - 5.2 g/dL Final     Bilirubin Total   Date Value Ref Range Status   04/30/2025 0.5 0.1 - 1.0 mg/dL Final     Comment:     For infants and newborns, interpretation of results should be based   on gestational age, weight and in agreement with clinical   observations.    Premature Infant recommended reference ranges:   0-24 hours:  <8.0 mg/dL   24-48 hours: <12.0 mg/dL   3-5 days:    <15.0 mg/dL   6-29 days:   <15.0 mg/dL     ALP   Date Value Ref Range Status   04/30/2025 66 40 - 150 unit/L Final     AST   Date Value Ref Range Status   04/30/2025 18 11 - 45 unit/L Final     ALT   Date Value Ref Range Status   04/30/2025 14 10 - 44 unit/L Final     Anion Gap   Date Value Ref Range Status   04/30/2025 9 8 - 16 mmol/L Final     eGFR   Date Value Ref Range Status   04/30/2025 >60 >60 mL/min/1.73/m2 Final   10/09/2024 >60 >60 mL/min/1.73 m^2 Final         Imaging:  KUB was independently visualized and reviewed by me and showed Scattered gas throughout the stomach small and large bowel in a nonobstructive pattern. No pathologic abdominal calcification. No acute osseous finding.     Assessment:   40 y.o.  female with long history of IBS-C presents for follow up . Unfortunately she failed OTC Miralax and has lost response to Linzess after taking this medication for several years. She did well on trial of Ibsrela however now her insurance is denying this medication.     Plan:  -Appeal PA denial - medically necessary for patient to have Ibsrela (50 mg BID) as she has failed Miralax and lost response to Linzess  -RTC 6 months     Adrienne Arbour Carona, MD [facility]  [address], [address]  [place] [address]  Office: [phone]  Fax: [phone]

## 2025-08-04 ENCOUNTER — PATIENT MESSAGE (OUTPATIENT)
Dept: OBSTETRICS AND GYNECOLOGY | Facility: CLINIC | Age: 40
End: 2025-08-04
Payer: COMMERCIAL

## 2025-08-06 ENCOUNTER — OFFICE VISIT (OUTPATIENT)
Dept: OBSTETRICS AND GYNECOLOGY | Facility: CLINIC | Age: 40
End: 2025-08-06
Payer: COMMERCIAL

## 2025-08-06 VITALS
SYSTOLIC BLOOD PRESSURE: 122 MMHG | HEIGHT: 69 IN | BODY MASS INDEX: 33.11 KG/M2 | DIASTOLIC BLOOD PRESSURE: 70 MMHG | WEIGHT: 223.56 LBS

## 2025-08-06 DIAGNOSIS — N81.4 UTERINE PROLAPSE: Primary | ICD-10-CM

## 2025-08-06 DIAGNOSIS — N81.6 RECTOCELE: ICD-10-CM

## 2025-08-06 DIAGNOSIS — R10.2 PELVIC PAIN: ICD-10-CM

## 2025-08-06 PROCEDURE — 99999 PR PBB SHADOW E&M-EST. PATIENT-LVL III: CPT | Mod: PBBFAC,,, | Performed by: SPECIALIST

## 2025-08-06 NOTE — PROGRESS NOTES
39 yo BF  presents for follo wup continued complaint vaginal pressure, PP, prolonged menses with associated dysmenorrhea. LMP  which has continued intermittently. Vaginal pressure especially with sitting  Denies overt ZULEIKA, urinary hesitency, c/d  Past Medical History:   Diagnosis Date    Anxiety and depression     Constipation     Encounter for blood transfusion     Endometriosis     GERD (gastroesophageal reflux disease)     History of pneumonia     IBS (irritable bowel syndrome)     Neuromuscular disorder     nerve damage of right foot after hammertoe surgery    PCOS (polycystic ovarian syndrome)     Sleep disturbance     Thyroid disease     Vaginal prolapse        Past Surgical History:   Procedure Laterality Date    ARTHROSCOPIC ACROMIOPLASTY OF SHOULDER Left 2022    Procedure: ACROMIOPLASTY, ARTHROSCOPIC;  Surgeon: Steven Zarate MD;  Location: [facility] OR;  Service: Orthopedics;  Laterality: Left;    ARTHROSCOPIC REPAIR OF ROTATOR CUFF OF SHOULDER Left 2022    Procedure: REPAIR, ROTATOR CUFF, ARTHROSCOPIC;  Surgeon: Steven Zarate MD;  Location: [facility] OR;  Service: Orthopedics;  Laterality: Left;    ARTHROSCOPIC TENOTOMY OF BICEPS TENDON Left 2022    Procedure: TENOTOMY, BICEPS, ARTHROSCOPIC;  Surgeon: Steven Zarate MD;  Location: [facility] OR;  Service: Orthopedics;  Laterality: Left;    ARTHROSCOPY OF SHOULDER WITH DECOMPRESSION OF SUBACROMIAL SPACE Left 2022    Procedure: ARTHROSCOPY, SHOULDER, WITH SUBACROMIAL SPACE DECOMPRESSION;  Surgeon: Steven Zarate MD;  Location: [facility] OR;  Service: Orthopedics;  Laterality: Left;    bladder suspension      COLONOSCOPY  ~24 years old    Dr. Neri, colon polyps per patient report    COLONOSCOPY N/A 2020    Procedure: COLONOSCOPY;  Surgeon: Yessi Pal MD;  Location: [facility];  Service: Endoscopy;  Laterality: N/A;    DIAGNOSTIC LAPAROSCOPY N/A 2022    Procedure: LAPAROSCOPY, DIAGNOSTIC;  Surgeon: Jack RUEDA  MD Rosetta;  Location: [facility];  Service: OB/GYN;  Laterality: N/A;    DIAGNOSTIC LAPAROSCOPY N/A 10/11/2024    Procedure: LAPAROSCOPY, DIAGNOSTIC;  Surgeon: Jack Sargent MD;  Location: [facility];  Service: OB/GYN;  Laterality: N/A;    dx lap      ESOPHAGOGASTRODUODENOSCOPY N/A 01/22/2020    Procedure: EGD (ESOPHAGOGASTRODUODENOSCOPY);  Surgeon: Yessi Pal MD;  Location: [facility];  Service: Endoscopy;  Laterality: N/A;    hammer toe Right 11/19/2015    heavenly toe repeair with callus filing    LAPAROSCOPIC SALPINGECTOMY Left 10/11/2024    Procedure: SALPINGECTOMY, LAPAROSCOPIC;  Surgeon: Jack Sargent MD;  Location: [facility];  Service: OB/GYN;  Laterality: Left;    LAPAROSCOPIC SUSPENSION OF UTEROSACRAL LIGAMENT Bilateral 01/06/2022    Procedure: SUSPENSION, LIGAMENT, UTEROSACRAL, LAPAROSCOPIC;  Surgeon: Jack Sargent MD;  Location: [facility];  Service: OB/GYN;  Laterality: Bilateral;    LAPAROSCOPIC TREATMENT OF ECTOPIC PREGNANCY Left 10/11/2024    Procedure: REMOVAL, ECTOPIC PREGNANCY, LAPAROSCOPIC;  Surgeon: Jack Sargent MD;  Location: [facility];  Service: OB/GYN;  Laterality: Left;    LAPAROSCOPY  ~2012    done for infertility concern    TONSILLECTOMY         Family History   Problem Relation Name Age of Onset    No Known Problems Paternal Grandfather      No Known Problems Paternal Grandmother      Heart disease Maternal Grandmother      No Known Problems Maternal Grandfather      Schizophrenia Father      Thyroid disease Mother          hyperthyroidism    Hypertension Mother      Hypertension Sister      No Known Problems Sister      Breast cancer Cousin          maternal    Ovarian cancer Neg Hx      Eczema Neg Hx      Lupus Neg Hx      Psoriasis Neg Hx      Melanoma Neg Hx      Cancer Neg Hx      Colon cancer Neg Hx      Crohn's disease Neg Hx      Ulcerative colitis Neg Hx      Stomach cancer Neg Hx      Esophageal cancer Neg Hx      Uterine cancer Neg Hx         Social History      Socioeconomic History    Marital status: Single   Tobacco Use    Smoking status: Never    Smokeless tobacco: Never   Substance and Sexual Activity    Alcohol use: No    Drug use: No    Sexual activity: Not Currently     Partners: Male     Comment: OCP helps regulate cycle     Social Drivers of Health     Financial Resource Strain: Low Risk  (9/29/2020)    Overall Financial Resource Strain (CARDIA)     Difficulty of Paying Living Expenses: Not hard at all   Food Insecurity: No Food Insecurity (7/23/2019)    Hunger Vital Sign     Worried About Running Out of Food in the Last Year: Never true     Ran Out of Food in the Last Year: Never true   Transportation Needs: No Transportation Needs (7/23/2019)    PRAPARE - Transportation     Lack of Transportation (Medical): No     Lack of Transportation (Non-Medical): No   Physical Activity: Unknown (9/29/2020)    Exercise Vital Sign     Days of Exercise per Week: 2 days       Current Medications[1]    Review of patient's allergies indicates:  No Known Allergies    Review of System:   General: no chills, fever, night sweats, weight gain or weight loss  Psychological: no depression or suicidal ideation  Breasts: no new or changing breast lumps, nipple discharge or masses.  Respiratory: no cough, shortness of breath, or wheezing  Cardiovascular: no chest pain or dyspnea on exertion  Gastrointestinal: no abdominal pain, change in bowel habits, or black or bloody stools  Genito-Urinary: as above  Musculoskeletal: no gait disturbance or muscular weakness                                               General Appearance    A and O x 4, Cooperative, no distress   Breasts    Abdomen   deferred  Soft, non-tender, bowel sounds active all four quadrants,  no masses, no organomegaly    Genitourinary:   External rectal exam shows no thrombosed external hemorrhoids.   Pelvic exam was performed with patient supine.  No labial fusion.  There is no rash, lesion or injury on the right  labia.  There is no rash, lesion or injury on the left labia.  No bleeding and no signs of injury around the vaginal introitus, urethra is without lesions and well supported. The cervix is visualized with no discharge, lesions or friability.  No vaginal discharge found.  No significant Cystocele, NOTED IS GRADE 1-2 UTERINE PROLAPSE WITH GRADE 2 -3 ON SUPINE VALSALVA  GARDE 1-2 RECTOCELE  Bimanual exam:  The urethra is normal to palpation and there are no palpable vaginal wall masses.  Uterus is not deviated, not enlarged, not fixed, normal shape and BOGGY TO PALP  Cervix exhibits no motion tenderness.   Right adnexum displays no mass and no tenderness.  Left adnexum displays no mass and no tenderness.   Extremities: Extremities normal, atraumatic, no cyanosis or edema                     NOTE  NURSING PERSONAL PRESENT FOR ENTIRE PHYSICAL EXAM     Discussed suspected clinical adenomyosis as well as uterine prolaspe, rectocele. Discussed surgical options including TLH BS with rectocele repair  Pt voices desire for BSO but I counseled her on this and rec ovary sparring procedure  Pt desires to schedule for Dec.  Discussed bleeding precautions and currently bleeding is minimal  Answered all questions    I spent a total of 30 minutes on the day of the visit. This includes face to face time and non-face to face time preparing to see the patient (eg, review of tests), obtaining and/or reviewing separately obtained history, documenting clinical information in the electronic or other health record, independently interpreting results and communicating results to the patient/family/caregiver, or care coordinator.            [1]   Current Outpatient Medications   Medication Sig Dispense Refill    cholecalciferol, vitamin D3, (VITAMIN D3) 50 mcg (2,000 unit) Tab Take 2,000 Units by mouth once daily.      ferrous sulfate (FEOSOL) Tab tablet Take 1 tablet by mouth every evening.      levothyroxine (SYNTHROID) 112 MCG tablet TAKE 1  TABLET BY MOUTH BEFORE BREAKFAST. 90 tablet 3    omeprazole (PRILOSEC) 40 MG capsule TAKE 1 CAPSULE BY MOUTH BEFORE BREAKFAST 90 capsule 3    sertraline (ZOLOFT) 100 MG tablet Take 1 tablet (100 mg total) by mouth once daily. 90 tablet 0    tenapanor (IBSRELA) 50 mg Tab Take 50 mg by mouth 2 (two) times daily. 60 tablet 11    UNABLE TO FIND Take 2 tablets by mouth every evening. [employer] Prenatal Vitamin       No current facility-administered medications for this visit.

## (undated) DEVICE — SYR 3CC LUER LOC

## (undated) DEVICE — SOL IRR NACL .9% 3000ML

## (undated) DEVICE — GLOVE SURGEONS ULTRA TOUCH 6.5

## (undated) DEVICE — TUBING SUC UNIV W/CONN 12FT

## (undated) DEVICE — TOWEL OR DISP STRL BLUE 4/PK

## (undated) DEVICE — GLOVE SURG ULTRA TOUCH 7.5

## (undated) DEVICE — COVER SURG LIGHT HANDLE

## (undated) DEVICE — GOWN POLY REINF SET SLV XL

## (undated) DEVICE — GLOVE BIOGEL PIMICRO INDIC 6.5

## (undated) DEVICE — Device

## (undated) DEVICE — DRAPE THREE-QTR REINF 53X77IN

## (undated) DEVICE — GLOVE SURG ULTRA TOUCH 6

## (undated) DEVICE — TUBING ARTHROSCOPY

## (undated) DEVICE — MAT QUICK 40X30 FLOOR FLUID LF

## (undated) DEVICE — SLEEVE SCD EXPRESS KNEE MEDIUM

## (undated) DEVICE — DRAPE STERI U-SHAPED 47X51IN

## (undated) DEVICE — CONNECTOR TUBING STR 5 IN 1

## (undated) DEVICE — DRG DOC 8.0 X 85MM

## (undated) DEVICE — PAD ABD 8X10 STERILE

## (undated) DEVICE — LOOP PASSING HI-FI

## (undated) DEVICE — DRAPE STERI INSTRUMENT 1018

## (undated) DEVICE — UNDERGLOVE BIOGEL PI SZ 6.5 LF

## (undated) DEVICE — SLING SHLDR IMMOBILIZER MED

## (undated) DEVICE — GLOVE SURG ULTRA TOUCH 8

## (undated) DEVICE — STRAP OR TABLE 5IN X 72IN

## (undated) DEVICE — BLADE SURG CARBON STEEL SZ11

## (undated) DEVICE — DRAPE ORTH SPLIT 77X108IN

## (undated) DEVICE — NDL BLUNT FILL 18G 1IN

## (undated) DEVICE — NDL SPECTRUM AUTOPASS

## (undated) DEVICE — ALCOHOL 70% ISOP RUBBING 4OZ

## (undated) DEVICE — SEE MEDLINE ITEM 157216

## (undated) DEVICE — PACK SHOULDER

## (undated) DEVICE — SET INFLOW TUBE ARTHSCP

## (undated) DEVICE — SPONGE BULKEE II ABSRB 6X6.75

## (undated) DEVICE — MANIFOLD 4 PORT

## (undated) DEVICE — SYR 30CC LUER LOCK

## (undated) DEVICE — DRESSING XEROFORM FOIL PK 1X8

## (undated) DEVICE — ELECTRODE REM PLYHSV RETURN 9

## (undated) DEVICE — APPLICATOR CHLORAPREP ORN 26ML

## (undated) DEVICE — KIT ASSISTARM SH KN STRL

## (undated) DEVICE — TAPE MEDIPORE 3 X 10YD

## (undated) DEVICE — NDL FLTR 5MCRN BLNT TIP 18GX1

## (undated) DEVICE — DRAPE INCISE IOBAN 2 23X33IN

## (undated) DEVICE — GOWN POLY REINF BRTH SLV LG

## (undated) DEVICE — CANNULA DRY DOC 7 X 85

## (undated) DEVICE — NDL SPINAL 18GX3.5 SPINOCAN

## (undated) DEVICE — UNDERGLOVES BIOGEL PI SIZE 8